# Patient Record
Sex: MALE | Race: OTHER | NOT HISPANIC OR LATINO | URBAN - METROPOLITAN AREA
[De-identification: names, ages, dates, MRNs, and addresses within clinical notes are randomized per-mention and may not be internally consistent; named-entity substitution may affect disease eponyms.]

---

## 2023-06-28 ENCOUNTER — INPATIENT (INPATIENT)
Facility: HOSPITAL | Age: 78
LOS: 15 days | Discharge: HOME HEALTH | DRG: 219 | End: 2023-07-14
Attending: THORACIC SURGERY (CARDIOTHORACIC VASCULAR SURGERY) | Admitting: THORACIC SURGERY (CARDIOTHORACIC VASCULAR SURGERY)
Payer: MEDICAID

## 2023-06-28 VITALS
WEIGHT: 146.83 LBS | HEART RATE: 68 BPM | SYSTOLIC BLOOD PRESSURE: 222 MMHG | DIASTOLIC BLOOD PRESSURE: 100 MMHG | HEIGHT: 69.29 IN | OXYGEN SATURATION: 98 % | RESPIRATION RATE: 18 BRPM

## 2023-06-28 DIAGNOSIS — Z90.79 ACQUIRED ABSENCE OF OTHER GENITAL ORGAN(S): Chronic | ICD-10-CM

## 2023-06-28 DIAGNOSIS — Z90.49 ACQUIRED ABSENCE OF OTHER SPECIFIED PARTS OF DIGESTIVE TRACT: Chronic | ICD-10-CM

## 2023-06-28 LAB
A1C WITH ESTIMATED AVERAGE GLUCOSE RESULT: 8.7 % — HIGH (ref 4–5.6)
ALBUMIN SERPL ELPH-MCNC: 3.7 G/DL — SIGNIFICANT CHANGE UP (ref 3.3–5)
ALP SERPL-CCNC: 130 U/L — HIGH (ref 40–120)
ALT FLD-CCNC: 22 U/L — SIGNIFICANT CHANGE UP (ref 10–45)
ANION GAP SERPL CALC-SCNC: 10 MMOL/L — SIGNIFICANT CHANGE UP (ref 5–17)
APTT BLD: 32.4 SEC — SIGNIFICANT CHANGE UP (ref 27.5–35.5)
AST SERPL-CCNC: 28 U/L — SIGNIFICANT CHANGE UP (ref 10–40)
BASOPHILS # BLD AUTO: 0.05 K/UL — SIGNIFICANT CHANGE UP (ref 0–0.2)
BASOPHILS NFR BLD AUTO: 0.7 % — SIGNIFICANT CHANGE UP (ref 0–2)
BILIRUB SERPL-MCNC: 1.1 MG/DL — SIGNIFICANT CHANGE UP (ref 0.2–1.2)
BLD GP AB SCN SERPL QL: NEGATIVE — SIGNIFICANT CHANGE UP
BUN SERPL-MCNC: 47 MG/DL — HIGH (ref 7–23)
CALCIUM SERPL-MCNC: 9.3 MG/DL — SIGNIFICANT CHANGE UP (ref 8.4–10.5)
CHLORIDE SERPL-SCNC: 103 MMOL/L — SIGNIFICANT CHANGE UP (ref 96–108)
CO2 SERPL-SCNC: 19 MMOL/L — LOW (ref 22–31)
CREAT SERPL-MCNC: 1.4 MG/DL — HIGH (ref 0.5–1.3)
EGFR: 51 ML/MIN/1.73M2 — LOW
EOSINOPHIL # BLD AUTO: 0.16 K/UL — SIGNIFICANT CHANGE UP (ref 0–0.5)
EOSINOPHIL NFR BLD AUTO: 2.1 % — SIGNIFICANT CHANGE UP (ref 0–6)
ESTIMATED AVERAGE GLUCOSE: 203 MG/DL — HIGH (ref 68–114)
GLUCOSE BLDC GLUCOMTR-MCNC: 158 MG/DL — HIGH (ref 70–99)
GLUCOSE SERPL-MCNC: 206 MG/DL — HIGH (ref 70–99)
HCT VFR BLD CALC: 43.1 % — SIGNIFICANT CHANGE UP (ref 39–50)
HGB BLD-MCNC: 14.6 G/DL — SIGNIFICANT CHANGE UP (ref 13–17)
IMM GRANULOCYTES NFR BLD AUTO: 0.3 % — SIGNIFICANT CHANGE UP (ref 0–0.9)
INR BLD: 1.08 — SIGNIFICANT CHANGE UP (ref 0.88–1.16)
LYMPHOCYTES # BLD AUTO: 2.06 K/UL — SIGNIFICANT CHANGE UP (ref 1–3.3)
LYMPHOCYTES # BLD AUTO: 27.5 % — SIGNIFICANT CHANGE UP (ref 13–44)
MCHC RBC-ENTMCNC: 32.7 PG — SIGNIFICANT CHANGE UP (ref 27–34)
MCHC RBC-ENTMCNC: 33.9 GM/DL — SIGNIFICANT CHANGE UP (ref 32–36)
MCV RBC AUTO: 96.4 FL — SIGNIFICANT CHANGE UP (ref 80–100)
MONOCYTES # BLD AUTO: 0.53 K/UL — SIGNIFICANT CHANGE UP (ref 0–0.9)
MONOCYTES NFR BLD AUTO: 7.1 % — SIGNIFICANT CHANGE UP (ref 2–14)
NEUTROPHILS # BLD AUTO: 4.68 K/UL — SIGNIFICANT CHANGE UP (ref 1.8–7.4)
NEUTROPHILS NFR BLD AUTO: 62.3 % — SIGNIFICANT CHANGE UP (ref 43–77)
NRBC # BLD: 0 /100 WBCS — SIGNIFICANT CHANGE UP (ref 0–0)
NT-PROBNP SERPL-SCNC: HIGH PG/ML (ref 0–300)
PLATELET # BLD AUTO: 251 K/UL — SIGNIFICANT CHANGE UP (ref 150–400)
POTASSIUM SERPL-MCNC: 4.9 MMOL/L — SIGNIFICANT CHANGE UP (ref 3.5–5.3)
POTASSIUM SERPL-SCNC: 4.9 MMOL/L — SIGNIFICANT CHANGE UP (ref 3.5–5.3)
PROT SERPL-MCNC: 7.8 G/DL — SIGNIFICANT CHANGE UP (ref 6–8.3)
PROTHROM AB SERPL-ACNC: 12.9 SEC — SIGNIFICANT CHANGE UP (ref 10.5–13.4)
RBC # BLD: 4.47 M/UL — SIGNIFICANT CHANGE UP (ref 4.2–5.8)
RBC # FLD: 12.9 % — SIGNIFICANT CHANGE UP (ref 10.3–14.5)
RH IG SCN BLD-IMP: POSITIVE — SIGNIFICANT CHANGE UP
SODIUM SERPL-SCNC: 132 MMOL/L — LOW (ref 135–145)
TSH SERPL-MCNC: 1.44 UIU/ML — SIGNIFICANT CHANGE UP (ref 0.27–4.2)
WBC # BLD: 7.5 K/UL — SIGNIFICANT CHANGE UP (ref 3.8–10.5)
WBC # FLD AUTO: 7.5 K/UL — SIGNIFICANT CHANGE UP (ref 3.8–10.5)

## 2023-06-28 PROCEDURE — 93010 ELECTROCARDIOGRAM REPORT: CPT

## 2023-06-28 RX ORDER — DEXTROSE 50 % IN WATER 50 %
25 SYRINGE (ML) INTRAVENOUS ONCE
Refills: 0 | Status: DISCONTINUED | OUTPATIENT
Start: 2023-06-28 | End: 2023-06-30

## 2023-06-28 RX ORDER — POTASSIUM CHLORIDE 20 MEQ
20 PACKET (EA) ORAL DAILY
Refills: 0 | Status: DISCONTINUED | OUTPATIENT
Start: 2023-06-29 | End: 2023-06-30

## 2023-06-28 RX ORDER — PANTOPRAZOLE SODIUM 20 MG/1
40 TABLET, DELAYED RELEASE ORAL
Refills: 0 | Status: DISCONTINUED | OUTPATIENT
Start: 2023-06-28 | End: 2023-06-30

## 2023-06-28 RX ORDER — HEPARIN SODIUM 5000 [USP'U]/ML
5000 INJECTION INTRAVENOUS; SUBCUTANEOUS EVERY 8 HOURS
Refills: 0 | Status: DISCONTINUED | OUTPATIENT
Start: 2023-06-28 | End: 2023-06-30

## 2023-06-28 RX ORDER — INSULIN LISPRO 100/ML
VIAL (ML) SUBCUTANEOUS
Refills: 0 | Status: DISCONTINUED | OUTPATIENT
Start: 2023-06-28 | End: 2023-06-30

## 2023-06-28 RX ORDER — DEXTROSE 50 % IN WATER 50 %
12.5 SYRINGE (ML) INTRAVENOUS ONCE
Refills: 0 | Status: DISCONTINUED | OUTPATIENT
Start: 2023-06-28 | End: 2023-06-30

## 2023-06-28 RX ORDER — FUROSEMIDE 40 MG
40 TABLET ORAL DAILY
Refills: 0 | Status: DISCONTINUED | OUTPATIENT
Start: 2023-06-29 | End: 2023-06-30

## 2023-06-28 RX ORDER — HYDRALAZINE HCL 50 MG
5 TABLET ORAL ONCE
Refills: 0 | Status: COMPLETED | OUTPATIENT
Start: 2023-06-28 | End: 2023-06-28

## 2023-06-28 RX ORDER — GLUCAGON INJECTION, SOLUTION 0.5 MG/.1ML
1 INJECTION, SOLUTION SUBCUTANEOUS ONCE
Refills: 0 | Status: DISCONTINUED | OUTPATIENT
Start: 2023-06-28 | End: 2023-06-30

## 2023-06-28 RX ORDER — SODIUM CHLORIDE 9 MG/ML
3 INJECTION INTRAMUSCULAR; INTRAVENOUS; SUBCUTANEOUS EVERY 8 HOURS
Refills: 0 | Status: DISCONTINUED | OUTPATIENT
Start: 2023-06-28 | End: 2023-06-30

## 2023-06-28 RX ORDER — SODIUM CHLORIDE 9 MG/ML
1000 INJECTION, SOLUTION INTRAVENOUS
Refills: 0 | Status: DISCONTINUED | OUTPATIENT
Start: 2023-06-28 | End: 2023-06-30

## 2023-06-28 RX ORDER — LOSARTAN POTASSIUM 100 MG/1
100 TABLET, FILM COATED ORAL DAILY
Refills: 0 | Status: DISCONTINUED | OUTPATIENT
Start: 2023-06-28 | End: 2023-06-29

## 2023-06-28 RX ORDER — SENNA PLUS 8.6 MG/1
2 TABLET ORAL AT BEDTIME
Refills: 0 | Status: DISCONTINUED | OUTPATIENT
Start: 2023-06-28 | End: 2023-06-30

## 2023-06-28 RX ORDER — DEXTROSE 50 % IN WATER 50 %
15 SYRINGE (ML) INTRAVENOUS ONCE
Refills: 0 | Status: DISCONTINUED | OUTPATIENT
Start: 2023-06-28 | End: 2023-06-30

## 2023-06-28 RX ADMIN — Medication 5 MILLIGRAM(S): at 22:31

## 2023-06-28 RX ADMIN — Medication 2: at 23:23

## 2023-06-28 NOTE — H&P ADULT - HISTORY OF PRESENT ILLNESS
76 YO Male w/ PMHx of HTN and DM who originally presented to [facility] c/o chest pain. He underwent a stress test which revealed small sized, reversible, myocardial perfusion defect of the anteroapical wall c/w ischemia. TTE revealed moderate AS and severe AR, nml LVEF. Patient referred for cardiac cath which revealed non-obstructive CAD. He was then transferred to [facility] under the care of Dr. Koehler for further work-up and intervention of severe AI and AS. At present ___.  78 YO Syrian-speaking Male w/ PMHx of HTN and DM who originally presented to [facility] on 6/26 c/o chest pain x 3 months. Chest pain associated with LE edema, cough and phlegm, which was unresolved with OTC medication. Patient lives in [facility] and states that he has not seen a doctor about his symptoms until coming to the [place]. At [facility] he underwent a stress test which revealed small sized, reversible, myocardial perfusion defect of the anteroapical wall c/w ischemia. TTE revealed moderate AS and severe AR, nml LVEF. Patient referred for cardiac cath which revealed non-obstructive CAD. He was then transferred to [facility] under the care of Dr. Koehler for further work-up and intervention of severe AI and AS. At present patient admits to feeling like his heart is racing, but denies fever, chills chest pain, SOB, N/V/D.   ID#[id number] utilized.

## 2023-06-28 NOTE — H&P ADULT - NSHPPHYSICALEXAM_GEN_ALL_CORE
PHYSICAL EXAM:  GENERAL: NAD, lying in bed comfortably  HEAD:  Atraumatic, Normocephalic  EYES: EOMI, PERRLA, conjunctiva and sclera clear  ENT: Moist mucous membranes  NECK: Supple, No JVD  CHEST/LUNG: Clear to auscultation bilaterally; No rales, rhonchi, wheezing, or rubs. Unlabored respirations  HEART: Regular rate and rhythm; No murmurs, rubs, or gallops  ABDOMEN: Bowel sounds present; Soft, Nontender, Nondistended. No hepatomegally  EXTREMITIES:  2+ Peripheral Pulses, brisk capillary refill. No clubbing, cyanosis, or edema  NERVOUS SYSTEM:  Alert & Oriented X3, speech clear. No deficits PHYSICAL EXAM:  GENERAL: NAD, sitting upright in bed  HEAD:  Atraumatic, Normocephalic  EYES: EOMI, PERRLA, conjunctiva and sclera clear  ENT: Moist mucous membranes  NECK: Supple, No JVD  CHEST/LUNG: CTAB  HEART: RRR  ABDOMEN: Soft, Nontender, Nondistended. No hepatomegally  EXTREMITIES:  2+ Peripheral Pulses, brisk capillary refill. No clubbing, cyanosis, or edema  NERVOUS SYSTEM:  Alert & Oriented X3, speech clear. No deficits

## 2023-06-28 NOTE — H&P ADULT - NSICDXPASTMEDICALHX_GEN_ALL_CORE_FT
PAST MEDICAL HISTORY:  DM (diabetes mellitus)     HTN (hypertension)     Severe aortic regurgitation

## 2023-06-28 NOTE — H&P ADULT - ASSESSMENT
78 YO Male w/ PMHx of HTN and DM who originally presented to [facility] c/o chest pain. He underwent a stress test which revealed small sized, reversible, myocardial perfusion defect of the anteroapical wall c/w ischemia. TTE revealed moderate AS and severe AR, nml LVEF. Patient referred for cardiac cath which revealed non-obstructive CAD. He was then transferred to [facility] under the care of Dr. Koehler for further work-up and intervention of severe AI and AS.     Plan:    Neurovascular:   -Pain well controlled with current regimen. PRN's: Tylenol    Cardiovascular:   -Severe AS and AI  -F/u PST  -Hx of HTN  -Hemodynamically stable.   -Monitor: BP, HR, tele    Respiratory:   -Oxygenating well on room air  -Encourage continued use of IS 10x/hr and frequent ambulation  -CXR: pending    GI:  -GI PPX: Protonix  -PO Diet  -Bowel Regimen: Senna    Renal / :  -Continue to monitor renal function: BUN/Cr  -Monitor I/O's daily     Endocrine:    -Hx of DM  -A1c:  -ISS  -No hx of thyroid dx  -TSH:    Hematologic:  -CBC: H/H-  -Coagulation Panel.    ID:  -Temperature: Afebrile  -CBC: WBC-  -Continue to observe for SIRS/Sepsis Syndrome.    Prophylaxis:  -DVT prophylaxis with 5000 SubQ Heparin q8h.  -Continue with SCD's b/l while patient is at rest     Disposition:  -Discharge home once patient is medically ready   76 YO Ghanaian-speaking Male w/ PMHx of HTN and DM who originally presented to [facility] on 6/26 c/o chest pain x 3 months. Chest pain associated with LE edema, cough and phlegm, which was unresolved with OTC medication. Patient lives in [facility] and states that he has not seen a doctor about his symptoms until coming to the [place]. At [facility] he underwent a stress test which revealed small sized, reversible, myocardial perfusion defect of the anteroapical wall c/w ischemia. TTE revealed moderate AS and severe AR, nml LVEF. Patient referred for cardiac cath which revealed non-obstructive CAD. He was then transferred to [facility] under the care of Dr. Koehler for further work-up and intervention of severe AI and AS.     Plan:    Neurovascular:   -Pain well controlled with current regimen. PRN's: Tylenol    Cardiovascular:   -Severe AS and AI  -F/u PST  -Surgical plan pending  -Hx of HTN  -Given IVP Hydralazine for BP 170s  -Home meds: Losartan 100mg, Bisoprolol 2.5mg QD  -Hemodynamically stable.   -Monitor: BP, HR, tele    Respiratory:   -Oxygenating well on room air  -Encourage continued use of IS 10x/hr and frequent ambulation  -CXR: pending    GI:  -GI PPX: Protonix  -PO Diet  -Bowel Regimen: Senna    Renal / :  -Continue to monitor renal function: BUN/Cr: pending  -Monitor I/O's daily     Endocrine:    -Hx of DM  -A1c: pending  -ISS  -No hx of  thyroid dx  -TSH: pending    Hematologic:  -CBC: H/H- pending  -Coagulation Panel.    ID:  -Temperature: Afebrile  -CBC: WBC- pending  -Continue to observe for SIRS/Sepsis Syndrome.    Prophylaxis:  -DVT prophylaxis with 5000 SubQ Heparin q8h.  -Continue with SCD's b/l while patient is at rest     Disposition:  -OR plan pending

## 2023-06-28 NOTE — H&P ADULT - NSHPREVIEWOFSYSTEMS_GEN_ALL_CORE
REVIEW OF SYSTEMS:    CONSTITUTIONAL: No weakness, fevers or chills  EYES/ENT: No visual changes;  No vertigo or throat pain   NECK: No pain or stiffness  RESPIRATORY: No cough, wheezing, hemoptysis; No shortness of breath  CARDIOVASCULAR: No chest pain or palpitations  GASTROINTESTINAL: No abdominal or epigastric pain. No nausea, vomiting, or hematemesis; No diarrhea or constipation. No melena or hematochezia.  GENITOURINARY: No dysuria, frequency or hematuria  NEUROLOGICAL: No numbness or weakness  SKIN: No itching, burning, rashes, or lesions   All other review of systems is negative unless indicated above. REVIEW OF SYSTEMS:    CONSTITUTIONAL: No weakness, fevers or chills  EYES/ENT: No visual changes;  No vertigo or throat pain   NECK: No pain or stiffness  RESPIRATORY: No cough, wheezing, hemoptysis; No shortness of breath  CARDIOVASCULAR: +heart racing. No chest pain or palpitations  GASTROINTESTINAL: No abdominal or epigastric pain. No nausea, vomiting, or hematemesis; No diarrhea or constipation. No melena or hematochezia.  GENITOURINARY: No dysuria, frequency or hematuria  NEUROLOGICAL: No numbness or weakness  SKIN: No itching, burning, rashes, or lesions   All other review of systems is negative unless indicated above.

## 2023-06-29 ENCOUNTER — TRANSCRIPTION ENCOUNTER (OUTPATIENT)
Age: 78
End: 2023-06-29

## 2023-06-29 PROBLEM — Z00.00 ENCOUNTER FOR PREVENTIVE HEALTH EXAMINATION: Status: ACTIVE | Noted: 2023-06-29

## 2023-06-29 LAB
A1C WITH ESTIMATED AVERAGE GLUCOSE RESULT: 8.6 % — HIGH (ref 4–5.6)
ALBUMIN SERPL ELPH-MCNC: 3.5 G/DL — SIGNIFICANT CHANGE UP (ref 3.3–5)
ALP SERPL-CCNC: 117 U/L — SIGNIFICANT CHANGE UP (ref 40–120)
ALT FLD-CCNC: 19 U/L — SIGNIFICANT CHANGE UP (ref 10–45)
ANION GAP SERPL CALC-SCNC: 10 MMOL/L — SIGNIFICANT CHANGE UP (ref 5–17)
APPEARANCE UR: CLEAR — SIGNIFICANT CHANGE UP
AST SERPL-CCNC: 25 U/L — SIGNIFICANT CHANGE UP (ref 10–40)
BACTERIA # UR AUTO: PRESENT /HPF
BASOPHILS # BLD AUTO: 0.03 K/UL — SIGNIFICANT CHANGE UP (ref 0–0.2)
BASOPHILS NFR BLD AUTO: 0.3 % — SIGNIFICANT CHANGE UP (ref 0–2)
BILIRUB SERPL-MCNC: 1.2 MG/DL — SIGNIFICANT CHANGE UP (ref 0.2–1.2)
BILIRUB UR-MCNC: NEGATIVE — SIGNIFICANT CHANGE UP
BLD GP AB SCN SERPL QL: NEGATIVE — SIGNIFICANT CHANGE UP
BUN SERPL-MCNC: 44 MG/DL — HIGH (ref 7–23)
CALCIUM SERPL-MCNC: 8.7 MG/DL — SIGNIFICANT CHANGE UP (ref 8.4–10.5)
CHLORIDE SERPL-SCNC: 102 MMOL/L — SIGNIFICANT CHANGE UP (ref 96–108)
CHOLEST SERPL-MCNC: 163 MG/DL — SIGNIFICANT CHANGE UP
CHOLEST SERPL-MCNC: 169 MG/DL — SIGNIFICANT CHANGE UP
CO2 SERPL-SCNC: 21 MMOL/L — LOW (ref 22–31)
COLOR SPEC: YELLOW — SIGNIFICANT CHANGE UP
COMMENT - URINE: SIGNIFICANT CHANGE UP
CREAT SERPL-MCNC: 1.41 MG/DL — HIGH (ref 0.5–1.3)
DIFF PNL FLD: NEGATIVE — SIGNIFICANT CHANGE UP
EGFR: 50 ML/MIN/1.73M2 — LOW
EOSINOPHIL # BLD AUTO: 0.2 K/UL — SIGNIFICANT CHANGE UP (ref 0–0.5)
EOSINOPHIL NFR BLD AUTO: 2.3 % — SIGNIFICANT CHANGE UP (ref 0–6)
EPI CELLS # UR: ABNORMAL /HPF (ref 0–5)
ESTIMATED AVERAGE GLUCOSE: 200 MG/DL — HIGH (ref 68–114)
GLUCOSE BLDC GLUCOMTR-MCNC: 110 MG/DL — HIGH (ref 70–99)
GLUCOSE BLDC GLUCOMTR-MCNC: 121 MG/DL — HIGH (ref 70–99)
GLUCOSE BLDC GLUCOMTR-MCNC: 142 MG/DL — HIGH (ref 70–99)
GLUCOSE BLDC GLUCOMTR-MCNC: 187 MG/DL — HIGH (ref 70–99)
GLUCOSE BLDC GLUCOMTR-MCNC: 202 MG/DL — HIGH (ref 70–99)
GLUCOSE BLDC GLUCOMTR-MCNC: 272 MG/DL — HIGH (ref 70–99)
GLUCOSE SERPL-MCNC: 147 MG/DL — HIGH (ref 70–99)
GLUCOSE UR QL: 250
HCT VFR BLD CALC: 41.7 % — SIGNIFICANT CHANGE UP (ref 39–50)
HDLC SERPL-MCNC: 39 MG/DL — LOW
HDLC SERPL-MCNC: 44 MG/DL — SIGNIFICANT CHANGE UP
HGB BLD-MCNC: 14 G/DL — SIGNIFICANT CHANGE UP (ref 13–17)
IMM GRANULOCYTES NFR BLD AUTO: 0.5 % — SIGNIFICANT CHANGE UP (ref 0–0.9)
KETONES UR-MCNC: NEGATIVE — SIGNIFICANT CHANGE UP
LEUKOCYTE ESTERASE UR-ACNC: NEGATIVE — SIGNIFICANT CHANGE UP
LIPID PNL WITH DIRECT LDL SERPL: 109 MG/DL — HIGH
LIPID PNL WITH DIRECT LDL SERPL: 99 MG/DL — SIGNIFICANT CHANGE UP
LYMPHOCYTES # BLD AUTO: 2.41 K/UL — SIGNIFICANT CHANGE UP (ref 1–3.3)
LYMPHOCYTES # BLD AUTO: 27.7 % — SIGNIFICANT CHANGE UP (ref 13–44)
MCHC RBC-ENTMCNC: 32.5 PG — SIGNIFICANT CHANGE UP (ref 27–34)
MCHC RBC-ENTMCNC: 33.6 GM/DL — SIGNIFICANT CHANGE UP (ref 32–36)
MCV RBC AUTO: 96.8 FL — SIGNIFICANT CHANGE UP (ref 80–100)
MONOCYTES # BLD AUTO: 0.65 K/UL — SIGNIFICANT CHANGE UP (ref 0–0.9)
MONOCYTES NFR BLD AUTO: 7.5 % — SIGNIFICANT CHANGE UP (ref 2–14)
NEUTROPHILS # BLD AUTO: 5.37 K/UL — SIGNIFICANT CHANGE UP (ref 1.8–7.4)
NEUTROPHILS NFR BLD AUTO: 61.7 % — SIGNIFICANT CHANGE UP (ref 43–77)
NITRITE UR-MCNC: NEGATIVE — SIGNIFICANT CHANGE UP
NON HDL CHOLESTEROL: 119 MG/DL — SIGNIFICANT CHANGE UP
NON HDL CHOLESTEROL: 130 MG/DL — HIGH
NRBC # BLD: 0 /100 WBCS — SIGNIFICANT CHANGE UP (ref 0–0)
PH UR: 5.5 — SIGNIFICANT CHANGE UP (ref 5–8)
PLATELET # BLD AUTO: 250 K/UL — SIGNIFICANT CHANGE UP (ref 150–400)
POTASSIUM SERPL-MCNC: 4.5 MMOL/L — SIGNIFICANT CHANGE UP (ref 3.5–5.3)
POTASSIUM SERPL-SCNC: 4.5 MMOL/L — SIGNIFICANT CHANGE UP (ref 3.5–5.3)
PROT SERPL-MCNC: 7.4 G/DL — SIGNIFICANT CHANGE UP (ref 6–8.3)
PROT UR-MCNC: 30 MG/DL
RBC # BLD: 4.31 M/UL — SIGNIFICANT CHANGE UP (ref 4.2–5.8)
RBC # FLD: 12.9 % — SIGNIFICANT CHANGE UP (ref 10.3–14.5)
RBC CASTS # UR COMP ASSIST: < 5 /HPF — SIGNIFICANT CHANGE UP
RH IG SCN BLD-IMP: POSITIVE — SIGNIFICANT CHANGE UP
SODIUM SERPL-SCNC: 133 MMOL/L — LOW (ref 135–145)
SP GR SPEC: 1.02 — SIGNIFICANT CHANGE UP (ref 1–1.03)
TRIGL SERPL-MCNC: 105 MG/DL — SIGNIFICANT CHANGE UP
TRIGL SERPL-MCNC: 98 MG/DL — SIGNIFICANT CHANGE UP
UROBILINOGEN FLD QL: 0.2 E.U./DL — SIGNIFICANT CHANGE UP
WBC # BLD: 8.7 K/UL — SIGNIFICANT CHANGE UP (ref 3.8–10.5)
WBC # FLD AUTO: 8.7 K/UL — SIGNIFICANT CHANGE UP (ref 3.8–10.5)
WBC UR QL: ABNORMAL /HPF

## 2023-06-29 PROCEDURE — 93010 ELECTROCARDIOGRAM REPORT: CPT

## 2023-06-29 PROCEDURE — 93880 EXTRACRANIAL BILAT STUDY: CPT | Mod: 26

## 2023-06-29 PROCEDURE — 94010 BREATHING CAPACITY TEST: CPT | Mod: 26

## 2023-06-29 PROCEDURE — 71046 X-RAY EXAM CHEST 2 VIEWS: CPT | Mod: 26

## 2023-06-29 RX ORDER — INSULIN LISPRO 100/ML
5 VIAL (ML) SUBCUTANEOUS
Refills: 0 | Status: DISCONTINUED | OUTPATIENT
Start: 2023-06-29 | End: 2023-06-30

## 2023-06-29 RX ORDER — CEFTRIAXONE 500 MG/1
1000 INJECTION, POWDER, FOR SOLUTION INTRAMUSCULAR; INTRAVENOUS EVERY 24 HOURS
Refills: 0 | Status: DISCONTINUED | OUTPATIENT
Start: 2023-06-29 | End: 2023-06-30

## 2023-06-29 RX ORDER — INSULIN GLARGINE 100 [IU]/ML
10 INJECTION, SOLUTION SUBCUTANEOUS AT BEDTIME
Refills: 0 | Status: DISCONTINUED | OUTPATIENT
Start: 2023-06-29 | End: 2023-06-30

## 2023-06-29 RX ORDER — BNT162B2 ORIGINAL AND OMICRON BA.4/BA.5 .1125; .1125 MG/2.25ML; MG/2.25ML
0.3 INJECTION, SUSPENSION INTRAMUSCULAR ONCE
Refills: 0 | Status: DISCONTINUED | OUTPATIENT
Start: 2023-06-29 | End: 2023-06-29

## 2023-06-29 RX ORDER — CHLORHEXIDINE GLUCONATE 213 G/1000ML
1 SOLUTION TOPICAL ONCE
Refills: 0 | Status: COMPLETED | OUTPATIENT
Start: 2023-06-29 | End: 2023-06-29

## 2023-06-29 RX ORDER — CHLORHEXIDINE GLUCONATE 213 G/1000ML
1 SOLUTION TOPICAL ONCE
Refills: 0 | Status: DISCONTINUED | OUTPATIENT
Start: 2023-06-29 | End: 2023-07-01

## 2023-06-29 RX ORDER — CHLORHEXIDINE GLUCONATE 213 G/1000ML
1 SOLUTION TOPICAL ONCE
Refills: 0 | Status: COMPLETED | OUTPATIENT
Start: 2023-06-30 | End: 2023-06-30

## 2023-06-29 RX ORDER — CHLORHEXIDINE GLUCONATE 213 G/1000ML
15 SOLUTION TOPICAL ONCE
Refills: 0 | Status: COMPLETED | OUTPATIENT
Start: 2023-06-30 | End: 2023-06-30

## 2023-06-29 RX ADMIN — HEPARIN SODIUM 5000 UNIT(S): 5000 INJECTION INTRAVENOUS; SUBCUTANEOUS at 06:42

## 2023-06-29 RX ADMIN — Medication 4: at 17:13

## 2023-06-29 RX ADMIN — Medication 40 MILLIGRAM(S): at 06:42

## 2023-06-29 RX ADMIN — PANTOPRAZOLE SODIUM 40 MILLIGRAM(S): 20 TABLET, DELAYED RELEASE ORAL at 06:42

## 2023-06-29 RX ADMIN — Medication 2: at 14:14

## 2023-06-29 RX ADMIN — INSULIN GLARGINE 10 UNIT(S): 100 INJECTION, SOLUTION SUBCUTANEOUS at 23:35

## 2023-06-29 RX ADMIN — SODIUM CHLORIDE 3 MILLILITER(S): 9 INJECTION INTRAMUSCULAR; INTRAVENOUS; SUBCUTANEOUS at 22:45

## 2023-06-29 RX ADMIN — CEFTRIAXONE 100 MILLIGRAM(S): 500 INJECTION, POWDER, FOR SOLUTION INTRAMUSCULAR; INTRAVENOUS at 14:14

## 2023-06-29 RX ADMIN — HEPARIN SODIUM 5000 UNIT(S): 5000 INJECTION INTRAVENOUS; SUBCUTANEOUS at 22:26

## 2023-06-29 RX ADMIN — HEPARIN SODIUM 5000 UNIT(S): 5000 INJECTION INTRAVENOUS; SUBCUTANEOUS at 14:13

## 2023-06-29 RX ADMIN — CHLORHEXIDINE GLUCONATE 1 APPLICATION(S): 213 SOLUTION TOPICAL at 22:27

## 2023-06-29 RX ADMIN — SODIUM CHLORIDE 3 MILLILITER(S): 9 INJECTION INTRAMUSCULAR; INTRAVENOUS; SUBCUTANEOUS at 14:14

## 2023-06-29 RX ADMIN — SENNA PLUS 2 TABLET(S): 8.6 TABLET ORAL at 22:26

## 2023-06-29 NOTE — PROGRESS NOTE ADULT - SUBJECTIVE AND OBJECTIVE BOX
HISTORY OF PRESENT ILLNESS  Akash Johnson is a 78-year-old male with a past medical history of type 2 diabetes mellitus, and hypertension who originally presented to [facility] (6/26/23) complaining of chest pain for the previous 3 months. He lives in [facility] and stated that he had not seen a doctor about his symptoms until [place] the [place]. The patient underwent a stress test which revealed a small sized, reversible, myocardial perfusion defect of the anteroapical wall consistent with ischemia. TTE revealed moderate aortic stenosis and severe aortic regurgitation with normal LVEF. The patient was referred for cardiac cath which revealed non-obstructive coronary artery disease. He was then transferred to [facility] under the care of Dr. Koehler for further work-up and intervention of severe aortic stenosis. Endocrinology was consulted for recommendations regarding diabetes management.     CAPILLARY BLOOD GLUCOSE & INSULIN RECEIVED  158 mg/dL (06-28 @ 22:51) ? 2 units of lispro sliding scale.   142 mg/dL (06-29 @ 06:52) ? Ø  272 mg/dL (06-29 @ 11:35) ?     DIABETES HISTORY  - Age at diagnosis:   - Symptoms at time of diagnosis:   - Current Therapy:  - History of other regimens:   - History of hypoglycemia:   - History of DKA/HHS:   - Complications:   - Home FSG:        > Fasting: *** mg/dL.        > Before meals: *** mg/dL.        > Bedtime: *** mg/dL.  - Diet:          > Breakfast:         > Lunch:        > Dinner:        > Snacks:  - Physical activity:    - Outpatient follow-up:     PAST MEDICAL & SURGICAL HISTORY  As per history of present illness.     FAMILY HISTORY  - Diabetes:  - Thyroid:  - Autoimmune:  - Other:    SOCIAL HISTORY  - Work:  - Alcohol:  - Smoking:  - Recreational Drugs:    ALLERGIES  Allergy Status Unknown    CURRENT MEDICATIONS  cefTRIAXone   IVPB 1000 milliGRAM(s) IV Intermittent every 24 hours  dextrose 5%. 1000 milliLiter(s) IV Continuous <Continuous>  dextrose 5%. 1000 milliLiter(s) IV Continuous <Continuous>  dextrose 50% Injectable 25 Gram(s) IV Push once  dextrose 50% Injectable 12.5 Gram(s) IV Push once  dextrose 50% Injectable 25 Gram(s) IV Push once  dextrose Oral Gel 15 Gram(s) Oral once PRN  furosemide    Tablet 40 milliGRAM(s) Oral daily  glucagon  Injectable 1 milliGRAM(s) IntraMuscular once  heparin   Injectable 5000 Unit(s) SubCutaneous every 8 hours  insulin lispro (ADMELOG) corrective regimen sliding scale   SubCutaneous three times a day before meals  pantoprazole    Tablet 40 milliGRAM(s) Oral before breakfast  potassium chloride    Tablet ER 20 milliEquivalent(s) Oral daily  senna 2 Tablet(s) Oral at bedtime  sodium chloride 0.9% lock flush 3 milliLiter(s) IV Push every 8 hours    REVIEW OF SYSTEMS  Constitutional:  Negative fever, chills or loss of appetite.  Eyes:  Negative blurry vision or double vision.  Cardiovascular:  Negative for chest pain or palpitations.  Respiratory:  Negative for cough, wheezing, or shortness of breath.   Gastrointestinal:  Negative for nausea, vomiting, diarrhea, constipation, or abdominal pain.  Genitourinary:  Negative frequency, urgency or dysuria.  Neurologic:  No headache, confusion, dizziness, lightheadedness.    PHYSICAL EXAM  Vital Signs Last 24 Hrs  T(C): 36.8 (29 Jun 2023 08:57), Max: 36.8 (29 Jun 2023 08:57)  T(F): 98.3 (29 Jun 2023 08:57), Max: 98.3 (29 Jun 2023 08:57)  HR: 74 (29 Jun 2023 08:30) (68 - 74)  BP: 137/65 (29 Jun 2023 08:30) (113/72 - 222/100)  BP(mean): 94 (29 Jun 2023 08:30) (87 - 101)  RR: 17 (29 Jun 2023 08:30) (17 - 18)  SpO2: 95% (29 Jun 2023 08:30) (95% - 98%)    Parameters below as of 29 Jun 2023 08:30  Patient On (Oxygen Delivery Method): room air    Constitutional: Awake, alert, in no acute distress.   HEENT: Normocephalic, atraumatic, SCARLETT, no proptosis or lid retraction.   Neck: supple, no acanthosis, no thyromegaly or palpable thyroid nodules.  Respiratory: Lungs clear to ausculation bilaterally.   Cardiovascular: regular rhythm, normal S1 and S2, no audible murmurs.   GI: soft, non-tender, non-distended, bowel sounds present, no masses appreciated.  Extremities: No lower extremity edema, peripheral pulses present.   Skin: no rashes.   Psychiatric: AAO x 3. Normal affect/mood.     LABS  CBC - WBC/HGB/HTC/PLT: 8.70/14.0/41.7/250 (06-29-23)  BMP: Na/K/Cl/Bicarb/BUN/Cr/Gluc: 133/4.5/102/21/44/1.41/147 (06-29-23)  Anion Gap: 10 (06-29-23)  eGFR: 50 (06-29-23)  Calcium: 8.7 (06-29-23)  Phosphorus: -- (06-29-23)  Magnesium: -- (06-29-23)  LFT - Alb/Tprot/Tbili/Dbili/AlkPhos/ALT/AST: 3.5/--/1.2/--/117/19/25 (06-29-23)  PT/aPTT/INR: 12.9/32.4/1.08 (06-28-23)  Thyroid Stimulating Hormone, Serum: 1.440 (06-28-23)    ASSESSMENT / RECOMMENDATIONS  Mr. Johnson is a 78-year-old male with a past medical history of type 2 diabetes mellitus, hypertension and severe aortic stenosis who was transferred to [facility] for further work-up and intervention of severe aortic stenosis. Endocrinology was consulted for recommendations regarding diabetes management.     A1C: 8.6 %  BUN: 44  Creatinine: 1.41  GFR: 50  Weight: 66.6  BMI: 21.5    # Type 2 diabetes mellitus with hyperglycemia  - Please continue lantus *** units at bedtime.   - Continue lispro *** units before each meal.  - Continue lispro moderate / low dose sliding scale before meals and at bedtime.  - Patient's fingerstick glucose goal is 100-180 mg/dL.    - Discharge recommendations to be discussed.   - Patient can follow up at discharge with [facility] by calling [phone] to make an appointment.      Case discussed with Dr. Berry. Primary team updated.       Milton Laura    Endocrinology Fellow    Service Pager: [phone]

## 2023-06-29 NOTE — PROGRESS NOTE ADULT - ASSESSMENT
78 YO Bahamian-speaking Male w/ PMHx of HTN and DM who originally presented to [facility] on 6/26 c/o chest pain x 3 months. Chest pain associated with LE edema, cough and phlegm, which was unresolved with OTC medication. Patient lives in [facility] and states that he has not seen a doctor about his symptoms until coming to the [place]. At [facility] he underwent a stress test which revealed small sized, reversible, myocardial perfusion defect of the anteroapical wall c/w ischemia. TTE revealed moderate AS and severe AR, nml LVEF. Patient referred for cardiac cath which revealed non-obstructive CAD. He was then transferred to [facility] under the care of Dr. Koehler for further work-up and intervention of severe AI and AS. Pt planned for OR tomorrow for aortic valve replacement.     Plan:    Neurovascular:   -Pain well controlled with current regimen. PRN's: NA    Cardiovascular:   -Hemodynamically stable.   -Monitor: BP, HR, tele  -AS-OR tomorrow for aortic valve replacement  -c/w lasix 40 PO BID  -HTN    -c/w metoprolol 12.5 BID      Respiratory:   -Oxygenating well on room air  -Encourage continued use of IS 10x/hr and frequent ambulation  -CXR: WNL    GI:  -GI PPX: protonix  -PO Diet  -Bowel Regimen: senna    Renal / :  -Continue to monitor renal function: BUN/Cr 44/1.4  -Monitor I/O's daily     Endocrine:    -No hx of DM or thyroid dx  -A1c: 8.6  -ISS, awaiting endo reccs  -TSH: 1.4    Hematologic:  -CBC: H/H- 14/41  -Coagulation Panel.    ID:  -Temperature: 36.5  -CBC: WBC- 8.7  -Continue to observe for SIRS/Sepsis Syndrome.    Prophylaxis:  -DVT prophylaxis with 5000 SubQ Heparin q8h.  -Continue with SCD's b/l while patient is at rest     Disposition:  -OR tomorrow for aortic valve replacement

## 2023-06-29 NOTE — PROGRESS NOTE ADULT - SUBJECTIVE AND OBJECTIVE BOX
Planned Date of Surgery: 6/30                                                                                                           Surgeon/Attending MD: Rodo    Procedure: aortic valve replacement    Subjective: Pt feeling well, no acute complaints at this time    HPI:  78y Male    PAST MEDICAL & SURGICAL HISTORY:  HTN (hypertension)      DM (diabetes mellitus)      Severe aortic regurgitation      History of cholecystectomy      H/O prostatectomy          Allergy Status Unknown      Vitals:  T(C): 36.1 (06-29-23 @ 14:42), Max: 36.8 (06-29-23 @ 08:57)  HR: 72 (06-29-23 @ 14:10) (68 - 74)  BP: 156/70 (06-29-23 @ 14:10) (113/72 - 222/100)  RR: 16 (06-29-23 @ 14:10) (16 - 18)  SpO2: 97% (06-29-23 @ 14:10) (95% - 98%)      PHYSICAL EXAM:  General: resting comfortably in chair in NAD  Neurological: AOx3. Motor skills grossly intact  Cardiovascular: Normal S1/S2. Regular rate/rhythm. No murmurs  Respiratory: Lungs CTA bilaterally. No wheezing or rales  Gastrointestinal: +BS in all 4 quadrants. Non-distended. Soft. Non-tender  Extremities: Strength 5/5 b/l upper/lower extremities. Sensation grossly intact upper/lower extremities. No edema. No calf tenderness.  Vascular: Radial 2+bilaterally, DP 2+ b/l  Incision Sites: none      MEDICATIONS  (STANDING):  cefTRIAXone   IVPB 1000 milliGRAM(s) IV Intermittent every 24 hours  chlorhexidine 4% Liquid 1 Application(s) Topical once  chlorhexidine 4% Liquid 1 Application(s) Topical once  dextrose 5%. 1000 milliLiter(s) (50 mL/Hr) IV Continuous <Continuous>  dextrose 5%. 1000 milliLiter(s) (100 mL/Hr) IV Continuous <Continuous>  dextrose 50% Injectable 25 Gram(s) IV Push once  dextrose 50% Injectable 12.5 Gram(s) IV Push once  dextrose 50% Injectable 25 Gram(s) IV Push once  furosemide    Tablet 40 milliGRAM(s) Oral daily  glucagon  Injectable 1 milliGRAM(s) IntraMuscular once  heparin   Injectable 5000 Unit(s) SubCutaneous every 8 hours  insulin lispro (ADMELOG) corrective regimen sliding scale   SubCutaneous three times a day before meals  pantoprazole    Tablet 40 milliGRAM(s) Oral before breakfast  potassium chloride    Tablet ER 20 milliEquivalent(s) Oral daily  senna 2 Tablet(s) Oral at bedtime  sodium chloride 0.9% lock flush 3 milliLiter(s) IV Push every 8 hours    MEDICATIONS  (PRN):  dextrose Oral Gel 15 Gram(s) Oral once PRN Blood Glucose LESS THAN 70 milliGRAM(s)/deciliter      On Beta Blocker? YES / NO / CONTRAINDICATED Reason_______________    Labs:                        14.0   8.70  )-----------( 250      ( 29 Jun 2023 05:30 )             41.7     06-29    133<L>  |  102  |  44<H>  ----------------------------<  147<H>  4.5   |  21<L>  |  1.41<H>    Ca    8.7      29 Jun 2023 05:30    TPro  7.4  /  Alb  3.5  /  TBili  1.2  /  DBili  x   /  AST  25  /  ALT  19  /  AlkPhos  117  06-29    PT/INR - ( 28 Jun 2023 22:13 )   PT: 12.9 sec;   INR: 1.08          PTT - ( 28 Jun 2023 22:13 )  PTT:32.4 sec  Urinalysis Basic - ( 29 Jun 2023 05:30 )    Color: x / Appearance: x / SG: x / pH: x  Gluc: 147 mg/dL / Ketone: x  / Bili: x / Urobili: x   Blood: x / Protein: x / Nitrite: x   Leuk Esterase: x / RBC: x / WBC x   Sq Epi: x / Non Sq Epi: x / Bacteria: x      ABO Interpretation: A (06-29-23 @ 12:01)            Preoperative Checklist: (x = completed, n/a = not applicable, p = pending)  [ x ] ECHO  [ x ] CXR  [ x ] Carotid Duplex  [ na ] CT imaging  [ x ] PFTs  [ x ] UA  [ x ] Baseline Labs (CMP, CBC w/ diff, PTT, PT/INR)  [ x ] A1c  [ x ] TSH  [ x ] Lipid panel  [ x ] Cardiac enzymes  [ x ] Pro BNP  [ x ] EKG   [ x ] T&S 1  [ x ] T&S 2 (within 72 hours)  [ na ] COVID PCR (within 72 hours)  [ na ] Room air ABG  [ na ] P2Y12  [ na ] bHCG  [ x ] Consents  [ x ] Pre-op Orders Placed  [x  ] Blood Products Ordered  [ x ] NPO at midnight  [ na ] Conduit tested    Abnormal/Noteworthy Preoperative Testing Results:     < from: US Duplex Carotid Arteries Complete, Bilateral (06.29.23 @ 13:55) >    IMPRESSION:  1. No significant hemodynamic stenosis of either carotid artery.    Measurement of carotid stenosis is based on velocity parameters that   correlate the residual internal carotid diameter with that of the more   distal vessel in accordance witha method such as the [place] American   Symptomatic Carotid Endarterectomy Trial (NASCET).    < end of copied text >

## 2023-06-29 NOTE — PATIENT PROFILE ADULT - FALL HARM RISK - RISK INTERVENTIONS
Assistance with ambulation/Communicate Fall Risk and Risk Factors to all staff, patient, and family/Monitor gait and stability/Reinforce activity limits and safety measures with patient and family/Sit up slowly, dangle for a short time, stand at bedside before walking/Visual Cue: Yellow wristband/Bed in lowest position, wheels locked, appropriate side rails in place/Call bell, personal items and telephone in reach/Instruct patient to call for assistance before getting out of bed or chair/Non-slip footwear when patient is out of bed/[place] to call system/Physically safe environment - no spills, clutter or unnecessary equipment/Purposeful Proactive Rounding/Room/bathroom lighting operational, light cord in reach

## 2023-06-29 NOTE — CONSULT NOTE ADULT - SUBJECTIVE AND OBJECTIVE BOX
HISTORY OF PRESENT ILLNESS  Akash Johnson is a 78-year-old male with a past medical history of type 2 diabetes mellitus, and hypertension who originally presented to [facility] (23) complaining of chest pain for the previous 3 months. He lives in [place] and stated that he had not seen a doctor about his symptoms until coming to the [place]. The patient underwent a stress test which revealed a small sized, reversible, myocardial perfusion defect of the anteroapical wall consistent with ischemia. TTE revealed moderate aortic stenosis and severe aortic regurgitation with normal LVEF. The patient was referred for cardiac cath which revealed non-obstructive coronary artery disease. He was then transferred to [facility] under the care of Dr. Koehler for further work-up and intervention of severe aortic stenosis. Endocrinology was consulted for recommendations regarding diabetes management.     CAPILLARY BLOOD GLUCOSE & INSULIN RECEIVED  158 mg/dL ( @ 22:51) ? 2 units of lispro sliding scale.   142 mg/dL ( @ 06:52) ? Ø  272 mg/dL ( @ 11:35) ? 2 units of lispro sliding scale.     DIABETES HISTORY  - Age at diagnosis: 12-13 years ago.   - Symptoms at time of diagnosis: Polyuria, polydipsia, weight loss (lost ~50 lbs).   - Current Therapy: Jardiance Duo 12.5-850 mg (Empagliflozin-Metformin) 1/2 tablet daily.   - History of other regimens: He was previously taking Glucovance 5-500 mg (Glyburide-Metformin) 1 tablet daily for ~11 years; however, the therapy was switched ~3 months ago after he followed with a doctor in [place] and reported experiencing persistent hypoglycemia to 60s mg/dL in the morning. He says that he was experiencing lows in the morning for a long time before the medication was changed, probably for years.   - History of hypoglycemia: None since his medication was changed to current regimen.   - History of DKA/HHS: Denied.   - Complications: He has never had a dilated eye exam. Denied having vision problems. Denied having neuropathy symptoms.   - Home FSG: He only checks 2-3 times per week, usually in the morning before breakfast, around 8-9 AM.        > Fastin-120 mg/dL.  - Diet:          > Breakfast: Coffee (with Splenda) and eggs +/- tomato juice (made with one tomato + water + 1 Splenda) or blueberry juice (8-10 blueberries + water + 1 Splenda).         > Lunch: Always eats in a restaurant close to his house as he has no one to cook for him. Usually has noodle soup + meat OR rice (1/2 plate) + beans + meat OR rice (1/2 plate) + potatoes.         > Dinner: Rice (1/2 plate) + meat OR omelette OR coffee with 1 bread.         > Snacks: He has a small piece of cake 2-3 times per month during family gatherings. Occasionally, he might have a glass of diluted coke during this gatherings (1/2 regular coke + 1/2 water).   - Outpatient follow-up: None.     PAST MEDICAL & SURGICAL HISTORY  As per history of present illness.     FAMILY HISTORY  - Diabetes: Mother (she was one pills for a long time, used insulin when older). Siblings (4 out of 9 have diabetes).   - Thyroid: Reports having family with thyroid issues.   - Autoimmune: Denied.     SOCIAL HISTORY  - Work: Retired, used to work as a .   - Alcohol: Denied.   - Smoking: Denied.   - Recreational Drugs: Denied.     ALLERGIES  Allergy Status Unknown    CURRENT MEDICATIONS  cefTRIAXone   IVPB 1000 milliGRAM(s) IV Intermittent every 24 hours  dextrose 5%. 1000 milliLiter(s) IV Continuous <Continuous>  dextrose 5%. 1000 milliLiter(s) IV Continuous <Continuous>  dextrose 50% Injectable 25 Gram(s) IV Push once  dextrose 50% Injectable 12.5 Gram(s) IV Push once  dextrose 50% Injectable 25 Gram(s) IV Push once  dextrose Oral Gel 15 Gram(s) Oral once PRN  furosemide    Tablet 40 milliGRAM(s) Oral daily  glucagon  Injectable 1 milliGRAM(s) IntraMuscular once  heparin   Injectable 5000 Unit(s) SubCutaneous every 8 hours  insulin lispro (ADMELOG) corrective regimen sliding scale   SubCutaneous three times a day before meals  pantoprazole    Tablet 40 milliGRAM(s) Oral before breakfast  potassium chloride    Tablet ER 20 milliEquivalent(s) Oral daily  senna 2 Tablet(s) Oral at bedtime  sodium chloride 0.9% lock flush 3 milliLiter(s) IV Push every 8 hours    REVIEW OF SYSTEMS  Constitutional:  Negative fever, chills or loss of appetite.  Cardiovascular:  Negative for chest pain or palpitations.  Respiratory:  Negative for cough, wheezing, or shortness of breath.   Gastrointestinal:  Negative for nausea, vomiting, diarrhea, constipation, or abdominal pain.  Neurologic:  No headache, confusion, dizziness, lightheadedness.    PHYSICAL EXAM  Vital Signs Last 24 Hrs  T(C): 36.8 (2023 08:57), Max: 36.8 (2023 08:57)  T(F): 98.3 (2023 08:57), Max: 98.3 (2023 08:57)  HR: 74 (2023 08:30) (68 - 74)  BP: 137/65 (2023 08:30) (113/72 - 222/100)  BP(mean): 94 (2023 08:30) (87 - 101)  RR: 17 (2023 08:30) (17 - 18)  SpO2: 95% (2023 08:30) (95% - 98%)    Parameters below as of 2023 08:30  Patient On (Oxygen Delivery Method): room air    Constitutional: Awake, alert, elderly male, in no acute distress.   HEENT: Normocephalic, atraumatic, SCARLETT.  Respiratory: Lungs clear to ausculation bilaterally.   Cardiovascular: regular rhythm, normal S1 and S2, no audible murmurs.   GI: soft, non-tender, non-distended, bowel sounds present.  Extremities: No lower extremity edema.   Psychiatric: AAO x 3.    LABS  CBC - WBC/HGB/HTC/PLT: 8.70/14.0/41.7/250 (23)  BMP: Na/K/Cl/Bicarb/BUN/Cr/Gluc: 133/4.5/102/21/44/1.41/147 (23)  Anion Gap: 10 (23)  eGFR: 50 (23)  Calcium: 8.7 (23)  Phosphorus: -- (23)  Magnesium: -- (23)  LFT - Alb/Tprot/Tbili/Dbili/AlkPhos/ALT/AST: 3.5/--/1.2/--/117/19/25 (23)  PT/aPTT/INR: 12.9/32.4/1.08 (23)  Thyroid Stimulating Hormone, Serum: 1.440 (23)    ASSESSMENT / RECOMMENDATIONS  Mr. Johnson is a 78-year-old male with a past medical history of type 2 diabetes mellitus, hypertension and severe aortic stenosis who was transferred to [facility] for further work-up and intervention of severe aortic stenosis. Endocrinology was consulted for recommendations regarding diabetes management.     A1C: 8.6 %  BUN: 44  Creatinine: 1.41  GFR: 50  Weight: 66.6  BMI: 21.5    # Type 2 diabetes mellitus with hyperglycemia  - Please continue lantus *** units at bedtime.   - Continue lispro *** units before each meal.  - Continue lispro moderate / low dose sliding scale before meals and at bedtime.  - Patient's fingerstick glucose goal is 100-180 mg/dL.    - Discharge recommendations to be discussed.   - Patient can follow up at discharge with [facility] by calling [phone] to make an appointment.      Case discussed with Dr. Berry. Primary team updated.       Milton Laura    Endocrinology Fellow    Service Pager: [phone]    HISTORY OF PRESENT ILLNESS  Akash Johnson is a 78-year-old male with a past medical history of type 2 diabetes mellitus, and hypertension who originally presented to [facility] (23) complaining of chest pain for the previous 3 months. He lives in [facility] and stated that he had not seen a doctor about his symptoms until [place] the [place]. The patient underwent a stress test which revealed a small sized, reversible, myocardial perfusion defect of the anteroapical wall consistent with ischemia. TTE revealed moderate aortic stenosis and severe aortic regurgitation with normal LVEF. The patient was referred for cardiac cath which revealed non-obstructive coronary artery disease. He was then transferred to [facility] under the care of Dr. Koehler for further work-up and intervention of severe aortic stenosis. Endocrinology was consulted for recommendations regarding diabetes management.     CAPILLARY BLOOD GLUCOSE & INSULIN RECEIVED  158 mg/dL ( @ 22:51) ? 2 units of lispro sliding scale.   142 mg/dL ( @ 06:52) ? Ø  272 mg/dL ( @ 11:35) ? 2 units of lispro sliding scale.     DIABETES HISTORY  - Age at diagnosis: 12-13 years ago.   - Symptoms at time of diagnosis: Polyuria, polydipsia, weight loss (lost ~50 lbs).   - Current Therapy: Jardiance Duo 12.5-850 mg (Empagliflozin-Metformin) 1/2 tablet daily.   - History of other regimens: He was previously taking Glucovance 5-500 mg (Glyburide-Metformin) 1 tablet daily for ~11 years; however, the therapy was switched ~3 months ago after he followed with a doctor in [facility] and reported experiencing persistent hypoglycemia to 60s mg/dL in the morning. He says that he was experiencing lows in the morning for a long time before the medication was changed, probably for years.   - History of hypoglycemia: None since his medication was changed to current regimen.   - History of DKA/HHS: Denied.   - Complications: He has never had a dilated eye exam. Denied having vision problems. Denied having neuropathy symptoms.   - Home FSG: He only checks 2-3 times per week, usually in the morning before breakfast, around 8-9 AM.        > Fastin-120 mg/dL.  - Diet:          > Breakfast: Coffee (with Splenda) and eggs +/- tomato juice (made with one tomato + water + 1 Splenda) or blueberry juice (8-10 blueberries + water + 1 Splenda).         > Lunch: Always eats in a restaurant close to his house as he has no one to cook for him. Usually has noodle soup + meat OR rice (1/2 plate) + beans + meat OR rice (1/2 plate) + potatoes.         > Dinner: Rice (1/2 plate) + meat OR omelette OR coffee with 1 bread.         > Snacks: He has a small piece of cake 2-3 times per month during family gatherings. Occasionally, he might have a glass of diluted coke during this gatherings (1/2 regular coke + 1/2 water).   - Outpatient follow-up: None.     PAST MEDICAL & SURGICAL HISTORY  As per history of present illness.     FAMILY HISTORY  - Diabetes: Mother (she was one pills for a long time, used insulin when older). Siblings (4 out of 9 have diabetes).   - Thyroid: Reports having family with thyroid issues.   - Autoimmune: Denied.     SOCIAL HISTORY  - Work: Retired, used to work as a .   - Alcohol: Denied.   - Smoking: Denied.   - Recreational Drugs: Denied.     ALLERGIES  Allergy Status Unknown    CURRENT MEDICATIONS  cefTRIAXone   IVPB 1000 milliGRAM(s) IV Intermittent every 24 hours  dextrose 5%. 1000 milliLiter(s) IV Continuous <Continuous>  dextrose 5%. 1000 milliLiter(s) IV Continuous <Continuous>  dextrose 50% Injectable 25 Gram(s) IV Push once  dextrose 50% Injectable 12.5 Gram(s) IV Push once  dextrose 50% Injectable 25 Gram(s) IV Push once  dextrose Oral Gel 15 Gram(s) Oral once PRN  furosemide    Tablet 40 milliGRAM(s) Oral daily  glucagon  Injectable 1 milliGRAM(s) IntraMuscular once  heparin   Injectable 5000 Unit(s) SubCutaneous every 8 hours  insulin lispro (ADMELOG) corrective regimen sliding scale   SubCutaneous three times a day before meals  pantoprazole    Tablet 40 milliGRAM(s) Oral before breakfast  potassium chloride    Tablet ER 20 milliEquivalent(s) Oral daily  senna 2 Tablet(s) Oral at bedtime  sodium chloride 0.9% lock flush 3 milliLiter(s) IV Push every 8 hours    REVIEW OF SYSTEMS  Constitutional:  Negative fever, chills or loss of appetite.  Cardiovascular:  Negative for chest pain or palpitations.  Respiratory:  Negative for cough, wheezing, or shortness of breath.   Gastrointestinal:  Negative for nausea, vomiting, diarrhea, constipation, or abdominal pain.  Neurologic:  No headache, confusion, dizziness, lightheadedness.    PHYSICAL EXAM  Vital Signs Last 24 Hrs  T(C): 36.8 (2023 08:57), Max: 36.8 (2023 08:57)  T(F): 98.3 (2023 08:57), Max: 98.3 (2023 08:57)  HR: 74 (2023 08:30) (68 - 74)  BP: 137/65 (2023 08:30) (113/72 - 222/100)  BP(mean): 94 (2023 08:30) (87 - 101)  RR: 17 (2023 08:30) (17 - 18)  SpO2: 95% (2023 08:30) (95% - 98%)    Parameters below as of 2023 08:30  Patient On (Oxygen Delivery Method): room air    Constitutional: Awake, alert, elderly male, in no acute distress.   HEENT: Normocephalic, atraumatic, SCARLETT.  Respiratory: Lungs clear to ausculation bilaterally.   Cardiovascular: regular rhythm, normal S1 and S2, no audible murmurs.   GI: soft, non-tender, non-distended, bowel sounds present.  Extremities: No lower extremity edema.   Psychiatric: AAO x 3.    LABS  CBC - WBC/HGB/HTC/PLT: 8.70/14.0/41.7/250 (23)  BMP: Na/K/Cl/Bicarb/BUN/Cr/Gluc: 133/4.5/102/21/44/1.41/147 (23)  Anion Gap: 10 (23)  eGFR: 50 (23)  Calcium: 8.7 (23)  Phosphorus: -- (23)  Magnesium: -- (23)  LFT - Alb/Tprot/Tbili/Dbili/AlkPhos/ALT/AST: 3.5/--/1.2/--/117/19/25 (23)  PT/aPTT/INR: 12.9/32.4/1.08 (23)  Thyroid Stimulating Hormone, Serum: 1.440 (23)    ASSESSMENT / RECOMMENDATIONS  Mr. Johnson is a 78-year-old male with a past medical history of type 2 diabetes mellitus, hypertension and severe aortic stenosis who was transferred to [facility] for further work-up and intervention of severe aortic stenosis. Endocrinology was consulted for recommendations regarding diabetes management.     A1C: 8.6 %  BUN: 44  Creatinine: 1.41  GFR: 50  Weight: 66.6  BMI: 21.5    # Type 2 diabetes mellitus with hyperglycemia  - Please START lantus 10 units at bedtime.   - START lispro 5 units before each meal.  - Continue lispro moderate dose sliding scale before meals and at bedtime.  - Patient's fingerstick glucose goal is 100-180 mg/dL.    - Discharge recommendations to be discussed.   - Patient can follow up at discharge with [facility] by calling [phone] to make an appointment.      Case discussed with Dr. Berry. Primary team updated.       Milton Laura    Endocrinology Fellow    Service Pager: [phone]

## 2023-06-30 ENCOUNTER — APPOINTMENT (OUTPATIENT)
Dept: CARDIOTHORACIC SURGERY | Facility: HOSPITAL | Age: 78
End: 2023-06-30

## 2023-06-30 ENCOUNTER — RESULT REVIEW (OUTPATIENT)
Age: 78
End: 2023-06-30

## 2023-06-30 LAB
ALBUMIN SERPL ELPH-MCNC: 2.8 G/DL — LOW (ref 3.3–5)
ALBUMIN SERPL ELPH-MCNC: 3.3 G/DL — SIGNIFICANT CHANGE UP (ref 3.3–5)
ALBUMIN SERPL ELPH-MCNC: 3.5 G/DL — SIGNIFICANT CHANGE UP (ref 3.3–5)
ALBUMIN SERPL ELPH-MCNC: 3.6 G/DL — SIGNIFICANT CHANGE UP (ref 3.3–5)
ALP SERPL-CCNC: 114 U/L — SIGNIFICANT CHANGE UP (ref 40–120)
ALP SERPL-CCNC: 85 U/L — SIGNIFICANT CHANGE UP (ref 40–120)
ALP SERPL-CCNC: 88 U/L — SIGNIFICANT CHANGE UP (ref 40–120)
ALP SERPL-CCNC: 94 U/L — SIGNIFICANT CHANGE UP (ref 40–120)
ALT FLD-CCNC: 16 U/L — SIGNIFICANT CHANGE UP (ref 10–45)
ALT FLD-CCNC: 17 U/L — SIGNIFICANT CHANGE UP (ref 10–45)
ALT FLD-CCNC: 17 U/L — SIGNIFICANT CHANGE UP (ref 10–45)
ALT FLD-CCNC: 19 U/L — SIGNIFICANT CHANGE UP (ref 10–45)
ANION GAP SERPL CALC-SCNC: 12 MMOL/L — SIGNIFICANT CHANGE UP (ref 5–17)
ANION GAP SERPL CALC-SCNC: 12 MMOL/L — SIGNIFICANT CHANGE UP (ref 5–17)
ANION GAP SERPL CALC-SCNC: 13 MMOL/L — SIGNIFICANT CHANGE UP (ref 5–17)
ANION GAP SERPL CALC-SCNC: 16 MMOL/L — SIGNIFICANT CHANGE UP (ref 5–17)
APTT BLD: 35.9 SEC — HIGH (ref 27.5–35.5)
APTT BLD: 46.4 SEC — HIGH (ref 27.5–35.5)
APTT BLD: 60.7 SEC — HIGH (ref 27.5–35.5)
APTT BLD: 63.2 SEC — HIGH (ref 27.5–35.5)
AST SERPL-CCNC: 25 U/L — SIGNIFICANT CHANGE UP (ref 10–40)
AST SERPL-CCNC: 41 U/L — HIGH (ref 10–40)
AST SERPL-CCNC: 41 U/L — HIGH (ref 10–40)
AST SERPL-CCNC: 45 U/L — HIGH (ref 10–40)
BASE EXCESS BLDA CALC-SCNC: -1.8 MMOL/L — SIGNIFICANT CHANGE UP (ref -2–3)
BASE EXCESS BLDA CALC-SCNC: -2.5 MMOL/L — LOW (ref -2–3)
BASE EXCESS BLDA CALC-SCNC: -2.6 MMOL/L — LOW (ref -2–3)
BASE EXCESS BLDA CALC-SCNC: -3.5 MMOL/L — LOW (ref -2–3)
BASE EXCESS BLDA CALC-SCNC: -3.7 MMOL/L — LOW (ref -2–3)
BASE EXCESS BLDA CALC-SCNC: -3.8 MMOL/L — LOW (ref -2–3)
BASE EXCESS BLDV CALC-SCNC: -1.5 MMOL/L — SIGNIFICANT CHANGE UP (ref -2–3)
BASE EXCESS BLDV CALC-SCNC: -1.5 MMOL/L — SIGNIFICANT CHANGE UP (ref -2–3)
BASE EXCESS BLDV CALC-SCNC: -2.7 MMOL/L — LOW (ref -2–3)
BASE EXCESS BLDV CALC-SCNC: -6.9 MMOL/L — LOW (ref -2–3)
BILIRUB SERPL-MCNC: 0.9 MG/DL — SIGNIFICANT CHANGE UP (ref 0.2–1.2)
BILIRUB SERPL-MCNC: 1 MG/DL — SIGNIFICANT CHANGE UP (ref 0.2–1.2)
BILIRUB SERPL-MCNC: 1.4 MG/DL — HIGH (ref 0.2–1.2)
BILIRUB SERPL-MCNC: 1.6 MG/DL — HIGH (ref 0.2–1.2)
BUN SERPL-MCNC: 44 MG/DL — HIGH (ref 7–23)
BUN SERPL-MCNC: 46 MG/DL — HIGH (ref 7–23)
BUN SERPL-MCNC: 46 MG/DL — HIGH (ref 7–23)
BUN SERPL-MCNC: 60 MG/DL — HIGH (ref 7–23)
CA-I BLDA-SCNC: 0.86 MMOL/L — LOW (ref 1.15–1.33)
CA-I BLDA-SCNC: 0.93 MMOL/L — LOW (ref 1.15–1.33)
CA-I BLDA-SCNC: 1.15 MMOL/L — SIGNIFICANT CHANGE UP (ref 1.15–1.33)
CA-I BLDA-SCNC: 1.22 MMOL/L — SIGNIFICANT CHANGE UP (ref 1.15–1.33)
CA-I BLDA-SCNC: 1.24 MMOL/L — SIGNIFICANT CHANGE UP (ref 1.15–1.33)
CA-I BLDA-SCNC: 1.29 MMOL/L — SIGNIFICANT CHANGE UP (ref 1.15–1.33)
CA-I SERPL-SCNC: 0.96 MMOL/L — LOW (ref 1.15–1.33)
CA-I SERPL-SCNC: 1.2 MMOL/L — SIGNIFICANT CHANGE UP (ref 1.15–1.33)
CA-I SERPL-SCNC: 1.24 MMOL/L — SIGNIFICANT CHANGE UP (ref 1.15–1.33)
CALCIUM SERPL-MCNC: 8.8 MG/DL — SIGNIFICANT CHANGE UP (ref 8.4–10.5)
CALCIUM SERPL-MCNC: 9.1 MG/DL — SIGNIFICANT CHANGE UP (ref 8.4–10.5)
CALCIUM SERPL-MCNC: 9.2 MG/DL — SIGNIFICANT CHANGE UP (ref 8.4–10.5)
CALCIUM SERPL-MCNC: 9.3 MG/DL — SIGNIFICANT CHANGE UP (ref 8.4–10.5)
CHLORIDE SERPL-SCNC: 103 MMOL/L — SIGNIFICANT CHANGE UP (ref 96–108)
CHLORIDE SERPL-SCNC: 106 MMOL/L — SIGNIFICANT CHANGE UP (ref 96–108)
CHLORIDE SERPL-SCNC: 107 MMOL/L — SIGNIFICANT CHANGE UP (ref 96–108)
CHLORIDE SERPL-SCNC: 108 MMOL/L — SIGNIFICANT CHANGE UP (ref 96–108)
CO2 BLDA-SCNC: 22 MMOL/L — SIGNIFICANT CHANGE UP (ref 19–24)
CO2 BLDA-SCNC: 24 MMOL/L — SIGNIFICANT CHANGE UP (ref 19–24)
CO2 BLDV-SCNC: 19.6 MMOL/L — LOW (ref 22–26)
CO2 BLDV-SCNC: 22.9 MMOL/L — SIGNIFICANT CHANGE UP (ref 22–26)
CO2 BLDV-SCNC: 25 MMOL/L — SIGNIFICANT CHANGE UP (ref 22–26)
CO2 BLDV-SCNC: 25.7 MMOL/L — SIGNIFICANT CHANGE UP (ref 22–26)
CO2 SERPL-SCNC: 18 MMOL/L — LOW (ref 22–31)
CO2 SERPL-SCNC: 19 MMOL/L — LOW (ref 22–31)
CO2 SERPL-SCNC: 20 MMOL/L — LOW (ref 22–31)
CO2 SERPL-SCNC: 20 MMOL/L — LOW (ref 22–31)
COHGB MFR BLDA: 1 % — SIGNIFICANT CHANGE UP
COHGB MFR BLDA: 1.1 % — SIGNIFICANT CHANGE UP
COHGB MFR BLDA: 1.1 % — SIGNIFICANT CHANGE UP
COHGB MFR BLDA: 1.3 % — SIGNIFICANT CHANGE UP
COHGB MFR BLDA: 1.4 % — SIGNIFICANT CHANGE UP
COHGB MFR BLDA: 1.5 % — SIGNIFICANT CHANGE UP
COHGB MFR BLDV: 1.6 % — SIGNIFICANT CHANGE UP
CREAT ?TM UR-MCNC: 46 MG/DL — SIGNIFICANT CHANGE UP
CREAT SERPL-MCNC: 1.61 MG/DL — HIGH (ref 0.5–1.3)
CREAT SERPL-MCNC: 1.68 MG/DL — HIGH (ref 0.5–1.3)
CREAT SERPL-MCNC: 1.76 MG/DL — HIGH (ref 0.5–1.3)
CREAT SERPL-MCNC: 2.15 MG/DL — HIGH (ref 0.5–1.3)
EGFR: 30 ML/MIN/1.73M2 — LOW
EGFR: 39 ML/MIN/1.73M2 — LOW
EGFR: 41 ML/MIN/1.73M2 — LOW
EGFR: 43 ML/MIN/1.73M2 — LOW
GAS PNL BLDA: SIGNIFICANT CHANGE UP
GAS PNL BLDV: 131 MMOL/L — LOW (ref 136–145)
GAS PNL BLDV: 135 MMOL/L — LOW (ref 136–145)
GAS PNL BLDV: 138 MMOL/L — SIGNIFICANT CHANGE UP (ref 136–145)
GAS PNL BLDV: SIGNIFICANT CHANGE UP
GLUCOSE BLDA-MCNC: 104 MG/DL — HIGH (ref 70–99)
GLUCOSE BLDA-MCNC: 113 MG/DL — HIGH (ref 70–99)
GLUCOSE BLDA-MCNC: 120 MG/DL — HIGH (ref 70–99)
GLUCOSE BLDA-MCNC: 121 MG/DL — HIGH (ref 70–99)
GLUCOSE BLDA-MCNC: 131 MG/DL — HIGH (ref 70–99)
GLUCOSE BLDA-MCNC: 94 MG/DL — SIGNIFICANT CHANGE UP (ref 70–99)
GLUCOSE BLDC GLUCOMTR-MCNC: 115 MG/DL — HIGH (ref 70–99)
GLUCOSE BLDC GLUCOMTR-MCNC: 117 MG/DL — HIGH (ref 70–99)
GLUCOSE BLDC GLUCOMTR-MCNC: 119 MG/DL — HIGH (ref 70–99)
GLUCOSE BLDC GLUCOMTR-MCNC: 122 MG/DL — HIGH (ref 70–99)
GLUCOSE BLDC GLUCOMTR-MCNC: 153 MG/DL — HIGH (ref 70–99)
GLUCOSE BLDC GLUCOMTR-MCNC: 87 MG/DL — SIGNIFICANT CHANGE UP (ref 70–99)
GLUCOSE BLDC GLUCOMTR-MCNC: 97 MG/DL — SIGNIFICANT CHANGE UP (ref 70–99)
GLUCOSE BLDV-MCNC: 114 MG/DL — HIGH (ref 70–99)
GLUCOSE SERPL-MCNC: 119 MG/DL — HIGH (ref 70–99)
GLUCOSE SERPL-MCNC: 131 MG/DL — HIGH (ref 70–99)
GLUCOSE SERPL-MCNC: 164 MG/DL — HIGH (ref 70–99)
GLUCOSE SERPL-MCNC: 99 MG/DL — SIGNIFICANT CHANGE UP (ref 70–99)
HCO3 BLDA-SCNC: 21 MMOL/L — SIGNIFICANT CHANGE UP (ref 21–28)
HCO3 BLDA-SCNC: 22 MMOL/L — SIGNIFICANT CHANGE UP (ref 21–28)
HCO3 BLDA-SCNC: 22 MMOL/L — SIGNIFICANT CHANGE UP (ref 21–28)
HCO3 BLDV-SCNC: 18 MMOL/L — LOW (ref 22–29)
HCO3 BLDV-SCNC: 22 MMOL/L — SIGNIFICANT CHANGE UP (ref 22–29)
HCO3 BLDV-SCNC: 24 MMOL/L — SIGNIFICANT CHANGE UP (ref 22–29)
HCO3 BLDV-SCNC: 24 MMOL/L — SIGNIFICANT CHANGE UP (ref 22–29)
HCT VFR BLD CALC: 27.3 % — LOW (ref 39–50)
HCT VFR BLD CALC: 28.2 % — LOW (ref 39–50)
HCT VFR BLD CALC: 32.2 % — LOW (ref 39–50)
HCT VFR BLD CALC: 40.7 % — SIGNIFICANT CHANGE UP (ref 39–50)
HCT VFR BLDA CALC: 34 % — SIGNIFICANT CHANGE UP
HGB BLD CALC-MCNC: 11.3 G/DL — LOW (ref 12.6–17.4)
HGB BLD-MCNC: 11 G/DL — LOW (ref 13–17)
HGB BLD-MCNC: 13.6 G/DL — SIGNIFICANT CHANGE UP (ref 13–17)
HGB BLD-MCNC: 9.5 G/DL — LOW (ref 13–17)
HGB BLD-MCNC: 9.8 G/DL — LOW (ref 13–17)
HGB BLDA-MCNC: 10 G/DL — LOW (ref 12.6–17.4)
HGB BLDA-MCNC: 11.1 G/DL — LOW (ref 12.6–17.4)
HGB BLDA-MCNC: 11.3 G/DL — LOW (ref 12.6–17.4)
HGB BLDA-MCNC: 11.9 G/DL — LOW (ref 12.6–17.4)
HGB BLDA-MCNC: 13.4 G/DL — SIGNIFICANT CHANGE UP (ref 12.6–17.4)
HGB BLDA-MCNC: 9.7 G/DL — LOW (ref 12.6–17.4)
INR BLD: 1.03 — SIGNIFICANT CHANGE UP (ref 0.88–1.16)
INR BLD: 1.2 — HIGH (ref 0.88–1.16)
INR BLD: 1.21 — HIGH (ref 0.88–1.16)
INR BLD: 1.23 — HIGH (ref 0.88–1.16)
ISTAT ARTERIAL BE: -3 MMOL/L — LOW (ref -2–3)
ISTAT ARTERIAL GLUCOSE: 121 MG/DL — HIGH (ref 70–99)
ISTAT ARTERIAL HCO3: 21 MMOL/L — LOW (ref 22–26)
ISTAT ARTERIAL HEMATOCRIT: 30 % — LOW (ref 39–50)
ISTAT ARTERIAL HEMOGLOBIN: 10.2 G/DL — LOW (ref 13–17)
ISTAT ARTERIAL IONIZED CALCIUM: 1.28 MMOL/L — SIGNIFICANT CHANGE UP (ref 1.12–1.3)
ISTAT ARTERIAL PCO2: 32 MMHG — LOW (ref 35–45)
ISTAT ARTERIAL PH: 7.42 — SIGNIFICANT CHANGE UP (ref 7.35–7.45)
ISTAT ARTERIAL PO2: 116 MMHG — HIGH (ref 80–105)
ISTAT ARTERIAL POTASSIUM: 4.9 MMOL/L — SIGNIFICANT CHANGE UP (ref 3.5–5.3)
ISTAT ARTERIAL SO2: 99 % — HIGH (ref 95–98)
ISTAT ARTERIAL SODIUM: 139 MMOL/L — SIGNIFICANT CHANGE UP (ref 135–145)
ISTAT ARTERIAL TCO2: 22 MMOL/L — SIGNIFICANT CHANGE UP (ref 22–31)
LACTATE SERPL-SCNC: 1.3 MMOL/L — SIGNIFICANT CHANGE UP (ref 0.5–2)
LACTATE SERPL-SCNC: 1.5 MMOL/L — SIGNIFICANT CHANGE UP (ref 0.5–2)
MAGNESIUM SERPL-MCNC: 2.2 MG/DL — SIGNIFICANT CHANGE UP (ref 1.6–2.6)
MAGNESIUM SERPL-MCNC: 2.4 MG/DL — SIGNIFICANT CHANGE UP (ref 1.6–2.6)
MAGNESIUM SERPL-MCNC: 2.4 MG/DL — SIGNIFICANT CHANGE UP (ref 1.6–2.6)
MAGNESIUM SERPL-MCNC: 2.7 MG/DL — HIGH (ref 1.6–2.6)
MCHC RBC-ENTMCNC: 32.3 PG — SIGNIFICANT CHANGE UP (ref 27–34)
MCHC RBC-ENTMCNC: 32.6 PG — SIGNIFICANT CHANGE UP (ref 27–34)
MCHC RBC-ENTMCNC: 33.3 PG — SIGNIFICANT CHANGE UP (ref 27–34)
MCHC RBC-ENTMCNC: 33.3 PG — SIGNIFICANT CHANGE UP (ref 27–34)
MCHC RBC-ENTMCNC: 33.4 GM/DL — SIGNIFICANT CHANGE UP (ref 32–36)
MCHC RBC-ENTMCNC: 34.2 GM/DL — SIGNIFICANT CHANGE UP (ref 32–36)
MCHC RBC-ENTMCNC: 34.8 GM/DL — SIGNIFICANT CHANGE UP (ref 32–36)
MCHC RBC-ENTMCNC: 34.8 GM/DL — SIGNIFICANT CHANGE UP (ref 32–36)
MCV RBC AUTO: 95.5 FL — SIGNIFICANT CHANGE UP (ref 80–100)
MCV RBC AUTO: 95.8 FL — SIGNIFICANT CHANGE UP (ref 80–100)
MCV RBC AUTO: 95.9 FL — SIGNIFICANT CHANGE UP (ref 80–100)
MCV RBC AUTO: 96.7 FL — SIGNIFICANT CHANGE UP (ref 80–100)
METHGB MFR BLDA: 1.1 % — SIGNIFICANT CHANGE UP
METHGB MFR BLDA: 1.2 % — SIGNIFICANT CHANGE UP
METHGB MFR BLDA: 1.2 % — SIGNIFICANT CHANGE UP
METHGB MFR BLDA: 1.6 % — HIGH
METHGB MFR BLDV: 1.2 % — SIGNIFICANT CHANGE UP
NRBC # BLD: 0 /100 WBCS — SIGNIFICANT CHANGE UP (ref 0–0)
OXYHGB MFR BLDA: 96.6 % — HIGH (ref 90–95)
OXYHGB MFR BLDA: 97.1 % — HIGH (ref 90–95)
OXYHGB MFR BLDA: 97.2 % — HIGH (ref 90–95)
OXYHGB MFR BLDA: 97.3 % — HIGH (ref 90–95)
OXYHGB MFR BLDA: 97.5 % — HIGH (ref 90–95)
OXYHGB MFR BLDA: 97.5 % — HIGH (ref 90–95)
PCO2 BLDA: 31 MMHG — LOW (ref 35–48)
PCO2 BLDA: 32 MMHG — LOW (ref 35–48)
PCO2 BLDA: 34 MMHG — LOW (ref 35–48)
PCO2 BLDA: 34 MMHG — LOW (ref 35–48)
PCO2 BLDA: 37 MMHG — SIGNIFICANT CHANGE UP (ref 35–48)
PCO2 BLDA: 39 MMHG — SIGNIFICANT CHANGE UP (ref 35–48)
PCO2 BLDV: 36 MMHG — LOW (ref 42–55)
PCO2 BLDV: 36 MMHG — LOW (ref 42–55)
PCO2 BLDV: 41 MMHG — LOW (ref 42–55)
PCO2 BLDV: 44 MMHG — SIGNIFICANT CHANGE UP (ref 42–55)
PH BLDA: 7.37 — SIGNIFICANT CHANGE UP (ref 7.35–7.45)
PH BLDA: 7.37 — SIGNIFICANT CHANGE UP (ref 7.35–7.45)
PH BLDA: 7.39 — SIGNIFICANT CHANGE UP (ref 7.35–7.45)
PH BLDA: 7.39 — SIGNIFICANT CHANGE UP (ref 7.35–7.45)
PH BLDA: 7.43 — SIGNIFICANT CHANGE UP (ref 7.35–7.45)
PH BLDA: 7.45 — SIGNIFICANT CHANGE UP (ref 7.35–7.45)
PH BLDV: 7.32 — SIGNIFICANT CHANGE UP (ref 7.32–7.43)
PH BLDV: 7.35 — SIGNIFICANT CHANGE UP (ref 7.32–7.43)
PH BLDV: 7.37 — SIGNIFICANT CHANGE UP (ref 7.32–7.43)
PH BLDV: 7.39 — SIGNIFICANT CHANGE UP (ref 7.32–7.43)
PHOSPHATE SERPL-MCNC: 2.8 MG/DL — SIGNIFICANT CHANGE UP (ref 2.5–4.5)
PHOSPHATE SERPL-MCNC: 3 MG/DL — SIGNIFICANT CHANGE UP (ref 2.5–4.5)
PHOSPHATE SERPL-MCNC: 3.1 MG/DL — SIGNIFICANT CHANGE UP (ref 2.5–4.5)
PHOSPHATE SERPL-MCNC: 4.1 MG/DL — SIGNIFICANT CHANGE UP (ref 2.5–4.5)
PLATELET # BLD AUTO: 171 K/UL — SIGNIFICANT CHANGE UP (ref 150–400)
PLATELET # BLD AUTO: 171 K/UL — SIGNIFICANT CHANGE UP (ref 150–400)
PLATELET # BLD AUTO: 196 K/UL — SIGNIFICANT CHANGE UP (ref 150–400)
PLATELET # BLD AUTO: 252 K/UL — SIGNIFICANT CHANGE UP (ref 150–400)
PO2 BLDA: 351 MMHG — HIGH (ref 83–108)
PO2 BLDA: 379 MMHG — HIGH (ref 83–108)
PO2 BLDA: 398 MMHG — HIGH (ref 83–108)
PO2 BLDA: 407 MMHG — HIGH (ref 83–108)
PO2 BLDA: 438 MMHG — HIGH (ref 83–108)
PO2 BLDA: 467 MMHG — HIGH (ref 83–108)
PO2 BLDV: 41 MMHG — SIGNIFICANT CHANGE UP (ref 25–45)
PO2 BLDV: 42 MMHG — SIGNIFICANT CHANGE UP (ref 25–45)
PO2 BLDV: 46 MMHG — HIGH (ref 25–45)
PO2 BLDV: 59 MMHG — HIGH (ref 25–45)
POTASSIUM BLDA-SCNC: 4.5 MMOL/L — SIGNIFICANT CHANGE UP (ref 3.5–5.1)
POTASSIUM BLDA-SCNC: 4.8 MMOL/L — SIGNIFICANT CHANGE UP (ref 3.5–5.1)
POTASSIUM BLDA-SCNC: 5.5 MMOL/L — HIGH (ref 3.5–5.1)
POTASSIUM BLDA-SCNC: 5.5 MMOL/L — HIGH (ref 3.5–5.1)
POTASSIUM BLDA-SCNC: 5.8 MMOL/L — HIGH (ref 3.5–5.1)
POTASSIUM BLDA-SCNC: 6.7 MMOL/L — CRITICAL HIGH (ref 3.5–5.1)
POTASSIUM BLDV-SCNC: 3.8 MMOL/L — SIGNIFICANT CHANGE UP (ref 3.5–5.1)
POTASSIUM BLDV-SCNC: 4.6 MMOL/L — SIGNIFICANT CHANGE UP (ref 3.5–5.1)
POTASSIUM BLDV-SCNC: 6.4 MMOL/L — CRITICAL HIGH (ref 3.5–5.1)
POTASSIUM SERPL-MCNC: 4.6 MMOL/L — SIGNIFICANT CHANGE UP (ref 3.5–5.3)
POTASSIUM SERPL-MCNC: 4.9 MMOL/L — SIGNIFICANT CHANGE UP (ref 3.5–5.3)
POTASSIUM SERPL-MCNC: 5.1 MMOL/L — SIGNIFICANT CHANGE UP (ref 3.5–5.3)
POTASSIUM SERPL-MCNC: 5.1 MMOL/L — SIGNIFICANT CHANGE UP (ref 3.5–5.3)
POTASSIUM SERPL-SCNC: 4.6 MMOL/L — SIGNIFICANT CHANGE UP (ref 3.5–5.3)
POTASSIUM SERPL-SCNC: 4.9 MMOL/L — SIGNIFICANT CHANGE UP (ref 3.5–5.3)
POTASSIUM SERPL-SCNC: 5.1 MMOL/L — SIGNIFICANT CHANGE UP (ref 3.5–5.3)
POTASSIUM SERPL-SCNC: 5.1 MMOL/L — SIGNIFICANT CHANGE UP (ref 3.5–5.3)
PROT SERPL-MCNC: 6 G/DL — SIGNIFICANT CHANGE UP (ref 6–8.3)
PROT SERPL-MCNC: 6 G/DL — SIGNIFICANT CHANGE UP (ref 6–8.3)
PROT SERPL-MCNC: 6.1 G/DL — SIGNIFICANT CHANGE UP (ref 6–8.3)
PROT SERPL-MCNC: 6.9 G/DL — SIGNIFICANT CHANGE UP (ref 6–8.3)
PROTHROM AB SERPL-ACNC: 12.3 SEC — SIGNIFICANT CHANGE UP (ref 10.5–13.4)
PROTHROM AB SERPL-ACNC: 14.3 SEC — HIGH (ref 10.5–13.4)
PROTHROM AB SERPL-ACNC: 14.4 SEC — HIGH (ref 10.5–13.4)
PROTHROM AB SERPL-ACNC: 14.7 SEC — HIGH (ref 10.5–13.4)
RBC # BLD: 2.85 M/UL — LOW (ref 4.2–5.8)
RBC # BLD: 2.94 M/UL — LOW (ref 4.2–5.8)
RBC # BLD: 3.37 M/UL — LOW (ref 4.2–5.8)
RBC # BLD: 4.21 M/UL — SIGNIFICANT CHANGE UP (ref 4.2–5.8)
RBC # FLD: 12.7 % — SIGNIFICANT CHANGE UP (ref 10.3–14.5)
RBC # FLD: 12.8 % — SIGNIFICANT CHANGE UP (ref 10.3–14.5)
RBC # FLD: 12.9 % — SIGNIFICANT CHANGE UP (ref 10.3–14.5)
RBC # FLD: 13 % — SIGNIFICANT CHANGE UP (ref 10.3–14.5)
SAO2 % BLDA: 100 % — HIGH (ref 94–98)
SAO2 % BLDA: 99.2 % — HIGH (ref 94–98)
SAO2 % BLDA: 99.2 % — HIGH (ref 94–98)
SAO2 % BLDA: 99.5 % — HIGH (ref 94–98)
SAO2 % BLDA: 99.8 % — HIGH (ref 94–98)
SAO2 % BLDA: 99.9 % — HIGH (ref 94–98)
SAO2 % BLDV: 70.2 % — SIGNIFICANT CHANGE UP (ref 67–88)
SAO2 % BLDV: 70.8 % — SIGNIFICANT CHANGE UP (ref 67–88)
SAO2 % BLDV: 73.7 % — SIGNIFICANT CHANGE UP (ref 67–88)
SAO2 % BLDV: 91 % — HIGH (ref 67–88)
SODIUM BLDA-SCNC: 130 MMOL/L — LOW (ref 136–145)
SODIUM BLDA-SCNC: 131 MMOL/L — LOW (ref 136–145)
SODIUM BLDA-SCNC: 131 MMOL/L — LOW (ref 136–145)
SODIUM BLDA-SCNC: 135 MMOL/L — LOW (ref 136–145)
SODIUM BLDA-SCNC: 135 MMOL/L — LOW (ref 136–145)
SODIUM BLDA-SCNC: 136 MMOL/L — SIGNIFICANT CHANGE UP (ref 136–145)
SODIUM SERPL-SCNC: 134 MMOL/L — LOW (ref 135–145)
SODIUM SERPL-SCNC: 139 MMOL/L — SIGNIFICANT CHANGE UP (ref 135–145)
SODIUM SERPL-SCNC: 140 MMOL/L — SIGNIFICANT CHANGE UP (ref 135–145)
SODIUM SERPL-SCNC: 141 MMOL/L — SIGNIFICANT CHANGE UP (ref 135–145)
SODIUM UR-SCNC: 70 MMOL/L — SIGNIFICANT CHANGE UP
UUN UR-MCNC: 382 MG/DL — SIGNIFICANT CHANGE UP
WBC # BLD: 10.09 K/UL — SIGNIFICANT CHANGE UP (ref 3.8–10.5)
WBC # BLD: 12.36 K/UL — HIGH (ref 3.8–10.5)
WBC # BLD: 13.39 K/UL — HIGH (ref 3.8–10.5)
WBC # BLD: 8.41 K/UL — SIGNIFICANT CHANGE UP (ref 3.8–10.5)
WBC # FLD AUTO: 10.09 K/UL — SIGNIFICANT CHANGE UP (ref 3.8–10.5)
WBC # FLD AUTO: 12.36 K/UL — HIGH (ref 3.8–10.5)
WBC # FLD AUTO: 13.39 K/UL — HIGH (ref 3.8–10.5)
WBC # FLD AUTO: 8.41 K/UL — SIGNIFICANT CHANGE UP (ref 3.8–10.5)

## 2023-06-30 PROCEDURE — 71045 X-RAY EXAM CHEST 1 VIEW: CPT | Mod: 26

## 2023-06-30 PROCEDURE — 88311 DECALCIFY TISSUE: CPT | Mod: 26

## 2023-06-30 PROCEDURE — 33405 REPLACEMENT AORTIC VALVE OPN: CPT

## 2023-06-30 PROCEDURE — 99292 CRITICAL CARE ADDL 30 MIN: CPT

## 2023-06-30 PROCEDURE — 88305 TISSUE EXAM BY PATHOLOGIST: CPT | Mod: 26

## 2023-06-30 PROCEDURE — 33405 REPLACEMENT AORTIC VALVE OPN: CPT | Mod: AS

## 2023-06-30 PROCEDURE — 71045 X-RAY EXAM CHEST 1 VIEW: CPT | Mod: 26,77

## 2023-06-30 PROCEDURE — 99291 CRITICAL CARE FIRST HOUR: CPT

## 2023-06-30 DEVICE — CATH VENT LEFT HEART SILICONE 16FR NON-VENTED: Type: IMPLANTABLE DEVICE | Status: FUNCTIONAL

## 2023-06-30 DEVICE — FELT PTFE 1 X 6": Type: IMPLANTABLE DEVICE | Status: FUNCTIONAL

## 2023-06-30 DEVICE — CANNULA VENOUS 2 STAGE MC2 32/40FR X 1/2" OVAL BODY NON-VENTED: Type: IMPLANTABLE DEVICE | Status: FUNCTIONAL

## 2023-06-30 DEVICE — INTRODUCER PERCUTANEOUS INSERTION KIT: Type: IMPLANTABLE DEVICE | Status: FUNCTIONAL

## 2023-06-30 DEVICE — CANNULA AORTIC ROOT 14G X 14CM FLANGED: Type: IMPLANTABLE DEVICE | Status: FUNCTIONAL

## 2023-06-30 DEVICE — IMPLANTABLE DEVICE: Type: IMPLANTABLE DEVICE | Status: FUNCTIONAL

## 2023-06-30 DEVICE — CANNULA ARTERIAL OPTISITE 18FR X 3/8" VENTED: Type: IMPLANTABLE DEVICE | Status: FUNCTIONAL

## 2023-06-30 DEVICE — SURGIFLO HEMOSTATIC MATRIX KIT: Type: IMPLANTABLE DEVICE | Status: FUNCTIONAL

## 2023-06-30 DEVICE — CANNULA CORONARY OSTIAL 12FR X 6" BASKET TIP: Type: IMPLANTABLE DEVICE | Status: FUNCTIONAL

## 2023-06-30 DEVICE — SURGICEL FIBRILLAR 4 X 4": Type: IMPLANTABLE DEVICE | Status: FUNCTIONAL

## 2023-06-30 DEVICE — COR-KNOT MINI DEVICE COMBO KIT: Type: IMPLANTABLE DEVICE | Status: FUNCTIONAL

## 2023-06-30 DEVICE — CANNULA RETROGRADE CARDIOPLEGIA SELF-INFLATING 14FR PRE-SHAPED STYLET/HANDLE: Type: IMPLANTABLE DEVICE | Status: FUNCTIONAL

## 2023-06-30 DEVICE — LIGATING CLIPS WECK HEMOCLIP TANTALUM LARGE (ORANGE) 10: Type: IMPLANTABLE DEVICE | Status: FUNCTIONAL

## 2023-06-30 DEVICE — COR-KNOT QUICK LOAD SINGLES: Type: IMPLANTABLE DEVICE | Status: FUNCTIONAL

## 2023-06-30 DEVICE — KIT CVC 2LUM MAC 9FR CHG: Type: IMPLANTABLE DEVICE | Status: FUNCTIONAL

## 2023-06-30 RX ORDER — FENTANYL CITRATE 50 UG/ML
25 INJECTION INTRAVENOUS
Refills: 0 | Status: DISCONTINUED | OUTPATIENT
Start: 2023-06-30 | End: 2023-07-01

## 2023-06-30 RX ORDER — FENTANYL CITRATE 50 UG/ML
25 INJECTION INTRAVENOUS ONCE
Refills: 0 | Status: DISCONTINUED | OUTPATIENT
Start: 2023-06-30 | End: 2023-06-30

## 2023-06-30 RX ORDER — DEXTROSE 50 % IN WATER 50 %
50 SYRINGE (ML) INTRAVENOUS
Refills: 0 | Status: DISCONTINUED | OUTPATIENT
Start: 2023-06-30 | End: 2023-07-07

## 2023-06-30 RX ORDER — DEXMEDETOMIDINE HYDROCHLORIDE IN 0.9% SODIUM CHLORIDE 4 UG/ML
0.2 INJECTION INTRAVENOUS
Qty: 400 | Refills: 0 | Status: DISCONTINUED | OUTPATIENT
Start: 2023-06-30 | End: 2023-07-01

## 2023-06-30 RX ORDER — ALBUMIN HUMAN 25 %
250 VIAL (ML) INTRAVENOUS ONCE
Refills: 0 | Status: COMPLETED | OUTPATIENT
Start: 2023-06-30 | End: 2023-06-30

## 2023-06-30 RX ORDER — INSULIN HUMAN 100 [IU]/ML
1 INJECTION, SOLUTION SUBCUTANEOUS
Qty: 50 | Refills: 0 | Status: DISCONTINUED | OUTPATIENT
Start: 2023-06-30 | End: 2023-07-01

## 2023-06-30 RX ORDER — CHLORHEXIDINE GLUCONATE 213 G/1000ML
1 SOLUTION TOPICAL DAILY
Refills: 0 | Status: DISCONTINUED | OUTPATIENT
Start: 2023-06-30 | End: 2023-07-10

## 2023-06-30 RX ORDER — SODIUM BICARBONATE 1 MEQ/ML
50 SYRINGE (ML) INTRAVENOUS ONCE
Refills: 0 | Status: COMPLETED | OUTPATIENT
Start: 2023-06-30 | End: 2023-06-30

## 2023-06-30 RX ORDER — ASPIRIN/CALCIUM CARB/MAGNESIUM 324 MG
81 TABLET ORAL DAILY
Refills: 0 | Status: DISCONTINUED | OUTPATIENT
Start: 2023-06-30 | End: 2023-07-03

## 2023-06-30 RX ORDER — HEPARIN SODIUM 5000 [USP'U]/ML
5000 INJECTION INTRAVENOUS; SUBCUTANEOUS EVERY 8 HOURS
Refills: 0 | Status: DISCONTINUED | OUTPATIENT
Start: 2023-06-30 | End: 2023-07-08

## 2023-06-30 RX ORDER — MEPERIDINE HYDROCHLORIDE 50 MG/ML
25 INJECTION INTRAMUSCULAR; INTRAVENOUS; SUBCUTANEOUS ONCE
Refills: 0 | Status: DISCONTINUED | OUTPATIENT
Start: 2023-06-30 | End: 2023-06-30

## 2023-06-30 RX ORDER — PANTOPRAZOLE SODIUM 20 MG/1
40 TABLET, DELAYED RELEASE ORAL DAILY
Refills: 0 | Status: DISCONTINUED | OUTPATIENT
Start: 2023-06-30 | End: 2023-07-01

## 2023-06-30 RX ORDER — FENTANYL CITRATE 50 UG/ML
50 INJECTION INTRAVENOUS ONCE
Refills: 0 | Status: DISCONTINUED | OUTPATIENT
Start: 2023-06-30 | End: 2023-06-30

## 2023-06-30 RX ORDER — CHLORHEXIDINE GLUCONATE 213 G/1000ML
5 SOLUTION TOPICAL
Refills: 0 | Status: DISCONTINUED | OUTPATIENT
Start: 2023-06-30 | End: 2023-07-01

## 2023-06-30 RX ORDER — ACETAMINOPHEN 500 MG
1000 TABLET ORAL ONCE
Refills: 0 | Status: COMPLETED | OUTPATIENT
Start: 2023-06-30 | End: 2023-06-30

## 2023-06-30 RX ORDER — PROPOFOL 10 MG/ML
10 INJECTION, EMULSION INTRAVENOUS
Qty: 1000 | Refills: 0 | Status: DISCONTINUED | OUTPATIENT
Start: 2023-06-30 | End: 2023-07-01

## 2023-06-30 RX ORDER — CEFAZOLIN SODIUM 1 G
2000 VIAL (EA) INJECTION EVERY 8 HOURS
Refills: 0 | Status: COMPLETED | OUTPATIENT
Start: 2023-06-30 | End: 2023-07-02

## 2023-06-30 RX ORDER — CHLORHEXIDINE GLUCONATE 213 G/1000ML
15 SOLUTION TOPICAL EVERY 12 HOURS
Refills: 0 | Status: DISCONTINUED | OUTPATIENT
Start: 2023-06-30 | End: 2023-07-01

## 2023-06-30 RX ORDER — SODIUM CHLORIDE 9 MG/ML
1000 INJECTION INTRAMUSCULAR; INTRAVENOUS; SUBCUTANEOUS
Refills: 0 | Status: DISCONTINUED | OUTPATIENT
Start: 2023-06-30 | End: 2023-07-14

## 2023-06-30 RX ORDER — NOREPINEPHRINE BITARTRATE/D5W 8 MG/250ML
0.04 PLASTIC BAG, INJECTION (ML) INTRAVENOUS
Qty: 8 | Refills: 0 | Status: DISCONTINUED | OUTPATIENT
Start: 2023-06-30 | End: 2023-07-02

## 2023-06-30 RX ORDER — ATORVASTATIN CALCIUM 80 MG/1
40 TABLET, FILM COATED ORAL AT BEDTIME
Refills: 0 | Status: DISCONTINUED | OUTPATIENT
Start: 2023-06-30 | End: 2023-07-03

## 2023-06-30 RX ADMIN — Medication 125 MILLILITER(S): at 14:18

## 2023-06-30 RX ADMIN — SODIUM CHLORIDE 3 MILLILITER(S): 9 INJECTION INTRAMUSCULAR; INTRAVENOUS; SUBCUTANEOUS at 06:10

## 2023-06-30 RX ADMIN — Medication 40 MILLIGRAM(S): at 05:06

## 2023-06-30 RX ADMIN — Medication 100 MILLIGRAM(S): at 22:05

## 2023-06-30 RX ADMIN — Medication 1000 MILLIGRAM(S): at 23:30

## 2023-06-30 RX ADMIN — HEPARIN SODIUM 5000 UNIT(S): 5000 INJECTION INTRAVENOUS; SUBCUTANEOUS at 05:05

## 2023-06-30 RX ADMIN — FENTANYL CITRATE 50 MICROGRAM(S): 50 INJECTION INTRAVENOUS at 17:39

## 2023-06-30 RX ADMIN — FENTANYL CITRATE 25 MICROGRAM(S): 50 INJECTION INTRAVENOUS at 18:46

## 2023-06-30 RX ADMIN — Medication 400 MILLIGRAM(S): at 22:30

## 2023-06-30 RX ADMIN — Medication 125 MILLILITER(S): at 15:00

## 2023-06-30 RX ADMIN — DEXMEDETOMIDINE HYDROCHLORIDE IN 0.9% SODIUM CHLORIDE 3.33 MICROGRAM(S)/KG/HR: 4 INJECTION INTRAVENOUS at 15:18

## 2023-06-30 RX ADMIN — Medication 125 MILLILITER(S): at 18:00

## 2023-06-30 RX ADMIN — Medication 5 MICROGRAM(S)/KG/MIN: at 15:18

## 2023-06-30 RX ADMIN — Medication 50 MILLIEQUIVALENT(S): at 21:26

## 2023-06-30 RX ADMIN — CHLORHEXIDINE GLUCONATE 15 MILLILITER(S): 213 SOLUTION TOPICAL at 19:31

## 2023-06-30 RX ADMIN — FENTANYL CITRATE 25 MICROGRAM(S): 50 INJECTION INTRAVENOUS at 17:58

## 2023-06-30 RX ADMIN — CHLORHEXIDINE GLUCONATE 1 APPLICATION(S): 213 SOLUTION TOPICAL at 05:12

## 2023-06-30 RX ADMIN — PANTOPRAZOLE SODIUM 40 MILLIGRAM(S): 20 TABLET, DELAYED RELEASE ORAL at 22:30

## 2023-06-30 RX ADMIN — CHLORHEXIDINE GLUCONATE 15 MILLILITER(S): 213 SOLUTION TOPICAL at 05:05

## 2023-06-30 RX ADMIN — HEPARIN SODIUM 5000 UNIT(S): 5000 INJECTION INTRAVENOUS; SUBCUTANEOUS at 22:06

## 2023-06-30 RX ADMIN — FENTANYL CITRATE 50 MICROGRAM(S): 50 INJECTION INTRAVENOUS at 17:50

## 2023-06-30 RX ADMIN — SODIUM CHLORIDE 10 MILLILITER(S): 9 INJECTION INTRAMUSCULAR; INTRAVENOUS; SUBCUTANEOUS at 15:29

## 2023-06-30 RX ADMIN — CHLORHEXIDINE GLUCONATE 5 MILLILITER(S): 213 SOLUTION TOPICAL at 19:31

## 2023-06-30 NOTE — PROGRESS NOTE ADULT - SUBJECTIVE AND OBJECTIVE BOX
CTICU  CRITICAL  CARE  attending     Hand off received 					   Pertinent clinical, laboratory, radiographic, hemodynamic, echocardiographic, respiratory data, microbiologic data and chart were reviewed and analyzed frequently throughout the course of the day and night  Patient seen and examined with CTS/ SH attending at bedside    Pt is a 78y , Male, operative day s/p AVR (t);    intra op:    no blood/products    post op:    intubated/sedated  volume resuscitated  low dose pressor support        , FAMILY HISTORY:  PAST MEDICAL & SURGICAL HISTORY:  HTN (hypertension)      DM (diabetes mellitus)      Severe aortic regurgitation      History of cholecystectomy      H/O prostatectomy        Patient is a 78y old  Male who presents with a chief complaint of severe AS and AI (29 Jun 2023 16:50)      14 system review unable to assess    Vital signs, hemodynamic and respiratory parameters were reviewed from the bedside nursing flowsheet.  ICU Vital Signs Last 24 Hrs  T(C): 36 (30 Jun 2023 14:24), Max: 36.7 (30 Jun 2023 03:59)  T(F): 96.8 (30 Jun 2023 14:24), Max: 98 (30 Jun 2023 03:59)  HR: 86 (30 Jun 2023 15:30) (72 - 95)  BP: 130/60 (30 Jun 2023 07:15) (127/60 - 135/66)  BP(mean): 86 (30 Jun 2023 07:15) (86 - 86)  ABP: 130/70 (30 Jun 2023 15:30) (95/50 - 142/63)  ABP(mean): 89 (30 Jun 2023 15:30) (64 - 89)  RR: 18 (30 Jun 2023 15:30) (17 - 20)  SpO2: 100% (30 Jun 2023 15:30) (91% - 100%)    O2 Parameters below as of 30 Jun 2023 15:45  Patient On (Oxygen Delivery Method): ventilator          Adult Advanced Hemodynamics Last 24 Hrs  CVP(mm Hg): 16 (30 Jun 2023 15:30) (1 - 16)  CVP(cm H2O): --  CO: 4.1 (30 Jun 2023 15:00) (3.6 - 4.1)  CI: 2.2 (30 Jun 2023 15:00) (1.9 - 2.2)  PA: 57/25 (30 Jun 2023 15:30) (31/6 - 57/25)  PA(mean): 36 (30 Jun 2023 15:30) (15 - 36)  PCWP: --  SVR: 1403 (30 Jun 2023 15:00) (1403 - 1753)  SVRI: 2614 (30 Jun 2023 15:00) (2614 - 3322)  PVR: --  PVRI: --, ABG - ( 30 Jun 2023 13:19 )  pH, Arterial: 7.42  pH, Blood: x     /  pCO2: 32    /  pO2: 131   / HCO3: 21    / Base Excess: -2.8  /  SaO2: 99.3              Mode: AC/ CMV (Assist Control/ Continuous Mandatory Ventilation)    Intake and output was reviewed and the fluid balance was calculated  Daily Height in cm: 176 (30 Jun 2023 07:15)    Daily   I&O's Summary    29 Jun 2023 07:01  -  30 Jun 2023 07:00  --------------------------------------------------------  IN: 0 mL / OUT: 200 mL / NET: -200 mL    30 Jun 2023 07:01  -  30 Jun 2023 15:48  --------------------------------------------------------  IN: 588 mL / OUT: 525 mL / NET: 63 mL        All lines and drain sites were assessed  [facility]      POCT Blood Glucose.: 87 mg/dL (30 Jun 2023 06:36)    No acute change in mental status  (+) orotracheally intubated  Auscultation of the chest reveals equal bs  Abdomen is soft  Extremities are warm and well perfused  Wounds appear clean and unremarkable  Antibiotics are periop    labs  CBC Full  -  ( 30 Jun 2023 13:19 )  WBC Count : 13.39 K/uL  RBC Count : 3.37 M/uL  Hemoglobin : 11.0 g/dL  Hematocrit : 32.2 %  Platelet Count - Automated : 196 K/uL  Mean Cell Volume : 95.5 fl  Mean Cell Hemoglobin : 32.6 pg  Mean Cell Hemoglobin Concentration : 34.2 gm/dL  Auto Neutrophil # : x  Auto Lymphocyte # : x  Auto Monocyte # : x  Auto Eosinophil # : x  Auto Basophil # : x  Auto Neutrophil % : x  Auto Lymphocyte % : x  Auto Monocyte % : x  Auto Eosinophil % : x  Auto Basophil % : x    06-30    139  |  107  |  44<H>  ----------------------------<  131<H>  5.1   |  20<L>  |  1.61<H>    Ca    8.8      30 Jun 2023 13:19  Phos  3.0     06-30  Mg     2.7     06-30    TPro  6.0  /  Alb  2.8<L>  /  TBili  0.9  /  DBili  x   /  AST  41<H>  /  ALT  17  /  AlkPhos  94  06-30    PT/INR - ( 30 Jun 2023 13:19 )   PT: 14.7 sec;   INR: 1.23          PTT - ( 30 Jun 2023 13:19 )  PTT:35.9 sec  The current medications were reviewed   MEDICATIONS  (STANDING):  aspirin enteric coated 81 milliGRAM(s) Oral daily  atorvastatin 40 milliGRAM(s) Oral at bedtime  ceFAZolin   IVPB 2000 milliGRAM(s) IV Intermittent every 8 hours  chlorhexidine 0.12% Liquid 15 milliLiter(s) Oral Mucosa every 12 hours  chlorhexidine 0.12% Liquid 5 milliLiter(s) Oral Mucosa two times a day  chlorhexidine 2% Cloths 1 Application(s) Topical daily  chlorhexidine 4% Liquid 1 Application(s) Topical once  dexMEDEtomidine Infusion 0.2 MICROgram(s)/kG/Hr (3.33 mL/Hr) IV Continuous <Continuous>  dextrose 50% Injectable 50 milliLiter(s) IV Push every 15 minutes  fentaNYL    Injectable 50 MICROGram(s) IV Push once  heparin   Injectable 5000 Unit(s) SubCutaneous every 8 hours  insulin regular Infusion 1 Unit(s)/Hr (1 mL/Hr) IV Continuous <Continuous>  norepinephrine Infusion 0.04 MICROgram(s)/kG/Min (5 mL/Hr) IV Continuous <Continuous>  pantoprazole  Injectable 40 milliGRAM(s) IV Push daily  propofol Infusion 10 MICROgram(s)/kG/Min (4 mL/Hr) IV Continuous <Continuous>  sodium chloride 0.9%. 1000 milliLiter(s) (10 mL/Hr) IV Continuous <Continuous>    MEDICATIONS  (PRN):  fentaNYL    Injectable 25 MICROGram(s) IV Push every 3 hours PRN Severe Pain (7 - 10)       PROBLEM LIST/ ASSESSMENT:  HEALTH ISSUES - PROBLEM Dx:      ,   Patient is a 78y old  Male who presents with a chief complaint of severe AS and AI (29 Jun 2023 16:50)     s/p AVR (t)      My plan includes :    Titrate pressor support to maintain MAP >70  Maintain CI> 2.2/MVO2 >60  Trend lactate levels  Full Vent support  Titrate Fio2 to maintain Sao2 >85%  Serial ABGs  Wean/extubate when ready  close hemodynamic, ventilatory and drain monitoring and management per post op routine    Monitor for arrhythmias and monitor parameters for organ perfusion  monitor neurologic status  Head of the bed should remain elevated to 45 deg .   chest PT and IS will be encouraged  monitor adequacy of oxygenation and ventilation and attempt to wean oxygen  Nutritional goals will be met using po eventually , ensure adequate caloric intake and montior the same  Stress ulcer and VTE prophylaxis will be achieved    Glycemic control is satisfactory  Electrolytes have been repleted as necessary and wound care has been carried out. Pain control has been achieved.   agressive physical therapy and early mobility and ambulation goals will be met   The family was updated about the course and plan  CRITICAL CARE TIME SPENT in evaluation and management, reassessments, review and interpretation of labs and x-rays, ventilator and hemodynamic management, formulating a plan and coordinating care: ___90____ MIN.  Time does not include procedural time.  CTICU ATTENDING     					    Radu Gabriel MD

## 2023-06-30 NOTE — BRIEF OPERATIVE NOTE - COMMENTS
I was the first assistant for this case including but not limited to sternotomy for aortic valve replacement (25 mm, vieyra).

## 2023-06-30 NOTE — BRIEF OPERATIVE NOTE - NSICDXBRIEFPREOP_GEN_ALL_CORE_FT
PRE-OP DIAGNOSIS:  Severe aortic insufficiency 30-Jun-2023 17:53:10  Gurinder Kong  Aortic stenosis 30-Jun-2023 17:53:19  Gurinder Kong

## 2023-06-30 NOTE — BRIEF OPERATIVE NOTE - NSICDXBRIEFPOSTOP_GEN_ALL_CORE_FT
POST-OP DIAGNOSIS:  Severe aortic insufficiency 30-Jun-2023 17:53:33  Gurinder Kong  Aortic stenosis 30-Jun-2023 17:53:50  Gurinder Kong

## 2023-06-30 NOTE — PROGRESS NOTE ADULT - ASSESSMENT
77 Male w/ PMHx of HTN and DM who originally presented to [facility] on 6/26 c/o chest pain x 3 months. TTE revealed moderate AS and severe AR, nml LVEF. Patient referred for cardiac cath which revealed non-obstructive CAD. He was then transferred to [facility] under the care of Dr. Koehler for further work-up and intervention of severe AI and AS.  S/p Bio AVR  EF 55%   6/30  received intubated on low dose Levo  A/p  In sinus, low dose levo for vasoplegia, volume resuscitated with colloids, CVP 7/ good UOP / warm and euvolemic on exam   Cardiac indices in normal range/ MvO2 73%  Post op HCT stable 27%/ low drains output--> will monitor  Admitted with Cr ~ 1.4-( unclear baseline also on RASSi/MRA/lasix and SGLT2i at home)  Cr has been is rising slowly ~ 1.7 now- good UOP lytes in good range, mild acidosis which is improving- likely hemodynamic related with a component of RISHI, urine is bland and unremarkable/ urine lytes pending will monitor UOP/ additional hydration for borderline urine   Woke up non focal/ cpaping well, Gas looks good--> for extubation   To dc willy in am     ATTENDING: I have personally and independently provided 30 min of critical care services.  77 Male w/ PMHx of HTN and DM who originally presented to [facility] on 6/26 c/o chest pain x 3 months. TTE revealed moderate AS and severe AR, nml LVEF. Patient referred for cardiac cath which revealed non-obstructive CAD. He was then transferred to [facility] under the care of Dr. Koehler for further work-up and intervention of severe AI and AS.  S/p Bio AVR  EF 55%   6/30  received intubated on low dose Levo  Right pneumo, pigtail placed with resolution of PTX  A/p  In sinus, low dose levo for vasoplegia, volume resuscitated with colloids, CVP 7/ good UOP / warm and euvolemic on exam   Cardiac indices in normal range/ MvO2 73%  Post op HCT stable 27%/ low drains output--> will monitor  Admitted with Cr ~ 1.4-( unclear baseline also on RASSi/MRA/lasix and SGLT2i at home)  Cr has been is rising slowly ~ 1.7 now- good UOP lytes in good range, mild acidosis which is improving- likely hemodynamic related with a component of RISHI, urine is bland and unremarkable/ urine lytes pending will monitor UOP/ additional hydration for borderline urine   Woke up non focal/ cpaping well, Gas looks good--> for extubation   To dc willy in am     ATTENDING: I have personally and independently provided 30 min of critical care services.

## 2023-06-30 NOTE — BRIEF OPERATIVE NOTE - NSICDXBRIEFPROCEDURE_GEN_ALL_CORE_FT
PROCEDURES:  Replacement of aortic valve with tissue valve 30-Jun-2023 17:50:17   25mm bio  Bypass time: 81 mins  Aortic clamp time: 62 mins Gurinder Kong

## 2023-06-30 NOTE — PROGRESS NOTE ADULT - SUBJECTIVE AND OBJECTIVE BOX
INTERVAL COURSE  POD#0  AVR   EF 55%  Received intubated on low dose Levo  Volume repleted/ woke up nonfocal  Cpaping/ In sinus CT output low     ICU Vital Signs Last 24 Hrs  T(C): 36.1 (30 Jun 2023 18:26), Max: 36.7 (30 Jun 2023 03:59)  T(F): 97 (30 Jun 2023 18:26), Max: 98 (30 Jun 2023 03:59)  HR: 89 (30 Jun 2023 21:00) (72 - 95)  BP: 130/60 (30 Jun 2023 07:15) (127/60 - 135/66)  BP(mean): 86 (30 Jun 2023 07:15) (86 - 86)  ABP: 131/57 (30 Jun 2023 21:00) (95/50 - 142/63)  ABP(mean): 78 (30 Jun 2023 21:00) (64 - 89)  RR: 18 (30 Jun 2023 21:00) (17 - 21)  SpO2: 100% (30 Jun 2023 21:00) (91% - 100%)    O2 Parameters below as of 30 Jun 2023 21:00  Patient On (Oxygen Delivery Method): ventilator    O2 Concentration (%): 40    Adult Advanced Hemodynamics Last 24 Hrs  CVP(mm Hg): 3 (30 Jun 2023 21:00) (1 - 16)  CO: 4.3 (30 Jun 2023 21:00) (3.6 - 6)  CI: 2.3 (30 Jun 2023 21:00) (1.9 - 3.3)  PA: 36/11 (30 Jun 2023 21:00) (31/6 - 57/25)  PA(mean): 20 (30 Jun 2023 21:00) (15 - 36)  SVR: 1393 (30 Jun 2023 21:00) (1039 - 1753)  SVRI: 2605 (30 Jun 2023 21:00) (2605 - 3322)  ABG - ( 30 Jun 2023 20:52 )  pH, Arterial: 7.47  pH, Blood: x     /  pCO2: 26    /  pO2: 170   / HCO3: 19    / Base Excess: -3.2  /  SaO2: 100.0     Mode: CPAP with PS  FiO2: 40  PEEP: 5  PS: 10  MAP: 7  PIP: 15    Daily   I&O's Summary  30 Jun 2023 07:01  -  30 Jun 2023 21:45  --------------------------------------------------------  IN: 1126.7 mL / OUT: 1930 mL / NET: -803.3 mL            	  PHYSICAL EXAM:  Constitutional: resting comfortably in bed; NAD  Respiratory: CTA B/L  Cardiac: NSR  Gastrointestinal: soft, NT/ND  Extremities: WWP, no peripheral edema  Neurologic: AAOx3; CNII-XII grossly intact; no focal deficits      LABS, RADIOLOGY, EKG & ADDITIONAL TESTS: Reviewed.

## 2023-07-01 LAB
ALBUMIN SERPL ELPH-MCNC: 3.1 G/DL — LOW (ref 3.3–5)
ALBUMIN SERPL ELPH-MCNC: 3.4 G/DL — SIGNIFICANT CHANGE UP (ref 3.3–5)
ALBUMIN SERPL ELPH-MCNC: 4 G/DL — SIGNIFICANT CHANGE UP (ref 3.3–5)
ALBUMIN SERPL ELPH-MCNC: 4.2 G/DL — SIGNIFICANT CHANGE UP (ref 3.3–5)
ALP SERPL-CCNC: 67 U/L — SIGNIFICANT CHANGE UP (ref 40–120)
ALP SERPL-CCNC: 70 U/L — SIGNIFICANT CHANGE UP (ref 40–120)
ALP SERPL-CCNC: 74 U/L — SIGNIFICANT CHANGE UP (ref 40–120)
ALP SERPL-CCNC: 81 U/L — SIGNIFICANT CHANGE UP (ref 40–120)
ALT FLD-CCNC: 12 U/L — SIGNIFICANT CHANGE UP (ref 10–45)
ALT FLD-CCNC: 6 U/L — LOW (ref 10–45)
ALT FLD-CCNC: 9 U/L — LOW (ref 10–45)
ALT FLD-CCNC: <5 U/L — LOW (ref 10–45)
ANION GAP SERPL CALC-SCNC: 13 MMOL/L — SIGNIFICANT CHANGE UP (ref 5–17)
ANION GAP SERPL CALC-SCNC: 14 MMOL/L — SIGNIFICANT CHANGE UP (ref 5–17)
ANION GAP SERPL CALC-SCNC: 15 MMOL/L — SIGNIFICANT CHANGE UP (ref 5–17)
ANION GAP SERPL CALC-SCNC: 3 MMOL/L — LOW (ref 5–17)
APPEARANCE UR: CLEAR — SIGNIFICANT CHANGE UP
APTT BLD: 33.8 SEC — SIGNIFICANT CHANGE UP (ref 27.5–35.5)
APTT BLD: 34.9 SEC — SIGNIFICANT CHANGE UP (ref 27.5–35.5)
APTT BLD: 35.5 SEC — SIGNIFICANT CHANGE UP (ref 27.5–35.5)
APTT BLD: 36.9 SEC — HIGH (ref 27.5–35.5)
APTT BLD: 38.5 SEC — HIGH (ref 27.5–35.5)
AST SERPL-CCNC: 32 U/L — SIGNIFICANT CHANGE UP (ref 10–40)
AST SERPL-CCNC: 32 U/L — SIGNIFICANT CHANGE UP (ref 10–40)
AST SERPL-CCNC: 36 U/L — SIGNIFICANT CHANGE UP (ref 10–40)
AST SERPL-CCNC: 37 U/L — SIGNIFICANT CHANGE UP (ref 10–40)
B-OH-BUTYR SERPL-SCNC: 0.1 MMOL/L — SIGNIFICANT CHANGE UP
BACTERIA # UR AUTO: PRESENT /HPF
BASE EXCESS BLDV CALC-SCNC: -0.7 MMOL/L — SIGNIFICANT CHANGE UP (ref -2–3)
BASE EXCESS BLDV CALC-SCNC: -2.2 MMOL/L — LOW (ref -2–3)
BASE EXCESS BLDV CALC-SCNC: -3.9 MMOL/L — LOW (ref -2–3)
BASE EXCESS BLDV CALC-SCNC: -4.8 MMOL/L — LOW (ref -2–3)
BASE EXCESS BLDV CALC-SCNC: -5 MMOL/L — LOW (ref -2–3)
BILIRUB SERPL-MCNC: 0.8 MG/DL — SIGNIFICANT CHANGE UP (ref 0.2–1.2)
BILIRUB SERPL-MCNC: 0.9 MG/DL — SIGNIFICANT CHANGE UP (ref 0.2–1.2)
BILIRUB SERPL-MCNC: 0.9 MG/DL — SIGNIFICANT CHANGE UP (ref 0.2–1.2)
BILIRUB SERPL-MCNC: 1 MG/DL — SIGNIFICANT CHANGE UP (ref 0.2–1.2)
BILIRUB UR-MCNC: NEGATIVE — SIGNIFICANT CHANGE UP
BUN SERPL-MCNC: 46 MG/DL — HIGH (ref 7–23)
BUN SERPL-MCNC: 48 MG/DL — HIGH (ref 7–23)
BUN SERPL-MCNC: 49 MG/DL — HIGH (ref 7–23)
BUN SERPL-MCNC: 51 MG/DL — HIGH (ref 7–23)
CA-I SERPL-SCNC: 1.19 MMOL/L — SIGNIFICANT CHANGE UP (ref 1.15–1.33)
CA-I SERPL-SCNC: 1.19 MMOL/L — SIGNIFICANT CHANGE UP (ref 1.15–1.33)
CA-I SERPL-SCNC: 1.2 MMOL/L — SIGNIFICANT CHANGE UP (ref 1.15–1.33)
CA-I SERPL-SCNC: 1.21 MMOL/L — SIGNIFICANT CHANGE UP (ref 1.15–1.33)
CALCIUM SERPL-MCNC: 8.4 MG/DL — SIGNIFICANT CHANGE UP (ref 8.4–10.5)
CALCIUM SERPL-MCNC: 8.7 MG/DL — SIGNIFICANT CHANGE UP (ref 8.4–10.5)
CALCIUM SERPL-MCNC: 8.8 MG/DL — SIGNIFICANT CHANGE UP (ref 8.4–10.5)
CALCIUM SERPL-MCNC: 8.8 MG/DL — SIGNIFICANT CHANGE UP (ref 8.4–10.5)
CHLORIDE SERPL-SCNC: 101 MMOL/L — SIGNIFICANT CHANGE UP (ref 96–108)
CHLORIDE SERPL-SCNC: 102 MMOL/L — SIGNIFICANT CHANGE UP (ref 96–108)
CHLORIDE SERPL-SCNC: 103 MMOL/L — SIGNIFICANT CHANGE UP (ref 96–108)
CHLORIDE SERPL-SCNC: 112 MMOL/L — HIGH (ref 96–108)
CO2 BLDV-SCNC: 22.4 MMOL/L — SIGNIFICANT CHANGE UP (ref 22–26)
CO2 BLDV-SCNC: 22.4 MMOL/L — SIGNIFICANT CHANGE UP (ref 22–26)
CO2 BLDV-SCNC: 23.5 MMOL/L — SIGNIFICANT CHANGE UP (ref 22–26)
CO2 BLDV-SCNC: 25.1 MMOL/L — SIGNIFICANT CHANGE UP (ref 22–26)
CO2 BLDV-SCNC: 27.3 MMOL/L — HIGH (ref 22–26)
CO2 SERPL-SCNC: 20 MMOL/L — LOW (ref 22–31)
CO2 SERPL-SCNC: 20 MMOL/L — LOW (ref 22–31)
CO2 SERPL-SCNC: 21 MMOL/L — LOW (ref 22–31)
CO2 SERPL-SCNC: 23 MMOL/L — SIGNIFICANT CHANGE UP (ref 22–31)
COLOR SPEC: YELLOW — SIGNIFICANT CHANGE UP
COMMENT - URINE: SIGNIFICANT CHANGE UP
CREAT SERPL-MCNC: 1.87 MG/DL — HIGH (ref 0.5–1.3)
CREAT SERPL-MCNC: 2.03 MG/DL — HIGH (ref 0.5–1.3)
CREAT SERPL-MCNC: 2.08 MG/DL — HIGH (ref 0.5–1.3)
CREAT SERPL-MCNC: 2.19 MG/DL — HIGH (ref 0.5–1.3)
DIFF PNL FLD: ABNORMAL
EGFR: 30 ML/MIN/1.73M2 — LOW
EGFR: 32 ML/MIN/1.73M2 — LOW
EGFR: 33 ML/MIN/1.73M2 — LOW
EGFR: 36 ML/MIN/1.73M2 — LOW
EPI CELLS # UR: SIGNIFICANT CHANGE UP /HPF (ref 0–5)
GAS PNL BLDA: SIGNIFICANT CHANGE UP
GAS PNL BLDV: 134 MMOL/L — LOW (ref 136–145)
GAS PNL BLDV: 135 MMOL/L — LOW (ref 136–145)
GAS PNL BLDV: SIGNIFICANT CHANGE UP
GLUCOSE BLDC GLUCOMTR-MCNC: 127 MG/DL — HIGH (ref 70–99)
GLUCOSE BLDC GLUCOMTR-MCNC: 154 MG/DL — HIGH (ref 70–99)
GLUCOSE BLDC GLUCOMTR-MCNC: 193 MG/DL — HIGH (ref 70–99)
GLUCOSE BLDC GLUCOMTR-MCNC: 238 MG/DL — HIGH (ref 70–99)
GLUCOSE BLDC GLUCOMTR-MCNC: 322 MG/DL — HIGH (ref 70–99)
GLUCOSE BLDC GLUCOMTR-MCNC: 67 MG/DL — LOW (ref 70–99)
GLUCOSE BLDC GLUCOMTR-MCNC: 67 MG/DL — LOW (ref 70–99)
GLUCOSE BLDC GLUCOMTR-MCNC: 84 MG/DL — SIGNIFICANT CHANGE UP (ref 70–99)
GLUCOSE BLDC GLUCOMTR-MCNC: 98 MG/DL — SIGNIFICANT CHANGE UP (ref 70–99)
GLUCOSE SERPL-MCNC: 136 MG/DL — HIGH (ref 70–99)
GLUCOSE SERPL-MCNC: 244 MG/DL — HIGH (ref 70–99)
GLUCOSE SERPL-MCNC: 252 MG/DL — HIGH (ref 70–99)
GLUCOSE SERPL-MCNC: 99 MG/DL — SIGNIFICANT CHANGE UP (ref 70–99)
GLUCOSE UR QL: NEGATIVE — SIGNIFICANT CHANGE UP
HCO3 BLDV-SCNC: 21 MMOL/L — LOW (ref 22–29)
HCO3 BLDV-SCNC: 21 MMOL/L — LOW (ref 22–29)
HCO3 BLDV-SCNC: 22 MMOL/L — SIGNIFICANT CHANGE UP (ref 22–29)
HCO3 BLDV-SCNC: 24 MMOL/L — SIGNIFICANT CHANGE UP (ref 22–29)
HCO3 BLDV-SCNC: 26 MMOL/L — SIGNIFICANT CHANGE UP (ref 22–29)
HCT VFR BLD CALC: 23.5 % — LOW (ref 39–50)
HCT VFR BLD CALC: 24 % — LOW (ref 39–50)
HCT VFR BLD CALC: 24 % — LOW (ref 39–50)
HCT VFR BLD CALC: 25.4 % — LOW (ref 39–50)
HCT VFR BLD CALC: 27.6 % — LOW (ref 39–50)
HGB BLD-MCNC: 7.9 G/DL — LOW (ref 13–17)
HGB BLD-MCNC: 8 G/DL — LOW (ref 13–17)
HGB BLD-MCNC: 8.2 G/DL — LOW (ref 13–17)
HGB BLD-MCNC: 8.5 G/DL — LOW (ref 13–17)
HGB BLD-MCNC: 9.2 G/DL — LOW (ref 13–17)
HYALINE CASTS # UR AUTO: SIGNIFICANT CHANGE UP /LPF (ref 0–2)
INR BLD: 1.19 — HIGH (ref 0.88–1.16)
INR BLD: 1.2 — HIGH (ref 0.88–1.16)
INR BLD: 1.22 — HIGH (ref 0.88–1.16)
INR BLD: 1.23 — HIGH (ref 0.88–1.16)
INR BLD: 1.25 — HIGH (ref 0.88–1.16)
KETONES UR-MCNC: NEGATIVE — SIGNIFICANT CHANGE UP
LACTATE SERPL-SCNC: 1.4 MMOL/L — SIGNIFICANT CHANGE UP (ref 0.5–2)
LACTATE SERPL-SCNC: 1.9 MMOL/L — SIGNIFICANT CHANGE UP (ref 0.5–2)
LACTATE SERPL-SCNC: 2.5 MMOL/L — HIGH (ref 0.5–2)
LACTATE SERPL-SCNC: 2.9 MMOL/L — HIGH (ref 0.5–2)
LEUKOCYTE ESTERASE UR-ACNC: NEGATIVE — SIGNIFICANT CHANGE UP
MAGNESIUM SERPL-MCNC: 2.2 MG/DL — SIGNIFICANT CHANGE UP (ref 1.6–2.6)
MAGNESIUM SERPL-MCNC: 2.3 MG/DL — SIGNIFICANT CHANGE UP (ref 1.6–2.6)
MCHC RBC-ENTMCNC: 31.5 PG — SIGNIFICANT CHANGE UP (ref 27–34)
MCHC RBC-ENTMCNC: 32.4 PG — SIGNIFICANT CHANGE UP (ref 27–34)
MCHC RBC-ENTMCNC: 32.7 PG — SIGNIFICANT CHANGE UP (ref 27–34)
MCHC RBC-ENTMCNC: 32.8 PG — SIGNIFICANT CHANGE UP (ref 27–34)
MCHC RBC-ENTMCNC: 32.9 PG — SIGNIFICANT CHANGE UP (ref 27–34)
MCHC RBC-ENTMCNC: 33.3 GM/DL — SIGNIFICANT CHANGE UP (ref 32–36)
MCHC RBC-ENTMCNC: 33.3 GM/DL — SIGNIFICANT CHANGE UP (ref 32–36)
MCHC RBC-ENTMCNC: 33.5 GM/DL — SIGNIFICANT CHANGE UP (ref 32–36)
MCHC RBC-ENTMCNC: 33.6 GM/DL — SIGNIFICANT CHANGE UP (ref 32–36)
MCHC RBC-ENTMCNC: 34.2 GM/DL — SIGNIFICANT CHANGE UP (ref 32–36)
MCV RBC AUTO: 94.1 FL — SIGNIFICANT CHANGE UP (ref 80–100)
MCV RBC AUTO: 96 FL — SIGNIFICANT CHANGE UP (ref 80–100)
MCV RBC AUTO: 97.2 FL — SIGNIFICANT CHANGE UP (ref 80–100)
MCV RBC AUTO: 97.9 FL — SIGNIFICANT CHANGE UP (ref 80–100)
MCV RBC AUTO: 98.2 FL — SIGNIFICANT CHANGE UP (ref 80–100)
NITRITE UR-MCNC: NEGATIVE — SIGNIFICANT CHANGE UP
NRBC # BLD: 0 /100 WBCS — SIGNIFICANT CHANGE UP (ref 0–0)
PCO2 BLDV: 41 MMHG — LOW (ref 42–55)
PCO2 BLDV: 42 MMHG — SIGNIFICANT CHANGE UP (ref 42–55)
PCO2 BLDV: 43 MMHG — SIGNIFICANT CHANGE UP (ref 42–55)
PCO2 BLDV: 44 MMHG — SIGNIFICANT CHANGE UP (ref 42–55)
PCO2 BLDV: 49 MMHG — SIGNIFICANT CHANGE UP (ref 42–55)
PH BLDV: 7.31 — LOW (ref 7.32–7.43)
PH BLDV: 7.32 — SIGNIFICANT CHANGE UP (ref 7.32–7.43)
PH BLDV: 7.32 — SIGNIFICANT CHANGE UP (ref 7.32–7.43)
PH BLDV: 7.33 — SIGNIFICANT CHANGE UP (ref 7.32–7.43)
PH BLDV: 7.34 — SIGNIFICANT CHANGE UP (ref 7.32–7.43)
PH UR: 5 — SIGNIFICANT CHANGE UP (ref 5–8)
PHOSPHATE SERPL-MCNC: 3.4 MG/DL — SIGNIFICANT CHANGE UP (ref 2.5–4.5)
PHOSPHATE SERPL-MCNC: 3.8 MG/DL — SIGNIFICANT CHANGE UP (ref 2.5–4.5)
PHOSPHATE SERPL-MCNC: 4 MG/DL — SIGNIFICANT CHANGE UP (ref 2.5–4.5)
PHOSPHATE SERPL-MCNC: 5 MG/DL — HIGH (ref 2.5–4.5)
PLATELET # BLD AUTO: 101 K/UL — LOW (ref 150–400)
PLATELET # BLD AUTO: 107 K/UL — LOW (ref 150–400)
PLATELET # BLD AUTO: 111 K/UL — LOW (ref 150–400)
PLATELET # BLD AUTO: 132 K/UL — LOW (ref 150–400)
PLATELET # BLD AUTO: 151 K/UL — SIGNIFICANT CHANGE UP (ref 150–400)
PO2 BLDV: 41 MMHG — SIGNIFICANT CHANGE UP (ref 25–45)
PO2 BLDV: 41 MMHG — SIGNIFICANT CHANGE UP (ref 25–45)
PO2 BLDV: 42 MMHG — SIGNIFICANT CHANGE UP (ref 25–45)
PO2 BLDV: 44 MMHG — SIGNIFICANT CHANGE UP (ref 25–45)
PO2 BLDV: 44 MMHG — SIGNIFICANT CHANGE UP (ref 25–45)
POTASSIUM BLDV-SCNC: 3.6 MMOL/L — SIGNIFICANT CHANGE UP (ref 3.5–5.1)
POTASSIUM BLDV-SCNC: 3.7 MMOL/L — SIGNIFICANT CHANGE UP (ref 3.5–5.1)
POTASSIUM BLDV-SCNC: 4.2 MMOL/L — SIGNIFICANT CHANGE UP (ref 3.5–5.1)
POTASSIUM BLDV-SCNC: 5.4 MMOL/L — HIGH (ref 3.5–5.1)
POTASSIUM SERPL-MCNC: 4 MMOL/L — SIGNIFICANT CHANGE UP (ref 3.5–5.3)
POTASSIUM SERPL-MCNC: 4.2 MMOL/L — SIGNIFICANT CHANGE UP (ref 3.5–5.3)
POTASSIUM SERPL-MCNC: 4.4 MMOL/L — SIGNIFICANT CHANGE UP (ref 3.5–5.3)
POTASSIUM SERPL-MCNC: 5.6 MMOL/L — HIGH (ref 3.5–5.3)
POTASSIUM SERPL-SCNC: 4 MMOL/L — SIGNIFICANT CHANGE UP (ref 3.5–5.3)
POTASSIUM SERPL-SCNC: 4.2 MMOL/L — SIGNIFICANT CHANGE UP (ref 3.5–5.3)
POTASSIUM SERPL-SCNC: 4.4 MMOL/L — SIGNIFICANT CHANGE UP (ref 3.5–5.3)
POTASSIUM SERPL-SCNC: 5.6 MMOL/L — HIGH (ref 3.5–5.3)
PROT SERPL-MCNC: 5.4 G/DL — LOW (ref 6–8.3)
PROT SERPL-MCNC: 6.2 G/DL — SIGNIFICANT CHANGE UP (ref 6–8.3)
PROT SERPL-MCNC: 6.7 G/DL — SIGNIFICANT CHANGE UP (ref 6–8.3)
PROT SERPL-MCNC: 6.7 G/DL — SIGNIFICANT CHANGE UP (ref 6–8.3)
PROT UR-MCNC: NEGATIVE MG/DL — SIGNIFICANT CHANGE UP
PROTHROM AB SERPL-ACNC: 14.2 SEC — HIGH (ref 10.5–13.4)
PROTHROM AB SERPL-ACNC: 14.3 SEC — HIGH (ref 10.5–13.4)
PROTHROM AB SERPL-ACNC: 14.5 SEC — HIGH (ref 10.5–13.4)
PROTHROM AB SERPL-ACNC: 14.7 SEC — HIGH (ref 10.5–13.4)
PROTHROM AB SERPL-ACNC: 14.9 SEC — HIGH (ref 10.5–13.4)
RBC # BLD: 2.4 M/UL — LOW (ref 4.2–5.8)
RBC # BLD: 2.47 M/UL — LOW (ref 4.2–5.8)
RBC # BLD: 2.5 M/UL — LOW (ref 4.2–5.8)
RBC # BLD: 2.7 M/UL — LOW (ref 4.2–5.8)
RBC # BLD: 2.81 M/UL — LOW (ref 4.2–5.8)
RBC # FLD: 13.1 % — SIGNIFICANT CHANGE UP (ref 10.3–14.5)
RBC # FLD: 13.8 % — SIGNIFICANT CHANGE UP (ref 10.3–14.5)
RBC # FLD: 14.7 % — HIGH (ref 10.3–14.5)
RBC # FLD: 14.8 % — HIGH (ref 10.3–14.5)
RBC # FLD: 15.1 % — HIGH (ref 10.3–14.5)
RBC CASTS # UR COMP ASSIST: > 10 /HPF
SAO2 % BLDV: 71.4 % — SIGNIFICANT CHANGE UP (ref 67–88)
SAO2 % BLDV: 71.7 % — SIGNIFICANT CHANGE UP (ref 67–88)
SAO2 % BLDV: 72.3 % — SIGNIFICANT CHANGE UP (ref 67–88)
SAO2 % BLDV: 76.9 % — SIGNIFICANT CHANGE UP (ref 67–88)
SAO2 % BLDV: 77.6 % — SIGNIFICANT CHANGE UP (ref 67–88)
SODIUM SERPL-SCNC: 136 MMOL/L — SIGNIFICANT CHANGE UP (ref 135–145)
SODIUM SERPL-SCNC: 136 MMOL/L — SIGNIFICANT CHANGE UP (ref 135–145)
SODIUM SERPL-SCNC: 137 MMOL/L — SIGNIFICANT CHANGE UP (ref 135–145)
SODIUM SERPL-SCNC: 138 MMOL/L — SIGNIFICANT CHANGE UP (ref 135–145)
SP GR SPEC: 1.01 — SIGNIFICANT CHANGE UP (ref 1–1.03)
UROBILINOGEN FLD QL: 0.2 E.U./DL — SIGNIFICANT CHANGE UP
WBC # BLD: 11.18 K/UL — HIGH (ref 3.8–10.5)
WBC # BLD: 8.39 K/UL — SIGNIFICANT CHANGE UP (ref 3.8–10.5)
WBC # BLD: 8.77 K/UL — SIGNIFICANT CHANGE UP (ref 3.8–10.5)
WBC # BLD: 9.86 K/UL — SIGNIFICANT CHANGE UP (ref 3.8–10.5)
WBC # BLD: 9.91 K/UL — SIGNIFICANT CHANGE UP (ref 3.8–10.5)
WBC # FLD AUTO: 11.18 K/UL — HIGH (ref 3.8–10.5)
WBC # FLD AUTO: 8.39 K/UL — SIGNIFICANT CHANGE UP (ref 3.8–10.5)
WBC # FLD AUTO: 8.77 K/UL — SIGNIFICANT CHANGE UP (ref 3.8–10.5)
WBC # FLD AUTO: 9.86 K/UL — SIGNIFICANT CHANGE UP (ref 3.8–10.5)
WBC # FLD AUTO: 9.91 K/UL — SIGNIFICANT CHANGE UP (ref 3.8–10.5)
WBC UR QL: ABNORMAL /HPF

## 2023-07-01 PROCEDURE — 32551 INSERTION OF CHEST TUBE: CPT

## 2023-07-01 PROCEDURE — 99292 CRITICAL CARE ADDL 30 MIN: CPT | Mod: 25

## 2023-07-01 PROCEDURE — 99291 CRITICAL CARE FIRST HOUR: CPT | Mod: 25

## 2023-07-01 PROCEDURE — 99292 CRITICAL CARE ADDL 30 MIN: CPT

## 2023-07-01 PROCEDURE — 71045 X-RAY EXAM CHEST 1 VIEW: CPT | Mod: 26,76

## 2023-07-01 PROCEDURE — 99232 SBSQ HOSP IP/OBS MODERATE 35: CPT | Mod: GC

## 2023-07-01 RX ORDER — SODIUM CHLORIDE 9 MG/ML
1000 INJECTION, SOLUTION INTRAVENOUS
Refills: 0 | Status: DISCONTINUED | OUTPATIENT
Start: 2023-07-01 | End: 2023-07-14

## 2023-07-01 RX ORDER — SODIUM ZIRCONIUM CYCLOSILICATE 10 G/10G
10 POWDER, FOR SUSPENSION ORAL ONCE
Refills: 0 | Status: COMPLETED | OUTPATIENT
Start: 2023-07-01 | End: 2023-07-01

## 2023-07-01 RX ORDER — SODIUM CHLORIDE 9 MG/ML
500 INJECTION, SOLUTION INTRAVENOUS
Refills: 0 | Status: DISCONTINUED | OUTPATIENT
Start: 2023-07-01 | End: 2023-07-02

## 2023-07-01 RX ORDER — DEXTROSE 50 % IN WATER 50 %
50 SYRINGE (ML) INTRAVENOUS ONCE
Refills: 0 | Status: COMPLETED | OUTPATIENT
Start: 2023-07-01 | End: 2023-07-01

## 2023-07-01 RX ORDER — FENTANYL CITRATE 50 UG/ML
25 INJECTION INTRAVENOUS
Refills: 0 | Status: DISCONTINUED | OUTPATIENT
Start: 2023-07-01 | End: 2023-07-02

## 2023-07-01 RX ORDER — OXYCODONE HYDROCHLORIDE 5 MG/1
5 TABLET ORAL EVERY 6 HOURS
Refills: 0 | Status: DISCONTINUED | OUTPATIENT
Start: 2023-07-01 | End: 2023-07-03

## 2023-07-01 RX ORDER — PANTOPRAZOLE SODIUM 20 MG/1
40 TABLET, DELAYED RELEASE ORAL
Refills: 0 | Status: DISCONTINUED | OUTPATIENT
Start: 2023-07-01 | End: 2023-07-03

## 2023-07-01 RX ORDER — BUMETANIDE 0.25 MG/ML
1 INJECTION INTRAMUSCULAR; INTRAVENOUS
Qty: 20 | Refills: 0 | Status: DISCONTINUED | OUTPATIENT
Start: 2023-07-01 | End: 2023-07-03

## 2023-07-01 RX ORDER — POLYETHYLENE GLYCOL 3350 17 G/17G
17 POWDER, FOR SOLUTION ORAL DAILY
Refills: 0 | Status: DISCONTINUED | OUTPATIENT
Start: 2023-07-01 | End: 2023-07-03

## 2023-07-01 RX ORDER — BUMETANIDE 0.25 MG/ML
1 INJECTION INTRAMUSCULAR; INTRAVENOUS ONCE
Refills: 0 | Status: COMPLETED | OUTPATIENT
Start: 2023-07-01 | End: 2023-07-01

## 2023-07-01 RX ORDER — DOBUTAMINE HCL 250MG/20ML
2.5 VIAL (ML) INTRAVENOUS
Qty: 500 | Refills: 0 | Status: DISCONTINUED | OUTPATIENT
Start: 2023-07-01 | End: 2023-07-02

## 2023-07-01 RX ORDER — INSULIN HUMAN 100 [IU]/ML
1 INJECTION, SOLUTION SUBCUTANEOUS
Qty: 100 | Refills: 0 | Status: DISCONTINUED | OUTPATIENT
Start: 2023-07-01 | End: 2023-07-02

## 2023-07-01 RX ORDER — OXYCODONE HYDROCHLORIDE 5 MG/1
10 TABLET ORAL EVERY 6 HOURS
Refills: 0 | Status: DISCONTINUED | OUTPATIENT
Start: 2023-07-01 | End: 2023-07-03

## 2023-07-01 RX ORDER — SODIUM BICARBONATE 1 MEQ/ML
50 SYRINGE (ML) INTRAVENOUS ONCE
Refills: 0 | Status: COMPLETED | OUTPATIENT
Start: 2023-07-01 | End: 2023-07-01

## 2023-07-01 RX ORDER — VASOPRESSIN 20 [USP'U]/ML
0.02 INJECTION INTRAVENOUS
Qty: 40 | Refills: 0 | Status: DISCONTINUED | OUTPATIENT
Start: 2023-07-01 | End: 2023-07-03

## 2023-07-01 RX ORDER — DEXTROSE 50 % IN WATER 50 %
15 SYRINGE (ML) INTRAVENOUS ONCE
Refills: 0 | Status: DISCONTINUED | OUTPATIENT
Start: 2023-07-01 | End: 2023-07-07

## 2023-07-01 RX ORDER — GLUCAGON INJECTION, SOLUTION 0.5 MG/.1ML
1 INJECTION, SOLUTION SUBCUTANEOUS ONCE
Refills: 0 | Status: DISCONTINUED | OUTPATIENT
Start: 2023-07-01 | End: 2023-07-14

## 2023-07-01 RX ORDER — SENNA PLUS 8.6 MG/1
2 TABLET ORAL AT BEDTIME
Refills: 0 | Status: DISCONTINUED | OUTPATIENT
Start: 2023-07-01 | End: 2023-07-03

## 2023-07-01 RX ORDER — SODIUM CHLORIDE 9 MG/ML
1000 INJECTION, SOLUTION INTRAVENOUS
Refills: 0 | Status: DISCONTINUED | OUTPATIENT
Start: 2023-07-01 | End: 2023-07-02

## 2023-07-01 RX ORDER — INSULIN HUMAN 100 [IU]/ML
10 INJECTION, SOLUTION SUBCUTANEOUS ONCE
Refills: 0 | Status: COMPLETED | OUTPATIENT
Start: 2023-07-01 | End: 2023-07-01

## 2023-07-01 RX ORDER — SODIUM CHLORIDE 9 MG/ML
100 INJECTION, SOLUTION INTRAVENOUS
Refills: 0 | Status: DISCONTINUED | OUTPATIENT
Start: 2023-07-01 | End: 2023-07-04

## 2023-07-01 RX ORDER — BUMETANIDE 0.25 MG/ML
2 INJECTION INTRAMUSCULAR; INTRAVENOUS ONCE
Refills: 0 | Status: COMPLETED | OUTPATIENT
Start: 2023-07-01 | End: 2023-07-01

## 2023-07-01 RX ORDER — METOCLOPRAMIDE HCL 10 MG
10 TABLET ORAL ONCE
Refills: 0 | Status: COMPLETED | OUTPATIENT
Start: 2023-07-01 | End: 2023-07-01

## 2023-07-01 RX ORDER — INSULIN LISPRO 100/ML
VIAL (ML) SUBCUTANEOUS
Refills: 0 | Status: DISCONTINUED | OUTPATIENT
Start: 2023-07-01 | End: 2023-07-03

## 2023-07-01 RX ORDER — ACETAMINOPHEN 500 MG
650 TABLET ORAL EVERY 6 HOURS
Refills: 0 | Status: DISCONTINUED | OUTPATIENT
Start: 2023-07-01 | End: 2023-07-03

## 2023-07-01 RX ORDER — MILRINONE LACTATE 1 MG/ML
0.12 INJECTION, SOLUTION INTRAVENOUS
Qty: 20 | Refills: 0 | Status: DISCONTINUED | OUTPATIENT
Start: 2023-07-01 | End: 2023-07-03

## 2023-07-01 RX ADMIN — MILRINONE LACTATE 5 MICROGRAM(S)/KG/MIN: 1 INJECTION, SOLUTION INTRAVENOUS at 19:49

## 2023-07-01 RX ADMIN — BUMETANIDE 1 MILLIGRAM(S): 0.25 INJECTION INTRAMUSCULAR; INTRAVENOUS at 17:00

## 2023-07-01 RX ADMIN — FENTANYL CITRATE 25 MICROGRAM(S): 50 INJECTION INTRAVENOUS at 19:53

## 2023-07-01 RX ADMIN — Medication 100 MILLIGRAM(S): at 22:25

## 2023-07-01 RX ADMIN — FENTANYL CITRATE 25 MICROGRAM(S): 50 INJECTION INTRAVENOUS at 23:00

## 2023-07-01 RX ADMIN — Medication 50 MILLILITER(S): at 12:42

## 2023-07-01 RX ADMIN — SENNA PLUS 2 TABLET(S): 8.6 TABLET ORAL at 22:26

## 2023-07-01 RX ADMIN — HEPARIN SODIUM 5000 UNIT(S): 5000 INJECTION INTRAVENOUS; SUBCUTANEOUS at 06:53

## 2023-07-01 RX ADMIN — HEPARIN SODIUM 5000 UNIT(S): 5000 INJECTION INTRAVENOUS; SUBCUTANEOUS at 15:38

## 2023-07-01 RX ADMIN — Medication 81 MILLIGRAM(S): at 12:43

## 2023-07-01 RX ADMIN — FENTANYL CITRATE 25 MICROGRAM(S): 50 INJECTION INTRAVENOUS at 22:27

## 2023-07-01 RX ADMIN — Medication 4: at 12:30

## 2023-07-01 RX ADMIN — BUMETANIDE 5 MG/HR: 0.25 INJECTION INTRAMUSCULAR; INTRAVENOUS at 19:50

## 2023-07-01 RX ADMIN — Medication 100 MILLIGRAM(S): at 06:00

## 2023-07-01 RX ADMIN — INSULIN HUMAN 10 UNIT(S): 100 INJECTION, SOLUTION SUBCUTANEOUS at 12:43

## 2023-07-01 RX ADMIN — VASOPRESSIN 3 UNIT(S)/MIN: 20 INJECTION INTRAVENOUS at 19:49

## 2023-07-01 RX ADMIN — FENTANYL CITRATE 25 MICROGRAM(S): 50 INJECTION INTRAVENOUS at 20:14

## 2023-07-01 RX ADMIN — POLYETHYLENE GLYCOL 3350 17 GRAM(S): 17 POWDER, FOR SOLUTION ORAL at 12:43

## 2023-07-01 RX ADMIN — Medication 100 MILLIGRAM(S): at 15:38

## 2023-07-01 RX ADMIN — VASOPRESSIN 3 UNIT(S)/MIN: 20 INJECTION INTRAVENOUS at 15:39

## 2023-07-01 RX ADMIN — SODIUM ZIRCONIUM CYCLOSILICATE 10 GRAM(S): 10 POWDER, FOR SUSPENSION ORAL at 12:43

## 2023-07-01 RX ADMIN — Medication 5 MICROGRAM(S)/KG/MIN: at 15:39

## 2023-07-01 RX ADMIN — ATORVASTATIN CALCIUM 40 MILLIGRAM(S): 80 TABLET, FILM COATED ORAL at 22:25

## 2023-07-01 RX ADMIN — SODIUM CHLORIDE 75 MILLILITER(S): 9 INJECTION, SOLUTION INTRAVENOUS at 20:16

## 2023-07-01 RX ADMIN — HEPARIN SODIUM 5000 UNIT(S): 5000 INJECTION INTRAVENOUS; SUBCUTANEOUS at 22:25

## 2023-07-01 RX ADMIN — BUMETANIDE 1 MILLIGRAM(S): 0.25 INJECTION INTRAMUSCULAR; INTRAVENOUS at 18:00

## 2023-07-01 RX ADMIN — Medication 50 MILLIEQUIVALENT(S): at 17:30

## 2023-07-01 RX ADMIN — INSULIN HUMAN 1 UNIT(S)/HR: 100 INJECTION, SOLUTION SUBCUTANEOUS at 19:50

## 2023-07-01 RX ADMIN — Medication 10 MILLIGRAM(S): at 22:25

## 2023-07-01 RX ADMIN — Medication 5 MICROGRAM(S)/KG/MIN: at 19:50

## 2023-07-01 RX ADMIN — SODIUM CHLORIDE 250 MILLILITER(S): 9 INJECTION, SOLUTION INTRAVENOUS at 01:37

## 2023-07-01 RX ADMIN — BUMETANIDE 2 MILLIGRAM(S): 0.25 INJECTION INTRAMUSCULAR; INTRAVENOUS at 22:25

## 2023-07-01 NOTE — DIETITIAN INITIAL EVALUATION ADULT - NSFNSGIIOFT_GEN_A_CORE
06-30-23 @ 07:01 - 07-01-23 @ 07:00  --------------------------------------------------------  OUT:    Chest Tube (mL): 970 mL    Nasogastric/Oral tube (mL): 0 mL  Total OUT: 970 mL    Total NET: -970 mL      07-01-23 @ 07:01 - 07-01-23 @ 14:14  --------------------------------------------------------  OUT:    Chest Tube (mL): 20 mL  Total OUT: 20 mL    Total NET: -20 mL

## 2023-07-01 NOTE — DIETITIAN NUTRITION RISK NOTIFICATION - ADDITIONAL COMMENTS/DIETITIAN RECOMMENDATIONS
1. Continue consistent carbohydrate, renal diet  2. Add Ensure Max BID (300kcal/60gpro)   3. Manage pain  4. Reinforce education prn

## 2023-07-01 NOTE — DIETITIAN INITIAL EVALUATION ADULT - OTHER INFO
78 YO Egyptian-speaking Male w/ PMHx of HTN and DM who originally presented to [facility] on 6/26 c/o chest pain x 3 months. Chest pain associated with LE edema, cough and phlegm, which was unresolved with OTC medication. Patient lives in [place] and states that he has not seen a doctor about his symptoms until coming to the [place]. At [facility] he underwent a stress test which revealed small sized, reversible, myocardial perfusion defect of the anteroapical wall c/w ischemia. TTE revealed moderate AS and severe AR, nml LVEF. Patient referred for cardiac cath which revealed non-obstructive CAD. He was then transferred to [facility] under the care of Dr. Koehler for further work-up and intervention of severe AI and AS.     Pt seen in room for nutrition assessment, sitting up in bed. Family at bedside. S/p aortic valve replacement 6/30. Pt reports decreased appetite PTA as well as 35lb/17% wt loss over the past 3 months. NKFA. No cultural, ethnic, Hindu food preferences noted. Currently on consistent carb/renal diet, not eating breakfast d/t not feeling hungry. Pt reports 7/10 pain during assessment. A1C 8.7%. No N/V/D/C noted at present. Per ASPEN guidelines, pt meets criteria for severe protein-calorie malnutrition 2/2 significant wt loss, likely meeting <75% EER, muscle/fat loss. RD provided education on importance of optimal PO intake to meet nutrition needs and prevent further wt loss. Encouraged adequate intake/hydration. Pt expressed understanding. Please see full nutrition recommendations below. Will continue to follow per RD protocol.

## 2023-07-01 NOTE — DIETITIAN INITIAL EVALUATION ADULT - PERTINENT MEDS FT
MEDICATIONS  (STANDING):  aspirin enteric coated 81 milliGRAM(s) Oral daily  atorvastatin 40 milliGRAM(s) Oral at bedtime  buMETAnide 1 milliGRAM(s) Oral once  ceFAZolin   IVPB 2000 milliGRAM(s) IV Intermittent every 8 hours  chlorhexidine 2% Cloths 1 Application(s) Topical daily  dextrose 5%. 1000 milliLiter(s) (50 mL/Hr) IV Continuous <Continuous>  dextrose 5%. 1000 milliLiter(s) (100 mL/Hr) IV Continuous <Continuous>  dextrose 50% Injectable 50 milliLiter(s) IV Push every 15 minutes  glucagon  Injectable 1 milliGRAM(s) IntraMuscular once  heparin   Injectable 5000 Unit(s) SubCutaneous every 8 hours  insulin lispro (ADMELOG) corrective regimen sliding scale   SubCutaneous three times a day before meals  lactated ringers. 500 milliLiter(s) (250 mL/Hr) IV Continuous <Continuous>  norepinephrine Infusion 0.04 MICROgram(s)/kG/Min (5 mL/Hr) IV Continuous <Continuous>  pantoprazole    Tablet 40 milliGRAM(s) Oral before breakfast  polyethylene glycol 3350 17 Gram(s) Oral daily  senna 2 Tablet(s) Oral at bedtime  sodium chloride 0.9%. 1000 milliLiter(s) (10 mL/Hr) IV Continuous <Continuous>    MEDICATIONS  (PRN):  acetaminophen     Tablet .. 650 milliGRAM(s) Oral every 6 hours PRN Mild Pain (1 - 3)  dextrose Oral Gel 15 Gram(s) Oral once PRN Blood Glucose LESS THAN 70 milliGRAM(s)/deciliter  fentaNYL    Injectable 25 MICROGram(s) IV Push every 3 hours PRN Breakthrough Pain  oxyCODONE    IR 5 milliGRAM(s) Oral every 6 hours PRN Moderate Pain (4 - 6)  oxyCODONE    IR 10 milliGRAM(s) Oral every 6 hours PRN Severe Pain (7 - 10)

## 2023-07-01 NOTE — PROGRESS NOTE ADULT - SUBJECTIVE AND OBJECTIVE BOX
SUBJECTIVE / INTERVAL HPI: Patient was seen and examined this morning. Her underwent aortic valve replacement yesterday (6/30/23). Overnight, he was placed briefly on an insulin drip, later discontinued due to borderline low glucose levels. He reports having low appetite. He didn't have breakfast this morning and was not planning to have lunch later.     CAPILLARY BLOOD GLUCOSE & INSULIN RECEIVED  158 mg/dL (06-28 @ 22:51) ? 2 units of lispro sliding scale.   142 mg/dL (06-29 @ 06:52) ? Ø  272 mg/dL (06-29 @ 11:35) ? Ø  187 mg/dL (06-29 @ 14:10) ? 2 units of lispro sliding scale.   202 mg/dL (06-29 @ 16:46) ? 4 units of lispro sliding scale.   110 mg/dL (06-29 @ 23:23) ? 10 units lantus.   87 mg/dL (06-30 @ 06:36) ? Ø  153 mg/dL (06-30 @ 16:57) ? Insulin infusion started at 2 units/hr.   122 mg/dL (06-30 @ 18:29) ? Insulin infusion at 2 units/hr.   119 mg/dL (06-30 @ 19:56) ? Insulin infusion at 2 units/hr.   115 mg/dL (06-30 @ 21:01) ? Insulin infusion at 2 units/hr.   117 mg/dL (06-30 @ 22:17) ? Insulin infusion at 1.5 units/hr.   97 mg/dL (06-30 @ 23:14) ? Insulin infusion stopped.   84 mg/dL (07-01 @ 00:13) ? Ø  98 mg/dL (07-01 @ 02:17) ? Ø  127 mg/dL (07-01 @ 07:43) ? Ø    REVIEW OF SYSTEMS  Constitutional:  (+) Loss of appetite. Negative fever, chills.   Cardiovascular:  (+) Chest soreness from procedure. Negative for palpitations.  Respiratory:  (+) Cough. Negative for cough, wheezing, or shortness of breath.    Gastrointestinal:  Negative for nausea, vomiting, diarrhea, constipation, or abdominal pain.  Neurologic:  No headache, confusion, dizziness, lightheadedness.    PHYSICAL EXAM  Vital Signs Last 24 Hrs  T(C): 36 (01 Jul 2023 08:59), Max: 36.5 (30 Jun 2023 16:00)  T(F): 96.8 (01 Jul 2023 08:59), Max: 97.7 (30 Jun 2023 16:00)  HR: 81 (01 Jul 2023 12:00) (72 - 95)  BP: 112/55 (01 Jul 2023 07:00) (112/55 - 112/55)  BP(mean): 78 (01 Jul 2023 07:00) (78 - 78)  RR: 17 (01 Jul 2023 12:00) (16 - 21)  SpO2: 98% (01 Jul 2023 12:00) (93% - 100%)    Parameters below as of 01 Jul 2023 13:00  Patient On (Oxygen Delivery Method): nasal cannula w/ humidification  O2 Flow (L/min): 6    Constitutional: Awake, alert, elderly male, in no acute distress.   HEENT: Normocephalic, atraumatic, SCARLETT.  Respiratory: Lungs clear to ausculation bilaterally.   Cardiovascular: regular rhythm, normal S1 and S2, (+) systolic murmur. (+) midline sternotomy scar covered with sterile dressings (+) wound vac.   GI: soft, non-tender, non-distended, bowel sounds present.  Extremities: No lower extremity edema.   Psychiatric: AAO x 3.    LABS  CBC - WBC/HGB/HTC/PLT: 9.91/9.2/27.6/132 (07-01-23)  BMP - Na/K/Cl/Bicarb/BUN/Cr/Gluc/AG/eGFR: 136/5.6/103/20/48/2.03/244/13/33 (07-01-23)  Ca - 8.7 (07-01-23)  Phos - 5.0 (07-01-23)  Mg - 2.3 (07-01-23)  LFT - Alb/Tprot/Tbili/Dbili/AlkPhos/ALT/AST: 3.4/--/1.0/--/81/9/37 (07-01-23)  PT/aPTT/INR: 14.2/33.8/1.19 (07-01-23)   Thyroid Stimulating Hormone, Serum: 1.440 (06-28-23)    MEDICATIONS  MEDICATIONS  (STANDING):  aspirin enteric coated 81 milliGRAM(s) Oral daily  atorvastatin 40 milliGRAM(s) Oral at bedtime  ceFAZolin   IVPB 2000 milliGRAM(s) IV Intermittent every 8 hours  chlorhexidine 2% Cloths 1 Application(s) Topical daily  dextrose 5%. 1000 milliLiter(s) (50 mL/Hr) IV Continuous <Continuous>  dextrose 5%. 1000 milliLiter(s) (100 mL/Hr) IV Continuous <Continuous>  dextrose 50% Injectable 50 milliLiter(s) IV Push once  dextrose 50% Injectable 50 milliLiter(s) IV Push every 15 minutes  glucagon  Injectable 1 milliGRAM(s) IntraMuscular once  heparin   Injectable 5000 Unit(s) SubCutaneous every 8 hours  insulin lispro (ADMELOG) corrective regimen sliding scale   SubCutaneous three times a day before meals  insulin regular  human recombinant 10 Unit(s) IV Push once  lactated ringers. 500 milliLiter(s) (250 mL/Hr) IV Continuous <Continuous>  norepinephrine Infusion 0.04 MICROgram(s)/kG/Min (5 mL/Hr) IV Continuous <Continuous>  pantoprazole    Tablet 40 milliGRAM(s) Oral before breakfast  polyethylene glycol 3350 17 Gram(s) Oral daily  senna 2 Tablet(s) Oral at bedtime  sodium chloride 0.9%. 1000 milliLiter(s) (10 mL/Hr) IV Continuous <Continuous>  sodium zirconium cyclosilicate 10 Gram(s) Oral once    MEDICATIONS  (PRN):  acetaminophen     Tablet .. 650 milliGRAM(s) Oral every 6 hours PRN Mild Pain (1 - 3)  dextrose Oral Gel 15 Gram(s) Oral once PRN Blood Glucose LESS THAN 70 milliGRAM(s)/deciliter  fentaNYL    Injectable 25 MICROGram(s) IV Push every 3 hours PRN Breakthrough Pain  oxyCODONE    IR 5 milliGRAM(s) Oral every 6 hours PRN Moderate Pain (4 - 6)  oxyCODONE    IR 10 milliGRAM(s) Oral every 6 hours PRN Severe Pain (7 - 10)    ASSESSMENT / RECOMMENDATIONS  Mr. Johnson is a 78-year-old male with a past medical history of type 2 diabetes mellitus, hypertension and severe aortic stenosis who was transferred to [facility] for further work-up and intervention of severe aortic stenosis, now s/p aortic valve replacement (6/30/23). Endocrinology was consulted for recommendations regarding diabetes management.     A1C: 8.6 %  BUN: 48  Creatinine: 2.03  GFR: 33  Weight: 66.6  BMI: 21.5    # Type 2 diabetes mellitus with hyperglycemia  - Please START lantus 6 units at bedtime.   - Would hold off starting premeal insulin due to poor appetite.   - Continue lispro moderate dose sliding scale before meals and at bedtime.  - Patient's fingerstick glucose goal is 100-180 mg/dL.    - Discharge recommendations to be discussed.   - Patient can follow up at discharge with [facility] by calling [phone] to make an appointment.      Case discussed with Dr. Cee. Primary team updated.       Milton Laura    Endocrinology Fellow    Service Pager: [phone]    SUBJECTIVE / INTERVAL HPI: Patient was seen and examined this morning. Her underwent aortic valve replacement yesterday (6/30/23). Overnight, he was placed briefly on an insulin drip, later discontinued due to borderline low glucose levels. He reports having low appetite. He didn't have breakfast this morning and was not planning to have lunch later.     CAPILLARY BLOOD GLUCOSE & INSULIN RECEIVED  158 mg/dL (06-28 @ 22:51) ? 2 units of lispro sliding scale.   142 mg/dL (06-29 @ 06:52) ? Ø  272 mg/dL (06-29 @ 11:35) ? Ø  187 mg/dL (06-29 @ 14:10) ? 2 units of lispro sliding scale.   202 mg/dL (06-29 @ 16:46) ? 4 units of lispro sliding scale.   110 mg/dL (06-29 @ 23:23) ? 10 units lantus.   87 mg/dL (06-30 @ 06:36) ? Ø  153 mg/dL (06-30 @ 16:57) ? Insulin infusion started at 2 units/hr.   122 mg/dL (06-30 @ 18:29) ? Insulin infusion at 2 units/hr.   119 mg/dL (06-30 @ 19:56) ? Insulin infusion at 2 units/hr.   115 mg/dL (06-30 @ 21:01) ? Insulin infusion at 2 units/hr.   117 mg/dL (06-30 @ 22:17) ? Insulin infusion at 1.5 units/hr.   97 mg/dL (06-30 @ 23:14) ? Insulin infusion stopped.   84 mg/dL (07-01 @ 00:13) ? Ø  98 mg/dL (07-01 @ 02:17) ? Ø  127 mg/dL (07-01 @ 07:43) ? Ø    REVIEW OF SYSTEMS  Constitutional:  (+) Loss of appetite. Negative fever, chills.   Cardiovascular:  (+) Chest soreness from procedure. Negative for palpitations.  Respiratory:  (+) Cough. Negative for cough, wheezing, or shortness of breath.    Gastrointestinal:  Negative for nausea, vomiting, diarrhea, constipation, or abdominal pain.  Neurologic:  No headache, confusion, dizziness, lightheadedness.    PHYSICAL EXAM  Vital Signs Last 24 Hrs  T(C): 36 (01 Jul 2023 08:59), Max: 36.5 (30 Jun 2023 16:00)  T(F): 96.8 (01 Jul 2023 08:59), Max: 97.7 (30 Jun 2023 16:00)  HR: 81 (01 Jul 2023 12:00) (72 - 95)  BP: 112/55 (01 Jul 2023 07:00) (112/55 - 112/55)  BP(mean): 78 (01 Jul 2023 07:00) (78 - 78)  RR: 17 (01 Jul 2023 12:00) (16 - 21)  SpO2: 98% (01 Jul 2023 12:00) (93% - 100%)    Parameters below as of 01 Jul 2023 13:00  Patient On (Oxygen Delivery Method): nasal cannula w/ humidification  O2 Flow (L/min): 6    Constitutional: Awake, alert, elderly male, in no acute distress.   HEENT: Normocephalic, atraumatic, SCARLETT.  Respiratory: Lungs clear to ausculation bilaterally.   Cardiovascular: regular rhythm, normal S1 and S2, (+) systolic murmur. (+) midline sternotomy scar covered with sterile dressings (+) wound vac.   GI: soft, non-tender, non-distended, bowel sounds present.  Extremities: No lower extremity edema.   Psychiatric: AAO x 3.    LABS  CBC - WBC/HGB/HTC/PLT: 9.91/9.2/27.6/132 (07-01-23)  BMP - Na/K/Cl/Bicarb/BUN/Cr/Gluc/AG/eGFR: 136/5.6/103/20/48/2.03/244/13/33 (07-01-23)  Ca - 8.7 (07-01-23)  Phos - 5.0 (07-01-23)  Mg - 2.3 (07-01-23)  LFT - Alb/Tprot/Tbili/Dbili/AlkPhos/ALT/AST: 3.4/--/1.0/--/81/9/37 (07-01-23)  PT/aPTT/INR: 14.2/33.8/1.19 (07-01-23)   Thyroid Stimulating Hormone, Serum: 1.440 (06-28-23)    MEDICATIONS  MEDICATIONS  (STANDING):  aspirin enteric coated 81 milliGRAM(s) Oral daily  atorvastatin 40 milliGRAM(s) Oral at bedtime  ceFAZolin   IVPB 2000 milliGRAM(s) IV Intermittent every 8 hours  chlorhexidine 2% Cloths 1 Application(s) Topical daily  dextrose 5%. 1000 milliLiter(s) (50 mL/Hr) IV Continuous <Continuous>  dextrose 5%. 1000 milliLiter(s) (100 mL/Hr) IV Continuous <Continuous>  dextrose 50% Injectable 50 milliLiter(s) IV Push once  dextrose 50% Injectable 50 milliLiter(s) IV Push every 15 minutes  glucagon  Injectable 1 milliGRAM(s) IntraMuscular once  heparin   Injectable 5000 Unit(s) SubCutaneous every 8 hours  insulin lispro (ADMELOG) corrective regimen sliding scale   SubCutaneous three times a day before meals  insulin regular  human recombinant 10 Unit(s) IV Push once  lactated ringers. 500 milliLiter(s) (250 mL/Hr) IV Continuous <Continuous>  norepinephrine Infusion 0.04 MICROgram(s)/kG/Min (5 mL/Hr) IV Continuous <Continuous>  pantoprazole    Tablet 40 milliGRAM(s) Oral before breakfast  polyethylene glycol 3350 17 Gram(s) Oral daily  senna 2 Tablet(s) Oral at bedtime  sodium chloride 0.9%. 1000 milliLiter(s) (10 mL/Hr) IV Continuous <Continuous>  sodium zirconium cyclosilicate 10 Gram(s) Oral once    MEDICATIONS  (PRN):  acetaminophen     Tablet .. 650 milliGRAM(s) Oral every 6 hours PRN Mild Pain (1 - 3)  dextrose Oral Gel 15 Gram(s) Oral once PRN Blood Glucose LESS THAN 70 milliGRAM(s)/deciliter  fentaNYL    Injectable 25 MICROGram(s) IV Push every 3 hours PRN Breakthrough Pain  oxyCODONE    IR 5 milliGRAM(s) Oral every 6 hours PRN Moderate Pain (4 - 6)  oxyCODONE    IR 10 milliGRAM(s) Oral every 6 hours PRN Severe Pain (7 - 10)    ASSESSMENT / RECOMMENDATIONS  Mr. Johnson is a 78-year-old male with type 2 diabetes mellitus, hypertension and severe aortic stenosis who was transferred to [facility] for further work-up and intervention of severe aortic stenosis, now s/p aortic valve replacement (6/30/23). Endocrinology was consulted for recommendations regarding diabetes management.     A1C: 8.6 %  BUN: 48  Creatinine: 2.03  GFR: 33  Weight: 66.6  BMI: 21.5    # Type 2 diabetes mellitus with hyperglycemia  - Please START lantus 6 units at bedtime.   - Would hold off starting premeal insulin due to poor appetite.   - Continue lispro moderate dose sliding scale before meals and at bedtime.  - Patient's fingerstick glucose goal is 100-180 mg/dL  -consistent carbohydrate diet  -hypoglycemia protocol  - Discharge recommendations to be discussed.   - Patient can follow up at discharge with [facility] by calling [phone] to make an appointment.      Case discussed with Dr. Cee. Primary team updated.       Milton Laura    Endocrinology Fellow    Service Pager: [phone]

## 2023-07-01 NOTE — PROGRESS NOTE ADULT - ATTENDING COMMENTS
Agree with Dr. Osuna assessment plan above. The patient was ambulating without problems, eating.  We will add small dose of basal insulin. Based on additional POCT glucose data, will add lispro with meals if needed. Consistent carbohydrate diet, hypoglycemia protocol

## 2023-07-01 NOTE — PROGRESS NOTE ADULT - SUBJECTIVE AND OBJECTIVE BOX
CTICU  CRITICAL  CARE  attending     Hand off received 					   Pertinent clinical, laboratory, radiographic, hemodynamic, echocardiographic, respiratory data, microbiologic data and chart were reviewed and analyzed frequently throughout the course of the day and night        77 year old Portuguese-speaking Male with DM, HTN.  He presented to [facility] with ANGINA.   He had chest pain x 3 months. Chest pain associated with LE edema, cough and clear sputum.  He underwent a stress test which revealed small sized, reversible, myocardial perfusion defect of the anteroapical wall consistent with subendocardial ischemia.   ECHO: Moderate Aortic Stenosis. Severe Aortic Regurgitation, Normal LVEF.   Cardiac cath revealed non-obstructive CAD.   He was then transferred to [facility] under the care of Dr. Koehler for AVR.    S/P AVR.        FAMILY HISTORY:  PAST MEDICAL & SURGICAL HISTORY:  HTN (hypertension)  DM (diabetes mellitus)  Severe aortic regurgitation  History of cholecystectomy  H/O prostatectomy          14 system review was unremarkable    Vital signs, hemodynamic and respiratory parameters were reviewed from the bedside nursing flow sheet.  ICU Vital Signs Last 24 Hrs  T(C): 36.1 (01 Jul 2023 22:32), Max: 36.5 (01 Jul 2023 14:42)  T(F): 96.9 (01 Jul 2023 22:32), Max: 97.7 (01 Jul 2023 14:42)  HR: 106 (01 Jul 2023 22:05) (72 - 106)  BP: 133/65 (01 Jul 2023 16:00) (112/55 - 133/65)  BP(mean): 93 (01 Jul 2023 16:00) (78 - 93)  ABP: 130/44 (01 Jul 2023 21:00) (108/47 - 157/52)  ABP(mean): 68 (01 Jul 2023 21:00) (63 - 86)  RR: 20 (01 Jul 2023 22:05) (16 - 20)  SpO2: 93% (01 Jul 2023 22:05) (91% - 100%)    O2 Parameters below as of 01 Jul 2023 22:05  Patient On (Oxygen Delivery Method): nasal cannula, high flow  O2 Flow (L/min): 30  O2 Concentration (%): 60      Adult Advanced Hemodynamics Last 24 Hrs  CVP(mm Hg): 8 (01 Jul 2023 19:00) (1 - 205)  CVP(cm H2O): --  CO: 6.3 (01 Jul 2023 18:00) (4.4 - 17.7)  CI: 3.4 (01 Jul 2023 18:00) (2.4 - 9.7)  PA: 64/12 (01 Jul 2023 21:00) (21/11 - 67/11)  PA(mean): 30 (01 Jul 2023 21:00) (15 - 36)  PCWP: --  SVR: 875 (01 Jul 2023 18:00) (338 - 1292)  SVRI: 1621 (01 Jul 2023 18:00) (617 - 2428)  PVR: --  PVRI: --, ABG - ( 01 Jul 2023 19:40 )  pH, Arterial: 7.37  pH, Blood: x     /  pCO2: 41    /  pO2: 85    / HCO3: 24    / Base Excess: -1.5  /  SaO2: 98.5                Intake and output was reviewed and the fluid balance was calculated  Daily     Daily   I&O's Summary    30 Jun 2023 07:01  -  01 Jul 2023 07:00  --------------------------------------------------------  IN: 2130.2 mL / OUT: 2595 mL / NET: -464.8 mL    01 Jul 2023 07:01  -  01 Jul 2023 22:40  --------------------------------------------------------  IN: 2676 mL / OUT: 945 mL / NET: 1731 mL        Neuro: No change in the Neuro status from the baseline.   Neck: No JVD.  CVS: S1, S2, No S3.  Lungs: Diminished air entry on the left side.  Abd: Soft. No tenderness. + Bowel sounds.  Vascular: + DP/PT.  Extremities: No edema.  Lymphatic: Normal.  Skin: No abnormalities.      labs  CBC Full  -  ( 01 Jul 2023 19:40 )  WBC Count : 9.86 K/uL  RBC Count : 2.40 M/uL  Hemoglobin : 7.9 g/dL  Hematocrit : 23.5 %  Platelet Count - Automated : 107 K/uL  Mean Cell Volume : 97.9 fl  Mean Cell Hemoglobin : 32.9 pg  Mean Cell Hemoglobin Concentration : 33.6 gm/dL  Auto Neutrophil # : x  Auto Lymphocyte # : x  Auto Monocyte # : x  Auto Eosinophil # : x  Auto Basophil # : x  Auto Neutrophil % : x  Auto Lymphocyte % : x  Auto Monocyte % : x  Auto Eosinophil % : x  Auto Basophil % : x    07-01    138  |  101  |  49<H>  ----------------------------<  136<H>  4.0   |  23  |  2.19<H>    Ca    8.4      01 Jul 2023 19:40  Phos  3.4     07-01  Mg     2.2     07-01    TPro  6.7  /  Alb  4.2  /  TBili  0.8  /  DBili  x   /  AST  32  /  ALT  <5<L>  /  AlkPhos  67  07-01    PT/INR - ( 01 Jul 2023 19:40 )   PT: 14.9 sec;   INR: 1.25          PTT - ( 01 Jul 2023 19:40 )  PTT:36.9 sec  The current medications were reviewed   MEDICATIONS  (STANDING):  aspirin enteric coated 81 milliGRAM(s) Oral daily  atorvastatin 40 milliGRAM(s) Oral at bedtime  buMETAnide Infusion 1 mG/Hr (5 mL/Hr) IV Continuous <Continuous>  ceFAZolin   IVPB 2000 milliGRAM(s) IV Intermittent every 8 hours  chlorhexidine 2% Cloths 1 Application(s) Topical daily  dextrose 5%. 1000 milliLiter(s) (50 mL/Hr) IV Continuous <Continuous>  dextrose 5%. 1000 milliLiter(s) (100 mL/Hr) IV Continuous <Continuous>  dextrose 50% Injectable 50 milliLiter(s) IV Push every 15 minutes  DOBUTamine Infusion 2.5 MICROgram(s)/kG/Min (5 mL/Hr) IV Continuous <Continuous>  glucagon  Injectable 1 milliGRAM(s) IntraMuscular once  heparin   Injectable 5000 Unit(s) SubCutaneous every 8 hours  insulin lispro (ADMELOG) corrective regimen sliding scale   SubCutaneous three times a day before meals  insulin regular Infusion 1 Unit(s)/Hr (1 mL/Hr) IV Continuous <Continuous>  lactated ringers. 500 milliLiter(s) (250 mL/Hr) IV Continuous <Continuous>  metolazone 5 milliGRAM(s) Oral once  milrinone Infusion 0.25 MICROgram(s)/kG/Min (5 mL/Hr) IV Continuous <Continuous>  norepinephrine Infusion 0.04 MICROgram(s)/kG/Min (5 mL/Hr) IV Continuous <Continuous>  pantoprazole    Tablet 40 milliGRAM(s) Oral before breakfast  polyethylene glycol 3350 17 Gram(s) Oral daily  senna 2 Tablet(s) Oral at bedtime  sodium chloride 0.45%. 1000 milliLiter(s) (75 mL/Hr) IV Continuous <Continuous>  sodium chloride 0.9%. 1000 milliLiter(s) (10 mL/Hr) IV Continuous <Continuous>  vasopressin Infusion 0.02 Unit(s)/Min (3 mL/Hr) IV Continuous <Continuous>    MEDICATIONS  (PRN):  acetaminophen     Tablet .. 650 milliGRAM(s) Oral every 6 hours PRN Mild Pain (1 - 3)  dextrose Oral Gel 15 Gram(s) Oral once PRN Blood Glucose LESS THAN 70 milliGRAM(s)/deciliter  fentaNYL    Injectable 25 MICROGram(s) IV Push every 3 hours PRN Breakthrough Pain  oxyCODONE    IR 5 milliGRAM(s) Oral every 6 hours PRN Moderate Pain (4 - 6)  oxyCODONE    IR 10 milliGRAM(s) Oral every 6 hours PRN Severe Pain (7 - 10)          78 year old  Male admitted with AORTIC STENOSIS  S/P AVR  CAMMIE  Right lung collapse and pneumothorax  Decreased  urine out put.  Hemodynamically stable.  Fair oxygenation.        My plan includes :  Emergent chest tube insertion.  D/C Norepinephrine.  WEAN milrinone and dobutamine as tolerated.  Gentle Diuresis.  Statin Rx.   Close hemodynamic monitoring   Monitor for arrhythmias and monitor parameters for organ perfusion  Monitor neurologic status  Monitor renal function.  Head of the bed should remain elevated to 45 deg .   Chest PT and IS will be encouraged  Monitor adequacy of oxygenation   Nutritional goals will be met using po eventually , ensure adequate caloric intake and monitor the same  Stress ulcer and VTE prophylaxis will be achieved    Glycemic control is satisfactory  Electrolytes have been repleted as necessary and wound care has been carried out. Pain control has been achieved.   Aggressive physical therapy and early mobility and ambulation goals will be met   The family was updated about the course and plan  CRITICAL CARE TIME SPENT in evaluation and management, review and interpretation of labs and x-rays, hemodynamic management, formulating a plan and coordinating care: ___30____ MIN.  Time does not include procedural time.  CTICU ATTENDING     					    Leodan Alcantar MD

## 2023-07-01 NOTE — PROGRESS NOTE ADULT - SUBJECTIVE AND OBJECTIVE BOX
CTICU  CRITICAL  CARE  attending     Hand off received 					   Pertinent clinical, laboratory, radiographic, hemodynamic, echocardiographic, respiratory data, microbiologic data and chart were reviewed and analyzed frequently throughout the course of the day and night  Patient seen and examined with CTS/ SH attending at bedside  Pt is a 78y , Male, HEALTH ISSUES - PROBLEM Dx:      , FAMILY HISTORY:  PAST MEDICAL & SURGICAL HISTORY:  HTN (hypertension)      DM (diabetes mellitus)      Severe aortic regurgitation      History of cholecystectomy      H/O prostatectomy        Patient is a 78y old  Male who presents with a chief complaint of AORTIC STENOSIS     (01 Jul 2023 14:14)      14 system review was unremarkable    Vital signs, hemodynamic and respiratory parameters were reviewed from the bedside nursing flowsheet.  ICU Vital Signs Last 24 Hrs  T(C): 36.1 (01 Jul 2023 22:32), Max: 36.5 (01 Jul 2023 14:42)  T(F): 96.9 (01 Jul 2023 22:32), Max: 97.7 (01 Jul 2023 14:42)  HR: 106 (01 Jul 2023 22:05) (72 - 106)  BP: 133/65 (01 Jul 2023 16:00) (112/55 - 133/65)  BP(mean): 93 (01 Jul 2023 16:00) (78 - 93)  ABP: 130/44 (01 Jul 2023 21:00) (108/47 - 157/52)  ABP(mean): 68 (01 Jul 2023 21:00) (63 - 86)  RR: 20 (01 Jul 2023 22:05) (16 - 20)  SpO2: 93% (01 Jul 2023 22:05) (91% - 100%)    O2 Parameters below as of 01 Jul 2023 22:05  Patient On (Oxygen Delivery Method): nasal cannula, high flow  O2 Flow (L/min): 30  O2 Concentration (%): 60      Adult Advanced Hemodynamics Last 24 Hrs  CVP(mm Hg): 8 (01 Jul 2023 19:00) (1 - 205)  CVP(cm H2O): --  CO: 6.3 (01 Jul 2023 18:00) (4.4 - 17.7)  CI: 3.4 (01 Jul 2023 18:00) (2.4 - 9.7)  PA: 64/12 (01 Jul 2023 21:00) (21/11 - 67/11)  PA(mean): 30 (01 Jul 2023 21:00) (15 - 36)  PCWP: --  SVR: 875 (01 Jul 2023 18:00) (338 - 1292)  SVRI: 1621 (01 Jul 2023 18:00) (617 - 2428)  PVR: --  PVRI: --, ABG - ( 01 Jul 2023 19:40 )  pH, Arterial: 7.37  pH, Blood: x     /  pCO2: 41    /  pO2: 85    / HCO3: 24    / Base Excess: -1.5  /  SaO2: 98.5                Intake and output was reviewed and the fluid balance was calculated  Daily     Daily   I&O's Summary    30 Jun 2023 07:01  -  01 Jul 2023 07:00  --------------------------------------------------------  IN: 2130.2 mL / OUT: 2595 mL / NET: -464.8 mL    01 Jul 2023 07:01  -  01 Jul 2023 22:48  --------------------------------------------------------  IN: 2676 mL / OUT: 945 mL / NET: 1731 mL        All lines and drain sites were assessed  Glycemic trend was reviewedCAPILLARY BLOOD GLUCOSE      POCT Blood Glucose.: 67 mg/dL (01 Jul 2023 21:50)    No acute change in mental status  Auscultation of the chest reveals equal bs  Abdomen is soft  Extremities are warm and well perfused  Wounds appear clean and unremarkable  Antibiotics are periop    labs  CBC Full  -  ( 01 Jul 2023 19:40 )  WBC Count : 9.86 K/uL  RBC Count : 2.40 M/uL  Hemoglobin : 7.9 g/dL  Hematocrit : 23.5 %  Platelet Count - Automated : 107 K/uL  Mean Cell Volume : 97.9 fl  Mean Cell Hemoglobin : 32.9 pg  Mean Cell Hemoglobin Concentration : 33.6 gm/dL  Auto Neutrophil # : x  Auto Lymphocyte # : x  Auto Monocyte # : x  Auto Eosinophil # : x  Auto Basophil # : x  Auto Neutrophil % : x  Auto Lymphocyte % : x  Auto Monocyte % : x  Auto Eosinophil % : x  Auto Basophil % : x    07-01    138  |  101  |  49<H>  ----------------------------<  136<H>  4.0   |  23  |  2.19<H>    Ca    8.4      01 Jul 2023 19:40  Phos  3.4     07-01  Mg     2.2     07-01    TPro  6.7  /  Alb  4.2  /  TBili  0.8  /  DBili  x   /  AST  32  /  ALT  <5<L>  /  AlkPhos  67  07-01    PT/INR - ( 01 Jul 2023 19:40 )   PT: 14.9 sec;   INR: 1.25          PTT - ( 01 Jul 2023 19:40 )  PTT:36.9 sec  The current medications were reviewed   MEDICATIONS  (STANDING):  aspirin enteric coated 81 milliGRAM(s) Oral daily  atorvastatin 40 milliGRAM(s) Oral at bedtime  buMETAnide Infusion 1 mG/Hr (5 mL/Hr) IV Continuous <Continuous>  ceFAZolin   IVPB 2000 milliGRAM(s) IV Intermittent every 8 hours  chlorhexidine 2% Cloths 1 Application(s) Topical daily  dextrose 5%. 1000 milliLiter(s) (50 mL/Hr) IV Continuous <Continuous>  dextrose 5%. 1000 milliLiter(s) (100 mL/Hr) IV Continuous <Continuous>  dextrose 50% Injectable 50 milliLiter(s) IV Push every 15 minutes  DOBUTamine Infusion 2.5 MICROgram(s)/kG/Min (5 mL/Hr) IV Continuous <Continuous>  glucagon  Injectable 1 milliGRAM(s) IntraMuscular once  heparin   Injectable 5000 Unit(s) SubCutaneous every 8 hours  insulin lispro (ADMELOG) corrective regimen sliding scale   SubCutaneous three times a day before meals  insulin regular Infusion 1 Unit(s)/Hr (1 mL/Hr) IV Continuous <Continuous>  lactated ringers. 500 milliLiter(s) (250 mL/Hr) IV Continuous <Continuous>  metolazone 5 milliGRAM(s) Oral once  milrinone Infusion 0.25 MICROgram(s)/kG/Min (5 mL/Hr) IV Continuous <Continuous>  norepinephrine Infusion 0.04 MICROgram(s)/kG/Min (5 mL/Hr) IV Continuous <Continuous>  pantoprazole    Tablet 40 milliGRAM(s) Oral before breakfast  polyethylene glycol 3350 17 Gram(s) Oral daily  senna 2 Tablet(s) Oral at bedtime  sodium chloride 0.45%. 1000 milliLiter(s) (75 mL/Hr) IV Continuous <Continuous>  sodium chloride 0.9%. 1000 milliLiter(s) (10 mL/Hr) IV Continuous <Continuous>  vasopressin Infusion 0.02 Unit(s)/Min (3 mL/Hr) IV Continuous <Continuous>    MEDICATIONS  (PRN):  acetaminophen     Tablet .. 650 milliGRAM(s) Oral every 6 hours PRN Mild Pain (1 - 3)  dextrose Oral Gel 15 Gram(s) Oral once PRN Blood Glucose LESS THAN 70 milliGRAM(s)/deciliter  fentaNYL    Injectable 25 MICROGram(s) IV Push every 3 hours PRN Breakthrough Pain  oxyCODONE    IR 5 milliGRAM(s) Oral every 6 hours PRN Moderate Pain (4 - 6)  oxyCODONE    IR 10 milliGRAM(s) Oral every 6 hours PRN Severe Pain (7 - 10)       PROBLEM LIST/ ASSESSMENT:  HEALTH ISSUES - PROBLEM Dx:      ,   Patient is a 78y old  Male who presents with a chief complaint of AORTIC STENOSIS     (01 Jul 2023 14:14)     s/p cardiac surgery    Acute systolic and diastolic heart failure evidenced by SOB and parenchymal infiltrates; will treat with diuresis    Cardiogenic shock on ionotropy    Vasogenic shock due to hypotension in the cticu , will keep on pressors    Hypovolemic shock - > 20% intravascular depletion will replete volume    Acute blood loss anemia with relative hypotension treated with > 1 unit PC    Acute post operative respiratory insufficiency ruled in due to hypoxemia, O2 sats < 91% on RA treated with HFNC    Acidosis evidenced by anion gap and negative base excess    Acute kidney injury - creatinine > 0.3 due to combined prerenal and intrarenal factors can presume ATN        My plan includes :    volume dobut primacor pc repleted  pigtail for ptx   close hemodynamic, ventilatory and drain monitoring and management per post op routine    Monitor for arrhythmias and monitor parameters for organ perfusion  beta blockade not administered due to hemodynamic instability and bradycardia  monitor neurologic status  Head of the bed should remain elevated to 45 deg .   chest PT and IS will be encouraged  monitor adequacy of oxygenation and ventilation and attempt to wean oxygen  antibiotic regimen will be tailored to the clinical, laboratory and microbiologic data  Nutritional goals will be met using po eventually , ensure adequate caloric intake and montior the same  Stress ulcer and VTE prophylaxis will be achieved    Glycemic control is satisfactory  Electrolytes have been repleted as necessary and wound care has been carried out. Pain control has been achieved.   agressive physical therapy and early mobility and ambulation goals will be met   The family was updated about the course and plan  CRITICAL CARE TIME Upon my evaluation, this patient had a high probability of imminent or life-threatening deterioration due to the above problems which required my direct attention, intervention, and personal management.  I have personally provided 110 minutes of critical care time exclusive of time spent on separately billable procedures. Time included review of laboratory data, radiology results, discussion with consultants, and monitoring for potential decompensation. Interventions were performed as documented abovepersonally provided by me  in evaluation and management, reassessments, review and interpretation of labs and x-rays, ventilator and hemodynamic management, formulating a plan and coordinating care: ___110___ MIN.  Time does not include procedural time.    CTICU ATTENDING     					    Alex Charlton MD

## 2023-07-01 NOTE — DIETITIAN INITIAL EVALUATION ADULT - PERTINENT LABORATORY DATA
07-01    136  |  103  |  48<H>  ----------------------------<  244<H>  5.6<H>   |  20<L>  |  2.03<H>    Ca    8.7      01 Jul 2023 10:01  Phos  5.0     07-01  Mg     2.3     07-01    TPro  6.2  /  Alb  3.4  /  TBili  1.0  /  DBili  x   /  AST  37  /  ALT  9<L>  /  AlkPhos  81  07-01  POCT Blood Glucose.: 322 mg/dL (07-01-23 @ 14:06)  A1C with Estimated Average Glucose Result: 8.6 % (06-29-23 @ 05:30)  A1C with Estimated Average Glucose Result: 8.7 % (06-28-23 @ 22:13)

## 2023-07-02 LAB
ALBUMIN SERPL ELPH-MCNC: 3.6 G/DL — SIGNIFICANT CHANGE UP (ref 3.3–5)
ALBUMIN SERPL ELPH-MCNC: 3.7 G/DL — SIGNIFICANT CHANGE UP (ref 3.3–5)
ALBUMIN SERPL ELPH-MCNC: 3.8 G/DL — SIGNIFICANT CHANGE UP (ref 3.3–5)
ALP SERPL-CCNC: 68 U/L — SIGNIFICANT CHANGE UP (ref 40–120)
ALP SERPL-CCNC: 69 U/L — SIGNIFICANT CHANGE UP (ref 40–120)
ALP SERPL-CCNC: 78 U/L — SIGNIFICANT CHANGE UP (ref 40–120)
ALT FLD-CCNC: <5 U/L — LOW (ref 10–45)
ANION GAP SERPL CALC-SCNC: 13 MMOL/L — SIGNIFICANT CHANGE UP (ref 5–17)
ANION GAP SERPL CALC-SCNC: 13 MMOL/L — SIGNIFICANT CHANGE UP (ref 5–17)
ANION GAP SERPL CALC-SCNC: 14 MMOL/L — SIGNIFICANT CHANGE UP (ref 5–17)
APTT BLD: 35.5 SEC — SIGNIFICANT CHANGE UP (ref 27.5–35.5)
APTT BLD: 38.8 SEC — HIGH (ref 27.5–35.5)
AST SERPL-CCNC: 29 U/L — SIGNIFICANT CHANGE UP (ref 10–40)
AST SERPL-CCNC: 30 U/L — SIGNIFICANT CHANGE UP (ref 10–40)
AST SERPL-CCNC: 32 U/L — SIGNIFICANT CHANGE UP (ref 10–40)
BASE EXCESS BLDV CALC-SCNC: -2.4 MMOL/L — LOW (ref -2–3)
BASE EXCESS BLDV CALC-SCNC: -2.5 MMOL/L — LOW (ref -2–3)
BILIRUB SERPL-MCNC: 0.9 MG/DL — SIGNIFICANT CHANGE UP (ref 0.2–1.2)
BILIRUB SERPL-MCNC: 1 MG/DL — SIGNIFICANT CHANGE UP (ref 0.2–1.2)
BILIRUB SERPL-MCNC: 1.2 MG/DL — SIGNIFICANT CHANGE UP (ref 0.2–1.2)
BUN SERPL-MCNC: 45 MG/DL — HIGH (ref 7–23)
BUN SERPL-MCNC: 52 MG/DL — HIGH (ref 7–23)
BUN SERPL-MCNC: 53 MG/DL — HIGH (ref 7–23)
CA-I SERPL-SCNC: 1.15 MMOL/L — SIGNIFICANT CHANGE UP (ref 1.15–1.33)
CALCIUM SERPL-MCNC: 8.6 MG/DL — SIGNIFICANT CHANGE UP (ref 8.4–10.5)
CALCIUM SERPL-MCNC: 8.8 MG/DL — SIGNIFICANT CHANGE UP (ref 8.4–10.5)
CALCIUM SERPL-MCNC: 8.8 MG/DL — SIGNIFICANT CHANGE UP (ref 8.4–10.5)
CHLORIDE SERPL-SCNC: 102 MMOL/L — SIGNIFICANT CHANGE UP (ref 96–108)
CHLORIDE SERPL-SCNC: 98 MMOL/L — SIGNIFICANT CHANGE UP (ref 96–108)
CHLORIDE SERPL-SCNC: 99 MMOL/L — SIGNIFICANT CHANGE UP (ref 96–108)
CO2 BLDV-SCNC: 25.1 MMOL/L — SIGNIFICANT CHANGE UP (ref 22–26)
CO2 BLDV-SCNC: 25.7 MMOL/L — SIGNIFICANT CHANGE UP (ref 22–26)
CO2 SERPL-SCNC: 23 MMOL/L — SIGNIFICANT CHANGE UP (ref 22–31)
CO2 SERPL-SCNC: 24 MMOL/L — SIGNIFICANT CHANGE UP (ref 22–31)
CO2 SERPL-SCNC: 24 MMOL/L — SIGNIFICANT CHANGE UP (ref 22–31)
CREAT SERPL-MCNC: 2.09 MG/DL — HIGH (ref 0.5–1.3)
CREAT SERPL-MCNC: 2.16 MG/DL — HIGH (ref 0.5–1.3)
CREAT SERPL-MCNC: 2.18 MG/DL — HIGH (ref 0.5–1.3)
EGFR: 30 ML/MIN/1.73M2 — LOW
EGFR: 30 ML/MIN/1.73M2 — LOW
EGFR: 31 ML/MIN/1.73M2 — LOW
GAS PNL BLDA: SIGNIFICANT CHANGE UP
GAS PNL BLDA: SIGNIFICANT CHANGE UP
GAS PNL BLDV: 132 MMOL/L — LOW (ref 136–145)
GAS PNL BLDV: SIGNIFICANT CHANGE UP
GAS PNL BLDV: SIGNIFICANT CHANGE UP
GLUCOSE BLDC GLUCOMTR-MCNC: 109 MG/DL — HIGH (ref 70–99)
GLUCOSE BLDC GLUCOMTR-MCNC: 202 MG/DL — HIGH (ref 70–99)
GLUCOSE BLDC GLUCOMTR-MCNC: 248 MG/DL — HIGH (ref 70–99)
GLUCOSE BLDC GLUCOMTR-MCNC: 249 MG/DL — HIGH (ref 70–99)
GLUCOSE BLDC GLUCOMTR-MCNC: 263 MG/DL — HIGH (ref 70–99)
GLUCOSE SERPL-MCNC: 155 MG/DL — HIGH (ref 70–99)
GLUCOSE SERPL-MCNC: 273 MG/DL — HIGH (ref 70–99)
GLUCOSE SERPL-MCNC: 66 MG/DL — LOW (ref 70–99)
HCO3 BLDV-SCNC: 24 MMOL/L — SIGNIFICANT CHANGE UP (ref 22–29)
HCO3 BLDV-SCNC: 24 MMOL/L — SIGNIFICANT CHANGE UP (ref 22–29)
HCT VFR BLD CALC: 24.6 % — LOW (ref 39–50)
HCT VFR BLD CALC: 28.8 % — LOW (ref 39–50)
HGB BLD-MCNC: 8.5 G/DL — LOW (ref 13–17)
HGB BLD-MCNC: 9.8 G/DL — LOW (ref 13–17)
INR BLD: 1.12 — SIGNIFICANT CHANGE UP (ref 0.88–1.16)
INR BLD: 1.18 — HIGH (ref 0.88–1.16)
LACTATE SERPL-SCNC: 1.4 MMOL/L — SIGNIFICANT CHANGE UP (ref 0.5–2)
MAGNESIUM SERPL-MCNC: 2 MG/DL — SIGNIFICANT CHANGE UP (ref 1.6–2.6)
MAGNESIUM SERPL-MCNC: 2.1 MG/DL — SIGNIFICANT CHANGE UP (ref 1.6–2.6)
MAGNESIUM SERPL-MCNC: 2.2 MG/DL — SIGNIFICANT CHANGE UP (ref 1.6–2.6)
MCHC RBC-ENTMCNC: 32.2 PG — SIGNIFICANT CHANGE UP (ref 27–34)
MCHC RBC-ENTMCNC: 32.9 PG — SIGNIFICANT CHANGE UP (ref 27–34)
MCHC RBC-ENTMCNC: 34 GM/DL — SIGNIFICANT CHANGE UP (ref 32–36)
MCHC RBC-ENTMCNC: 34.6 GM/DL — SIGNIFICANT CHANGE UP (ref 32–36)
MCV RBC AUTO: 94.7 FL — SIGNIFICANT CHANGE UP (ref 80–100)
MCV RBC AUTO: 95.3 FL — SIGNIFICANT CHANGE UP (ref 80–100)
NRBC # BLD: 0 /100 WBCS — SIGNIFICANT CHANGE UP (ref 0–0)
NRBC # BLD: 0 /100 WBCS — SIGNIFICANT CHANGE UP (ref 0–0)
PCO2 BLDV: 45 MMHG — SIGNIFICANT CHANGE UP (ref 42–55)
PCO2 BLDV: 48 MMHG — SIGNIFICANT CHANGE UP (ref 42–55)
PH BLDV: 7.31 — LOW (ref 7.32–7.43)
PH BLDV: 7.33 — SIGNIFICANT CHANGE UP (ref 7.32–7.43)
PHOSPHATE SERPL-MCNC: 3.5 MG/DL — SIGNIFICANT CHANGE UP (ref 2.5–4.5)
PHOSPHATE SERPL-MCNC: 4 MG/DL — SIGNIFICANT CHANGE UP (ref 2.5–4.5)
PHOSPHATE SERPL-MCNC: 4 MG/DL — SIGNIFICANT CHANGE UP (ref 2.5–4.5)
PLATELET # BLD AUTO: 105 K/UL — LOW (ref 150–400)
PLATELET # BLD AUTO: 113 K/UL — LOW (ref 150–400)
PO2 BLDV: 42 MMHG — SIGNIFICANT CHANGE UP (ref 25–45)
PO2 BLDV: 42 MMHG — SIGNIFICANT CHANGE UP (ref 25–45)
POTASSIUM BLDV-SCNC: 4.4 MMOL/L — SIGNIFICANT CHANGE UP (ref 3.5–5.1)
POTASSIUM SERPL-MCNC: 3.9 MMOL/L — SIGNIFICANT CHANGE UP (ref 3.5–5.3)
POTASSIUM SERPL-MCNC: 4.2 MMOL/L — SIGNIFICANT CHANGE UP (ref 3.5–5.3)
POTASSIUM SERPL-MCNC: 5.1 MMOL/L — SIGNIFICANT CHANGE UP (ref 3.5–5.3)
POTASSIUM SERPL-SCNC: 3.9 MMOL/L — SIGNIFICANT CHANGE UP (ref 3.5–5.3)
POTASSIUM SERPL-SCNC: 4.2 MMOL/L — SIGNIFICANT CHANGE UP (ref 3.5–5.3)
POTASSIUM SERPL-SCNC: 5.1 MMOL/L — SIGNIFICANT CHANGE UP (ref 3.5–5.3)
PROT SERPL-MCNC: 6.2 G/DL — SIGNIFICANT CHANGE UP (ref 6–8.3)
PROT SERPL-MCNC: 6.4 G/DL — SIGNIFICANT CHANGE UP (ref 6–8.3)
PROT SERPL-MCNC: 6.7 G/DL — SIGNIFICANT CHANGE UP (ref 6–8.3)
PROTHROM AB SERPL-ACNC: 13.4 SEC — SIGNIFICANT CHANGE UP (ref 10.5–13.4)
PROTHROM AB SERPL-ACNC: 14.1 SEC — HIGH (ref 10.5–13.4)
RBC # BLD: 2.58 M/UL — LOW (ref 4.2–5.8)
RBC # BLD: 3.04 M/UL — LOW (ref 4.2–5.8)
RBC # FLD: 15.2 % — HIGH (ref 10.3–14.5)
RBC # FLD: 15.6 % — HIGH (ref 10.3–14.5)
SAO2 % BLDV: 71.1 % — SIGNIFICANT CHANGE UP (ref 67–88)
SAO2 % BLDV: 72.3 % — SIGNIFICANT CHANGE UP (ref 67–88)
SODIUM SERPL-SCNC: 135 MMOL/L — SIGNIFICANT CHANGE UP (ref 135–145)
SODIUM SERPL-SCNC: 136 MMOL/L — SIGNIFICANT CHANGE UP (ref 135–145)
SODIUM SERPL-SCNC: 139 MMOL/L — SIGNIFICANT CHANGE UP (ref 135–145)
WBC # BLD: 12.45 K/UL — HIGH (ref 3.8–10.5)
WBC # BLD: 14.25 K/UL — HIGH (ref 3.8–10.5)
WBC # FLD AUTO: 12.45 K/UL — HIGH (ref 3.8–10.5)
WBC # FLD AUTO: 14.25 K/UL — HIGH (ref 3.8–10.5)

## 2023-07-02 PROCEDURE — 71045 X-RAY EXAM CHEST 1 VIEW: CPT | Mod: 26

## 2023-07-02 PROCEDURE — 99292 CRITICAL CARE ADDL 30 MIN: CPT

## 2023-07-02 PROCEDURE — 99291 CRITICAL CARE FIRST HOUR: CPT

## 2023-07-02 PROCEDURE — 99232 SBSQ HOSP IP/OBS MODERATE 35: CPT | Mod: GC

## 2023-07-02 RX ORDER — AMIODARONE HYDROCHLORIDE 400 MG/1
150 TABLET ORAL ONCE
Refills: 0 | Status: COMPLETED | OUTPATIENT
Start: 2023-07-02 | End: 2023-07-02

## 2023-07-02 RX ORDER — BENZOCAINE AND MENTHOL 5; 1 G/100ML; G/100ML
1 LIQUID ORAL EVERY 4 HOURS
Refills: 0 | Status: DISCONTINUED | OUTPATIENT
Start: 2023-07-02 | End: 2023-07-03

## 2023-07-02 RX ORDER — INSULIN GLARGINE 100 [IU]/ML
10 INJECTION, SOLUTION SUBCUTANEOUS AT BEDTIME
Refills: 0 | Status: DISCONTINUED | OUTPATIENT
Start: 2023-07-02 | End: 2023-07-03

## 2023-07-02 RX ORDER — MAGNESIUM SULFATE 500 MG/ML
2 VIAL (ML) INJECTION ONCE
Refills: 0 | Status: COMPLETED | OUTPATIENT
Start: 2023-07-02 | End: 2023-07-02

## 2023-07-02 RX ORDER — IPRATROPIUM BROMIDE 0.2 MG/ML
500 SOLUTION, NON-ORAL INHALATION ONCE
Refills: 0 | Status: COMPLETED | OUTPATIENT
Start: 2023-07-02 | End: 2023-07-02

## 2023-07-02 RX ORDER — AMIODARONE HYDROCHLORIDE 400 MG/1
400 TABLET ORAL EVERY 8 HOURS
Refills: 0 | Status: DISCONTINUED | OUTPATIENT
Start: 2023-07-02 | End: 2023-07-05

## 2023-07-02 RX ORDER — POTASSIUM CHLORIDE 20 MEQ
20 PACKET (EA) ORAL ONCE
Refills: 0 | Status: COMPLETED | OUTPATIENT
Start: 2023-07-02 | End: 2023-07-02

## 2023-07-02 RX ORDER — AMIODARONE HYDROCHLORIDE 400 MG/1
TABLET ORAL
Refills: 0 | Status: DISCONTINUED | OUTPATIENT
Start: 2023-07-02 | End: 2023-07-05

## 2023-07-02 RX ORDER — AMIODARONE HYDROCHLORIDE 400 MG/1
150 TABLET ORAL ONCE
Refills: 0 | Status: DISCONTINUED | OUTPATIENT
Start: 2023-07-02 | End: 2023-07-02

## 2023-07-02 RX ADMIN — FENTANYL CITRATE 25 MICROGRAM(S): 50 INJECTION INTRAVENOUS at 03:30

## 2023-07-02 RX ADMIN — INSULIN GLARGINE 10 UNIT(S): 100 INJECTION, SOLUTION SUBCUTANEOUS at 22:33

## 2023-07-02 RX ADMIN — AMIODARONE HYDROCHLORIDE 300 MILLIGRAM(S): 400 TABLET ORAL at 13:00

## 2023-07-02 RX ADMIN — OXYCODONE HYDROCHLORIDE 10 MILLIGRAM(S): 5 TABLET ORAL at 11:45

## 2023-07-02 RX ADMIN — POLYETHYLENE GLYCOL 3350 17 GRAM(S): 17 POWDER, FOR SOLUTION ORAL at 11:45

## 2023-07-02 RX ADMIN — CHLORHEXIDINE GLUCONATE 1 APPLICATION(S): 213 SOLUTION TOPICAL at 05:12

## 2023-07-02 RX ADMIN — FENTANYL CITRATE 25 MICROGRAM(S): 50 INJECTION INTRAVENOUS at 03:45

## 2023-07-02 RX ADMIN — OXYCODONE HYDROCHLORIDE 10 MILLIGRAM(S): 5 TABLET ORAL at 12:37

## 2023-07-02 RX ADMIN — AMIODARONE HYDROCHLORIDE 400 MILLIGRAM(S): 400 TABLET ORAL at 14:56

## 2023-07-02 RX ADMIN — HEPARIN SODIUM 5000 UNIT(S): 5000 INJECTION INTRAVENOUS; SUBCUTANEOUS at 05:15

## 2023-07-02 RX ADMIN — Medication 100 MILLIGRAM(S): at 05:15

## 2023-07-02 RX ADMIN — BUMETANIDE 5 MG/HR: 0.25 INJECTION INTRAMUSCULAR; INTRAVENOUS at 05:16

## 2023-07-02 RX ADMIN — ATORVASTATIN CALCIUM 40 MILLIGRAM(S): 80 TABLET, FILM COATED ORAL at 22:32

## 2023-07-02 RX ADMIN — Medication 81 MILLIGRAM(S): at 11:45

## 2023-07-02 RX ADMIN — AMIODARONE HYDROCHLORIDE 600 MILLIGRAM(S): 400 TABLET ORAL at 14:45

## 2023-07-02 RX ADMIN — PANTOPRAZOLE SODIUM 40 MILLIGRAM(S): 20 TABLET, DELAYED RELEASE ORAL at 07:28

## 2023-07-02 RX ADMIN — Medication 500 MICROGRAM(S): at 06:14

## 2023-07-02 RX ADMIN — Medication 4: at 16:46

## 2023-07-02 RX ADMIN — Medication 25 GRAM(S): at 13:30

## 2023-07-02 RX ADMIN — Medication 25 GRAM(S): at 13:20

## 2023-07-02 RX ADMIN — SENNA PLUS 2 TABLET(S): 8.6 TABLET ORAL at 22:32

## 2023-07-02 RX ADMIN — HEPARIN SODIUM 5000 UNIT(S): 5000 INJECTION INTRAVENOUS; SUBCUTANEOUS at 14:30

## 2023-07-02 RX ADMIN — OXYCODONE HYDROCHLORIDE 10 MILLIGRAM(S): 5 TABLET ORAL at 22:33

## 2023-07-02 RX ADMIN — AMIODARONE HYDROCHLORIDE 400 MILLIGRAM(S): 400 TABLET ORAL at 22:32

## 2023-07-02 RX ADMIN — Medication 6: at 11:36

## 2023-07-02 RX ADMIN — HEPARIN SODIUM 5000 UNIT(S): 5000 INJECTION INTRAVENOUS; SUBCUTANEOUS at 22:32

## 2023-07-02 RX ADMIN — AMIODARONE HYDROCHLORIDE 300 MILLIGRAM(S): 400 TABLET ORAL at 10:57

## 2023-07-02 RX ADMIN — OXYCODONE HYDROCHLORIDE 10 MILLIGRAM(S): 5 TABLET ORAL at 23:33

## 2023-07-02 RX ADMIN — Medication 100 MILLIEQUIVALENT(S): at 11:04

## 2023-07-02 RX ADMIN — Medication 4: at 07:40

## 2023-07-02 NOTE — PROGRESS NOTE ADULT - SUBJECTIVE AND OBJECTIVE BOX
SUBJECTIVE / INTERVAL HPI: Patient was seen and examined this morning. He reported having poor appetite due to persistent throat pain. He denied eating anything yesterday or today. He says that he only has had water. He didn't want to try Glucerna for now as it hurts when he swallows or speaks too much. There were no visible redness or thrush in oropharynx on physical examination. Per team, there was concern about his anion gap increasing (15 mmol/L on 07-01 @ 16:30). Therefore, he was briefly placed on an insulin drip. Bicarbonate was mildly decreased to 20 mmol/L (07-01 @ 16:30), with mildly elevated lactate (2.9 mmol/L on 07-01 @ 16:30). However, Beta-hydroxybutyrate level was within normal limits (0.1 mmol/L on 07-01 @ 14:45). Mild metabolic acidosis unlikely to be due to ketoacidosis given absence of BHB, possible due to mild lactic acidosis. He later developed an episode of hypoglycemia at bedtime (67 mg/dL on 07-01 @ 21:50). Therefore, insulin infusion was stopped. He didn't received long-actin insulin last night. Overnight, his glucose increased to 249 mg/dL (07-02 @ 07:34).     CAPILLARY BLOOD GLUCOSE & INSULIN RECEIVED  153 mg/dL (06-30 @ 16:57) ? Insulin infusion started at 2 units/hr.   122 mg/dL (06-30 @ 18:29) ? Insulin infusion at 2 units/hr.   119 mg/dL (06-30 @ 19:56) ? Insulin infusion at 2 units/hr.   115 mg/dL (06-30 @ 21:01) ? Insulin infusion at 2 units/hr.   117 mg/dL (06-30 @ 22:17) ? Insulin infusion at 1.5 units/hr.   97 mg/dL (06-30 @ 23:14) ? Insulin infusion stopped.   84 mg/dL (07-01 @ 00:13) ? Ø  98 mg/dL (07-01 @ 02:17) ? Ø  127 mg/dL (07-01 @ 07:43) ? Ø  238 mg/dL (07-01 @ 12:17) ? 4 units of lispro sliding scale (He also received treatment for hyperkalemia with D50% + 10 units of regular insulin IVP)  + Insulin infusion started at 3.5 units/hr.   322 mg/dL (07-01 @ 14:06) ? Insulin infusion at 5.5 units/hr.   193 mg/dL (07-01 @ 18:29) ? Insulin infusion at 7.5 units/hr.   154 mg/dL (07-01 @ 19:39) ? Insulin infusion at 10.5 units/hr.   67 mg/dL (07-01 @ 21:50) ? Insulin infusion stopped.   67 mg/dL (07-01 @ 23:25) ? Ø  109 mg/dL (07-02 @ 01:08) ? Ø  249 mg/dL (07-02 @ 07:34) ? 4 units of lispro sliding scale.   263 mg/dL (07-02 @ 11:19) ? 6 units of lispro sliding scale.     REVIEW OF SYSTEMS  Constitutional:  (+) Loss of appetite. Negative fever, chills.   Cardiovascular:  Negative for palpitations.  Respiratory:  (+) Sore throat. (+) Cough. Negative for cough, wheezing, or shortness of breath.    Gastrointestinal:  Negative for nausea, vomiting, diarrhea, constipation, or abdominal pain.  Neurologic:  No headache, confusion, dizziness, lightheadedness.    PHYSICAL EXAM  Vital Signs Last 24 Hrs  T(C): 36.9 (02 Jul 2023 10:14), Max: 37 (02 Jul 2023 01:24)  T(F): 98.4 (02 Jul 2023 10:14), Max: 98.6 (02 Jul 2023 01:24)  HR: 98 (02 Jul 2023 12:38) (90 - 117)  BP: 108/58 (02 Jul 2023 11:09) (108/58 - 133/65)  BP(mean): 79 (02 Jul 2023 11:09) (79 - 93)  RR: 18 (02 Jul 2023 12:38) (16 - 20)  SpO2: 98% (02 Jul 2023 12:38) (90% - 100%)    Parameters below as of 02 Jul 2023 12:38  Patient On (Oxygen Delivery Method): nasal cannula, high flow  O2 Flow (L/min): 60  O2 Concentration (%): 60    Constitutional: Awake, alert, elderly male, in no acute distress.   HEENT: Normocephalic, atraumatic, SCARLETT.  Respiratory: Lungs clear to ausculation bilaterally.   Cardiovascular: regular rhythm, normal S1 and S2, (+) systolic murmur. (+) midline sternotomy scar covered with sterile dressings (+) wound vac.   GI: soft, non-tender, non-distended, bowel sounds present.  Extremities: No lower extremity edema.   Psychiatric: AAO x 3.    LABS  CBC - WBC/HGB/HTC/PLT: 14.25/9.8/28.8/113 (07-02-23)  BMP - Na/K/Cl/Bicarb/BUN/Cr/Gluc/AG/eGFR: 136/4.2/99/23/53/2.18/155/14/30 (07-02-23)  Ca - 8.8 (07-02-23)  Phos - 4.0 (07-02-23)  Mg - 2.1 (07-02-23)  LFT - Alb/Tprot/Tbili/Dbili/AlkPhos/ALT/AST: 3.6/--/1.2/--/68/<5/30 (07-02-23)  PT/aPTT/INR: 13.4/35.5/1.12 (07-02-23)   Thyroid Stimulating Hormone, Serum: 1.440 (06-28-23)    MEDICATIONS  MEDICATIONS  (STANDING):  aMIOdarone    Tablet 400 milliGRAM(s) Oral every 8 hours  aMIOdarone    Tablet   Oral   aMIOdarone IVPB 150 milliGRAM(s) IV Intermittent once  aspirin enteric coated 81 milliGRAM(s) Oral daily  atorvastatin 40 milliGRAM(s) Oral at bedtime  buMETAnide Infusion 1 mG/Hr (5 mL/Hr) IV Continuous <Continuous>  chlorhexidine 2% Cloths 1 Application(s) Topical daily  dextrose 5%. 100 milliLiter(s) (50 mL/Hr) IV Continuous <Continuous>  dextrose 5%. 1000 milliLiter(s) (50 mL/Hr) IV Continuous <Continuous>  dextrose 5%. 1000 milliLiter(s) (100 mL/Hr) IV Continuous <Continuous>  dextrose 50% Injectable 50 milliLiter(s) IV Push every 15 minutes  glucagon  Injectable 1 milliGRAM(s) IntraMuscular once  heparin   Injectable 5000 Unit(s) SubCutaneous every 8 hours  insulin lispro (ADMELOG) corrective regimen sliding scale   SubCutaneous three times a day before meals  milrinone Infusion 0.25 MICROgram(s)/kG/Min (5 mL/Hr) IV Continuous <Continuous>  norepinephrine Infusion 0.04 MICROgram(s)/kG/Min (5 mL/Hr) IV Continuous <Continuous>  pantoprazole    Tablet 40 milliGRAM(s) Oral before breakfast  polyethylene glycol 3350 17 Gram(s) Oral daily  senna 2 Tablet(s) Oral at bedtime  sodium chloride 0.9%. 1000 milliLiter(s) (10 mL/Hr) IV Continuous <Continuous>  vasopressin Infusion 0.02 Unit(s)/Min (3 mL/Hr) IV Continuous <Continuous>    MEDICATIONS  (PRN):  acetaminophen     Tablet .. 650 milliGRAM(s) Oral every 6 hours PRN Mild Pain (1 - 3)  dextrose Oral Gel 15 Gram(s) Oral once PRN Blood Glucose LESS THAN 70 milliGRAM(s)/deciliter  oxyCODONE    IR 5 milliGRAM(s) Oral every 6 hours PRN Moderate Pain (4 - 6)  oxyCODONE    IR 10 milliGRAM(s) Oral every 6 hours PRN Severe Pain (7 - 10)    ASSESSMENT / RECOMMENDATIONS  Mr. Johnson is a 78-year-old male with type 2 diabetes mellitus, hypertension and severe aortic stenosis who was transferred to [facility] for further work-up and intervention of severe aortic stenosis, now s/p aortic valve replacement (6/30/23). Endocrinology was consulted for recommendations regarding diabetes management.     A1C: 8.6 %  BUN: 53  Creatinine: 2.18  GFR: 30  Weight: 66.6  BMI: 21.5    # Type 2 diabetes mellitus with hyperglycemia  ***INCOMPLETE NOTE***  - Please continue lantus *** units at bedtime.   - Continue lispro *** units before each meal.  - Continue lispro moderate / low dose sliding scale before meals and at bedtime.  - Patient's fingerstick glucose goal is 100-180 mg/dL.    - Discharge recommendations to be discussed.   - Patient can follow up at discharge with [facility] by calling [phone] to make an appointment.      Case discussed with Dr. Cee. Primary team updated.       Milton Laura    Endocrinology Fellow    Service Pager: [phone]    SUBJECTIVE / INTERVAL HPI: Patient was seen and examined this morning. He reported having poor appetite due to persistent throat pain. He denied eating anything yesterday or today. He says that he only has had water. He didn't want to try Glucerna for now as it hurts when he swallows or speaks too much. There were no visible redness or thrush in oropharynx on physical examination. Per team, there was concern about his anion gap increasing (15 mmol/L on 07-01 @ 16:30). Therefore, he was briefly placed on an insulin drip. Bicarbonate was mildly decreased to 20 mmol/L (07-01 @ 16:30), with mildly elevated lactate (2.9 mmol/L on 07-01 @ 16:30). However, Beta-hydroxybutyrate level was within normal limits (0.1 mmol/L on 07-01 @ 14:45). Mild metabolic acidosis unlikely to be due to ketoacidosis given absence of BHB, possible due to mild lactic acidosis. He later developed an episode of hypoglycemia at bedtime (67 mg/dL on 07-01 @ 21:50). Therefore, insulin infusion was stopped. He didn't received long-actin insulin last night. Overnight, his glucose increased to 249 mg/dL (07-02 @ 07:34).     CAPILLARY BLOOD GLUCOSE & INSULIN RECEIVED  153 mg/dL (06-30 @ 16:57) ? Insulin infusion started at 2 units/hr.   122 mg/dL (06-30 @ 18:29) ? Insulin infusion at 2 units/hr.   119 mg/dL (06-30 @ 19:56) ? Insulin infusion at 2 units/hr.   115 mg/dL (06-30 @ 21:01) ? Insulin infusion at 2 units/hr.   117 mg/dL (06-30 @ 22:17) ? Insulin infusion at 1.5 units/hr.   97 mg/dL (06-30 @ 23:14) ? Insulin infusion stopped.   84 mg/dL (07-01 @ 00:13) ? Ø  98 mg/dL (07-01 @ 02:17) ? Ø  127 mg/dL (07-01 @ 07:43) ? Ø  238 mg/dL (07-01 @ 12:17) ? 4 units of lispro sliding scale (He also received treatment for hyperkalemia with D50% + 10 units of regular insulin IVP)  + Insulin infusion started at 3.5 units/hr.   322 mg/dL (07-01 @ 14:06) ? Insulin infusion at 5.5 units/hr.   193 mg/dL (07-01 @ 18:29) ? Insulin infusion at 7.5 units/hr.   154 mg/dL (07-01 @ 19:39) ? Insulin infusion at 10.5 units/hr.   67 mg/dL (07-01 @ 21:50) ? Insulin infusion stopped.   67 mg/dL (07-01 @ 23:25) ? Ø  109 mg/dL (07-02 @ 01:08) ? Ø  249 mg/dL (07-02 @ 07:34) ? 4 units of lispro sliding scale.   263 mg/dL (07-02 @ 11:19) ? 6 units of lispro sliding scale.     REVIEW OF SYSTEMS  Constitutional:  (+) Loss of appetite. Negative fever, chills.   Cardiovascular:  Negative for palpitations.  Respiratory:  (+) Sore throat. (+) Cough. Negative for cough, wheezing, or shortness of breath.    Gastrointestinal:  Negative for nausea, vomiting, diarrhea, constipation, or abdominal pain.  Neurologic:  No headache, confusion, dizziness, lightheadedness.    PHYSICAL EXAM  Vital Signs Last 24 Hrs  T(C): 36.9 (02 Jul 2023 10:14), Max: 37 (02 Jul 2023 01:24)  T(F): 98.4 (02 Jul 2023 10:14), Max: 98.6 (02 Jul 2023 01:24)  HR: 98 (02 Jul 2023 12:38) (90 - 117)  BP: 108/58 (02 Jul 2023 11:09) (108/58 - 133/65)  BP(mean): 79 (02 Jul 2023 11:09) (79 - 93)  RR: 18 (02 Jul 2023 12:38) (16 - 20)  SpO2: 98% (02 Jul 2023 12:38) (90% - 100%)    Parameters below as of 02 Jul 2023 12:38  Patient On (Oxygen Delivery Method): nasal cannula, high flow  O2 Flow (L/min): 60  O2 Concentration (%): 60    Constitutional: Awake, alert, elderly male, in no acute distress.   HEENT: Normocephalic, atraumatic, SCARLETT.  Respiratory: Lungs clear to ausculation bilaterally.   Cardiovascular: regular rhythm, normal S1 and S2, (+) systolic murmur. (+) midline sternotomy scar covered with sterile dressings (+) wound vac.   GI: soft, non-tender, non-distended, bowel sounds present.  Extremities: No lower extremity edema.   Psychiatric: AAO x 3.    LABS  CBC - WBC/HGB/HTC/PLT: 14.25/9.8/28.8/113 (07-02-23)  BMP - Na/K/Cl/Bicarb/BUN/Cr/Gluc/AG/eGFR: 136/4.2/99/23/53/2.18/155/14/30 (07-02-23)  Ca - 8.8 (07-02-23)  Phos - 4.0 (07-02-23)  Mg - 2.1 (07-02-23)  LFT - Alb/Tprot/Tbili/Dbili/AlkPhos/ALT/AST: 3.6/--/1.2/--/68/<5/30 (07-02-23)  PT/aPTT/INR: 13.4/35.5/1.12 (07-02-23)   Thyroid Stimulating Hormone, Serum: 1.440 (06-28-23)    MEDICATIONS  MEDICATIONS  (STANDING):  aMIOdarone    Tablet 400 milliGRAM(s) Oral every 8 hours  aMIOdarone    Tablet   Oral   aMIOdarone IVPB 150 milliGRAM(s) IV Intermittent once  aspirin enteric coated 81 milliGRAM(s) Oral daily  atorvastatin 40 milliGRAM(s) Oral at bedtime  buMETAnide Infusion 1 mG/Hr (5 mL/Hr) IV Continuous <Continuous>  chlorhexidine 2% Cloths 1 Application(s) Topical daily  dextrose 5%. 100 milliLiter(s) (50 mL/Hr) IV Continuous <Continuous>  dextrose 5%. 1000 milliLiter(s) (50 mL/Hr) IV Continuous <Continuous>  dextrose 5%. 1000 milliLiter(s) (100 mL/Hr) IV Continuous <Continuous>  dextrose 50% Injectable 50 milliLiter(s) IV Push every 15 minutes  glucagon  Injectable 1 milliGRAM(s) IntraMuscular once  heparin   Injectable 5000 Unit(s) SubCutaneous every 8 hours  insulin lispro (ADMELOG) corrective regimen sliding scale   SubCutaneous three times a day before meals  milrinone Infusion 0.25 MICROgram(s)/kG/Min (5 mL/Hr) IV Continuous <Continuous>  norepinephrine Infusion 0.04 MICROgram(s)/kG/Min (5 mL/Hr) IV Continuous <Continuous>  pantoprazole    Tablet 40 milliGRAM(s) Oral before breakfast  polyethylene glycol 3350 17 Gram(s) Oral daily  senna 2 Tablet(s) Oral at bedtime  sodium chloride 0.9%. 1000 milliLiter(s) (10 mL/Hr) IV Continuous <Continuous>  vasopressin Infusion 0.02 Unit(s)/Min (3 mL/Hr) IV Continuous <Continuous>    MEDICATIONS  (PRN):  acetaminophen     Tablet .. 650 milliGRAM(s) Oral every 6 hours PRN Mild Pain (1 - 3)  dextrose Oral Gel 15 Gram(s) Oral once PRN Blood Glucose LESS THAN 70 milliGRAM(s)/deciliter  oxyCODONE    IR 5 milliGRAM(s) Oral every 6 hours PRN Moderate Pain (4 - 6)  oxyCODONE    IR 10 milliGRAM(s) Oral every 6 hours PRN Severe Pain (7 - 10)    ASSESSMENT / RECOMMENDATIONS  Mr. Johnson is a 78-year-old male with type 2 diabetes mellitus, hypertension and severe aortic stenosis who was transferred to [facility] for further work-up and intervention of severe aortic stenosis, now s/p aortic valve replacement (6/30/23). Endocrinology was consulted for recommendations regarding diabetes management.     A1C: 8.6 %  BUN: 53  Creatinine: 2.18  GFR: 30  Weight: 66.6  BMI: 21.5    # Type 2 diabetes mellitus with hyperglycemia  - Please give Lantus 10  units at  6PM  - Patient is NOT NPO but has decreased appetite. His PO intake based on symptoms are unpredictable. As such, we will continue him on moderate sliding scale and adjust based on whether he needs and finger stick data   - Continue lispro moderate / low dose sliding scale before meals and at bedtime.  - Patient's fingerstick glucose goal is 100-180 mg/dL.    - Discharge recommendations to be discussed.   - Patient can follow up at discharge with [facility] by calling [phone] to make an appointment.      Case discussed with Dr. Cee. Primary team updated.       Milton Laura    Endocrinology Fellow    Service Pager: [phone]    SUBJECTIVE / INTERVAL HPI: Patient was seen and examined this morning. He reported having poor appetite due to persistent throat pain. He denied eating anything yesterday or today. He says that he only has had water. He didn't want to try Glucerna for now as it hurts when he swallows or speaks too much. There were no visible redness or thrush in oropharynx on physical examination. Per team, there was concern about his anion gap increasing (15 mmol/L on 07-01 @ 16:30). Therefore, he was briefly placed on an insulin drip. Bicarbonate was mildly decreased to 20 mmol/L (07-01 @ 16:30), with mildly elevated lactate (2.9 mmol/L on 07-01 @ 16:30). However, Beta-hydroxybutyrate level was within normal limits (0.1 mmol/L on 07-01 @ 14:45). Mild metabolic acidosis unlikely to be due to ketoacidosis given absence of BHB, possible due to mild lactic acidosis. He later developed an episode of hypoglycemia at bedtime (67 mg/dL on 07-01 @ 21:50). Therefore, insulin infusion was stopped. He didn't received long-actin insulin last night. Overnight, his glucose increased to 249 mg/dL (07-02 @ 07:34).     CAPILLARY BLOOD GLUCOSE & INSULIN RECEIVED  153 mg/dL (06-30 @ 16:57) ? Insulin infusion started at 2 units/hr.   122 mg/dL (06-30 @ 18:29) ? Insulin infusion at 2 units/hr.   119 mg/dL (06-30 @ 19:56) ? Insulin infusion at 2 units/hr.   115 mg/dL (06-30 @ 21:01) ? Insulin infusion at 2 units/hr.   117 mg/dL (06-30 @ 22:17) ? Insulin infusion at 1.5 units/hr.   97 mg/dL (06-30 @ 23:14) ? Insulin infusion stopped.   84 mg/dL (07-01 @ 00:13) ? Ø  98 mg/dL (07-01 @ 02:17) ? Ø  127 mg/dL (07-01 @ 07:43) ? Ø  238 mg/dL (07-01 @ 12:17) ? 4 units of lispro sliding scale (He also received treatment for hyperkalemia with D50% + 10 units of regular insulin IVP)  + Insulin infusion started at 3.5 units/hr.   322 mg/dL (07-01 @ 14:06) ? Insulin infusion at 5.5 units/hr.   193 mg/dL (07-01 @ 18:29) ? Insulin infusion at 7.5 units/hr.   154 mg/dL (07-01 @ 19:39) ? Insulin infusion at 10.5 units/hr.   67 mg/dL (07-01 @ 21:50) ? Insulin infusion stopped.   67 mg/dL (07-01 @ 23:25) ? Ø  109 mg/dL (07-02 @ 01:08) ? Ø  249 mg/dL (07-02 @ 07:34) ? 4 units of lispro sliding scale.   263 mg/dL (07-02 @ 11:19) ? 6 units of lispro sliding scale.     REVIEW OF SYSTEMS  Constitutional:  (+) Loss of appetite. Negative fever, chills.   Cardiovascular:  Negative for palpitations.  Respiratory:  (+) Sore throat. (+) Cough. Negative for cough, wheezing, or shortness of breath.    Gastrointestinal:  Negative for nausea, vomiting, diarrhea, constipation, or abdominal pain.  Neurologic:  No headache, confusion, dizziness, lightheadedness.    PHYSICAL EXAM  Vital Signs Last 24 Hrs  T(C): 36.9 (02 Jul 2023 10:14), Max: 37 (02 Jul 2023 01:24)  T(F): 98.4 (02 Jul 2023 10:14), Max: 98.6 (02 Jul 2023 01:24)  HR: 98 (02 Jul 2023 12:38) (90 - 117)  BP: 108/58 (02 Jul 2023 11:09) (108/58 - 133/65)  BP(mean): 79 (02 Jul 2023 11:09) (79 - 93)  RR: 18 (02 Jul 2023 12:38) (16 - 20)  SpO2: 98% (02 Jul 2023 12:38) (90% - 100%)    Parameters below as of 02 Jul 2023 12:38  Patient On (Oxygen Delivery Method): nasal cannula, high flow  O2 Flow (L/min): 60  O2 Concentration (%): 60    Constitutional: Awake, alert, elderly male, in no acute distress.   HEENT: Normocephalic, atraumatic, SCARLETT.  Respiratory: Lungs clear to ausculation bilaterally.   Cardiovascular: regular rhythm, normal S1 and S2, (+) systolic murmur. (+) midline sternotomy scar covered with sterile dressings (+) wound vac.   GI: soft, non-tender, non-distended, bowel sounds present.  Extremities: No lower extremity edema.   Psychiatric: AAO x 3.    LABS  CBC - WBC/HGB/HTC/PLT: 14.25/9.8/28.8/113 (07-02-23)  BMP - Na/K/Cl/Bicarb/BUN/Cr/Gluc/AG/eGFR: 136/4.2/99/23/53/2.18/155/14/30 (07-02-23)  Ca - 8.8 (07-02-23)  Phos - 4.0 (07-02-23)  Mg - 2.1 (07-02-23)  LFT - Alb/Tprot/Tbili/Dbili/AlkPhos/ALT/AST: 3.6/--/1.2/--/68/<5/30 (07-02-23)  PT/aPTT/INR: 13.4/35.5/1.12 (07-02-23)   Thyroid Stimulating Hormone, Serum: 1.440 (06-28-23)    MEDICATIONS  MEDICATIONS  (STANDING):  aMIOdarone    Tablet 400 milliGRAM(s) Oral every 8 hours  aMIOdarone    Tablet   Oral   aMIOdarone IVPB 150 milliGRAM(s) IV Intermittent once  aspirin enteric coated 81 milliGRAM(s) Oral daily  atorvastatin 40 milliGRAM(s) Oral at bedtime  buMETAnide Infusion 1 mG/Hr (5 mL/Hr) IV Continuous <Continuous>  chlorhexidine 2% Cloths 1 Application(s) Topical daily  dextrose 5%. 100 milliLiter(s) (50 mL/Hr) IV Continuous <Continuous>  dextrose 5%. 1000 milliLiter(s) (50 mL/Hr) IV Continuous <Continuous>  dextrose 5%. 1000 milliLiter(s) (100 mL/Hr) IV Continuous <Continuous>  dextrose 50% Injectable 50 milliLiter(s) IV Push every 15 minutes  glucagon  Injectable 1 milliGRAM(s) IntraMuscular once  heparin   Injectable 5000 Unit(s) SubCutaneous every 8 hours  insulin lispro (ADMELOG) corrective regimen sliding scale   SubCutaneous three times a day before meals  milrinone Infusion 0.25 MICROgram(s)/kG/Min (5 mL/Hr) IV Continuous <Continuous>  norepinephrine Infusion 0.04 MICROgram(s)/kG/Min (5 mL/Hr) IV Continuous <Continuous>  pantoprazole    Tablet 40 milliGRAM(s) Oral before breakfast  polyethylene glycol 3350 17 Gram(s) Oral daily  senna 2 Tablet(s) Oral at bedtime  sodium chloride 0.9%. 1000 milliLiter(s) (10 mL/Hr) IV Continuous <Continuous>  vasopressin Infusion 0.02 Unit(s)/Min (3 mL/Hr) IV Continuous <Continuous>    MEDICATIONS  (PRN):  acetaminophen     Tablet .. 650 milliGRAM(s) Oral every 6 hours PRN Mild Pain (1 - 3)  dextrose Oral Gel 15 Gram(s) Oral once PRN Blood Glucose LESS THAN 70 milliGRAM(s)/deciliter  oxyCODONE    IR 5 milliGRAM(s) Oral every 6 hours PRN Moderate Pain (4 - 6)  oxyCODONE    IR 10 milliGRAM(s) Oral every 6 hours PRN Severe Pain (7 - 10)    ASSESSMENT / RECOMMENDATIONS  Mr. Johnson is a 78-year-old male with type 2 diabetes mellitus, hypertension and severe aortic stenosis who was transferred to [facility] for further work-up and intervention of severe aortic stenosis, now s/p aortic valve replacement (6/30/23). Endocrinology was consulted for recommendations regarding diabetes management.     A1C: 8.6 %  BUN: 53  Creatinine: 2.18  GFR: 30  Weight: 66.6  BMI: 21.5    # Type 2 diabetes mellitus with hyperglycemia  - Please give Lantus 10  units at  6PM  - Patient is NOT NPO but has decreased appetite. His PO intake based on symptoms are unpredictable. As such, we will continue him on moderate sliding scale and adjust based on whether he needs and finger stick data   - Continue lispro moderate / low dose sliding scale before meals and at bedtime.  - Patient's fingerstick glucose goal is 100-180 mg/dL.    - Discharge recommendations to be discussed.   - Patient can follow up at discharge with [facility] by calling [phone] to make an appointment.      # Amiodarone  - The patient was chemically euthyroid initially  - He is on amiodarone. At some point either inpatient or outpatient, repeat TFTs should be completed.    Case discussed with Dr. Cee. Primary team updated.       Milton Laura    Endocrinology Fellow    Service Pager: [phone]

## 2023-07-02 NOTE — PROGRESS NOTE ADULT - ATTENDING COMMENTS
Agree with Dr. Osuna assessment and plan above. The patient has poor PO intake. HE developed hyperglycemia and was briefly on an insulin gtt.  He was titrated off and was not bridged with basal and he had resultant AM hyperglycemia.  For now, we will dose basal insulin.  He is not eating so continue with moderate SS.  Once he eats, and we have better SMBG data, we will consider adding prandial insulin. Agree with Dr. Osuna assessment and plan above. The patient has poor PO intake. HE developed hyperglycemia and was briefly on an insulin gtt.  He was titrated off and was not bridged with basal and he had resultant AM hyperglycemia.  For now, we will dose basal insulin.  He is not eating so continue with moderate SS.  Once he eats, and we have better SMBG data, we will consider adding prandial insulin.    Recommend repeating TFT's inpatient if he stays longer for 1 week.  Based on receiving amiodarone dose, thyroid function requires close surveillance and clinical correlation.

## 2023-07-02 NOTE — PROGRESS NOTE ADULT - SUBJECTIVE AND OBJECTIVE BOX
CTICU  CRITICAL  CARE  attending     Hand off received 					   Pertinent clinical, laboratory, radiographic, hemodynamic, echocardiographic, respiratory data, microbiologic data and chart were reviewed and analyzed frequently throughout the course of the day and night  Patient seen and examined with CTS/ SH attending at bedside    Pt is a 78y , Male, post op day # 2 s/p AVR (t);     post op:    inotrope/pressor support  acute post hemorrhagic anemia    today:    Afib/RVR; s/p multiple amiodarone boluses; and initiating enteral load  hypotension in the setting of acute post hemorrhagic anemia  s/p 1 unit PRBC today  supplemental O2 via HFNC  Acute on CKD ( Cr 2.0; baseline 1.3)          , FAMILY HISTORY:  PAST MEDICAL & SURGICAL HISTORY:  HTN (hypertension)      DM (diabetes mellitus)      Severe aortic regurgitation      History of cholecystectomy      H/O prostatectomy        Patient is a 78y old  Male who presents with a chief complaint of severe AS and AI (02 Jul 2023 12:52)      14 system review limited 2/2 post op morbidity    Vital signs, hemodynamic and respiratory parameters were reviewed from the bedside nursing flowsheet.  ICU Vital Signs Last 24 Hrs  T(C): 37.1 (02 Jul 2023 13:23), Max: 37.1 (02 Jul 2023 13:23)  T(F): 98.7 (02 Jul 2023 13:23), Max: 98.7 (02 Jul 2023 13:23)  HR: 112 (02 Jul 2023 14:00) (90 - 117)  BP: 108/58 (02 Jul 2023 11:09) (108/58 - 133/65)  BP(mean): 79 (02 Jul 2023 11:09) (79 - 93)  ABP: 108/51 (02 Jul 2023 14:00) (108/51 - 157/52)  ABP(mean): 65 (02 Jul 2023 14:00) (63 - 88)  RR: 20 (02 Jul 2023 14:00) (16 - 20)  SpO2: 97% (02 Jul 2023 14:00) (90% - 100%)    O2 Parameters below as of 02 Jul 2023 14:00  Patient On (Oxygen Delivery Method): nasal cannula, high flow  O2 Flow (L/min): 60  O2 Concentration (%): 60      Adult Advanced Hemodynamics Last 24 Hrs  CVP(mm Hg): 10 (02 Jul 2023 14:00) (1 - 15)  CVP(cm H2O): --  CO: 5.3 (02 Jul 2023 11:09) (5.3 - 17.7)  CI: 2.9 (02 Jul 2023 11:09) (2.9 - 9.7)  PA: 48/18 (02 Jul 2023 14:00) (21/11 - 84/23)  PA(mean): 27 (02 Jul 2023 14:00) (15 - 40)  PCWP: --  SVR: 994 (02 Jul 2023 11:09) (338 - 994)  SVRI: 1818 (02 Jul 2023 11:09) (617 - 1818)  PVR: --  PVRI: --, ABG - ( 02 Jul 2023 11:58 )  pH, Arterial: 7.34  pH, Blood: x     /  pCO2: 43    /  pO2: 125   / HCO3: 23    / Base Excess: -2.6  /  SaO2: 99.7                Intake and output was reviewed and the fluid balance was calculated  Daily     Daily   I&O's Summary    01 Jul 2023 07:01  -  02 Jul 2023 07:00  --------------------------------------------------------  IN: 3570 mL / OUT: 3165 mL / NET: 405 mL    02 Jul 2023 07:01  -  02 Jul 2023 14:25  --------------------------------------------------------  IN: 926 mL / OUT: 1040 mL / NET: -114 mL        All lines and drain sites were assessed  Glycemic trend was reviewedCAPILLARY BLOOD GLUCOSE      POCT Blood Glucose.: 263 mg/dL (02 Jul 2023 11:19)    No acute change in mental status  Auscultation of the chest reveals equal bs  Abdomen is soft  Extremities are warm and well perfused  Wounds appear clean and unremarkable  Antibiotics are periop    labs  CBC Full  -  ( 02 Jul 2023 11:58 )  WBC Count : 14.25 K/uL  RBC Count : 3.04 M/uL  Hemoglobin : 9.8 g/dL  Hematocrit : 28.8 %  Platelet Count - Automated : 113 K/uL  Mean Cell Volume : 94.7 fl  Mean Cell Hemoglobin : 32.2 pg  Mean Cell Hemoglobin Concentration : 34.0 gm/dL  Auto Neutrophil # : x  Auto Lymphocyte # : x  Auto Monocyte # : x  Auto Eosinophil # : x  Auto Basophil # : x  Auto Neutrophil % : x  Auto Lymphocyte % : x  Auto Monocyte % : x  Auto Eosinophil % : x  Auto Basophil % : x    07-02    135  |  98  |  52<H>  ----------------------------<  273<H>  5.1   |  24  |  2.09<H>    Ca    8.6      02 Jul 2023 11:58  Phos  4.0     07-02  Mg     2.0     07-02    TPro  6.7  /  Alb  3.7  /  TBili  0.9  /  DBili  x   /  AST  29  /  ALT  <5<L>  /  AlkPhos  78  07-02    PT/INR - ( 02 Jul 2023 11:58 )   PT: 13.4 sec;   INR: 1.12          PTT - ( 02 Jul 2023 11:58 )  PTT:35.5 sec  The current medications were reviewed   MEDICATIONS  (STANDING):  aMIOdarone    Tablet 400 milliGRAM(s) Oral every 8 hours  aMIOdarone    Tablet   Oral   aMIOdarone IVPB 150 milliGRAM(s) IV Intermittent once  aMIOdarone IVPB 150 milliGRAM(s) IV Intermittent once  aspirin enteric coated 81 milliGRAM(s) Oral daily  atorvastatin 40 milliGRAM(s) Oral at bedtime  buMETAnide Infusion 1 mG/Hr (5 mL/Hr) IV Continuous <Continuous>  chlorhexidine 2% Cloths 1 Application(s) Topical daily  dextrose 5%. 1000 milliLiter(s) (50 mL/Hr) IV Continuous <Continuous>  dextrose 5%. 1000 milliLiter(s) (100 mL/Hr) IV Continuous <Continuous>  dextrose 5%. 100 milliLiter(s) (50 mL/Hr) IV Continuous <Continuous>  dextrose 50% Injectable 50 milliLiter(s) IV Push every 15 minutes  glucagon  Injectable 1 milliGRAM(s) IntraMuscular once  heparin   Injectable 5000 Unit(s) SubCutaneous every 8 hours  insulin lispro (ADMELOG) corrective regimen sliding scale   SubCutaneous three times a day before meals  magnesium sulfate  IVPB 2 Gram(s) IV Intermittent once  milrinone Infusion 0.25 MICROgram(s)/kG/Min (5 mL/Hr) IV Continuous <Continuous>  norepinephrine Infusion 0.04 MICROgram(s)/kG/Min (5 mL/Hr) IV Continuous <Continuous>  pantoprazole    Tablet 40 milliGRAM(s) Oral before breakfast  polyethylene glycol 3350 17 Gram(s) Oral daily  senna 2 Tablet(s) Oral at bedtime  sodium chloride 0.9%. 1000 milliLiter(s) (10 mL/Hr) IV Continuous <Continuous>  vasopressin Infusion 0.02 Unit(s)/Min (3 mL/Hr) IV Continuous <Continuous>    MEDICATIONS  (PRN):  acetaminophen     Tablet .. 650 milliGRAM(s) Oral every 6 hours PRN Mild Pain (1 - 3)  benzocaine/menthol Lozenge 1 Lozenge Oral every 4 hours PRN Sore Throat  dextrose Oral Gel 15 Gram(s) Oral once PRN Blood Glucose LESS THAN 70 milliGRAM(s)/deciliter  oxyCODONE    IR 5 milliGRAM(s) Oral every 6 hours PRN Moderate Pain (4 - 6)  oxyCODONE    IR 10 milliGRAM(s) Oral every 6 hours PRN Severe Pain (7 - 10)       PROBLEM LIST/ ASSESSMENT:  HEALTH ISSUES - PROBLEM Dx:      ,   Patient is a 78y old  Male who presents with a chief complaint of severe AS and AI (02 Jul 2023 12:52)     s/p AVR(t)      My plan includes :    Titrate pressor support to maintain MAP >75-80 ( higher MAP needed to expedite renal fn recovery)  Continue inotrope support with Milrinone  Trend lactate levels  Supplemental O2 as needed  Titrate Fio2 to maintain Sao2 >95%  Serial ABGs  Trend H/H after PRBC transfusion  No evidence of acute blood loss    close hemodynamic, ventilatory and drain monitoring and management per post op routine    Monitor for arrhythmias and monitor parameters for organ perfusion  monitor neurologic status  Head of the bed should remain elevated to 45 deg .   chest PT and IS will be encouraged  monitor adequacy of oxygenation and ventilation and attempt to wean oxygen  Nutritional goals will be met using po eventually , ensure adequate caloric intake and montior the same  Stress ulcer and VTE prophylaxis will be achieved    Glycemic control is satisfactory  Electrolytes have been repleted as necessary and wound care has been carried out. Pain control has been achieved.   agressive physical therapy and early mobility and ambulation goals will be met   The family was updated about the course and plan  CRITICAL CARE TIME SPENT in evaluation and management, reassessments, review and interpretation of labs and x-rays, ventilator and hemodynamic management, formulating a plan and coordinating care: ___90____ MIN.  Time does not include procedural time.  CTICU ATTENDING     					    Radu Gabriel MD

## 2023-07-02 NOTE — PROGRESS NOTE ADULT - SUBJECTIVE AND OBJECTIVE BOX
CTICU  CRITICAL  CARE  attending     Hand off received 					   Pertinent clinical, laboratory, radiographic, hemodynamic, echocardiographic, respiratory data, microbiologic data and chart were reviewed and analyzed frequently throughout the course of the day and night    77 year old [place]ian-speaking Male with DM, HTN.  He presented to [facility] with ANGINA.   He had chest pain x 3 months. Chest pain associated with LE edema, cough and clear sputum.  He underwent a stress test which revealed small sized, reversible, myocardial perfusion defect of the anteroapical wall consistent with subendocardial ischemia.   ECHO: Moderate Aortic Stenosis. Severe Aortic Regurgitation, Normal LVEF.   Cardiac cath revealed non-obstructive CAD.   He was then transferred to [facility] under the care of Dr. Koehler for AVR.    S/P AVR.        FAMILY HISTORY:  PAST MEDICAL & SURGICAL HISTORY:  HTN (hypertension  DM (diabetes mellitus)  Severe aortic regurgitation  History of cholecystectomy  H/O prostatectomy          14 system review was unremarkable    Vital signs, hemodynamic and respiratory parameters were reviewed from the bedside nursing flow sheet.  ICU Vital Signs Last 24 Hrs  T(C): 36.7 (02 Jul 2023 21:44), Max: 37.1 (02 Jul 2023 13:23)  T(F): 98.1 (02 Jul 2023 21:44), Max: 98.7 (02 Jul 2023 13:23)  HR: 90 (02 Jul 2023 21:00) (78 - 125)  BP: 108/58 (02 Jul 2023 11:09) (108/58 - 108/58)  BP(mean): 79 (02 Jul 2023 11:09) (79 - 79)  ABP: 131/61 (02 Jul 2023 21:00) (108/51 - 160/69)  ABP(mean): 82 (02 Jul 2023 21:00) (65 - 95)  RR: 18 (02 Jul 2023 20:26) (16 - 20)  SpO2: 100% (02 Jul 2023 20:26) (90% - 100%)    O2 Parameters below as of 02 Jul 2023 22:00  Patient On (Oxygen Delivery Method): nasal cannula, high flow  O2 Flow (L/min): 60  O2 Concentration (%): 60      Adult Advanced Hemodynamics Last 24 Hrs  CVP(mm Hg): 9 (02 Jul 2023 21:00) (1 - 15)  CVP(cm H2O): --  CO: 3.9 (02 Jul 2023 16:00) (3.9 - 8.7)  CI: 2.1 (02 Jul 2023 16:00) (2.1 - 4.8)  PA: 43/20 (02 Jul 2023 21:00) (43/20 - 84/23)  PA(mean): 29 (02 Jul 2023 21:00) (23 - 40)  PCWP: --  SVR: 1413 (02 Jul 2023 16:00) (606 - 1413)  SVRI: 2625 (02 Jul 2023 16:00) (1104 - 2625)  PVR: --  PVRI: --, ABG - ( 02 Jul 2023 11:58 )  pH, Arterial: 7.34  pH, Blood: x     /  pCO2: 43    /  pO2: 125   / HCO3: 23    / Base Excess: -2.6  /  SaO2: 99.7                Intake and output was reviewed and the fluid balance was calculated  Daily     Daily   I&O's Summary    01 Jul 2023 07:01  -  02 Jul 2023 07:00  --------------------------------------------------------  IN: 3570 mL / OUT: 3165 mL / NET: 405 mL    02 Jul 2023 07:01  -  02 Jul 2023 21:54  --------------------------------------------------------  IN: 1420 mL / OUT: 2005 mL / NET: -585 mL          Neuro: No change in the Neuro status from the baseline.   Neck: No JVD.  CVS: S1, S2, No S3.  Lungs: Good air entry bilaterally.  Abd: Soft. No tenderness. + Bowel sounds.  Vascular: + DP/PT.  Extremities: No edema.  Lymphatic: Normal.  Skin: No abnormalities.      labs  CBC Full  -  ( 02 Jul 2023 11:58 )  WBC Count : 14.25 K/uL  RBC Count : 3.04 M/uL  Hemoglobin : 9.8 g/dL  Hematocrit : 28.8 %  Platelet Count - Automated : 113 K/uL  Mean Cell Volume : 94.7 fl  Mean Cell Hemoglobin : 32.2 pg  Mean Cell Hemoglobin Concentration : 34.0 gm/dL  Auto Neutrophil # : x  Auto Lymphocyte # : x  Auto Monocyte # : x  Auto Eosinophil # : x  Auto Basophil # : x  Auto Neutrophil % : x  Auto Lymphocyte % : x  Auto Monocyte % : x  Auto Eosinophil % : x  Auto Basophil % : x    07-02    135  |  98  |  52<H>  ----------------------------<  273<H>  5.1   |  24  |  2.09<H>    Ca    8.6      02 Jul 2023 11:58  Phos  4.0     07-02  Mg     2.0     07-02    TPro  6.7  /  Alb  3.7  /  TBili  0.9  /  DBili  x   /  AST  29  /  ALT  <5<L>  /  AlkPhos  78  07-02    PT/INR - ( 02 Jul 2023 11:58 )   PT: 13.4 sec;   INR: 1.12          PTT - ( 02 Jul 2023 11:58 )  PTT:35.5 sec  The current medications were reviewed   MEDICATIONS  (STANDING):  aMIOdarone    Tablet   Oral   aMIOdarone    Tablet 400 milliGRAM(s) Oral every 8 hours  aspirin enteric coated 81 milliGRAM(s) Oral daily  atorvastatin 40 milliGRAM(s) Oral at bedtime  buMETAnide Infusion 1 mG/Hr (5 mL/Hr) IV Continuous <Continuous>  chlorhexidine 2% Cloths 1 Application(s) Topical daily  dextrose 5%. 1000 milliLiter(s) (50 mL/Hr) IV Continuous <Continuous>  dextrose 5%. 1000 milliLiter(s) (100 mL/Hr) IV Continuous <Continuous>  dextrose 5%. 100 milliLiter(s) (50 mL/Hr) IV Continuous <Continuous>  dextrose 50% Injectable 50 milliLiter(s) IV Push every 15 minutes  glucagon  Injectable 1 milliGRAM(s) IntraMuscular once  heparin   Injectable 5000 Unit(s) SubCutaneous every 8 hours  insulin glargine Injectable (LANTUS) 10 Unit(s) SubCutaneous at bedtime  insulin lispro (ADMELOG) corrective regimen sliding scale   SubCutaneous three times a day before meals  milrinone Infusion 0.25 MICROgram(s)/kG/Min (5 mL/Hr) IV Continuous <Continuous>  pantoprazole    Tablet 40 milliGRAM(s) Oral before breakfast  polyethylene glycol 3350 17 Gram(s) Oral daily  senna 2 Tablet(s) Oral at bedtime  sodium chloride 0.9%. 1000 milliLiter(s) (10 mL/Hr) IV Continuous <Continuous>  vasopressin Infusion 0.02 Unit(s)/Min (3 mL/Hr) IV Continuous <Continuous>    MEDICATIONS  (PRN):  acetaminophen     Tablet .. 650 milliGRAM(s) Oral every 6 hours PRN Mild Pain (1 - 3)  benzocaine/menthol Lozenge 1 Lozenge Oral every 4 hours PRN Sore Throat  dextrose Oral Gel 15 Gram(s) Oral once PRN Blood Glucose LESS THAN 70 milliGRAM(s)/deciliter  oxyCODONE    IR 5 milliGRAM(s) Oral every 6 hours PRN Moderate Pain (4 - 6)  oxyCODONE    IR 10 milliGRAM(s) Oral every 6 hours PRN Severe Pain (7 - 10)            78 year old  Male admitted with AORTIC STENOSIS  S/P AVR  Postoperative course complicated by Uncontrolled DM, CAMMIE, Right pneumothorax.  Increased A-a Gradient.  Hemodynamically stable.  Fair urine out put.        My plan includes :  OPTIMIZE Glycemic control (Adjust Insulin).  Statin Rx.  Gentle Diuresis.  Low dose IV milrinone.   PO Amiodarone  Betablocker Rx when off vasopressin..  Close hemodynamic monitoring   Monitor for arrhythmias and monitor parameters for organ perfusion  Monitor neurologic status  Monitor renal function.  Head of the bed should remain elevated to 45 deg .   Chest PT and IS will be encouraged  Monitor adequacy of oxygenation   Nutritional goals will be met using po eventually , ensure adequate caloric intake and monitor the same  Stress ulcer and VTE prophylaxis will be achieved    Electrolytes have been repleted as necessary and wound care has been carried out. Pain control has been achieved.   Aggressive physical therapy and early mobility and ambulation goals will be met   The family was updated about the course and plan  CRITICAL CARE TIME SPENT in evaluation and management, review and interpretation of labs and x-rays, hemodynamic management, formulating a plan and coordinating care: ___30____ MIN.  Time does not include procedural time.  CTICU ATTENDING     					    Leodan Alcantar MD

## 2023-07-03 LAB
ALBUMIN SERPL ELPH-MCNC: 3.7 G/DL — SIGNIFICANT CHANGE UP (ref 3.3–5)
ALBUMIN SERPL ELPH-MCNC: 3.8 G/DL — SIGNIFICANT CHANGE UP (ref 3.3–5)
ALBUMIN SERPL ELPH-MCNC: 3.9 G/DL — SIGNIFICANT CHANGE UP (ref 3.3–5)
ALBUMIN SERPL ELPH-MCNC: 4.5 G/DL — SIGNIFICANT CHANGE UP (ref 3.3–5)
ALP SERPL-CCNC: 100 U/L — SIGNIFICANT CHANGE UP (ref 40–120)
ALP SERPL-CCNC: 76 U/L — SIGNIFICANT CHANGE UP (ref 40–120)
ALP SERPL-CCNC: 81 U/L — SIGNIFICANT CHANGE UP (ref 40–120)
ALP SERPL-CCNC: 84 U/L — SIGNIFICANT CHANGE UP (ref 40–120)
ALT FLD-CCNC: <5 U/L — LOW (ref 10–45)
ANION GAP SERPL CALC-SCNC: 12 MMOL/L — SIGNIFICANT CHANGE UP (ref 5–17)
ANION GAP SERPL CALC-SCNC: 14 MMOL/L — SIGNIFICANT CHANGE UP (ref 5–17)
ANION GAP SERPL CALC-SCNC: 14 MMOL/L — SIGNIFICANT CHANGE UP (ref 5–17)
ANION GAP SERPL CALC-SCNC: 18 MMOL/L — HIGH (ref 5–17)
APPEARANCE UR: CLEAR — SIGNIFICANT CHANGE UP
APTT BLD: 32.8 SEC — SIGNIFICANT CHANGE UP (ref 27.5–35.5)
APTT BLD: 33 SEC — SIGNIFICANT CHANGE UP (ref 27.5–35.5)
APTT BLD: 34.2 SEC — SIGNIFICANT CHANGE UP (ref 27.5–35.5)
APTT BLD: 34.9 SEC — SIGNIFICANT CHANGE UP (ref 27.5–35.5)
AST SERPL-CCNC: 23 U/L — SIGNIFICANT CHANGE UP (ref 10–40)
AST SERPL-CCNC: 24 U/L — SIGNIFICANT CHANGE UP (ref 10–40)
AST SERPL-CCNC: 26 U/L — SIGNIFICANT CHANGE UP (ref 10–40)
AST SERPL-CCNC: 27 U/L — SIGNIFICANT CHANGE UP (ref 10–40)
BACTERIA # UR AUTO: ABNORMAL /HPF
BASE EXCESS BLDV CALC-SCNC: -0.3 MMOL/L — SIGNIFICANT CHANGE UP (ref -2–3)
BASE EXCESS BLDV CALC-SCNC: 0.5 MMOL/L — SIGNIFICANT CHANGE UP (ref -2–3)
BASE EXCESS BLDV CALC-SCNC: 2.4 MMOL/L — SIGNIFICANT CHANGE UP (ref -2–3)
BILIRUB SERPL-MCNC: 0.7 MG/DL — SIGNIFICANT CHANGE UP (ref 0.2–1.2)
BILIRUB SERPL-MCNC: 0.9 MG/DL — SIGNIFICANT CHANGE UP (ref 0.2–1.2)
BILIRUB UR-MCNC: NEGATIVE — SIGNIFICANT CHANGE UP
BUN SERPL-MCNC: 60 MG/DL — HIGH (ref 7–23)
BUN SERPL-MCNC: 63 MG/DL — HIGH (ref 7–23)
BUN SERPL-MCNC: 65 MG/DL — HIGH (ref 7–23)
BUN SERPL-MCNC: 66 MG/DL — HIGH (ref 7–23)
CA-I SERPL-SCNC: 1.13 MMOL/L — LOW (ref 1.15–1.33)
CA-I SERPL-SCNC: 1.24 MMOL/L — SIGNIFICANT CHANGE UP (ref 1.15–1.33)
CA-I SERPL-SCNC: 1.25 MMOL/L — SIGNIFICANT CHANGE UP (ref 1.15–1.33)
CALCIUM SERPL-MCNC: 9.2 MG/DL — SIGNIFICANT CHANGE UP (ref 8.4–10.5)
CALCIUM SERPL-MCNC: 9.2 MG/DL — SIGNIFICANT CHANGE UP (ref 8.4–10.5)
CALCIUM SERPL-MCNC: 9.3 MG/DL — SIGNIFICANT CHANGE UP (ref 8.4–10.5)
CALCIUM SERPL-MCNC: 9.5 MG/DL — SIGNIFICANT CHANGE UP (ref 8.4–10.5)
CHLORIDE SERPL-SCNC: 92 MMOL/L — LOW (ref 96–108)
CHLORIDE SERPL-SCNC: 93 MMOL/L — LOW (ref 96–108)
CHLORIDE SERPL-SCNC: 93 MMOL/L — LOW (ref 96–108)
CHLORIDE SERPL-SCNC: 95 MMOL/L — LOW (ref 96–108)
CO2 BLDV-SCNC: 25.1 MMOL/L — SIGNIFICANT CHANGE UP (ref 22–26)
CO2 BLDV-SCNC: 29.5 MMOL/L — HIGH (ref 22–26)
CO2 BLDV-SCNC: 30.6 MMOL/L — HIGH (ref 22–26)
CO2 SERPL-SCNC: 23 MMOL/L — SIGNIFICANT CHANGE UP (ref 22–31)
CO2 SERPL-SCNC: 24 MMOL/L — SIGNIFICANT CHANGE UP (ref 22–31)
CO2 SERPL-SCNC: 25 MMOL/L — SIGNIFICANT CHANGE UP (ref 22–31)
CO2 SERPL-SCNC: 26 MMOL/L — SIGNIFICANT CHANGE UP (ref 22–31)
COD CRY URNS QL: ABNORMAL /HPF
COLOR SPEC: YELLOW — SIGNIFICANT CHANGE UP
COMMENT - URINE: SIGNIFICANT CHANGE UP
CORTIS F PM SERPL-MCNC: 28.97 UG/DL — HIGH (ref 2.68–10.5)
CREAT ?TM UR-MCNC: 46 MG/DL — SIGNIFICANT CHANGE UP
CREAT SERPL-MCNC: 2.21 MG/DL — HIGH (ref 0.5–1.3)
CREAT SERPL-MCNC: 2.22 MG/DL — HIGH (ref 0.5–1.3)
CREAT SERPL-MCNC: 2.26 MG/DL — HIGH (ref 0.5–1.3)
CREAT SERPL-MCNC: 2.29 MG/DL — HIGH (ref 0.5–1.3)
DIFF PNL FLD: ABNORMAL
EGFR: 28 ML/MIN/1.73M2 — LOW
EGFR: 29 ML/MIN/1.73M2 — LOW
EPI CELLS # UR: SIGNIFICANT CHANGE UP /HPF (ref 0–5)
GAS PNL BLDA: SIGNIFICANT CHANGE UP
GAS PNL BLDV: 129 MMOL/L — LOW (ref 136–145)
GAS PNL BLDV: SIGNIFICANT CHANGE UP
GLUCOSE BLDC GLUCOMTR-MCNC: 146 MG/DL — HIGH (ref 70–99)
GLUCOSE BLDC GLUCOMTR-MCNC: 151 MG/DL — HIGH (ref 70–99)
GLUCOSE BLDC GLUCOMTR-MCNC: 205 MG/DL — HIGH (ref 70–99)
GLUCOSE BLDC GLUCOMTR-MCNC: 221 MG/DL — HIGH (ref 70–99)
GLUCOSE BLDC GLUCOMTR-MCNC: 252 MG/DL — HIGH (ref 70–99)
GLUCOSE SERPL-MCNC: 180 MG/DL — HIGH (ref 70–99)
GLUCOSE SERPL-MCNC: 193 MG/DL — HIGH (ref 70–99)
GLUCOSE SERPL-MCNC: 209 MG/DL — HIGH (ref 70–99)
GLUCOSE SERPL-MCNC: 236 MG/DL — HIGH (ref 70–99)
GLUCOSE UR QL: NEGATIVE — SIGNIFICANT CHANGE UP
HCO3 BLDV-SCNC: 24 MMOL/L — SIGNIFICANT CHANGE UP (ref 22–29)
HCO3 BLDV-SCNC: 28 MMOL/L — SIGNIFICANT CHANGE UP (ref 22–29)
HCO3 BLDV-SCNC: 29 MMOL/L — SIGNIFICANT CHANGE UP (ref 22–29)
HCT VFR BLD CALC: 21.7 % — LOW (ref 39–50)
HCT VFR BLD CALC: 23 % — LOW (ref 39–50)
HCT VFR BLD CALC: 25.7 % — LOW (ref 39–50)
HCT VFR BLD CALC: 27.8 % — LOW (ref 39–50)
HCT VFR BLD CALC: 29 % — LOW (ref 39–50)
HGB BLD-MCNC: 7.8 G/DL — LOW (ref 13–17)
HGB BLD-MCNC: 7.9 G/DL — LOW (ref 13–17)
HGB BLD-MCNC: 8.8 G/DL — LOW (ref 13–17)
HGB BLD-MCNC: 9.3 G/DL — LOW (ref 13–17)
HGB BLD-MCNC: 9.9 G/DL — LOW (ref 13–17)
HYALINE CASTS # UR AUTO: ABNORMAL /LPF (ref 0–2)
INR BLD: 1.03 — SIGNIFICANT CHANGE UP (ref 0.88–1.16)
INR BLD: 1.04 — SIGNIFICANT CHANGE UP (ref 0.88–1.16)
INR BLD: 1.09 — SIGNIFICANT CHANGE UP (ref 0.88–1.16)
INR BLD: 1.16 — SIGNIFICANT CHANGE UP (ref 0.88–1.16)
KETONES UR-MCNC: NEGATIVE — SIGNIFICANT CHANGE UP
LACTATE SERPL-SCNC: 1 MMOL/L — SIGNIFICANT CHANGE UP (ref 0.5–2)
LACTATE SERPL-SCNC: 1 MMOL/L — SIGNIFICANT CHANGE UP (ref 0.5–2)
LACTATE SERPL-SCNC: 1.6 MMOL/L — SIGNIFICANT CHANGE UP (ref 0.5–2)
LEUKOCYTE ESTERASE UR-ACNC: ABNORMAL
MAGNESIUM SERPL-MCNC: 2.3 MG/DL — SIGNIFICANT CHANGE UP (ref 1.6–2.6)
MAGNESIUM SERPL-MCNC: 2.4 MG/DL — SIGNIFICANT CHANGE UP (ref 1.6–2.6)
MAGNESIUM SERPL-MCNC: 2.5 MG/DL — SIGNIFICANT CHANGE UP (ref 1.6–2.6)
MAGNESIUM SERPL-MCNC: 2.5 MG/DL — SIGNIFICANT CHANGE UP (ref 1.6–2.6)
MCHC RBC-ENTMCNC: 31.5 PG — SIGNIFICANT CHANGE UP (ref 27–34)
MCHC RBC-ENTMCNC: 31.5 PG — SIGNIFICANT CHANGE UP (ref 27–34)
MCHC RBC-ENTMCNC: 32.2 PG — SIGNIFICANT CHANGE UP (ref 27–34)
MCHC RBC-ENTMCNC: 32.2 PG — SIGNIFICANT CHANGE UP (ref 27–34)
MCHC RBC-ENTMCNC: 32.8 PG — SIGNIFICANT CHANGE UP (ref 27–34)
MCHC RBC-ENTMCNC: 33.5 GM/DL — SIGNIFICANT CHANGE UP (ref 32–36)
MCHC RBC-ENTMCNC: 34.1 GM/DL — SIGNIFICANT CHANGE UP (ref 32–36)
MCHC RBC-ENTMCNC: 34.2 GM/DL — SIGNIFICANT CHANGE UP (ref 32–36)
MCHC RBC-ENTMCNC: 34.3 GM/DL — SIGNIFICANT CHANGE UP (ref 32–36)
MCHC RBC-ENTMCNC: 35.9 GM/DL — SIGNIFICANT CHANGE UP (ref 32–36)
MCV RBC AUTO: 91.2 FL — SIGNIFICANT CHANGE UP (ref 80–100)
MCV RBC AUTO: 91.6 FL — SIGNIFICANT CHANGE UP (ref 80–100)
MCV RBC AUTO: 94.1 FL — SIGNIFICANT CHANGE UP (ref 80–100)
MCV RBC AUTO: 94.2 FL — SIGNIFICANT CHANGE UP (ref 80–100)
MCV RBC AUTO: 94.5 FL — SIGNIFICANT CHANGE UP (ref 80–100)
NITRITE UR-MCNC: NEGATIVE — SIGNIFICANT CHANGE UP
NRBC # BLD: 0 /100 WBCS — SIGNIFICANT CHANGE UP (ref 0–0)
OSMOLALITY UR: 310 MOSM/KG — SIGNIFICANT CHANGE UP (ref 300–900)
PCO2 BLDV: 29 MMHG — LOW (ref 42–55)
PCO2 BLDV: 59 MMHG — HIGH (ref 42–55)
PCO2 BLDV: 61 MMHG — HIGH (ref 42–55)
PH BLDV: 7.28 — LOW (ref 7.32–7.43)
PH BLDV: 7.28 — LOW (ref 7.32–7.43)
PH BLDV: 7.53 — HIGH (ref 7.32–7.43)
PH UR: 5 — SIGNIFICANT CHANGE UP (ref 5–8)
PHOSPHATE SERPL-MCNC: 3.4 MG/DL — SIGNIFICANT CHANGE UP (ref 2.5–4.5)
PHOSPHATE SERPL-MCNC: 4.5 MG/DL — SIGNIFICANT CHANGE UP (ref 2.5–4.5)
PHOSPHATE SERPL-MCNC: 4.7 MG/DL — HIGH (ref 2.5–4.5)
PHOSPHATE SERPL-MCNC: 4.9 MG/DL — HIGH (ref 2.5–4.5)
PLATELET # BLD AUTO: 100 K/UL — LOW (ref 150–400)
PLATELET # BLD AUTO: 102 K/UL — LOW (ref 150–400)
PLATELET # BLD AUTO: 107 K/UL — LOW (ref 150–400)
PLATELET # BLD AUTO: 84 K/UL — LOW (ref 150–400)
PLATELET # BLD AUTO: 94 K/UL — LOW (ref 150–400)
PO2 BLDV: 40 MMHG — SIGNIFICANT CHANGE UP (ref 25–45)
PO2 BLDV: 40 MMHG — SIGNIFICANT CHANGE UP (ref 25–45)
PO2 BLDV: 43 MMHG — SIGNIFICANT CHANGE UP (ref 25–45)
POTASSIUM BLDV-SCNC: 3.5 MMOL/L — SIGNIFICANT CHANGE UP (ref 3.5–5.1)
POTASSIUM BLDV-SCNC: 4.1 MMOL/L — SIGNIFICANT CHANGE UP (ref 3.5–5.1)
POTASSIUM BLDV-SCNC: 4.3 MMOL/L — SIGNIFICANT CHANGE UP (ref 3.5–5.1)
POTASSIUM SERPL-MCNC: 3.6 MMOL/L — SIGNIFICANT CHANGE UP (ref 3.5–5.3)
POTASSIUM SERPL-MCNC: 4.1 MMOL/L — SIGNIFICANT CHANGE UP (ref 3.5–5.3)
POTASSIUM SERPL-MCNC: 4.2 MMOL/L — SIGNIFICANT CHANGE UP (ref 3.5–5.3)
POTASSIUM SERPL-MCNC: 4.3 MMOL/L — SIGNIFICANT CHANGE UP (ref 3.5–5.3)
POTASSIUM SERPL-SCNC: 3.6 MMOL/L — SIGNIFICANT CHANGE UP (ref 3.5–5.3)
POTASSIUM SERPL-SCNC: 4.1 MMOL/L — SIGNIFICANT CHANGE UP (ref 3.5–5.3)
POTASSIUM SERPL-SCNC: 4.2 MMOL/L — SIGNIFICANT CHANGE UP (ref 3.5–5.3)
POTASSIUM SERPL-SCNC: 4.3 MMOL/L — SIGNIFICANT CHANGE UP (ref 3.5–5.3)
POTASSIUM UR-SCNC: 47 MMOL/L — SIGNIFICANT CHANGE UP
PROT ?TM UR-MCNC: 26 MG/DL — HIGH (ref 0–12)
PROT SERPL-MCNC: 6.4 G/DL — SIGNIFICANT CHANGE UP (ref 6–8.3)
PROT SERPL-MCNC: 6.9 G/DL — SIGNIFICANT CHANGE UP (ref 6–8.3)
PROT SERPL-MCNC: 7.1 G/DL — SIGNIFICANT CHANGE UP (ref 6–8.3)
PROT SERPL-MCNC: 7.1 G/DL — SIGNIFICANT CHANGE UP (ref 6–8.3)
PROT UR-MCNC: NEGATIVE MG/DL — SIGNIFICANT CHANGE UP
PROT/CREAT UR-RTO: 0.6 RATIO — HIGH (ref 0–0.2)
PROTHROM AB SERPL-ACNC: 12.3 SEC — SIGNIFICANT CHANGE UP (ref 10.5–13.4)
PROTHROM AB SERPL-ACNC: 12.4 SEC — SIGNIFICANT CHANGE UP (ref 10.5–13.4)
PROTHROM AB SERPL-ACNC: 13 SEC — SIGNIFICANT CHANGE UP (ref 10.5–13.4)
PROTHROM AB SERPL-ACNC: 13.8 SEC — HIGH (ref 10.5–13.4)
RBC # BLD: 2.38 M/UL — LOW (ref 4.2–5.8)
RBC # BLD: 2.51 M/UL — LOW (ref 4.2–5.8)
RBC # BLD: 2.73 M/UL — LOW (ref 4.2–5.8)
RBC # BLD: 2.95 M/UL — LOW (ref 4.2–5.8)
RBC # BLD: 3.07 M/UL — LOW (ref 4.2–5.8)
RBC # FLD: 15 % — HIGH (ref 10.3–14.5)
RBC # FLD: 15.1 % — HIGH (ref 10.3–14.5)
RBC # FLD: 15.5 % — HIGH (ref 10.3–14.5)
RBC # FLD: 15.6 % — HIGH (ref 10.3–14.5)
RBC # FLD: 15.6 % — HIGH (ref 10.3–14.5)
RBC CASTS # UR COMP ASSIST: ABNORMAL /HPF
SAO2 % BLDV: 66.2 % — LOW (ref 67–88)
SAO2 % BLDV: 71 % — SIGNIFICANT CHANGE UP (ref 67–88)
SAO2 % BLDV: 73.8 % — SIGNIFICANT CHANGE UP (ref 67–88)
SODIUM SERPL-SCNC: 130 MMOL/L — LOW (ref 135–145)
SODIUM SERPL-SCNC: 132 MMOL/L — LOW (ref 135–145)
SODIUM SERPL-SCNC: 133 MMOL/L — LOW (ref 135–145)
SODIUM SERPL-SCNC: 134 MMOL/L — LOW (ref 135–145)
SODIUM UR-SCNC: 69 MMOL/L — SIGNIFICANT CHANGE UP
SP GR SPEC: 1.02 — SIGNIFICANT CHANGE UP (ref 1–1.03)
UROBILINOGEN FLD QL: 0.2 E.U./DL — SIGNIFICANT CHANGE UP
UUN UR-MCNC: 150 MG/DL — SIGNIFICANT CHANGE UP
WBC # BLD: 11.94 K/UL — HIGH (ref 3.8–10.5)
WBC # BLD: 13.17 K/UL — HIGH (ref 3.8–10.5)
WBC # BLD: 14.35 K/UL — HIGH (ref 3.8–10.5)
WBC # BLD: 8.75 K/UL — SIGNIFICANT CHANGE UP (ref 3.8–10.5)
WBC # BLD: 9.83 K/UL — SIGNIFICANT CHANGE UP (ref 3.8–10.5)
WBC # FLD AUTO: 11.94 K/UL — HIGH (ref 3.8–10.5)
WBC # FLD AUTO: 13.17 K/UL — HIGH (ref 3.8–10.5)
WBC # FLD AUTO: 14.35 K/UL — HIGH (ref 3.8–10.5)
WBC # FLD AUTO: 8.75 K/UL — SIGNIFICANT CHANGE UP (ref 3.8–10.5)
WBC # FLD AUTO: 9.83 K/UL — SIGNIFICANT CHANGE UP (ref 3.8–10.5)
WBC UR QL: ABNORMAL /HPF

## 2023-07-03 PROCEDURE — 71045 X-RAY EXAM CHEST 1 VIEW: CPT | Mod: 26,77

## 2023-07-03 PROCEDURE — 99152 MOD SED SAME PHYS/QHP 5/>YRS: CPT

## 2023-07-03 PROCEDURE — 93503 INSERT/PLACE HEART CATHETER: CPT

## 2023-07-03 PROCEDURE — 76937 US GUIDE VASCULAR ACCESS: CPT | Mod: 26,RT

## 2023-07-03 PROCEDURE — 36556 INSERT NON-TUNNEL CV CATH: CPT | Mod: RT

## 2023-07-03 PROCEDURE — 31500 INSERT EMERGENCY AIRWAY: CPT

## 2023-07-03 PROCEDURE — 99232 SBSQ HOSP IP/OBS MODERATE 35: CPT | Mod: GC

## 2023-07-03 PROCEDURE — 71045 X-RAY EXAM CHEST 1 VIEW: CPT | Mod: 26

## 2023-07-03 PROCEDURE — 99292 CRITICAL CARE ADDL 30 MIN: CPT | Mod: 25

## 2023-07-03 PROCEDURE — 31645 BRNCHSC W/THER ASPIR 1ST: CPT

## 2023-07-03 PROCEDURE — 99292 CRITICAL CARE ADDL 30 MIN: CPT

## 2023-07-03 PROCEDURE — 99223 1ST HOSP IP/OBS HIGH 75: CPT

## 2023-07-03 PROCEDURE — 93306 TTE W/DOPPLER COMPLETE: CPT | Mod: 26

## 2023-07-03 PROCEDURE — 99291 CRITICAL CARE FIRST HOUR: CPT | Mod: 25

## 2023-07-03 RX ORDER — SENNA PLUS 8.6 MG/1
2 TABLET ORAL AT BEDTIME
Refills: 0 | Status: DISCONTINUED | OUTPATIENT
Start: 2023-07-03 | End: 2023-07-14

## 2023-07-03 RX ORDER — INSULIN HUMAN 100 [IU]/ML
1 INJECTION, SOLUTION SUBCUTANEOUS
Qty: 100 | Refills: 0 | Status: DISCONTINUED | OUTPATIENT
Start: 2023-07-03 | End: 2023-07-06

## 2023-07-03 RX ORDER — ATORVASTATIN CALCIUM 80 MG/1
40 TABLET, FILM COATED ORAL AT BEDTIME
Refills: 0 | Status: DISCONTINUED | OUTPATIENT
Start: 2023-07-03 | End: 2023-07-14

## 2023-07-03 RX ORDER — CALCIUM GLUCONATE 100 MG/ML
2 VIAL (ML) INTRAVENOUS ONCE
Refills: 0 | Status: COMPLETED | OUTPATIENT
Start: 2023-07-03 | End: 2023-07-04

## 2023-07-03 RX ORDER — DIGOXIN 250 MCG
500 TABLET ORAL ONCE
Refills: 0 | Status: COMPLETED | OUTPATIENT
Start: 2023-07-03 | End: 2023-07-03

## 2023-07-03 RX ORDER — AMIODARONE HYDROCHLORIDE 400 MG/1
150 TABLET ORAL ONCE
Refills: 0 | Status: COMPLETED | OUTPATIENT
Start: 2023-07-03 | End: 2023-07-03

## 2023-07-03 RX ORDER — ALBUMIN HUMAN 25 %
50 VIAL (ML) INTRAVENOUS ONCE
Refills: 0 | Status: COMPLETED | OUTPATIENT
Start: 2023-07-03 | End: 2023-07-03

## 2023-07-03 RX ORDER — DOBUTAMINE HCL 250MG/20ML
1 VIAL (ML) INTRAVENOUS
Qty: 500 | Refills: 0 | Status: DISCONTINUED | OUTPATIENT
Start: 2023-07-03 | End: 2023-07-07

## 2023-07-03 RX ORDER — ACETAMINOPHEN 500 MG
650 TABLET ORAL EVERY 6 HOURS
Refills: 0 | Status: DISCONTINUED | OUTPATIENT
Start: 2023-07-03 | End: 2023-07-04

## 2023-07-03 RX ORDER — CISATRACURIUM BESYLATE 2 MG/ML
10 INJECTION INTRAVENOUS ONCE
Refills: 0 | Status: COMPLETED | OUTPATIENT
Start: 2023-07-03 | End: 2023-07-03

## 2023-07-03 RX ORDER — BUMETANIDE 0.25 MG/ML
1 INJECTION INTRAMUSCULAR; INTRAVENOUS ONCE
Refills: 0 | Status: COMPLETED | OUTPATIENT
Start: 2023-07-03 | End: 2023-07-03

## 2023-07-03 RX ORDER — POTASSIUM CHLORIDE 20 MEQ
20 PACKET (EA) ORAL ONCE
Refills: 0 | Status: COMPLETED | OUTPATIENT
Start: 2023-07-03 | End: 2023-07-03

## 2023-07-03 RX ORDER — POLYETHYLENE GLYCOL 3350 17 G/17G
17 POWDER, FOR SOLUTION ORAL DAILY
Refills: 0 | Status: DISCONTINUED | OUTPATIENT
Start: 2023-07-03 | End: 2023-07-08

## 2023-07-03 RX ORDER — AMIODARONE HYDROCHLORIDE 400 MG/1
1 TABLET ORAL
Qty: 450 | Refills: 0 | Status: DISCONTINUED | OUTPATIENT
Start: 2023-07-03 | End: 2023-07-03

## 2023-07-03 RX ORDER — PROPOFOL 10 MG/ML
20 INJECTION, EMULSION INTRAVENOUS
Qty: 1000 | Refills: 0 | Status: DISCONTINUED | OUTPATIENT
Start: 2023-07-03 | End: 2023-07-05

## 2023-07-03 RX ORDER — BUMETANIDE 0.25 MG/ML
0.25 INJECTION INTRAMUSCULAR; INTRAVENOUS
Qty: 20 | Refills: 0 | Status: DISCONTINUED | OUTPATIENT
Start: 2023-07-03 | End: 2023-07-03

## 2023-07-03 RX ORDER — MILRINONE LACTATE 1 MG/ML
0.12 INJECTION, SOLUTION INTRAVENOUS
Qty: 20 | Refills: 0 | Status: DISCONTINUED | OUTPATIENT
Start: 2023-07-03 | End: 2023-07-05

## 2023-07-03 RX ORDER — BUMETANIDE 0.25 MG/ML
1 INJECTION INTRAMUSCULAR; INTRAVENOUS EVERY 8 HOURS
Refills: 0 | Status: DISCONTINUED | OUTPATIENT
Start: 2023-07-03 | End: 2023-07-03

## 2023-07-03 RX ORDER — PANTOPRAZOLE SODIUM 20 MG/1
40 TABLET, DELAYED RELEASE ORAL DAILY
Refills: 0 | Status: ACTIVE | OUTPATIENT
Start: 2023-07-04 | End: 2024-06-01

## 2023-07-03 RX ORDER — ALBUMIN HUMAN 25 %
250 VIAL (ML) INTRAVENOUS
Refills: 0 | Status: COMPLETED | OUTPATIENT
Start: 2023-07-03 | End: 2023-07-03

## 2023-07-03 RX ORDER — BUMETANIDE 0.25 MG/ML
0.5 INJECTION INTRAMUSCULAR; INTRAVENOUS
Qty: 20 | Refills: 0 | Status: DISCONTINUED | OUTPATIENT
Start: 2023-07-03 | End: 2023-07-05

## 2023-07-03 RX ORDER — FENTANYL CITRATE 50 UG/ML
25 INJECTION INTRAVENOUS ONCE
Refills: 0 | Status: DISCONTINUED | OUTPATIENT
Start: 2023-07-03 | End: 2023-07-03

## 2023-07-03 RX ORDER — MIDODRINE HYDROCHLORIDE 2.5 MG/1
10 TABLET ORAL EVERY 8 HOURS
Refills: 0 | Status: DISCONTINUED | OUTPATIENT
Start: 2023-07-03 | End: 2023-07-04

## 2023-07-03 RX ORDER — ALBUMIN HUMAN 25 %
250 VIAL (ML) INTRAVENOUS ONCE
Refills: 0 | Status: COMPLETED | OUTPATIENT
Start: 2023-07-03 | End: 2023-07-03

## 2023-07-03 RX ORDER — INSULIN GLARGINE 100 [IU]/ML
20 INJECTION, SOLUTION SUBCUTANEOUS AT BEDTIME
Refills: 0 | Status: DISCONTINUED | OUTPATIENT
Start: 2023-07-03 | End: 2023-07-03

## 2023-07-03 RX ORDER — ASPIRIN/CALCIUM CARB/MAGNESIUM 324 MG
81 TABLET ORAL DAILY
Refills: 0 | Status: DISCONTINUED | OUTPATIENT
Start: 2023-07-04 | End: 2023-07-14

## 2023-07-03 RX ORDER — ACETAMINOPHEN 500 MG
1000 TABLET ORAL ONCE
Refills: 0 | Status: COMPLETED | OUTPATIENT
Start: 2023-07-03 | End: 2023-07-04

## 2023-07-03 RX ORDER — FENTANYL CITRATE 50 UG/ML
50 INJECTION INTRAVENOUS ONCE
Refills: 0 | Status: DISCONTINUED | OUTPATIENT
Start: 2023-07-03 | End: 2023-07-03

## 2023-07-03 RX ORDER — DEXMEDETOMIDINE HYDROCHLORIDE IN 0.9% SODIUM CHLORIDE 4 UG/ML
0.5 INJECTION INTRAVENOUS
Qty: 400 | Refills: 0 | Status: DISCONTINUED | OUTPATIENT
Start: 2023-07-03 | End: 2023-07-06

## 2023-07-03 RX ORDER — MIDODRINE HYDROCHLORIDE 2.5 MG/1
10 TABLET ORAL EVERY 8 HOURS
Refills: 0 | Status: DISCONTINUED | OUTPATIENT
Start: 2023-07-03 | End: 2023-07-03

## 2023-07-03 RX ORDER — AMIODARONE HYDROCHLORIDE 400 MG/1
150 TABLET ORAL ONCE
Refills: 0 | Status: DISCONTINUED | OUTPATIENT
Start: 2023-07-03 | End: 2023-07-04

## 2023-07-03 RX ORDER — PROPOFOL 10 MG/ML
5 INJECTION, EMULSION INTRAVENOUS ONCE
Refills: 0 | Status: COMPLETED | OUTPATIENT
Start: 2023-07-03 | End: 2023-07-03

## 2023-07-03 RX ORDER — BUMETANIDE 0.25 MG/ML
2 INJECTION INTRAMUSCULAR; INTRAVENOUS ONCE
Refills: 0 | Status: COMPLETED | OUTPATIENT
Start: 2023-07-03 | End: 2023-07-03

## 2023-07-03 RX ORDER — CHLORHEXIDINE GLUCONATE 213 G/1000ML
15 SOLUTION TOPICAL EVERY 12 HOURS
Refills: 0 | Status: DISCONTINUED | OUTPATIENT
Start: 2023-07-03 | End: 2023-07-05

## 2023-07-03 RX ADMIN — SENNA PLUS 2 TABLET(S): 8.6 TABLET ORAL at 22:47

## 2023-07-03 RX ADMIN — Medication 4 MICROGRAM(S)/KG/MIN: at 17:02

## 2023-07-03 RX ADMIN — BUMETANIDE 1 MILLIGRAM(S): 0.25 INJECTION INTRAMUSCULAR; INTRAVENOUS at 12:30

## 2023-07-03 RX ADMIN — Medication 125 MILLILITER(S): at 08:47

## 2023-07-03 RX ADMIN — BUMETANIDE 1 MILLIGRAM(S): 0.25 INJECTION INTRAMUSCULAR; INTRAVENOUS at 16:36

## 2023-07-03 RX ADMIN — Medication 125 MILLILITER(S): at 13:49

## 2023-07-03 RX ADMIN — Medication 50 MILLILITER(S): at 13:33

## 2023-07-03 RX ADMIN — CISATRACURIUM BESYLATE 10 MILLIGRAM(S): 2 INJECTION INTRAVENOUS at 15:40

## 2023-07-03 RX ADMIN — CHLORHEXIDINE GLUCONATE 1 APPLICATION(S): 213 SOLUTION TOPICAL at 05:49

## 2023-07-03 RX ADMIN — AMIODARONE HYDROCHLORIDE 400 MILLIGRAM(S): 400 TABLET ORAL at 05:39

## 2023-07-03 RX ADMIN — AMIODARONE HYDROCHLORIDE 400 MILLIGRAM(S): 400 TABLET ORAL at 22:47

## 2023-07-03 RX ADMIN — Medication 100 MILLIEQUIVALENT(S): at 22:47

## 2023-07-03 RX ADMIN — Medication 4: at 09:01

## 2023-07-03 RX ADMIN — PANTOPRAZOLE SODIUM 40 MILLIGRAM(S): 20 TABLET, DELAYED RELEASE ORAL at 08:42

## 2023-07-03 RX ADMIN — AMIODARONE HYDROCHLORIDE 400 MILLIGRAM(S): 400 TABLET ORAL at 13:34

## 2023-07-03 RX ADMIN — Medication 4: at 17:43

## 2023-07-03 RX ADMIN — HEPARIN SODIUM 5000 UNIT(S): 5000 INJECTION INTRAVENOUS; SUBCUTANEOUS at 13:34

## 2023-07-03 RX ADMIN — Medication 6: at 12:20

## 2023-07-03 RX ADMIN — AMIODARONE HYDROCHLORIDE 600 MILLIGRAM(S): 400 TABLET ORAL at 16:36

## 2023-07-03 RX ADMIN — HEPARIN SODIUM 5000 UNIT(S): 5000 INJECTION INTRAVENOUS; SUBCUTANEOUS at 22:47

## 2023-07-03 RX ADMIN — ATORVASTATIN CALCIUM 40 MILLIGRAM(S): 80 TABLET, FILM COATED ORAL at 23:09

## 2023-07-03 RX ADMIN — BUMETANIDE 2 MILLIGRAM(S): 0.25 INJECTION INTRAMUSCULAR; INTRAVENOUS at 20:00

## 2023-07-03 RX ADMIN — Medication 125 MILLILITER(S): at 14:30

## 2023-07-03 RX ADMIN — Medication 125 MILLILITER(S): at 15:00

## 2023-07-03 RX ADMIN — MIDODRINE HYDROCHLORIDE 10 MILLIGRAM(S): 2.5 TABLET ORAL at 08:35

## 2023-07-03 RX ADMIN — CISATRACURIUM BESYLATE 10 MILLIGRAM(S): 2 INJECTION INTRAVENOUS at 19:50

## 2023-07-03 RX ADMIN — Medication 500 MICROGRAM(S): at 16:36

## 2023-07-03 RX ADMIN — MIDODRINE HYDROCHLORIDE 10 MILLIGRAM(S): 2.5 TABLET ORAL at 22:47

## 2023-07-03 RX ADMIN — Medication 81 MILLIGRAM(S): at 12:30

## 2023-07-03 RX ADMIN — HEPARIN SODIUM 5000 UNIT(S): 5000 INJECTION INTRAVENOUS; SUBCUTANEOUS at 05:39

## 2023-07-03 RX ADMIN — PROPOFOL 5 MILLIGRAM(S): 10 INJECTION, EMULSION INTRAVENOUS at 18:00

## 2023-07-03 RX ADMIN — FENTANYL CITRATE 25 MICROGRAM(S): 50 INJECTION INTRAVENOUS at 21:30

## 2023-07-03 RX ADMIN — POLYETHYLENE GLYCOL 3350 17 GRAM(S): 17 POWDER, FOR SOLUTION ORAL at 12:31

## 2023-07-03 RX ADMIN — AMIODARONE HYDROCHLORIDE 100 MILLIGRAM(S): 400 TABLET ORAL at 05:39

## 2023-07-03 RX ADMIN — FENTANYL CITRATE 50 MICROGRAM(S): 50 INJECTION INTRAVENOUS at 18:00

## 2023-07-03 RX ADMIN — FENTANYL CITRATE 50 MICROGRAM(S): 50 INJECTION INTRAVENOUS at 18:15

## 2023-07-03 RX ADMIN — BUMETANIDE 1 MILLIGRAM(S): 0.25 INJECTION INTRAMUSCULAR; INTRAVENOUS at 13:34

## 2023-07-03 RX ADMIN — MIDODRINE HYDROCHLORIDE 10 MILLIGRAM(S): 2.5 TABLET ORAL at 13:34

## 2023-07-03 RX ADMIN — BUMETANIDE 1 MILLIGRAM(S): 0.25 INJECTION INTRAMUSCULAR; INTRAVENOUS at 17:02

## 2023-07-03 RX ADMIN — FENTANYL CITRATE 25 MICROGRAM(S): 50 INJECTION INTRAVENOUS at 22:00

## 2023-07-03 NOTE — PHYSICAL THERAPY INITIAL EVALUATION ADULT - PERTINENT HX OF CURRENT PROBLEM, REHAB EVAL
76 YO [place]adorean-speaking Male w/ PMHx of HTN and DM who originally presented to [facility] on 6/26 c/o chest pain x 3 months. Chest pain associated with LE edema, cough and phlegm, which was unresolved with OTC medication. Patient lives in [facility] and states that he has not seen a doctor about his symptoms until coming to the [place]. At [facility] he underwent a stress test which revealed small sized, reversible, myocardial perfusion defect of the anteroapical wall c/w ischemia. TTE revealed moderate AS and severe AR, nml LVEF. Patient referred for cardiac cath which revealed non-obstructive CAD. He was then transferred to [facility] under the care of Dr. Koehler for further work-up and intervention of severe AI and AS

## 2023-07-03 NOTE — PROGRESS NOTE ADULT - SUBJECTIVE AND OBJECTIVE BOX
CTICU  CRITICAL  CARE  attending     Hand off received 					   Pertinent clinical, laboratory, radiographic, hemodynamic, echocardiographic, respiratory data, microbiologic data and chart were reviewed and analyzed frequently throughout the course of the day and night  Patient seen and examined with CTS/ SH attending at bedside  Pt is a 78y , Male, HEALTH ISSUES - PROBLEM Dx:  Acute on chronic renal failure        , FAMILY HISTORY:  PAST MEDICAL & SURGICAL HISTORY:  HTN (hypertension)      DM (diabetes mellitus)      Severe aortic regurgitation      History of cholecystectomy      H/O prostatectomy        Patient is a 78y old  Male who presents with a chief complaint of severe AS and AI (04 Jul 2023 13:42)      14 system review was unremarkable    Vital signs, hemodynamic and respiratory parameters were reviewed from the bedside nursing flowsheet.  ICU Vital Signs Last 24 Hrs  T(C): 36.1 (04 Jul 2023 14:59), Max: 36.6 (03 Jul 2023 17:10)  T(F): 97 (04 Jul 2023 14:59), Max: 97.8 (03 Jul 2023 17:10)  HR: 90 (04 Jul 2023 14:00) (16 - 123)  BP: --  BP(mean): --  ABP: 111/53 (04 Jul 2023 14:00) (100/47 - 188/64)  ABP(mean): 69 (04 Jul 2023 14:00) (61 - 101)  RR: 12 (04 Jul 2023 14:00) (12 - 23)  SpO2: 100% (04 Jul 2023 14:00) (91% - 100%)    O2 Parameters below as of 04 Jul 2023 14:00  Patient On (Oxygen Delivery Method): ventilator    O2 Concentration (%): 60      Adult Advanced Hemodynamics Last 24 Hrs  CVP(mm Hg): 13 (04 Jul 2023 14:00) (5 - 302)  CVP(cm H2O): --  CO: 4.4 (04 Jul 2023 13:00) (3.8 - 7.7)  CI: 2.4 (04 Jul 2023 13:00) (2 - 4.2)  PA: 56/21 (04 Jul 2023 14:00) (-37/-38 - 65/23)  PA(mean): 32 (04 Jul 2023 14:00) (-37 - 42)  PCWP: --  SVR: 1380 (04 Jul 2023 13:00) (612 - 1380)  SVRI: 2530 (04 Jul 2023 13:00) (1122  2530)  PVR: --  PVRI: --, ABG - ( 04 Jul 2023 09:26 )  pH, Arterial: 7.45  pH, Blood: x     /  pCO2: 35    /  pO2: 133   / HCO3: 24    / Base Excess: 0.7   /  SaO2: 100.0             Mode: AC/ CMV (Assist Control/ Continuous Mandatory Ventilation)  RR (machine): 14  TV (machine): 500  FiO2: 60  PEEP: 5  ITime: 1  MAP: 10  PIP: 20    Intake and output was reviewed and the fluid balance was calculated  Daily     Daily   I&O's Summary    03 Jul 2023 07:01  -  04 Jul 2023 07:00  --------------------------------------------------------  IN: 3098.7 mL / OUT: 3665 mL / NET: -566.3 mL    04 Jul 2023 07:01  -  04 Jul 2023 15:11  --------------------------------------------------------  IN: 548.3 mL / OUT: 990 mL / NET: -441.7 mL        All lines and drain sites were assessed  [facility]      POCT Blood Glucose.: 74 mg/dL (04 Jul 2023 15:07)    No acute change in mental status  Auscultation of the chest reveals equal bs  Abdomen is soft  Extremities are warm and well perfused  Wounds appear clean and unremarkable  Antibiotics are periop    labs  CBC Full  -  ( 04 Jul 2023 08:59 )  WBC Count : 8.08 K/uL  RBC Count : 2.67 M/uL  Hemoglobin : 8.6 g/dL  Hematocrit : 24.8 %  Platelet Count - Automated : 83 K/uL  Mean Cell Volume : 92.9 fl  Mean Cell Hemoglobin : 32.2 pg  Mean Cell Hemoglobin Concentration : 34.7 gm/dL  Auto Neutrophil # : x  Auto Lymphocyte # : x  Auto Monocyte # : x  Auto Eosinophil # : x  Auto Basophil # : x  Auto Neutrophil % : x  Auto Lymphocyte % : x  Auto Monocyte % : x  Auto Eosinophil % : x  Auto Basophil % : x    07-04    131<L>  |  96  |  65<H>  ----------------------------<  164<H>  4.0   |  22  |  2.21<H>    Ca    8.6      04 Jul 2023 08:59  Phos  3.7     07-04  Mg     2.0     07-04    TPro  5.6<L>  /  Alb  3.5  /  TBili  1.5<H>  /  DBili  x   /  AST  22  /  ALT  <5<L>  /  AlkPhos  69  07-04    PT/INR - ( 04 Jul 2023 08:59 )   PT: 13.9 sec;   INR: 1.17          PTT - ( 04 Jul 2023 08:59 )  PTT:33.8 sec  The current medications were reviewed   MEDICATIONS  (STANDING):  aMIOdarone    Tablet   Oral   aMIOdarone    Tablet 400 milliGRAM(s) Oral every 8 hours  aMIOdarone IVPB 150 milliGRAM(s) IV Intermittent once  aspirin  chewable 81 milliGRAM(s) Enteral Tube daily  atorvastatin 40 milliGRAM(s) Oral at bedtime  buMETAnide Infusion 1 mG/Hr (5 mL/Hr) IV Continuous <Continuous>  chlorhexidine 0.12% Liquid 15 milliLiter(s) Oral Mucosa every 12 hours  chlorhexidine 2% Cloths 1 Application(s) Topical daily  dexMEDEtomidine Infusion 0.5 MICROgram(s)/kG/Hr (8.33 mL/Hr) IV Continuous <Continuous>  dextrose 5%. 1000 milliLiter(s) (50 mL/Hr) IV Continuous <Continuous>  dextrose 5%. 1000 milliLiter(s) (100 mL/Hr) IV Continuous <Continuous>  dextrose 50% Injectable 50 milliLiter(s) IV Push every 15 minutes  DOBUTamine Infusion 2 MICROgram(s)/kG/Min (4 mL/Hr) IV Continuous <Continuous>  glucagon  Injectable 1 milliGRAM(s) IntraMuscular once  heparin   Injectable 5000 Unit(s) SubCutaneous every 8 hours  insulin regular Infusion 1 Unit(s)/Hr (1 mL/Hr) IV Continuous <Continuous>  milrinone Infusion 0.375 MICROgram(s)/kG/Min (7.49 mL/Hr) IV Continuous <Continuous>  pantoprazole  Injectable 40 milliGRAM(s) IV Push daily  polyethylene glycol 3350 17 Gram(s) Oral daily  propofol Infusion 20 MICROgram(s)/kG/Min (7.99 mL/Hr) IV Continuous <Continuous>  senna 2 Tablet(s) Oral at bedtime  sodium chloride 0.9%. 1000 milliLiter(s) (10 mL/Hr) IV Continuous <Continuous>  vasopressin Infusion 0.01 Unit(s)/Min (1.5 mL/Hr) IV Continuous <Continuous>    MEDICATIONS  (PRN):  dextrose Oral Gel 15 Gram(s) Oral once PRN Blood Glucose LESS THAN 70 milliGRAM(s)/deciliter  fentaNYL    Injectable 25 MICROGram(s) IV Push every 2 hours PRN Severe Pain (7 - 10)       PROBLEM LIST/ ASSESSMENT:  HEALTH ISSUES - PROBLEM Dx:  Acute on chronic renal failure        ,   Patient is a 78y old  Male who presents with a chief complaint of severe AS and AI (04 Jul 2023 13:42)     s/p cardiac surgery    Acute systolic and diastolic heart failure evidenced by SOB and parenchymal infiltrates; will treat with diuresis    Cardiogenic shock on ionotropy    Vasogenic shock due to hypotension in the cticu , will keep on pressors        Acute blood loss anemia with relative hypotension treated with > 1 unit PC      Acute respiratory insufficiency  ruled in due to prolonged mechanical ventilation > 24 hrs on the vent due to failure to wean due to weak respiratory mechanics      Acidosis evidenced by anion gap and negative base excess    Acute kidney injury - creatinine > 0.3 due to combined prerenal and intrarenal factors can presume ATN          My plan includes :    will intubate for double acidosis   cardiogenic shock    afterload reduction ionotropy  close hemodynamic, ventilatory and drain monitoring and management per post op routine    Monitor for arrhythmias and monitor parameters for organ perfusion  beta blockade not administered due to hemodynamic instability and bradycardia  monitor neurologic status  Head of the bed should remain elevated to 45 deg .   chest PT and IS will be encouraged  monitor adequacy of oxygenation and ventilation and attempt to wean oxygen  antibiotic regimen will be tailored to the clinical, laboratory and microbiologic data  Nutritional goals will be met using po eventually , ensure adequate caloric intake and montior the same  Stress ulcer and VTE prophylaxis will be achieved    Glycemic control is satisfactory  Electrolytes have been repleted as necessary and wound care has been carried out. Pain control has been achieved.   agressive physical therapy and early mobility and ambulation goals will be met   The family was updated about the course and plan  CRITICAL CARE TIME Upon my evaluation, this patient had a high probability of imminent or life-threatening deterioration due to the above problems which required my direct attention, intervention, and personal management.  I have personally provided 110 minutes of critical care time exclusive of time spent on separately billable procedures. Time included review of laboratory data, radiology results, discussion with consultants, and monitoring for potential decompensation. Interventions were performed as documented abovepersonally provided by me  in evaluation and management, reassessments, review and interpretation of labs and x-rays, ventilator and hemodynamic management, formulating a plan and coordinating care: ___110___ MIN.  Time does not include procedural time.    CTICU ATTENDING     					    Alex Charlton MD

## 2023-07-03 NOTE — CONSULT NOTE ADULT - SUBJECTIVE AND OBJECTIVE BOX
HPI:  78 YO Japanese-speaking Male w/ HTN, DM who originally presented to [facility] on  c/o chest pain x 3 months. Chest pain associated with LE edema, cough. At [facility] he underwent a stress test which revealed small sized, reversible, myocardial perfusion defect of the anteroapical wall c/w ischemia. Patient referred for cardiac cath which revealed non-obstructive CAD. He was then transferred to [facility] under the care of Dr. Koehler for further work-up and intervention of severe AI and AS s/p AVR , post op course c/b cardiogenic shock, pneumothorax and CAMMIE. Pt started diuresis post op, Cr w/ gradual rise. Nephrology consulted 7/3 for CAMMIE. No previous records in [facility]. Pt at this time tachypneic, requiring HF O2. UOP responding to bumex drip.        PAST MEDICAL & SURGICAL HISTORY:  HTN (hypertension)      DM (diabetes mellitus)      Severe aortic regurgitation      History of cholecystectomy      H/O prostatectomy            Allergies:  Allergy Status Unknown      Home Medications:   Aldactone 25 mg oral tablet: 1 tab(s) orally once a day  bisoprolol 5 mg oral tablet: 0.5 tab(s) orally once a day  furosemide 40 mg oral tablet: 1 tab(s) orally once a day  Jardiance 25 mg oral tablet: 1 tab(s) orally once a day  losartan 100 mg oral tablet: 1 tab(s) orally once a day  spironolactone 25 mg oral tablet: 1 tab(s) orally once a day      Hospital Medications:   MEDICATIONS  (STANDING):  aMIOdarone    Tablet   Oral   aMIOdarone    Tablet 400 milliGRAM(s) Oral every 8 hours  aMIOdarone Infusion 1 mG/Min (33.3 mL/Hr) IV Continuous <Continuous>  aMIOdarone IVPB 150 milliGRAM(s) IV Intermittent once  atorvastatin 40 milliGRAM(s) Oral at bedtime  buMETAnide Infusion 1 mG/Hr (5 mL/Hr) IV Continuous <Continuous>  chlorhexidine 0.12% Liquid 15 milliLiter(s) Oral Mucosa every 12 hours  chlorhexidine 2% Cloths 1 Application(s) Topical daily  cisatracurium Injectable 10 milliGRAM(s) IV Push once  cisatracurium Injectable 10 milliGRAM(s) IV Push once  dextrose 5%. 1000 milliLiter(s) (50 mL/Hr) IV Continuous <Continuous>  dextrose 5%. 1000 milliLiter(s) (100 mL/Hr) IV Continuous <Continuous>  dextrose 5%. 100 milliLiter(s) (50 mL/Hr) IV Continuous <Continuous>  dextrose 50% Injectable 50 milliLiter(s) IV Push every 15 minutes  DOBUTamine Infusion 2 MICROgram(s)/kG/Min (4 mL/Hr) IV Continuous <Continuous>  glucagon  Injectable 1 milliGRAM(s) IntraMuscular once  heparin   Injectable 5000 Unit(s) SubCutaneous every 8 hours  insulin lispro (ADMELOG) corrective regimen sliding scale   SubCutaneous Before meals and at bedtime  insulin regular Infusion 1 Unit(s)/Hr (1 mL/Hr) IV Continuous <Continuous>  midodrine 10 milliGRAM(s) Oral every 8 hours  polyethylene glycol 3350 17 Gram(s) Oral daily  propofol Infusion 20 MICROgram(s)/kG/Min (7.99 mL/Hr) IV Continuous <Continuous>  propofol Injectable 5 milliGRAM(s) IV Push once  senna 2 Tablet(s) Oral at bedtime  sodium chloride 0.9%. 1000 milliLiter(s) (10 mL/Hr) IV Continuous <Continuous>  vasopressin Infusion 0.02 Unit(s)/Min (3 mL/Hr) IV Continuous <Continuous>      SOCIAL HISTORY:  Denies ETOh, Smoking    Family History:  FAMILY HISTORY:        VITALS:  T(F): 97.8 (23 @ 17:10), Max: 98.2 (23 @ 01:30)  HR: 16 (23 @ 18:40)  BP: --  RR: 22 (23 @ 18:00)  SpO2: 100% (23 @ 18:40)  Wt(kg): --     @ :  -   @ 07:00  --------------------------------------------------------  IN: 1650 mL / OUT: 3340 mL / NET: -1690 mL     @ :  -   @ 19:55  --------------------------------------------------------  IN: 1383.9 mL / OUT: 1080 mL / NET: 303.9 mL        CAPILLARY BLOOD GLUCOSE      POCT Blood Glucose.: 221 mg/dL (2023 16:48)  POCT Blood Glucose.: 252 mg/dL (2023 11:49)  POCT Blood Glucose.: 205 mg/dL (2023 08:50)  POCT Blood Glucose.: 202 mg/dL (2023 22:56)      Review of Systems:  Negative except as mentioned in HPI    PHYSICAL EXAM:  GENERAL: Alert, awake, mod distress due to SOB  CHEST/LUNG: Bilateral clear breath sounds  HEART: S1, S2  ABDOMEN: Soft, nontender, non distended  EXTREMITIES: trace dependent edema  Neurology: Awake, oriented        LABS:      130<L>  |  92<L>  |  66<H>  ----------------------------<  193<H>  4.1   |  24  |  2.29<H>    Ca    9.3      2023 16:10  Phos  4.9     07-  Mg     2.5     07-03    TPro  7.1  /  Alb  4.5  /  TBili  0.7  /  DBili      /  AST  23  /  ALT  <5<L>  /  AlkPhos  81  07-03    Creatinine Trend: 2.29 <--, 2.22 <--, 2.21 <--, 2.09 <--, 2.18 <--, 2.16 <--, 2.19 <--, 2.08 <--, 2.03 <--, 1.87 <--, 1.76 <--, 1.68 <--, 1.61 <--, 2.15 <--, 1.41 <--, 1.40 <--                        8.8    11.94 )-----------( 100      ( 2023 16:10 )             25.7     Urine Studies:  Urinalysis Basic - ( 2023 18:07 )    Color: Yellow / Appearance: Clear / S.025 / pH:   Gluc:  / Ketone: NEGATIVE  / Bili: Negative / Urobili: 0.2 E.U./dL   Blood:  / Protein: NEGATIVE mg/dL / Nitrite: NEGATIVE   Leuk Esterase: Small / RBC: Many /HPF / WBC 5-10 /HPF   Sq Epi:  / Non Sq Epi:  / Bacteria: Many /HPF      Sodium, Random Urine: 69 mmol/L ( @ 18:07)  Creatinine, Random Urine: 46 mg/dL ( @ 18:07)  Protein/Creatinine Ratio Calculation: 0.6 Ratio ( @ 18:07)  Osmolality, Random Urine: 310 mosm/kg ( @ 18:07)  Potassium, Random Urine: 47 mmol/L ( @ 18:07)  Sodium, Random Urine: 70 mmol/L ( @ 22:47)  Creatinine, Random Urine: 46 mg/dL ( @ 22:47)

## 2023-07-03 NOTE — OCCUPATIONAL THERAPY INITIAL EVALUATION ADULT - PERSONAL SAFETY AND JUDGMENT, REHAB EVAL
Requires edu/cues to increased safety awareness w/ functional activities; edu provided for sternal precs.

## 2023-07-03 NOTE — PHYSICAL THERAPY INITIAL EVALUATION ADULT - MANUAL MUSCLE TESTING RESULTS, REHAB EVAL
Bot UE WFL. Bilateral hip flexion  3+/5. Left knee ext 5/5. right knee ext 4-/5. Bilateral ankle DF/PF 5/5

## 2023-07-03 NOTE — OCCUPATIONAL THERAPY INITIAL EVALUATION ADULT - ADDITIONAL COMMENTS
Pt states that he lives w/ his son in basement apt w/ 12 stairs to enter. Pt states that he was independent prior to admission w/ no prior use of AEs/DMEs.

## 2023-07-03 NOTE — OCCUPATIONAL THERAPY INITIAL EVALUATION ADULT - GENERAL OBSERVATIONS, REHAB EVAL
Pt's RN Claudia aware of intent to eval/tx; cleared Pt. Pt received in chair - +telemetry, heplock, IVs<>RIJ, a-line, chest tube x1 & blakes x3 (off suction for activities per RN), prevena drain, 02 via NC 6/L, sternal dressing intact, tete, PT Dianna CHATMAN present.

## 2023-07-03 NOTE — PROGRESS NOTE ADULT - SUBJECTIVE AND OBJECTIVE BOX
SUBJECTIVE / INTERVAL HPI: Patient was seen and examined this morning. He reports slight improvement in soreness of throat. He continues to have decreased appetite. Glucose levels have remained above target despite minimal intake, likely due to post-surgical stress.     DIET:  - Dinner: Ø  - Breakfast: Sugar-free Jello + pudding.   - Lunch: 1 cup of tea.     CAPILLARY BLOOD GLUCOSE & INSULIN RECEIVED  109 mg/dL (07-02 @ 01:08) ? Ø  249 mg/dL (07-02 @ 07:34) ? 4 units of lispro sliding scale.   263 mg/dL (07-02 @ 11:19) ? 6 units of lispro sliding scale.   248 mg/dL (07-02 @ 16:24) ? 4 units of lispro sliding scale.   202 mg/dL (07-02 @ 22:56) ? 10 units of lantus.   205 mg/dL (07-03 @ 08:50) ? 4 units of lispro sliding scale.   252 mg/dL (07-03 @ 11:49) ? 6 units of lispro sliding scale.     REVIEW OF SYSTEMS  Constitutional:  (+) Loss of appetite. Negative fever, chills.   Cardiovascular:  Negative for palpitations.  Respiratory:  (+) Sore throat. (+) Cough. Negative for cough, wheezing, or shortness of breath.    Gastrointestinal:  Negative for nausea, vomiting, diarrhea, constipation, or abdominal pain.  Neurologic:  No headache, confusion, dizziness, lightheadedness.    PHYSICAL EXAM  Vital Signs Last 24 Hrs  T(C): 36.1 (03 Jul 2023 09:50), Max: 36.8 (03 Jul 2023 01:30)  T(F): 97 (03 Jul 2023 09:50), Max: 98.2 (03 Jul 2023 01:30)  HR: 125 (03 Jul 2023 13:00) (76 - 130)  BP: --  BP(mean): --  RR: 20 (03 Jul 2023 13:00) (14 - 21)  SpO2: 100% (03 Jul 2023 13:00) (93% - 100%)    Parameters below as of 03 Jul 2023 13:00  Patient On (Oxygen Delivery Method): nasal cannula w/ humidification  O2 Flow (L/min): 6    Constitutional: Awake, alert, elderly male, in no acute distress.   HEENT: Normocephalic, atraumatic, SCARLETT.  Respiratory: Lungs clear to ausculation bilaterally.   Cardiovascular: regular rhythm, normal S1 and S2, (+) systolic murmur. (+) midline sternotomy scar covered with sterile dressings (+) wound vac.   GI: soft, non-tender, non-distended, bowel sounds present.  Extremities: No lower extremity edema.   Psychiatric: AAO x 3.    LABS  CBC - WBC/HGB/HTC/PLT: 13.17/9.3/27.8/107 (07-03-23)  BMP - Na/K/Cl/Bicarb/BUN/Cr/Gluc/AG/eGFR: 132/4.2/93/25/63/2.22/236/14/29 (07-03-23)  Ca - 9.2 (07-03-23)  Phos - 4.7 (07-03-23)  Mg - 2.4 (07-03-23)  LFT - Alb/Tprot/Tbili/Dbili/AlkPhos/ALT/AST: 3.8/--/0.7/--/84/<5/24 (07-03-23)  PT/aPTT/INR: 12.3/33.0/1.03 (07-03-23)   Thyroid Stimulating Hormone, Serum: 1.440 (06-28-23)    MEDICATIONS  MEDICATIONS  (STANDING):  albumin human  5% IVPB 250 milliLiter(s) IV Intermittent every 1 hour  albumin human 25% IVPB 50 milliLiter(s) IV Intermittent once  aMIOdarone    Tablet   Oral   aMIOdarone    Tablet 400 milliGRAM(s) Oral every 8 hours  aspirin enteric coated 81 milliGRAM(s) Oral daily  atorvastatin 40 milliGRAM(s) Oral at bedtime  buMETAnide Injectable 1 milliGRAM(s) IV Push every 8 hours  buMETAnide Injectable 1 milliGRAM(s) IV Push once  chlorhexidine 2% Cloths 1 Application(s) Topical daily  dextrose 5%. 1000 milliLiter(s) (50 mL/Hr) IV Continuous <Continuous>  dextrose 5%. 1000 milliLiter(s) (100 mL/Hr) IV Continuous <Continuous>  dextrose 5%. 100 milliLiter(s) (50 mL/Hr) IV Continuous <Continuous>  dextrose 50% Injectable 50 milliLiter(s) IV Push every 15 minutes  glucagon  Injectable 1 milliGRAM(s) IntraMuscular once  heparin   Injectable 5000 Unit(s) SubCutaneous every 8 hours  insulin glargine Injectable (LANTUS) 10 Unit(s) SubCutaneous at bedtime  insulin lispro (ADMELOG) corrective regimen sliding scale   SubCutaneous Before meals and at bedtime  midodrine 10 milliGRAM(s) Oral every 8 hours  milrinone Infusion 0.125 MICROgram(s)/kG/Min (2.5 mL/Hr) IV Continuous <Continuous>  pantoprazole    Tablet 40 milliGRAM(s) Oral before breakfast  polyethylene glycol 3350 17 Gram(s) Oral daily  senna 2 Tablet(s) Oral at bedtime  sodium chloride 0.9%. 1000 milliLiter(s) (10 mL/Hr) IV Continuous <Continuous>  vasopressin Infusion 0.02 Unit(s)/Min (3 mL/Hr) IV Continuous <Continuous>    MEDICATIONS  (PRN):  acetaminophen     Tablet .. 650 milliGRAM(s) Oral every 6 hours PRN Mild Pain (1 - 3)  benzocaine/menthol Lozenge 1 Lozenge Oral every 4 hours PRN Sore Throat  dextrose Oral Gel 15 Gram(s) Oral once PRN Blood Glucose LESS THAN 70 milliGRAM(s)/deciliter  oxyCODONE    IR 5 milliGRAM(s) Oral every 6 hours PRN Moderate Pain (4 - 6)  oxyCODONE    IR 10 milliGRAM(s) Oral every 6 hours PRN Severe Pain (7 - 10)    ASSESSMENT / RECOMMENDATIONS  Mr. Johnson is a 78-year-old male with type 2 diabetes mellitus, hypertension and severe aortic stenosis who was transferred to [facility] for further work-up and intervention of severe aortic stenosis, now s/p aortic valve replacement (6/30/23). Endocrinology was consulted for recommendations regarding diabetes management.     A1C: 8.6 %  BUN: 63  Creatinine: 2.22  GFR: 29  Weight: 66.6  BMI: 21.5    # Type 2 diabetes mellitus with hyperglycemia  - Please continue lantus *** units at bedtime.   - Continue lispro *** units before each meal.  - Continue lispro moderate / low dose sliding scale before meals and at bedtime.  - Patient's fingerstick glucose goal is 100-180 mg/dL.    - Discharge recommendations to be discussed.   - Patient can follow up at discharge with [facility] by calling [phone] to make an appointment.      Case discussed with Dr. Caicedo. Primary team updated.       Milton Laura    Endocrinology Fellow    Service Pager: [phone]  SUBJECTIVE / INTERVAL HPI: Patient was seen and examined this morning. He reports slight improvement in soreness of throat. He continues to have decreased appetite. Glucose levels have remained above target despite minimal intake, likely due to post-surgical stress.     DIET:  - Dinner: Ø  - Breakfast: Sugar-free Jello + pudding.   - Lunch: 1 cup of tea.     CAPILLARY BLOOD GLUCOSE & INSULIN RECEIVED  109 mg/dL (07-02 @ 01:08) ? Ø  249 mg/dL (07-02 @ 07:34) ? 4 units of lispro sliding scale.   263 mg/dL (07-02 @ 11:19) ? 6 units of lispro sliding scale.   248 mg/dL (07-02 @ 16:24) ? 4 units of lispro sliding scale.   202 mg/dL (07-02 @ 22:56) ? 10 units of lantus.   205 mg/dL (07-03 @ 08:50) ? 4 units of lispro sliding scale.   252 mg/dL (07-03 @ 11:49) ? 6 units of lispro sliding scale.     REVIEW OF SYSTEMS  Constitutional:  (+) Loss of appetite. Negative fever, chills.   Cardiovascular:  Negative for palpitations.  Respiratory:  (+) Sore throat. (+) Cough. Negative for cough, wheezing, or shortness of breath.    Gastrointestinal:  Negative for nausea, vomiting, diarrhea, constipation, or abdominal pain.  Neurologic:  No headache, confusion, dizziness, lightheadedness.    PHYSICAL EXAM  Vital Signs Last 24 Hrs  T(C): 36.1 (03 Jul 2023 09:50), Max: 36.8 (03 Jul 2023 01:30)  T(F): 97 (03 Jul 2023 09:50), Max: 98.2 (03 Jul 2023 01:30)  HR: 125 (03 Jul 2023 13:00) (76 - 130)  BP: --  BP(mean): --  RR: 20 (03 Jul 2023 13:00) (14 - 21)  SpO2: 100% (03 Jul 2023 13:00) (93% - 100%)    Parameters below as of 03 Jul 2023 13:00  Patient On (Oxygen Delivery Method): nasal cannula w/ humidification  O2 Flow (L/min): 6    Constitutional: Awake, alert, elderly male, in no acute distress.   HEENT: Normocephalic, atraumatic, SCARLETT.  Respiratory: Lungs clear to ausculation bilaterally.   Cardiovascular: regular rhythm, normal S1 and S2, (+) systolic murmur. (+) midline sternotomy scar covered with sterile dressings (+) wound vac.   GI: soft, non-tender, non-distended, bowel sounds present.  Extremities: No lower extremity edema.   Psychiatric: AAO x 3.    LABS  CBC - WBC/HGB/HTC/PLT: 13.17/9.3/27.8/107 (07-03-23)  BMP - Na/K/Cl/Bicarb/BUN/Cr/Gluc/AG/eGFR: 132/4.2/93/25/63/2.22/236/14/29 (07-03-23)  Ca - 9.2 (07-03-23)  Phos - 4.7 (07-03-23)  Mg - 2.4 (07-03-23)  LFT - Alb/Tprot/Tbili/Dbili/AlkPhos/ALT/AST: 3.8/--/0.7/--/84/<5/24 (07-03-23)  PT/aPTT/INR: 12.3/33.0/1.03 (07-03-23)   Thyroid Stimulating Hormone, Serum: 1.440 (06-28-23)    MEDICATIONS  MEDICATIONS  (STANDING):  albumin human  5% IVPB 250 milliLiter(s) IV Intermittent every 1 hour  albumin human 25% IVPB 50 milliLiter(s) IV Intermittent once  aMIOdarone    Tablet   Oral   aMIOdarone    Tablet 400 milliGRAM(s) Oral every 8 hours  aspirin enteric coated 81 milliGRAM(s) Oral daily  atorvastatin 40 milliGRAM(s) Oral at bedtime  buMETAnide Injectable 1 milliGRAM(s) IV Push every 8 hours  buMETAnide Injectable 1 milliGRAM(s) IV Push once  chlorhexidine 2% Cloths 1 Application(s) Topical daily  dextrose 5%. 1000 milliLiter(s) (50 mL/Hr) IV Continuous <Continuous>  dextrose 5%. 1000 milliLiter(s) (100 mL/Hr) IV Continuous <Continuous>  dextrose 5%. 100 milliLiter(s) (50 mL/Hr) IV Continuous <Continuous>  dextrose 50% Injectable 50 milliLiter(s) IV Push every 15 minutes  glucagon  Injectable 1 milliGRAM(s) IntraMuscular once  heparin   Injectable 5000 Unit(s) SubCutaneous every 8 hours  insulin glargine Injectable (LANTUS) 10 Unit(s) SubCutaneous at bedtime  insulin lispro (ADMELOG) corrective regimen sliding scale   SubCutaneous Before meals and at bedtime  midodrine 10 milliGRAM(s) Oral every 8 hours  milrinone Infusion 0.125 MICROgram(s)/kG/Min (2.5 mL/Hr) IV Continuous <Continuous>  pantoprazole    Tablet 40 milliGRAM(s) Oral before breakfast  polyethylene glycol 3350 17 Gram(s) Oral daily  senna 2 Tablet(s) Oral at bedtime  sodium chloride 0.9%. 1000 milliLiter(s) (10 mL/Hr) IV Continuous <Continuous>  vasopressin Infusion 0.02 Unit(s)/Min (3 mL/Hr) IV Continuous <Continuous>    MEDICATIONS  (PRN):  acetaminophen     Tablet .. 650 milliGRAM(s) Oral every 6 hours PRN Mild Pain (1 - 3)  benzocaine/menthol Lozenge 1 Lozenge Oral every 4 hours PRN Sore Throat  dextrose Oral Gel 15 Gram(s) Oral once PRN Blood Glucose LESS THAN 70 milliGRAM(s)/deciliter  oxyCODONE    IR 5 milliGRAM(s) Oral every 6 hours PRN Moderate Pain (4 - 6)  oxyCODONE    IR 10 milliGRAM(s) Oral every 6 hours PRN Severe Pain (7 - 10)    ASSESSMENT / RECOMMENDATIONS  Mr. Johnson is a 78-year-old male with type 2 diabetes mellitus, hypertension and severe aortic stenosis who was transferred to [facility] for further work-up and intervention of severe aortic stenosis, now s/p aortic valve replacement (6/30/23). Endocrinology was consulted for recommendations regarding diabetes management.     A1C: 8.6 %  BUN: 63  Creatinine: 2.22  GFR: 29  Weight: 66.6  BMI: 21.5    # Type 2 diabetes mellitus with hyperglycemia  - Glucose levels remain elevated despite minimal oral intake, likely due to increase in basal requirements from recent surgery.   - Please INCREASE lantus to 20 units at bedtime.   - Continue hold off premeal insulin due to poor oral intake.   - Continue lispro moderate dose sliding scale before meals and at bedtime.  - Patient's fingerstick glucose goal is 100-180 mg/dL.    - Discharge recommendations to be discussed.   - Patient can follow up at discharge with [facility] by calling [phone] to make an appointment.      Case discussed with Dr. Caicedo. Primary team updated.       Milton Laura    Endocrinology Fellow    Service Pager: [phone]

## 2023-07-03 NOTE — OCCUPATIONAL THERAPY INITIAL EVALUATION ADULT - MODIFIED CLINICAL TEST OF SENSORY INTEGRATION IN BALANCE TEST
Pt took ~10 steps in room (+A-fib per RN) w/ RW and Min A x2 Colchicine Counseling:  Patient counseled regarding adverse effects including but not limited to stomach upset (nausea, vomiting, stomach pain, or diarrhea).  Patient instructed to limit alcohol consumption while taking this medication.  Colchicine may reduce blood counts especially with prolonged use.  The patient understands that monitoring of kidney function and blood counts may be required, especially at baseline. The patient verbalized understanding of the proper use and possible adverse effects of colchicine.  All of the patient's questions and concerns were addressed.

## 2023-07-03 NOTE — OCCUPATIONAL THERAPY INITIAL EVALUATION ADULT - PERTINENT HX OF CURRENT PROBLEM, REHAB EVAL
78 YO [place]-speaking Male w/ PMHx of HTN and DM who originally presented to [facility] on 6/26 c/o chest pain x 3 months. Chest pain associated with LE edema, cough and phlegm, which was unresolved with OTC medication. Patient lives in [place] and states that he has not seen a doctor about his symptoms until coming to the [place]. At [facility] he underwent a stress test which revealed small sized, reversible, myocardial perfusion defect of the anteroapical wall c/w ischemia. TTE revealed moderate AS and severe AR, nml LVEF. Patient referred for cardiac cath which revealed non-obstructive CAD. He was then transferred to [facility] under the care of Dr. Koehler for further work-up and intervention of severe AI and AS.

## 2023-07-03 NOTE — PHYSICAL THERAPY INITIAL EVALUATION ADULT - ADDITIONAL COMMENTS
Patient staying wit his son in  a basement apartment with 12 steps down to apt. Denies use of AD PTA

## 2023-07-03 NOTE — PROGRESS NOTE ADULT - SUBJECTIVE AND OBJECTIVE BOX
CTICU  CRITICAL  CARE  PROCEDURE  NOTE      				     Name of procedure – Flexible fiberoptic bronchoscopy      Flexible fiberoptic bronchoscopy was performed under propofol and fentanyl sedation while the patient was intubated for controlled mechanical ventilation  Pre-procedural assessment reveals no risk of Tb  Ventilator settings were adjusted to reduce airway pressures to reduce the risk of ptx  The scope was advanced past the ett which was noted to be 2 cm the jasen   The jasen was sharp  Right LL and RML air way were patent ,  Lavaged and sent for culture  Left LL air way  , lavaged, and sent for culture  CXR confirmed no pneumothorax post bronch  There were no complications  The patient tolerated the procedure well

## 2023-07-03 NOTE — PROGRESS NOTE ADULT - ATTENDING COMMENTS
Pt seen on rounds this afternoon and events of the weekend reviewed>  78[facility] severe [facility] who underwent open AVR on 6/30/23.  Has a 12-yr history of type 2 DM, treated for most of this interval with Glucovance, but apparently with frequent episodes of hypoglycemia (anitha in the AM) on this regimen.  Was switched 3 months ago to a Jardiance/metformin combination pill, but has been taking only 1/2 pill once a day.    A1c level on admission was moderately elevated at 8.6 mg%  Is now on basal insulin only--10 units Lantus qhs--and glucoses since yesterday have been persistently in the 200 range despite a minimal PO intake.  Pt reports both lack of appetite and pain on swallowing (the latter likely related to intubation).  He says "the food won't go down."  He is moderately hoarse on exam today.  At this point he clearly needs more basal insulin and will raise the Lantus to 20 units.  Will hold off on pre-meal lispro given his negligible PO Intake.  He had previously refused enteral supplements but now says that he will try.

## 2023-07-03 NOTE — PROCEDURE NOTE - NSINFORMCONSENT_GEN_A_CORE
Benefits, risks, and possible complications of procedure explained to patient/caregiver who verbalized understanding and gave verbal consent.
Benefits, risks, and possible complications of procedure explained to patient/caregiver who verbalized understanding and gave verbal consent.
This was an emergent procedure.
Benefits, risks, and possible complications of procedure explained to patient/caregiver who verbalized understanding and gave verbal consent.
This was an emergent procedure.

## 2023-07-03 NOTE — PROGRESS NOTE ADULT - SUBJECTIVE AND OBJECTIVE BOX
CTICU  CRITICAL  CARE  attending     Hand off received 					   Pertinent clinical, laboratory, radiographic, hemodynamic, echocardiographic, respiratory data, microbiologic data and chart were reviewed and analyzed frequently throughout the course of the day and night        77 year old Welsh-speaking Male with DM, HTN.  He presented to [facility] with ANGINA.   He had chest pain x 3 months. Chest pain associated with LE edema, cough and clear sputum.  He underwent a stress test which revealed small sized, reversible, myocardial perfusion defect of the anteroapical wall consistent with subendocardial ischemia.   ECHO: Moderate Aortic Stenosis. Severe Aortic Regurgitation, Normal LVEF.   Cardiac cath revealed non-obstructive CAD.   He was then transferred to [facility] under the care of Dr. Koehler for AVR.    S/P AVR.      FAMILY HISTORY:  PAST MEDICAL & SURGICAL HISTORY:  HTN (hypertension)  DM (diabetes mellitus)  Severe aortic regurgitation  History of cholecystectomy  H/O prostatectomy        14 system review was unremarkable    Vital signs, hemodynamic and respiratory parameters were reviewed from the bedside nursing flow sheet.  ICU Vital Signs Last 24 Hrs  T(C): 36.4 (03 Jul 2023 21:15), Max: 36.8 (03 Jul 2023 01:30)  T(F): 97.5 (03 Jul 2023 21:15), Max: 98.2 (03 Jul 2023 01:30)  HR: 97 (03 Jul 2023 22:00) (16 - 130)  BP: --  BP(mean): --  ABP: 138/61 (03 Jul 2023 22:00) (99/57 - 188/64)  ABP(mean): 79 (03 Jul 2023 22:00) (63 - 101)  RR: 17 (03 Jul 2023 22:00) (14 - 23)  SpO2: 100% (03 Jul 2023 22:00) (91% - 100%)    O2 Parameters below as of 03 Jul 2023 22:00  Patient On (Oxygen Delivery Method): ventilator    O2 Concentration (%): 100      Adult Advanced Hemodynamics Last 24 Hrs  CVP(mm Hg): 22 (03 Jul 2023 22:00) (3 - 117)  CVP(cm H2O): --  CO: 7.7 (03 Jul 2023 21:00) (4.1 - 7.7)  CI: 4.2 (03 Jul 2023 21:00) (2.2 - 4.2)  PA: 65/23 (03 Jul 2023 21:00) (-37/-38 - 66/36)  PA(mean): 36 (03 Jul 2023 21:00) (-37 - 46)  PCWP: --  SVR: 612 (03 Jul 2023 21:00) (612 - 1403)  SVRI: 1122 (03 Jul 2023 21:00) (1122 - 2614)  PVR: --  PVRI: --, ABG - ( 03 Jul 2023 20:30 )  pH, Arterial: 7.54  pH, Blood: x     /  pCO2: 24    /  pO2: 131   / HCO3: 20    / Base Excess: -0.4  /  SaO2: 99.7              Mode: AC/ CMV (Assist Control/ Continuous Mandatory Ventilation)  RR (machine): 16  TV (machine): 650  FiO2: 100  PEEP: 5  ITime: 1  MAP: 14  PIP: 29    Intake and output was reviewed and the fluid balance was calculated  Daily     Daily   I&O's Summary    02 Jul 2023 07:01  -  03 Jul 2023 07:00  --------------------------------------------------------  IN: 1650 mL / OUT: 3340 mL / NET: -1690 mL    03 Jul 2023 07:01  -  03 Jul 2023 23:14  --------------------------------------------------------  IN: 1540.6 mL / OUT: 2000 mL / NET: -459.4 mL        Neuro: No change in the Neuro status from the baseline.   Neck: No JVD.  CVS: S1, S2, No S3.  Lungs: Good air entry bilaterally.  Abd: Soft. No tenderness. + Bowel sounds.  Vascular: + DP/PT.  Extremities: No edema.  Lymphatic: Normal.  Skin: No abnormalities.      labs  CBC Full  -  ( 03 Jul 2023 20:46 )  WBC Count : 9.83 K/uL  RBC Count : 2.51 M/uL  Hemoglobin : 7.9 g/dL  Hematocrit : 23.0 %  Platelet Count - Automated : 84 K/uL  Mean Cell Volume : 91.6 fl  Mean Cell Hemoglobin : 31.5 pg  Mean Cell Hemoglobin Concentration : 34.3 gm/dL  Auto Neutrophil # : x  Auto Lymphocyte # : x  Auto Monocyte # : x  Auto Eosinophil # : x  Auto Basophil # : x  Auto Neutrophil % : x  Auto Lymphocyte % : x  Auto Monocyte % : x  Auto Eosinophil % : x  Auto Basophil % : x    07-03    134<L>  |  93<L>  |  65<H>  ----------------------------<  180<H>  3.6   |  23  |  2.26<H>    Ca    9.2      03 Jul 2023 20:46  Phos  3.4     07-03  Mg     2.3     07-03    TPro  6.4  /  Alb  3.9  /  TBili  0.9  /  DBili  x   /  AST  26  /  ALT  <5<L>  /  AlkPhos  76  07-03    PT/INR - ( 03 Jul 2023 20:46 )   PT: 13.8 sec;   INR: 1.16          PTT - ( 03 Jul 2023 20:46 )  PTT:34.9 sec  The current medications were reviewed   MEDICATIONS  (STANDING):  acetaminophen   IVPB .. 1000 milliGRAM(s) IV Intermittent once  aMIOdarone    Tablet   Oral   aMIOdarone    Tablet 400 milliGRAM(s) Oral every 8 hours  aMIOdarone IVPB 150 milliGRAM(s) IV Intermittent once  atorvastatin 40 milliGRAM(s) Oral at bedtime  buMETAnide Infusion 1 mG/Hr (5 mL/Hr) IV Continuous <Continuous>  buMETAnide Injectable 2 milliGRAM(s) IV Push once  calcium gluconate IVPB 2 Gram(s) IV Intermittent once  chlorhexidine 0.12% Liquid 15 milliLiter(s) Oral Mucosa every 12 hours  chlorhexidine 2% Cloths 1 Application(s) Topical daily  dexMEDEtomidine Infusion 0.5 MICROgram(s)/kG/Hr (8.33 mL/Hr) IV Continuous <Continuous>  dextrose 5%. 1000 milliLiter(s) (50 mL/Hr) IV Continuous <Continuous>  dextrose 5%. 1000 milliLiter(s) (100 mL/Hr) IV Continuous <Continuous>  dextrose 5%. 100 milliLiter(s) (50 mL/Hr) IV Continuous <Continuous>  dextrose 50% Injectable 50 milliLiter(s) IV Push every 15 minutes  DOBUTamine Infusion 2 MICROgram(s)/kG/Min (4 mL/Hr) IV Continuous <Continuous>  glucagon  Injectable 1 milliGRAM(s) IntraMuscular once  heparin   Injectable 5000 Unit(s) SubCutaneous every 8 hours  insulin regular Infusion 1 Unit(s)/Hr (1 mL/Hr) IV Continuous <Continuous>  midodrine 10 milliGRAM(s) Oral every 8 hours  milrinone Infusion 0.375 MICROgram(s)/kG/Min (7.49 mL/Hr) IV Continuous <Continuous>  polyethylene glycol 3350 17 Gram(s) Oral daily  propofol Infusion 20 MICROgram(s)/kG/Min (7.99 mL/Hr) IV Continuous <Continuous>  senna 2 Tablet(s) Oral at bedtime  sodium chloride 0.9%. 1000 milliLiter(s) (10 mL/Hr) IV Continuous <Continuous>    MEDICATIONS  (PRN):  acetaminophen     Tablet .. 650 milliGRAM(s) Oral every 6 hours PRN Mild Pain (1 - 3)  dextrose Oral Gel 15 Gram(s) Oral once PRN Blood Glucose LESS THAN 70 milliGRAM(s)/deciliter               78 year old  Male admitted with AORTIC STENOSIS  S/P AVR  Postoperative course complicated by Uncontrolled DM, CAMMIE, Right pneumothorax.  Reintubated for cardiogenic shock and respiratory failure.  Hyponatremia  Hypochloremia  Azotemia  Hemodynamically stable.  Good oxygenation.  Low urine out put.        My plan includes :  Continue vent support overnight  IV dobutamine and milrinone infusion.  Statin Rx.  Gentle diuresis.  Optimize Glycemic control.  Amiodarone and beta blocker Rx.  Enteral Nutrition.  Close hemodynamic, ventilatory and drain monitoring and management  Monitor for arrhythmias and monitor parameters for organ perfusion  Monitor neurologic status  Monitor renal function.  Head of the bed should remain elevated to 45 deg .   Chest PT and IS will be encouraged  Monitor adequacy of oxygenation and ventilation and attempt to wean oxygen  Nutritional goals will be met using po eventually , ensure adequate caloric intake and monitor the same  Stress ulcer and VTE prophylaxis will be achieved    Glycemic control is satisfactory  Electrolytes have been repleted as necessary and wound care has been carried out. Pain control has been achieved.   Aggressive physical therapy and early mobility and ambulation goals will be met   The family was updated about the course and plan  CRITICAL CARE TIME SPENT in evaluation and management, reassessments, review and interpretation of labs and x-rays, ventilator and hemodynamic management, formulating a plan and coordinating care: ___30____ MIN.  Time does not include procedural time.  CTICU ATTENDING     					    Leodan Alcantar MD

## 2023-07-03 NOTE — PROGRESS NOTE ADULT - SUBJECTIVE AND OBJECTIVE BOX
Moderate sedation was performed by me using propofol midazolam and fentanyl  for the procedure of intubation  continuous hemodynamic monitoring was performed   continuous monitoring of adequacy of oxygenation and ventilation was performed  patient remained stable thorughout the procedure  post - sedation monitoring of hemodynamics and oxygenation and ventilation was performed  Time required for moderate sedation was 30 minutes  time does not include proceural time or critical care time

## 2023-07-03 NOTE — PHYSICAL THERAPY INITIAL EVALUATION ADULT - GENERAL OBSERVATIONS, REHAB EVAL
As per Ct surgery ROOSEVELT Matthews patient cleared for PT. Received sitting in a chair + tele, central line,  A line, blakes x 3, CT x 1 to suction, oxygen 6LNC, temporary pacemaker, epstein, prevena,  in NAD

## 2023-07-03 NOTE — PROCEDURE NOTE - NSPROCDETAILS_GEN_ALL_CORE
Seldinger technique
Seldinger technique/dressing applied/secured in place/percutaneous
ultrasound guidance with use of sterile gel and probe cove

## 2023-07-03 NOTE — OCCUPATIONAL THERAPY INITIAL EVALUATION ADULT - MARITAL STATUS
protocol.      Donald Charles, PT [facility] [id number]    Treatment Charges: Mins Units   Initial Evaluation     Re-Evaluation     Ther Exercise         TE 15 1   Manual Therapy     MT     Ther Activities        TA     Gait Training          GT     Neuro Re-education NR 25 2   Modalities     Non-Billable Service Time     Other     Total Time/Units 40 3 Single

## 2023-07-04 DIAGNOSIS — N17.9 ACUTE KIDNEY FAILURE, UNSPECIFIED: ICD-10-CM

## 2023-07-04 LAB
ALBUMIN SERPL ELPH-MCNC: 3.5 G/DL — SIGNIFICANT CHANGE UP (ref 3.3–5)
ALBUMIN SERPL ELPH-MCNC: 3.7 G/DL — SIGNIFICANT CHANGE UP (ref 3.3–5)
ALBUMIN SERPL ELPH-MCNC: 3.8 G/DL — SIGNIFICANT CHANGE UP (ref 3.3–5)
ALP SERPL-CCNC: 69 U/L — SIGNIFICANT CHANGE UP (ref 40–120)
ALP SERPL-CCNC: 71 U/L — SIGNIFICANT CHANGE UP (ref 40–120)
ALP SERPL-CCNC: 71 U/L — SIGNIFICANT CHANGE UP (ref 40–120)
ALP SERPL-CCNC: 76 U/L — SIGNIFICANT CHANGE UP (ref 40–120)
ALP SERPL-CCNC: 78 U/L — SIGNIFICANT CHANGE UP (ref 40–120)
ALT FLD-CCNC: <5 U/L — LOW (ref 10–45)
ANION GAP SERPL CALC-SCNC: 11 MMOL/L — SIGNIFICANT CHANGE UP (ref 5–17)
ANION GAP SERPL CALC-SCNC: 12 MMOL/L — SIGNIFICANT CHANGE UP (ref 5–17)
ANION GAP SERPL CALC-SCNC: 13 MMOL/L — SIGNIFICANT CHANGE UP (ref 5–17)
ANION GAP SERPL CALC-SCNC: 13 MMOL/L — SIGNIFICANT CHANGE UP (ref 5–17)
ANION GAP SERPL CALC-SCNC: 16 MMOL/L — SIGNIFICANT CHANGE UP (ref 5–17)
APTT BLD: 32.6 SEC — SIGNIFICANT CHANGE UP (ref 27.5–35.5)
APTT BLD: 33.8 SEC — SIGNIFICANT CHANGE UP (ref 27.5–35.5)
APTT BLD: 34.2 SEC — SIGNIFICANT CHANGE UP (ref 27.5–35.5)
APTT BLD: 38 SEC — HIGH (ref 27.5–35.5)
AST SERPL-CCNC: 21 U/L — SIGNIFICANT CHANGE UP (ref 10–40)
AST SERPL-CCNC: 21 U/L — SIGNIFICANT CHANGE UP (ref 10–40)
AST SERPL-CCNC: 22 U/L — SIGNIFICANT CHANGE UP (ref 10–40)
AST SERPL-CCNC: 25 U/L — SIGNIFICANT CHANGE UP (ref 10–40)
AST SERPL-CCNC: 26 U/L — SIGNIFICANT CHANGE UP (ref 10–40)
BASE EXCESS BLDV CALC-SCNC: 0.3 MMOL/L — SIGNIFICANT CHANGE UP (ref -2–3)
BASE EXCESS BLDV CALC-SCNC: 0.5 MMOL/L — SIGNIFICANT CHANGE UP (ref -2–3)
BASE EXCESS BLDV CALC-SCNC: 1.3 MMOL/L — SIGNIFICANT CHANGE UP (ref -2–3)
BASE EXCESS BLDV CALC-SCNC: 2 MMOL/L — SIGNIFICANT CHANGE UP (ref -2–3)
BILIRUB SERPL-MCNC: 1.4 MG/DL — HIGH (ref 0.2–1.2)
BILIRUB SERPL-MCNC: 1.4 MG/DL — HIGH (ref 0.2–1.2)
BILIRUB SERPL-MCNC: 1.5 MG/DL — HIGH (ref 0.2–1.2)
BILIRUB SERPL-MCNC: 1.5 MG/DL — HIGH (ref 0.2–1.2)
BILIRUB SERPL-MCNC: 1.6 MG/DL — HIGH (ref 0.2–1.2)
BLD GP AB SCN SERPL QL: NEGATIVE — SIGNIFICANT CHANGE UP
BUN SERPL-MCNC: 63 MG/DL — HIGH (ref 7–23)
BUN SERPL-MCNC: 64 MG/DL — HIGH (ref 7–23)
BUN SERPL-MCNC: 65 MG/DL — HIGH (ref 7–23)
BUN SERPL-MCNC: 65 MG/DL — HIGH (ref 7–23)
BUN SERPL-MCNC: 68 MG/DL — HIGH (ref 7–23)
CALCIUM SERPL-MCNC: 8.6 MG/DL — SIGNIFICANT CHANGE UP (ref 8.4–10.5)
CALCIUM SERPL-MCNC: 8.6 MG/DL — SIGNIFICANT CHANGE UP (ref 8.4–10.5)
CALCIUM SERPL-MCNC: 8.7 MG/DL — SIGNIFICANT CHANGE UP (ref 8.4–10.5)
CALCIUM SERPL-MCNC: 8.8 MG/DL — SIGNIFICANT CHANGE UP (ref 8.4–10.5)
CALCIUM SERPL-MCNC: 9.1 MG/DL — SIGNIFICANT CHANGE UP (ref 8.4–10.5)
CHLORIDE SERPL-SCNC: 91 MMOL/L — LOW (ref 96–108)
CHLORIDE SERPL-SCNC: 95 MMOL/L — LOW (ref 96–108)
CHLORIDE SERPL-SCNC: 95 MMOL/L — LOW (ref 96–108)
CHLORIDE SERPL-SCNC: 96 MMOL/L — SIGNIFICANT CHANGE UP (ref 96–108)
CHLORIDE SERPL-SCNC: 96 MMOL/L — SIGNIFICANT CHANGE UP (ref 96–108)
CO2 BLDV-SCNC: 25.8 MMOL/L — SIGNIFICANT CHANGE UP (ref 22–26)
CO2 BLDV-SCNC: 26.7 MMOL/L — HIGH (ref 22–26)
CO2 BLDV-SCNC: 27.1 MMOL/L — HIGH (ref 22–26)
CO2 BLDV-SCNC: 28.7 MMOL/L — HIGH (ref 22–26)
CO2 SERPL-SCNC: 22 MMOL/L — SIGNIFICANT CHANGE UP (ref 22–31)
CO2 SERPL-SCNC: 24 MMOL/L — SIGNIFICANT CHANGE UP (ref 22–31)
CO2 SERPL-SCNC: 24 MMOL/L — SIGNIFICANT CHANGE UP (ref 22–31)
CO2 SERPL-SCNC: 25 MMOL/L — SIGNIFICANT CHANGE UP (ref 22–31)
CO2 SERPL-SCNC: 25 MMOL/L — SIGNIFICANT CHANGE UP (ref 22–31)
CREAT SERPL-MCNC: 2.21 MG/DL — HIGH (ref 0.5–1.3)
CREAT SERPL-MCNC: 2.24 MG/DL — HIGH (ref 0.5–1.3)
CREAT SERPL-MCNC: 2.27 MG/DL — HIGH (ref 0.5–1.3)
CREAT SERPL-MCNC: 2.3 MG/DL — HIGH (ref 0.5–1.3)
CREAT SERPL-MCNC: 2.33 MG/DL — HIGH (ref 0.5–1.3)
EGFR: 28 ML/MIN/1.73M2 — LOW
EGFR: 29 ML/MIN/1.73M2 — LOW
EGFR: 29 ML/MIN/1.73M2 — LOW
GAS PNL BLDA: SIGNIFICANT CHANGE UP
GAS PNL BLDV: SIGNIFICANT CHANGE UP
GAS PNL BLDV: SIGNIFICANT CHANGE UP
GLUCOSE BLDC GLUCOMTR-MCNC: 105 MG/DL — HIGH (ref 70–99)
GLUCOSE BLDC GLUCOMTR-MCNC: 109 MG/DL — HIGH (ref 70–99)
GLUCOSE BLDC GLUCOMTR-MCNC: 112 MG/DL — HIGH (ref 70–99)
GLUCOSE BLDC GLUCOMTR-MCNC: 115 MG/DL — HIGH (ref 70–99)
GLUCOSE BLDC GLUCOMTR-MCNC: 126 MG/DL — HIGH (ref 70–99)
GLUCOSE BLDC GLUCOMTR-MCNC: 131 MG/DL — HIGH (ref 70–99)
GLUCOSE BLDC GLUCOMTR-MCNC: 157 MG/DL — HIGH (ref 70–99)
GLUCOSE BLDC GLUCOMTR-MCNC: 162 MG/DL — HIGH (ref 70–99)
GLUCOSE BLDC GLUCOMTR-MCNC: 165 MG/DL — HIGH (ref 70–99)
GLUCOSE BLDC GLUCOMTR-MCNC: 169 MG/DL — HIGH (ref 70–99)
GLUCOSE BLDC GLUCOMTR-MCNC: 184 MG/DL — HIGH (ref 70–99)
GLUCOSE BLDC GLUCOMTR-MCNC: 185 MG/DL — HIGH (ref 70–99)
GLUCOSE BLDC GLUCOMTR-MCNC: 190 MG/DL — HIGH (ref 70–99)
GLUCOSE BLDC GLUCOMTR-MCNC: 74 MG/DL — SIGNIFICANT CHANGE UP (ref 70–99)
GLUCOSE BLDC GLUCOMTR-MCNC: 96 MG/DL — SIGNIFICANT CHANGE UP (ref 70–99)
GLUCOSE BLDC GLUCOMTR-MCNC: 96 MG/DL — SIGNIFICANT CHANGE UP (ref 70–99)
GLUCOSE SERPL-MCNC: 108 MG/DL — HIGH (ref 70–99)
GLUCOSE SERPL-MCNC: 113 MG/DL — HIGH (ref 70–99)
GLUCOSE SERPL-MCNC: 126 MG/DL — HIGH (ref 70–99)
GLUCOSE SERPL-MCNC: 134 MG/DL — HIGH (ref 70–99)
GLUCOSE SERPL-MCNC: 164 MG/DL — HIGH (ref 70–99)
GRAM STN FLD: SIGNIFICANT CHANGE UP
HCO3 BLDV-SCNC: 25 MMOL/L — SIGNIFICANT CHANGE UP (ref 22–29)
HCO3 BLDV-SCNC: 25 MMOL/L — SIGNIFICANT CHANGE UP (ref 22–29)
HCO3 BLDV-SCNC: 26 MMOL/L — SIGNIFICANT CHANGE UP (ref 22–29)
HCO3 BLDV-SCNC: 27 MMOL/L — SIGNIFICANT CHANGE UP (ref 22–29)
HCT VFR BLD CALC: 24.1 % — LOW (ref 39–50)
HCT VFR BLD CALC: 24.8 % — LOW (ref 39–50)
HCT VFR BLD CALC: 25 % — LOW (ref 39–50)
HCT VFR BLD CALC: 25.5 % — LOW (ref 39–50)
HEPARIN-PF4 AB RESULT: <0.6 U/ML — SIGNIFICANT CHANGE UP (ref 0–0.9)
HGB BLD-MCNC: 8.4 G/DL — LOW (ref 13–17)
HGB BLD-MCNC: 8.6 G/DL — LOW (ref 13–17)
HGB BLD-MCNC: 8.7 G/DL — LOW (ref 13–17)
HGB BLD-MCNC: 9 G/DL — LOW (ref 13–17)
INR BLD: 1.13 — SIGNIFICANT CHANGE UP (ref 0.88–1.16)
INR BLD: 1.14 — SIGNIFICANT CHANGE UP (ref 0.88–1.16)
INR BLD: 1.15 — SIGNIFICANT CHANGE UP (ref 0.88–1.16)
INR BLD: 1.17 — HIGH (ref 0.88–1.16)
LACTATE SERPL-SCNC: 0.9 MMOL/L — SIGNIFICANT CHANGE UP (ref 0.5–2)
LACTATE SERPL-SCNC: 1 MMOL/L — SIGNIFICANT CHANGE UP (ref 0.5–2)
LACTATE SERPL-SCNC: 1.2 MMOL/L — SIGNIFICANT CHANGE UP (ref 0.5–2)
LACTATE SERPL-SCNC: 1.2 MMOL/L — SIGNIFICANT CHANGE UP (ref 0.5–2)
MAGNESIUM SERPL-MCNC: 2 MG/DL — SIGNIFICANT CHANGE UP (ref 1.6–2.6)
MAGNESIUM SERPL-MCNC: 2.1 MG/DL — SIGNIFICANT CHANGE UP (ref 1.6–2.6)
MAGNESIUM SERPL-MCNC: 2.2 MG/DL — SIGNIFICANT CHANGE UP (ref 1.6–2.6)
MCHC RBC-ENTMCNC: 31.8 PG — SIGNIFICANT CHANGE UP (ref 27–34)
MCHC RBC-ENTMCNC: 32.1 PG — SIGNIFICANT CHANGE UP (ref 27–34)
MCHC RBC-ENTMCNC: 32.2 PG — SIGNIFICANT CHANGE UP (ref 27–34)
MCHC RBC-ENTMCNC: 32.4 PG — SIGNIFICANT CHANGE UP (ref 27–34)
MCHC RBC-ENTMCNC: 34.7 GM/DL — SIGNIFICANT CHANGE UP (ref 32–36)
MCHC RBC-ENTMCNC: 34.8 GM/DL — SIGNIFICANT CHANGE UP (ref 32–36)
MCHC RBC-ENTMCNC: 34.9 GM/DL — SIGNIFICANT CHANGE UP (ref 32–36)
MCHC RBC-ENTMCNC: 35.3 GM/DL — SIGNIFICANT CHANGE UP (ref 32–36)
MCV RBC AUTO: 91.3 FL — SIGNIFICANT CHANGE UP (ref 80–100)
MCV RBC AUTO: 91.7 FL — SIGNIFICANT CHANGE UP (ref 80–100)
MCV RBC AUTO: 92.3 FL — SIGNIFICANT CHANGE UP (ref 80–100)
MCV RBC AUTO: 92.9 FL — SIGNIFICANT CHANGE UP (ref 80–100)
NRBC # BLD: 0 /100 WBCS — SIGNIFICANT CHANGE UP (ref 0–0)
PCO2 BLDV: 38 MMHG — LOW (ref 42–55)
PCO2 BLDV: 40 MMHG — LOW (ref 42–55)
PCO2 BLDV: 41 MMHG — LOW (ref 42–55)
PCO2 BLDV: 44 MMHG — SIGNIFICANT CHANGE UP (ref 42–55)
PF4 HEPARIN CMPLX AB SER-ACNC: NEGATIVE — SIGNIFICANT CHANGE UP
PH BLDV: 7.4 — SIGNIFICANT CHANGE UP (ref 7.32–7.43)
PH BLDV: 7.4 — SIGNIFICANT CHANGE UP (ref 7.32–7.43)
PH BLDV: 7.42 — SIGNIFICANT CHANGE UP (ref 7.32–7.43)
PH BLDV: 7.42 — SIGNIFICANT CHANGE UP (ref 7.32–7.43)
PHOSPHATE SERPL-MCNC: 2.2 MG/DL — LOW (ref 2.5–4.5)
PHOSPHATE SERPL-MCNC: 2.8 MG/DL — SIGNIFICANT CHANGE UP (ref 2.5–4.5)
PHOSPHATE SERPL-MCNC: 3.7 MG/DL — SIGNIFICANT CHANGE UP (ref 2.5–4.5)
PHOSPHATE SERPL-MCNC: 3.7 MG/DL — SIGNIFICANT CHANGE UP (ref 2.5–4.5)
PHOSPHATE SERPL-MCNC: 4.1 MG/DL — SIGNIFICANT CHANGE UP (ref 2.5–4.5)
PLATELET # BLD AUTO: 83 K/UL — LOW (ref 150–400)
PLATELET # BLD AUTO: 83 K/UL — LOW (ref 150–400)
PLATELET # BLD AUTO: 90 K/UL — LOW (ref 150–400)
PLATELET # BLD AUTO: 98 K/UL — LOW (ref 150–400)
PO2 BLDV: 40 MMHG — SIGNIFICANT CHANGE UP (ref 25–45)
PO2 BLDV: 43 MMHG — SIGNIFICANT CHANGE UP (ref 25–45)
PO2 BLDV: 44 MMHG — SIGNIFICANT CHANGE UP (ref 25–45)
PO2 BLDV: 47 MMHG — HIGH (ref 25–45)
POTASSIUM SERPL-MCNC: 3 MMOL/L — LOW (ref 3.5–5.3)
POTASSIUM SERPL-MCNC: 3.4 MMOL/L — LOW (ref 3.5–5.3)
POTASSIUM SERPL-MCNC: 3.9 MMOL/L — SIGNIFICANT CHANGE UP (ref 3.5–5.3)
POTASSIUM SERPL-MCNC: 4 MMOL/L — SIGNIFICANT CHANGE UP (ref 3.5–5.3)
POTASSIUM SERPL-MCNC: 4 MMOL/L — SIGNIFICANT CHANGE UP (ref 3.5–5.3)
POTASSIUM SERPL-SCNC: 3 MMOL/L — LOW (ref 3.5–5.3)
POTASSIUM SERPL-SCNC: 3.4 MMOL/L — LOW (ref 3.5–5.3)
POTASSIUM SERPL-SCNC: 3.9 MMOL/L — SIGNIFICANT CHANGE UP (ref 3.5–5.3)
POTASSIUM SERPL-SCNC: 4 MMOL/L — SIGNIFICANT CHANGE UP (ref 3.5–5.3)
POTASSIUM SERPL-SCNC: 4 MMOL/L — SIGNIFICANT CHANGE UP (ref 3.5–5.3)
PROT SERPL-MCNC: 5.4 G/DL — LOW (ref 6–8.3)
PROT SERPL-MCNC: 5.6 G/DL — LOW (ref 6–8.3)
PROT SERPL-MCNC: 5.7 G/DL — LOW (ref 6–8.3)
PROT SERPL-MCNC: 5.7 G/DL — LOW (ref 6–8.3)
PROT SERPL-MCNC: 6.1 G/DL — SIGNIFICANT CHANGE UP (ref 6–8.3)
PROTHROM AB SERPL-ACNC: 13.5 SEC — HIGH (ref 10.5–13.4)
PROTHROM AB SERPL-ACNC: 13.6 SEC — HIGH (ref 10.5–13.4)
PROTHROM AB SERPL-ACNC: 13.7 SEC — HIGH (ref 10.5–13.4)
PROTHROM AB SERPL-ACNC: 13.9 SEC — HIGH (ref 10.5–13.4)
RBC # BLD: 2.64 M/UL — LOW (ref 4.2–5.8)
RBC # BLD: 2.67 M/UL — LOW (ref 4.2–5.8)
RBC # BLD: 2.71 M/UL — LOW (ref 4.2–5.8)
RBC # BLD: 2.78 M/UL — LOW (ref 4.2–5.8)
RBC # FLD: 14.4 % — SIGNIFICANT CHANGE UP (ref 10.3–14.5)
RBC # FLD: 14.5 % — SIGNIFICANT CHANGE UP (ref 10.3–14.5)
RH IG SCN BLD-IMP: POSITIVE — SIGNIFICANT CHANGE UP
SAO2 % BLDV: 64.5 % — LOW (ref 67–88)
SAO2 % BLDV: 73.5 % — SIGNIFICANT CHANGE UP (ref 67–88)
SAO2 % BLDV: 74.8 % — SIGNIFICANT CHANGE UP (ref 67–88)
SAO2 % BLDV: 77.2 % — SIGNIFICANT CHANGE UP (ref 67–88)
SODIUM SERPL-SCNC: 131 MMOL/L — LOW (ref 135–145)
SODIUM SERPL-SCNC: 132 MMOL/L — LOW (ref 135–145)
SODIUM SERPL-SCNC: 133 MMOL/L — LOW (ref 135–145)
SPECIMEN SOURCE: SIGNIFICANT CHANGE UP
WBC # BLD: 8.08 K/UL — SIGNIFICANT CHANGE UP (ref 3.8–10.5)
WBC # BLD: 8.25 K/UL — SIGNIFICANT CHANGE UP (ref 3.8–10.5)
WBC # BLD: 8.82 K/UL — SIGNIFICANT CHANGE UP (ref 3.8–10.5)
WBC # BLD: 8.98 K/UL — SIGNIFICANT CHANGE UP (ref 3.8–10.5)
WBC # FLD AUTO: 8.08 K/UL — SIGNIFICANT CHANGE UP (ref 3.8–10.5)
WBC # FLD AUTO: 8.25 K/UL — SIGNIFICANT CHANGE UP (ref 3.8–10.5)
WBC # FLD AUTO: 8.82 K/UL — SIGNIFICANT CHANGE UP (ref 3.8–10.5)
WBC # FLD AUTO: 8.98 K/UL — SIGNIFICANT CHANGE UP (ref 3.8–10.5)

## 2023-07-04 PROCEDURE — 99292 CRITICAL CARE ADDL 30 MIN: CPT

## 2023-07-04 PROCEDURE — 71045 X-RAY EXAM CHEST 1 VIEW: CPT | Mod: 26

## 2023-07-04 PROCEDURE — 99291 CRITICAL CARE FIRST HOUR: CPT

## 2023-07-04 RX ORDER — POTASSIUM CHLORIDE 20 MEQ
20 PACKET (EA) ORAL ONCE
Refills: 0 | Status: COMPLETED | OUTPATIENT
Start: 2023-07-04 | End: 2023-07-04

## 2023-07-04 RX ORDER — VASOPRESSIN 20 [USP'U]/ML
0.01 INJECTION INTRAVENOUS
Qty: 40 | Refills: 0 | Status: DISCONTINUED | OUTPATIENT
Start: 2023-07-04 | End: 2023-07-07

## 2023-07-04 RX ORDER — POTASSIUM PHOSPHATE, MONOBASIC POTASSIUM PHOSPHATE, DIBASIC 236; 224 MG/ML; MG/ML
15 INJECTION, SOLUTION INTRAVENOUS ONCE
Refills: 0 | Status: COMPLETED | OUTPATIENT
Start: 2023-07-04 | End: 2023-07-04

## 2023-07-04 RX ORDER — DEXTROSE 50 % IN WATER 50 %
50 SYRINGE (ML) INTRAVENOUS
Refills: 0 | Status: COMPLETED | OUTPATIENT
Start: 2023-07-04 | End: 2023-07-04

## 2023-07-04 RX ORDER — POTASSIUM CHLORIDE 20 MEQ
20 PACKET (EA) ORAL
Refills: 0 | Status: DISCONTINUED | OUTPATIENT
Start: 2023-07-04 | End: 2023-07-04

## 2023-07-04 RX ORDER — FENTANYL CITRATE 50 UG/ML
25 INJECTION INTRAVENOUS
Refills: 0 | Status: DISCONTINUED | OUTPATIENT
Start: 2023-07-04 | End: 2023-07-07

## 2023-07-04 RX ORDER — ALBUMIN HUMAN 25 %
250 VIAL (ML) INTRAVENOUS
Refills: 0 | Status: COMPLETED | OUTPATIENT
Start: 2023-07-04 | End: 2023-07-04

## 2023-07-04 RX ORDER — NICARDIPINE HYDROCHLORIDE 30 MG/1
1 CAPSULE, EXTENDED RELEASE ORAL
Qty: 40 | Refills: 0 | Status: DISCONTINUED | OUTPATIENT
Start: 2023-07-04 | End: 2023-07-04

## 2023-07-04 RX ADMIN — CHLORHEXIDINE GLUCONATE 1 APPLICATION(S): 213 SOLUTION TOPICAL at 07:19

## 2023-07-04 RX ADMIN — FENTANYL CITRATE 25 MICROGRAM(S): 50 INJECTION INTRAVENOUS at 09:36

## 2023-07-04 RX ADMIN — ATORVASTATIN CALCIUM 40 MILLIGRAM(S): 80 TABLET, FILM COATED ORAL at 22:28

## 2023-07-04 RX ADMIN — SENNA PLUS 2 TABLET(S): 8.6 TABLET ORAL at 22:28

## 2023-07-04 RX ADMIN — DEXMEDETOMIDINE HYDROCHLORIDE IN 0.9% SODIUM CHLORIDE 8.33 MICROGRAM(S)/KG/HR: 4 INJECTION INTRAVENOUS at 09:04

## 2023-07-04 RX ADMIN — AMIODARONE HYDROCHLORIDE 400 MILLIGRAM(S): 400 TABLET ORAL at 05:23

## 2023-07-04 RX ADMIN — AMIODARONE HYDROCHLORIDE 400 MILLIGRAM(S): 400 TABLET ORAL at 22:28

## 2023-07-04 RX ADMIN — AMIODARONE HYDROCHLORIDE 400 MILLIGRAM(S): 400 TABLET ORAL at 13:01

## 2023-07-04 RX ADMIN — HEPARIN SODIUM 5000 UNIT(S): 5000 INJECTION INTRAVENOUS; SUBCUTANEOUS at 13:01

## 2023-07-04 RX ADMIN — HEPARIN SODIUM 5000 UNIT(S): 5000 INJECTION INTRAVENOUS; SUBCUTANEOUS at 22:29

## 2023-07-04 RX ADMIN — Medication 125 MILLILITER(S): at 09:37

## 2023-07-04 RX ADMIN — CHLORHEXIDINE GLUCONATE 15 MILLILITER(S): 213 SOLUTION TOPICAL at 05:23

## 2023-07-04 RX ADMIN — MILRINONE LACTATE 7.49 MICROGRAM(S)/KG/MIN: 1 INJECTION, SOLUTION INTRAVENOUS at 02:15

## 2023-07-04 RX ADMIN — Medication 50 MILLIEQUIVALENT(S): at 05:23

## 2023-07-04 RX ADMIN — SODIUM CHLORIDE 10 MILLILITER(S): 9 INJECTION INTRAMUSCULAR; INTRAVENOUS; SUBCUTANEOUS at 17:31

## 2023-07-04 RX ADMIN — PANTOPRAZOLE SODIUM 40 MILLIGRAM(S): 20 TABLET, DELAYED RELEASE ORAL at 11:11

## 2023-07-04 RX ADMIN — MIDODRINE HYDROCHLORIDE 10 MILLIGRAM(S): 2.5 TABLET ORAL at 05:23

## 2023-07-04 RX ADMIN — HEPARIN SODIUM 5000 UNIT(S): 5000 INJECTION INTRAVENOUS; SUBCUTANEOUS at 05:23

## 2023-07-04 RX ADMIN — BUMETANIDE 5 MG/HR: 0.25 INJECTION INTRAMUSCULAR; INTRAVENOUS at 02:15

## 2023-07-04 RX ADMIN — Medication 125 MILLILITER(S): at 07:18

## 2023-07-04 RX ADMIN — FENTANYL CITRATE 25 MICROGRAM(S): 50 INJECTION INTRAVENOUS at 10:34

## 2023-07-04 RX ADMIN — PROPOFOL 7.99 MICROGRAM(S)/KG/MIN: 10 INJECTION, EMULSION INTRAVENOUS at 22:29

## 2023-07-04 RX ADMIN — POLYETHYLENE GLYCOL 3350 17 GRAM(S): 17 POWDER, FOR SOLUTION ORAL at 12:57

## 2023-07-04 RX ADMIN — Medication 200 GRAM(S): at 01:18

## 2023-07-04 RX ADMIN — PROPOFOL 7.99 MICROGRAM(S)/KG/MIN: 10 INJECTION, EMULSION INTRAVENOUS at 02:15

## 2023-07-04 RX ADMIN — Medication 1000 MILLIGRAM(S): at 12:30

## 2023-07-04 RX ADMIN — FENTANYL CITRATE 25 MICROGRAM(S): 50 INJECTION INTRAVENOUS at 19:34

## 2023-07-04 RX ADMIN — Medication 9.99 MICROGRAM(S)/KG/MIN: at 17:31

## 2023-07-04 RX ADMIN — NICARDIPINE HYDROCHLORIDE 5 MG/HR: 30 CAPSULE, EXTENDED RELEASE ORAL at 02:15

## 2023-07-04 RX ADMIN — MILRINONE LACTATE 2.5 MICROGRAM(S)/KG/MIN: 1 INJECTION, SOLUTION INTRAVENOUS at 17:31

## 2023-07-04 RX ADMIN — Medication 81 MILLIGRAM(S): at 11:11

## 2023-07-04 RX ADMIN — FENTANYL CITRATE 25 MICROGRAM(S): 50 INJECTION INTRAVENOUS at 19:16

## 2023-07-04 RX ADMIN — Medication 50 MILLILITER(S): at 15:22

## 2023-07-04 RX ADMIN — Medication 400 MILLIGRAM(S): at 11:11

## 2023-07-04 RX ADMIN — Medication 100 MILLIEQUIVALENT(S): at 17:17

## 2023-07-04 RX ADMIN — POTASSIUM PHOSPHATE, MONOBASIC POTASSIUM PHOSPHATE, DIBASIC 62.5 MILLIMOLE(S): 236; 224 INJECTION, SOLUTION INTRAVENOUS at 07:18

## 2023-07-04 RX ADMIN — CHLORHEXIDINE GLUCONATE 15 MILLILITER(S): 213 SOLUTION TOPICAL at 17:17

## 2023-07-04 NOTE — PROGRESS NOTE ADULT - SUBJECTIVE AND OBJECTIVE BOX
CC: AORTIC STENOSIS        INTERVAL HISTORY: intubated/sedated      ROS: No chest pain, no sob, no abd pain. No n/v/d    PAST MEDICAL & SURGICAL HISTORY:  HTN (hypertension)    DM (diabetes mellitus)    Severe aortic stenosis    Severe aortic regurgitation    History of cholecystectomy    H/O prostatectomy        PHYSICAL EXAM:  T(C): 36.4 (07-03-23 @ 21:15), Max: 36.6 (07-03-23 @ 17:10)  HR: 85 (07-04-23 @ 08:00)  BP: --  RR: 14 (07-04-23 @ 08:00)  SpO2: 99% (07-04-23 @ 08:00)  Wt(kg): --  I&O's Summary    03 Jul 2023 07:01  -  04 Jul 2023 07:00  --------------------------------------------------------  IN: 3098.7 mL / OUT: 3665 mL / NET: -566.3 mL    04 Jul 2023 07:01  -  04 Jul 2023 08:40  --------------------------------------------------------  IN: 99.6 mL / OUT: 100 mL / NET: -0.4 mL      Weight 66.6 (06-30 @ 07:15)  General: NAD.  HEENT: moist mucous membranes, no pallor/cyanosis.  Neck: no JVD visible.  Cardiac: S1, S2. RRR. No murmurs   Respratory:rhonchi  Abdomen: soft. nontender. nondistended  Skin: no rashes.  Extremities: no LE edema b/l  Access: R IJ      DATA:                        8.7<L>  8.98  )-----------( 83<L>    ( 04 Jul 2023 04:55 )             25.0<L>        133<L>  |  95<L>  |  63<H>  ----------------------------<  113<H>  Ca:8.6   (04 Jul 2023 04:55)  3.4<L>   |  25     |  2.24<H>        TPro  5.4<L>  /  Alb  3.5    /  TBili  1.6<H>  /  DBili  x      /  AST  25     /  ALT  <5<L>  /  AlkPhos  71     04 Jul 2023 04:55        Urinalysis Basic - ( 04 Jul 2023 04:55 )  Color: x / Appearance: x / SG: x / pH: x  Gluc: 113 mg/dL<H> / Ketone: x  / Bili: x / Urobili: x   Blood: x / Protein: x / Nitrite: x   Leuk Esterase: x / RBC: x / WBC x   Sq Epi: x / Non Sq Epi: x / Bacteria: x      Sodium, Random Urine: 69 mmol/L (07-03 @ 18:07)  Creatinine, Random Urine: 46 mg/dL (07-03 @ 18:07)  Osmolality, Random Urine: 310 mosm/kg (07-03 @ 18:07)  Potassium, Random Urine: 47 mmol/L (07-03 @ 18:07)    Protein/Creatinine Ratio Calculation: 0.6 Ratio *H* (07-03 @ 18:07)          MEDICATIONS  (STANDING):  acetaminophen   IVPB .. 1000 milliGRAM(s) IV Intermittent once  albumin human  5% IVPB 250 milliLiter(s) IV Intermittent every 1 hour  aMIOdarone    Tablet   Oral   aMIOdarone    Tablet 400 milliGRAM(s) Oral every 8 hours  aMIOdarone IVPB 150 milliGRAM(s) IV Intermittent once  aspirin  chewable 81 milliGRAM(s) Enteral Tube daily  atorvastatin 40 milliGRAM(s) Oral at bedtime  buMETAnide Infusion 1 mG/Hr (5 mL/Hr) IV Continuous <Continuous>  chlorhexidine 0.12% Liquid 15 milliLiter(s) Oral Mucosa every 12 hours  chlorhexidine 2% Cloths 1 Application(s) Topical daily  dexMEDEtomidine Infusion 0.5 MICROgram(s)/kG/Hr (8.33 mL/Hr) IV Continuous <Continuous>  dextrose 5%. 1000 milliLiter(s) (50 mL/Hr) IV Continuous <Continuous>  dextrose 5%. 1000 milliLiter(s) (100 mL/Hr) IV Continuous <Continuous>  dextrose 5%. 100 milliLiter(s) (50 mL/Hr) IV Continuous <Continuous>  dextrose 50% Injectable 50 milliLiter(s) IV Push every 15 minutes  DOBUTamine Infusion 2 MICROgram(s)/kG/Min (4 mL/Hr) IV Continuous <Continuous>  glucagon  Injectable 1 milliGRAM(s) IntraMuscular once  heparin   Injectable 5000 Unit(s) SubCutaneous every 8 hours  insulin regular Infusion 1 Unit(s)/Hr (1 mL/Hr) IV Continuous <Continuous>  midodrine 10 milliGRAM(s) Oral every 8 hours  milrinone Infusion 0.375 MICROgram(s)/kG/Min (7.49 mL/Hr) IV Continuous <Continuous>  niCARdipine Infusion 1 mG/Hr (5 mL/Hr) IV Continuous <Continuous>  pantoprazole  Injectable 40 milliGRAM(s) IV Push daily  polyethylene glycol 3350 17 Gram(s) Oral daily  propofol Infusion 20 MICROgram(s)/kG/Min (7.99 mL/Hr) IV Continuous <Continuous>  senna 2 Tablet(s) Oral at bedtime  sodium chloride 0.9%. 1000 milliLiter(s) (10 mL/Hr) IV Continuous <Continuous>    MEDICATIONS  (PRN):  dextrose Oral Gel 15 Gram(s) Oral once PRN Blood Glucose LESS THAN 70 milliGRAM(s)/deciliter  fentaNYL    Injectable 25 MICROGram(s) IV Push every 2 hours PRN Severe Pain (7 - 10)

## 2023-07-04 NOTE — PROGRESS NOTE ADULT - SUBJECTIVE AND OBJECTIVE BOX
INTERVAL COURSE  POD#4   AVR  7/3: hemodynamic instability worsening renal function with oliguria   Stat echo with severely reduced LV fx, reduced RV fx -- no tamponade   [place] replaced remained on Mil and      ICU Vital Signs Last 24 Hrs  T(C): 36.4 (2023 10:00), Max: 36.6 (2023 17:10)  T(F): 97.5 (2023 10:00), Max: 97.8 (2023 17:10)  HR: 88 (2023 10:00) (16 - 126)  ABP: 100/47 (2023 10:00) (99/57 - 188/64)  ABP(mean): 61 (2023 10:00) (61 - 101)  RR: 14 (2023 10:00) (12 - 23)  SpO2: 100% (2023 10:00) (91% - 100%)  O2 Parameters below as of 2023 10:00  Patient On (Oxygen Delivery Method): ventilator  O2 Concentration (%): 60    Adult Advanced Hemodynamics Last 24 Hrs  CVP(mm Hg): 276 (2023 10:00) (5 - 302)  CO: 3.8 (2023 09:00) (3.8 - 7.7)  CI: 2 (2023 09:00) (2 - 4.2)  PA: 44/17 (2023 10:00) (-37/-38 - 65/23)  PA(mean): 26 (2023 10:00) (-37 - 42)  SVR: 1304 (2023 09:00) (612 - 1304)  SVRI: 2375 (2023 09:00) (1122 - 2375)  ABG - ( 2023 09:26 )  pH, Arterial: 7.45  pH, Blood: x     /  pCO2: 35    /  pO2: 133   / HCO3: 24    / Base Excess: 0.7   /  SaO2: 100.0     Mode: AC/ CMV (Assist Control/ Continuous Mandatory Ventilation)  RR (machine): 14  TV (machine): 500  FiO2: 60  PEEP: 5  ITime: 1  MAP: 9  PIP: 21    I&O's Summary    2023 07:01   07:00  --------------------------------------------------------  IN: 3098.7 mL / OUT: 3665 mL / NET: -566.3 mL    2023 07:01  -  2023 10:45  --------------------------------------------------------  IN: 204.2 mL / OUT: 350 mL / NET: -145.8 mL    PHYSICAL EXAM  General: lightly sedated, interactive, following commands   Respiratory: CTA B/L; no wheezes  Cardiovascular: Regular rhythm/rate  Gastrointestinal: Soft; NTND   Extremities: Warm, mild pedal edema   Neurological: no obvious focal deficits    LABS/IMAGING/EKG/ETC  Reviewed.

## 2023-07-04 NOTE — PROGRESS NOTE ADULT - ASSESSMENT
77 Male w/ PMHx of HTN and DM who originally presented to [facility] on  c/o chest pain x 3 months. TTE revealed moderate AS and severe AR, nml LVEF. Patient referred for cardiac cath which revealed non-obstructive CAD. He was then transferred to [facility] under the care of Dr. Koehler for further work-up and intervention of severe AI and AS.  S/p Bio AVR  EF 55%     Post op course complicated by cardiogenic shock on Day 3  requiring reintubation and increasing inotrop support   Echo 7/3: severely reduced LVEF 15%/ reduced RV fx / no tamponade   A/p  Normal Lactate, MVo2 65%/ good UOP in response to bumex gtt   Borderline cardiac indices on Mil and / Pressors titrated off --> continue  and Mil current dose  Monitor perfusion parameters closely   Continue PO amio load for postop Afib/ Currently sinus with 1st degree AVB  CAMMIE : most likely perfusional, post valve surgery with cardiogenic shock-- Cr plateauing and slowly trending down  lytes and bicarb in good range/clinically euvolemic but--continue diuresis with bumex gtt at current rate given high PA pressures/ CVP in mid 10s for cardiac unloading and recovery- Bps marginal/ Coming off pressors, no room to add afterload reducing agents as of yet    Discontinue Midodrine- MAP goal 65-70 for adequate renal perfusion   [place] for decompression- will check residue and start trophic feeds   Continue ASA and ICU ppx   Plan:   Will obtain HF consult  CPAP as tolerated     ATTENDING: I have personally and independently provided 90 Min of critical care services.

## 2023-07-04 NOTE — CONSULT NOTE ADULT - ASSESSMENT
Assessment/Plan:   77 yo M w/ CKD3 admitted 6/28 for severe AS s/p AVR 6/30, post-op course c/b cardiogenic shock, pneumothorax and CAMMIE. Nephrology consulted 7/3 for CAMMIE Cr 2.3    #Non-oliguric CAMMIE on CKD3  BCr not known, admitted w/ Cr 1.4, since then gradual rise, now at 2.3  UA 7/3 w/ proteinuria (UPCR 0.5), hematuria (not present on admission)  Dede 69 7/3 high due to diuretics  Fluid status: s/p bumex started 7/1, net neg 1.6L/24 hours, net neg 1.2L/48 hours, net neg 2.1L/admission,   Etiology likely Cardiorenal syndrome i/s/o cardiogenic shock. Hematuria may be due to epstein    Recommend:   C/w bumex drip. Aim for net neg 1.5-2L in the next 24 hours. Will reassess vol status in AM and adjust fluid goal if needed  Lytes noted. Ensure drips composition in NS instead of D5  No indication of HD at this time. WIll monitor closely. If worsening BUN or pt became oliguric/diuretic resistance, then may need RRT  Maintain MAP>70 for adequate renal perfusion  renal sono   strict I/Os           Efrain Ferirs M.D.  PGY 4 - Nephrology Fellow  [phone]
77M PMH HTN, DM, CAD (nonobstructive on [facility] 6/27/23), AS/AI s/p AVR 6/29/23 with course c/b hypotension and concern for cardiogenic shock.     TTE 7/3/23: LVIDd 3.9cm. LVEF 10-15% with global hypokinesis. ?Reverse takotsubo pattern. RV normal in size with reduced function. LA mildly dilated. BioAVR, MG 7, no AI. Trace MR. Mild to moderate TR. PASP 58. Small pericardial effusion.  [facility] 6/27/23 ([facility]): Nonobstructive CAD    Hemodynamics  7/4 AM: MvO2 64.6; Erika CO 4.4 CI 2.7    # ?Cardiogenic shock  TTE with newly reduced EF compared to [facility] TTE, EF now severely reduced with ?reverse Takotsubo pattern  Unclear if decompensation after AVR represents CS. Currently warm on exam with adequate perfusion as reflected on CMP/lactate/PA catheter hemodynamics  Currently requiring vasopressin to maintain MAPs. Hypotension is expected effect of milrinone especially in setting of renal failure  -Slight reduction in MvO2 since yesterday. Would repeat MvO2 this afternoon and if stable/improving, would reduce milrinone to 0.125 and obtain follow-up labs 6 hours later. If labs continue to remain stable, wean off as tolerated  -Wean vasopressin as tolerated  -Continue dobutamine as ordered  -HF management as below    #CHF  -Etiology: NICM given nonobstructive CAD on recent [facility]. Etiology unclear ?stress induced CM. Anticipate repeat TTE for LV function assessment once stable.   -GDMT: On hold while on inotropes  -Volume: CVPs currently ~8, would continue diuresis at current rate. Nephro following.   -Devices: Not appropriate at this time

## 2023-07-04 NOTE — CONSULT NOTE ADULT - SUBJECTIVE AND OBJECTIVE BOX
HPI:  76 YO Kazakh-speaking Male w/ PMHx of HTN and DM who originally presented to [facility] on 6/26 c/o chest pain x 3 months. Chest pain associated with LE edema, cough and phlegm, which was unresolved with OTC medication. Patient lives in [facility] and states that he has not seen a doctor about his symptoms until coming to the [place]. At [facility] he underwent a stress test which revealed small sized, reversible, myocardial perfusion defect of the anteroapical wall c/w ischemia. TTE revealed moderate AS and severe AR, nml LVEF. Patient referred for cardiac cath which revealed non-obstructive CAD. He was then transferred to [facility] under the care of Dr. Koehler for further work-up and intervention of severe AI and AS. At present patient admits to feeling like his heart is racing, but denies fever, chills chest pain, SOB, N/V/D.   ID#[id number] utilized.  (28 Jun 2023 14:00)      ROS: A 10-point review of systems was otherwise negative.    PAST MEDICAL & SURGICAL HISTORY:  HTN (hypertension)      DM (diabetes mellitus)      Severe aortic regurgitation      History of cholecystectomy      H/O prostatectomy          SOCIAL HISTORY:  FAMILY HISTORY:      ALLERGIES: 	  Allergy Status Unknown            MEDICATIONS:  aMIOdarone    Tablet   Oral   aMIOdarone    Tablet 400 milliGRAM(s) Oral every 8 hours  aMIOdarone IVPB 150 milliGRAM(s) IV Intermittent once  aspirin  chewable 81 milliGRAM(s) Enteral Tube daily  atorvastatin 40 milliGRAM(s) Oral at bedtime  buMETAnide Infusion 1 mG/Hr IV Continuous <Continuous>  chlorhexidine 0.12% Liquid 15 milliLiter(s) Oral Mucosa every 12 hours  chlorhexidine 2% Cloths 1 Application(s) Topical daily  dexMEDEtomidine Infusion 0.5 MICROgram(s)/kG/Hr IV Continuous <Continuous>  dextrose 5%. 1000 milliLiter(s) IV Continuous <Continuous>  dextrose 5%. 1000 milliLiter(s) IV Continuous <Continuous>  dextrose 50% Injectable 50 milliLiter(s) IV Push every 15 minutes  dextrose Oral Gel 15 Gram(s) Oral once PRN  DOBUTamine Infusion 2 MICROgram(s)/kG/Min IV Continuous <Continuous>  fentaNYL    Injectable 25 MICROGram(s) IV Push every 2 hours PRN  glucagon  Injectable 1 milliGRAM(s) IntraMuscular once  heparin   Injectable 5000 Unit(s) SubCutaneous every 8 hours  insulin regular Infusion 1 Unit(s)/Hr IV Continuous <Continuous>  milrinone Infusion 0.375 MICROgram(s)/kG/Min IV Continuous <Continuous>  pantoprazole  Injectable 40 milliGRAM(s) IV Push daily  polyethylene glycol 3350 17 Gram(s) Oral daily  propofol Infusion 20 MICROgram(s)/kG/Min IV Continuous <Continuous>  senna 2 Tablet(s) Oral at bedtime  sodium chloride 0.9%. 1000 milliLiter(s) IV Continuous <Continuous>  vasopressin Infusion 0.01 Unit(s)/Min IV Continuous <Continuous>      HOME MEDICATIONS:  Aldactone 25 mg oral tablet: 1 tab(s) orally once a day  bisoprolol 5 mg oral tablet: 0.5 tab(s) orally once a day  furosemide 40 mg oral tablet: 1 tab(s) orally once a day  Jardiance 25 mg oral tablet: 1 tab(s) orally once a day  losartan 100 mg oral tablet: 1 tab(s) orally once a day  spironolactone 25 mg oral tablet: 1 tab(s) orally once a day      PHYSICAL EXAM:      I/O Summary 24H    IN: 3098.7 mL / OUT: 3665 mL / NET: -566.3 mL        T(F): 97 (07-04-23 @ 14:59), Max: 97.8 (07-03-23 @ 17:10)  HR: 90 (07-04-23 @ 14:00) (16 - 123)  BP: --  BP(mean): --  ABP: 111/53 (07-04-23 @ 14:00) (100/47 - 188/64)  ABP(mean): 69 (07-04-23 @ 14:00) (61 - 101)  RR: 12 (07-04-23 @ 14:00) (12 - 23)  SpO2: 100% (07-04-23 @ 14:00) (91% - 100%)    GEN: Awake, comfortable. NAD.   HEENT: NCAT, PERRL, EOMI. Mucosa moist. No JVD.   RESP: CTA b/l  CV: RRR, normal s1/s2. No m/r/g.  ABD: Soft, NTND. BS+  EXT: Warm. No edema, clubbing, or cyanosis.   NEURO: AAOx3. No focal deficits.      	  LABS:	 	    Cardiac Markers             CBC 07-04-23 @ 08:59                        8.6    8.08  )-----------( 83                    24.8       Hgb trend: 8.6 <-- , 8.7 <-- , 7.8 <-- , 7.9 <-- , 8.8 <-- , 9.3 <-- , 9.9 <-- , 9.8 <-- , 8.5 <-- , 8.5 <-- , 7.9 <-- , 8.0 <--   WBC trend: 8.08 <-- , 8.98 <-- , 8.75 <-- , 9.83 <-- , 11.94 <-- , 13.17 <-- , 14.35 <-- , 14.25 <-- , 12.45 <-- , 11.18 <-- , 9.86 <-- , 8.39 <--     CMP 07-04-23 @ 08:59    131<L>  |  96  |  65<H>  ----------------------------<  164<H>  4.0   |  22  |  2.21<H>    Ca    8.6      07-04-23 @ 08:59  Phos  3.7     07-04  Mg     2.0     07-04    TPro  5.6<L>  /  Alb  3.5  /  TBili  1.5<H>  /  DBili  x   /  AST  22  /  ALT  <5<L>  /  AlkPhos  69  07-04      Serum Cr trend: 2.21 <-- , 2.24 <-- , 2.30 <-- , 2.26 <-- , 2.29 <-- , 2.22 <-- , 2.21 <-- , 2.09 <-- , 2.18 <-- , 2.16 <-- , 2.19 <-- , 2.08 <--   proBNP:   Lipid Profile:   HgA1c:   TSH:     TELEMETRY: 	    ECG:  	  RADIOLOGY:   ECHO:  STRESS:  CATH:   HPI:  76 YO Belarusian-speaking Male w/ PMHx of HTN and DM who originally presented to [facility] on 6/26 c/o chest pain x 3 months. Chest pain associated with LE edema, cough and phlegm, which was unresolved with OTC medication. Patient lives in [facility] and states that he has not seen a doctor about his symptoms until coming to the [place]. At [facility] he underwent a stress test which revealed small sized, reversible, myocardial perfusion defect of the anteroapical wall c/w ischemia. TTE revealed moderate AS and severe AR, nml LVEF. Patient referred for cardiac cath which revealed non-obstructive CAD. He was then transferred to [facility] under the care of Dr. Koehler for further work-up and intervention of severe AI and AS. At present patient admits to feeling like his heart is racing, but denies fever, chills chest pain, SOB, N/V/D.   ID#[id number] utilized.  (28 Jun 2023 14:00)      Patient underwent AVR on 6/29/23. On 7/1, noted to have oliguric CAMMIE and was started on milrinone 0.25 due to concern for cardiogenic shock. MAPs at this time were 60s-80s. Course further c/b respiratory failure on 7/3 requiring intubationTTE showed severely reduced EF. Milrinone was started on 7/1. PA catheter placed.        ROS: A 10-point review of systems was otherwise negative.    PAST MEDICAL & SURGICAL HISTORY:  HTN (hypertension)      DM (diabetes mellitus)      Severe aortic regurgitation      History of cholecystectomy      H/O prostatectomy          SOCIAL HISTORY:  FAMILY HISTORY:      ALLERGIES: 	  Allergy Status Unknown            MEDICATIONS:  aMIOdarone    Tablet   Oral   aMIOdarone    Tablet 400 milliGRAM(s) Oral every 8 hours  aMIOdarone IVPB 150 milliGRAM(s) IV Intermittent once  aspirin  chewable 81 milliGRAM(s) Enteral Tube daily  atorvastatin 40 milliGRAM(s) Oral at bedtime  buMETAnide Infusion 1 mG/Hr IV Continuous <Continuous>  chlorhexidine 0.12% Liquid 15 milliLiter(s) Oral Mucosa every 12 hours  chlorhexidine 2% Cloths 1 Application(s) Topical daily  dexMEDEtomidine Infusion 0.5 MICROgram(s)/kG/Hr IV Continuous <Continuous>  dextrose 5%. 1000 milliLiter(s) IV Continuous <Continuous>  dextrose 5%. 1000 milliLiter(s) IV Continuous <Continuous>  dextrose 50% Injectable 50 milliLiter(s) IV Push every 15 minutes  dextrose Oral Gel 15 Gram(s) Oral once PRN  DOBUTamine Infusion 2 MICROgram(s)/kG/Min IV Continuous <Continuous>  fentaNYL    Injectable 25 MICROGram(s) IV Push every 2 hours PRN  glucagon  Injectable 1 milliGRAM(s) IntraMuscular once  heparin   Injectable 5000 Unit(s) SubCutaneous every 8 hours  insulin regular Infusion 1 Unit(s)/Hr IV Continuous <Continuous>  milrinone Infusion 0.375 MICROgram(s)/kG/Min IV Continuous <Continuous>  pantoprazole  Injectable 40 milliGRAM(s) IV Push daily  polyethylene glycol 3350 17 Gram(s) Oral daily  propofol Infusion 20 MICROgram(s)/kG/Min IV Continuous <Continuous>  senna 2 Tablet(s) Oral at bedtime  sodium chloride 0.9%. 1000 milliLiter(s) IV Continuous <Continuous>  vasopressin Infusion 0.01 Unit(s)/Min IV Continuous <Continuous>      HOME MEDICATIONS:  Aldactone 25 mg oral tablet: 1 tab(s) orally once a day  bisoprolol 5 mg oral tablet: 0.5 tab(s) orally once a day  furosemide 40 mg oral tablet: 1 tab(s) orally once a day  Jardiance 25 mg oral tablet: 1 tab(s) orally once a day  losartan 100 mg oral tablet: 1 tab(s) orally once a day  spironolactone 25 mg oral tablet: 1 tab(s) orally once a day      PHYSICAL EXAM:      I/O Summary 24H    IN: 3098.7 mL / OUT: 3665 mL / NET: -566.3 mL        T(F): 97 (07-04-23 @ 14:59), Max: 97.8 (07-03-23 @ 17:10)  HR: 90 (07-04-23 @ 14:00) (16 - 123)  BP: --  BP(mean): --  ABP: 111/53 (07-04-23 @ 14:00) (100/47 - 188/64)  ABP(mean): 69 (07-04-23 @ 14:00) (61 - 101)  RR: 12 (07-04-23 @ 14:00) (12 - 23)  SpO2: 100% (07-04-23 @ 14:00) (91% - 100%)    GEN: Awake, comfortable. NAD.   HEENT: NCAT, PERRL, EOMI. Mucosa moist. No JVD.   RESP: CTA b/l  CV: RRR, normal s1/s2. No m/r/g.  ABD: Soft, NTND. BS+  EXT: Warm. No edema, clubbing, or cyanosis.   NEURO: AAOx3. No focal deficits.      	  LABS:	 	    Cardiac Markers             CBC 07-04-23 @ 08:59                        8.6    8.08  )-----------( 83                    24.8       Hgb trend: 8.6 <-- , 8.7 <-- , 7.8 <-- , 7.9 <-- , 8.8 <-- , 9.3 <-- , 9.9 <-- , 9.8 <-- , 8.5 <-- , 8.5 <-- , 7.9 <-- , 8.0 <--   WBC trend: 8.08 <-- , 8.98 <-- , 8.75 <-- , 9.83 <-- , 11.94 <-- , 13.17 <-- , 14.35 <-- , 14.25 <-- , 12.45 <-- , 11.18 <-- , 9.86 <-- , 8.39 <--     CMP 07-04-23 @ 08:59    131<L>  |  96  |  65<H>  ----------------------------<  164<H>  4.0   |  22  |  2.21<H>    Ca    8.6      07-04-23 @ 08:59  Phos  3.7     07-04  Mg     2.0     07-04    TPro  5.6<L>  /  Alb  3.5  /  TBili  1.5<H>  /  DBili  x   /  AST  22  /  ALT  <5<L>  /  AlkPhos  69  07-04      Serum Cr trend: 2.21 <-- , 2.24 <-- , 2.30 <-- , 2.26 <-- , 2.29 <-- , 2.22 <-- , 2.21 <-- , 2.09 <-- , 2.18 <-- , 2.16 <-- , 2.19 <-- , 2.08 <--   proBNP:   Lipid Profile:   HgA1c:   TSH:     TELEMETRY: 	    ECG:  	  RADIOLOGY:   ECHO:  STRESS:  CATH:

## 2023-07-04 NOTE — PROGRESS NOTE ADULT - ASSESSMENT
78 YO Iranian-speaking Male w/ PMHx of HTN and DM who originally presented to [facility] on 6/26 c/o chest pain x 3 months  now post-op day #4 s/p Replacement of aortic valve with tissue valve  hospital course complicated by cardiogenic shock, pneumothorax and CAMMIE  called to follow for non-oliguric acute on chronic renal failure

## 2023-07-04 NOTE — PROGRESS NOTE ADULT - SUBJECTIVE AND OBJECTIVE BOX
CTICU  CRITICAL  CARE  attending     Hand off received 					   Pertinent clinical, laboratory, radiographic, hemodynamic, echocardiographic, respiratory data, microbiologic data and chart were reviewed and analyzed frequently throughout the course of the day and night  Patient seen and examined with CTS/ SH attending at bedside    Pt is a 78y , Male, post op day # 4 s/p AVR (t); for AS/AI;    post op:    inotrope/pressor support  extubated initially   re-intubated for low flow state yesterday (POD # 3)    currently:    on full Vent support  inotrope support with dobutamine/primacor  pressor support with vasopressin  Acute post hemorrhagic anemia  CAMMIE on CKD      Acute on chronic renal failure        , FAMILY HISTORY:  PAST MEDICAL & SURGICAL HISTORY:  HTN (hypertension)      DM (diabetes mellitus)      Severe aortic regurgitation      History of cholecystectomy      H/O prostatectomy        Patient is a 78y old  Male who presents with a chief complaint of severe AS and AI (04 Jul 2023 13:42)      14 system review unable to assess  acute changes include acute respiratory failure  Vital signs, hemodynamic and respiratory parameters were reviewed from the bedside nursing flowsheet.  ICU Vital Signs Last 24 Hrs  T(C): 36.3 (04 Jul 2023 22:31), Max: 36.5 (04 Jul 2023 16:30)  T(F): 97.3 (04 Jul 2023 22:31), Max: 97.7 (04 Jul 2023 16:30)  HR: 85 (05 Jul 2023 00:00) (84 - 98)  BP: --  BP(mean): --  ABP: 123/61 (05 Jul 2023 00:00) (100/47 - 152/68)  ABP(mean): 78 (05 Jul 2023 00:00) (61 - 90)  RR: 14 (05 Jul 2023 00:00) (12 - 20)  SpO2: 100% (05 Jul 2023 00:00) (96% - 100%)    O2 Parameters below as of 05 Jul 2023 00:00  Patient On (Oxygen Delivery Method): ventilator    O2 Concentration (%): 60      Adult Advanced Hemodynamics Last 24 Hrs  CVP(mm Hg): 12 (05 Jul 2023 00:00) (5 - 302)  CVP(cm H2O): --  CO: 4.2 (04 Jul 2023 21:00) (3.8 - 6.6)  CI: 2.3 (04 Jul 2023 21:00) (2 - 3.6)  PA: 48/20 (05 Jul 2023 00:00) (43/21 - 61/26)  PA(mean): 30 (05 Jul 2023 00:00) (26 - 42)  PCWP: --  SVR: 1141 (04 Jul 2023 21:00) (690 - 1380)  SVRI: 2084 (04 Jul 2023 21:00) (1265 - 2530)  PVR: --  PVRI: --, ABG - ( 04 Jul 2023 22:13 )  pH, Arterial: 7.45  pH, Blood: x     /  pCO2: 35    /  pO2: 187   / HCO3: 24    / Base Excess: 0.7   /  SaO2: 99.5              Mode: AC/ CMV (Assist Control/ Continuous Mandatory Ventilation)  RR (machine): 14  TV (machine): 500  FiO2: 60  PEEP: 5  MAP: 9  PIP: 21    Intake and output was reviewed and the fluid balance was calculated  Daily     Daily   I&O's Summary    03 Jul 2023 07:01  -  04 Jul 2023 07:00  --------------------------------------------------------  IN: 3098.7 mL / OUT: 3665 mL / NET: -566.3 mL    04 Jul 2023 07:01  -  05 Jul 2023 01:01  --------------------------------------------------------  IN: 1254.4 mL / OUT: 2493 mL / NET: -1238.6 mL        All lines and drain sites were assessed  Glycemic trend was reviewedCAPILLARY BLOOD GLUCOSE      POCT Blood Glucose.: 77 mg/dL (05 Jul 2023 00:56)    No acute change in mental status  (+) Orotracheally intubated  Auscultation of the chest reveals equal bs  Abdomen is soft  Extremities are warm and well perfused  Wounds appear clean and unremarkable  Antibiotics are periop    labs  CBC Full  -  ( 04 Jul 2023 22:20 )  WBC Count : 8.82 K/uL  RBC Count : 2.78 M/uL  Hemoglobin : 9.0 g/dL  Hematocrit : 25.5 %  Platelet Count - Automated : 98 K/uL  Mean Cell Volume : 91.7 fl  Mean Cell Hemoglobin : 32.4 pg  Mean Cell Hemoglobin Concentration : 35.3 gm/dL  Auto Neutrophil # : x  Auto Lymphocyte # : x  Auto Monocyte # : x  Auto Eosinophil # : x  Auto Basophil # : x  Auto Neutrophil % : x  Auto Lymphocyte % : x  Auto Monocyte % : x  Auto Eosinophil % : x  Auto Basophil % : x    07-04    132<L>  |  96  |  68<H>  ----------------------------<  126<H>  4.0   |  24  |  2.27<H>    Ca    8.8      04 Jul 2023 22:20  Phos  3.7     07-04  Mg     2.0     07-04    TPro  6.1  /  Alb  3.7  /  TBili  1.5<H>  /  DBili  x   /  AST  21  /  ALT  <5<L>  /  AlkPhos  78  07-04    PT/INR - ( 04 Jul 2023 22:20 )   PT: 13.7 sec;   INR: 1.15          PTT - ( 04 Jul 2023 22:20 )  PTT:32.6 sec  The current medications were reviewed   MEDICATIONS  (STANDING):  aMIOdarone    Tablet   Oral   aMIOdarone    Tablet 400 milliGRAM(s) Oral every 8 hours  aspirin  chewable 81 milliGRAM(s) Enteral Tube daily  atorvastatin 40 milliGRAM(s) Oral at bedtime  buMETAnide Infusion 0.5 mG/Hr (2.5 mL/Hr) IV Continuous <Continuous>  chlorhexidine 0.12% Liquid 15 milliLiter(s) Oral Mucosa every 12 hours  chlorhexidine 2% Cloths 1 Application(s) Topical daily  dexMEDEtomidine Infusion 0.5 MICROgram(s)/kG/Hr (8.33 mL/Hr) IV Continuous <Continuous>  dextrose 5%. 1000 milliLiter(s) (50 mL/Hr) IV Continuous <Continuous>  dextrose 5%. 1000 milliLiter(s) (100 mL/Hr) IV Continuous <Continuous>  dextrose 50% Injectable 50 milliLiter(s) IV Push every 15 minutes  DOBUTamine Infusion 5 MICROgram(s)/kG/Min (9.99 mL/Hr) IV Continuous <Continuous>  glucagon  Injectable 1 milliGRAM(s) IntraMuscular once  heparin   Injectable 5000 Unit(s) SubCutaneous every 8 hours  insulin regular Infusion 1 Unit(s)/Hr (1 mL/Hr) IV Continuous <Continuous>  milrinone Infusion 0.125 MICROgram(s)/kG/Min (2.5 mL/Hr) IV Continuous <Continuous>  pantoprazole  Injectable 40 milliGRAM(s) IV Push daily  polyethylene glycol 3350 17 Gram(s) Oral daily  propofol Infusion 20 MICROgram(s)/kG/Min (7.99 mL/Hr) IV Continuous <Continuous>  senna 2 Tablet(s) Oral at bedtime  sodium chloride 0.9%. 1000 milliLiter(s) (10 mL/Hr) IV Continuous <Continuous>  vasopressin Infusion 0.01 Unit(s)/Min (1.5 mL/Hr) IV Continuous <Continuous>    MEDICATIONS  (PRN):  dextrose Oral Gel 15 Gram(s) Oral once PRN Blood Glucose LESS THAN 70 milliGRAM(s)/deciliter  fentaNYL    Injectable 25 MICROGram(s) IV Push every 2 hours PRN Severe Pain (7 - 10)       PROBLEM LIST/ ASSESSMENT:  HEALTH ISSUES - PROBLEM Dx:  Acute on chronic renal failure        ,   Patient is a 78y old  Male who presents with a chief complaint of severe AS and AI (04 Jul 2023 13:42)     s/p AVR (t)  acute changes include acute respiratory failure    My plan includes :    Titrate pressor support to maintain MAP >75-80  Titrate inotrope support to maintain CI >2.2/MVO2 >60  Trend lactate levels  Full Vent support  Titrate Fio2 to maintain Sao2 >95%  Serial ABGs  Trend H/H; transfuse if needed    close hemodynamic, ventilatory and drain monitoring and management per post op routine    Monitor for arrhythmias and monitor parameters for organ perfusion  monitor neurologic status  Head of the bed should remain elevated to 45 deg .   chest PT and IS will be encouraged  monitor adequacy of oxygenation and ventilation and attempt to wean oxygen  Nutritional goals will be met using po eventually , ensure adequate caloric intake and montior the same  Stress ulcer and VTE prophylaxis will be achieved    Glycemic control is satisfactory  Electrolytes have been repleted as necessary and wound care has been carried out. Pain control has been achieved.   agressive physical therapy and early mobility and ambulation goals will be met   The family was updated about the course and plan  CRITICAL CARE TIME SPENT in evaluation and management, reassessments, review and interpretation of labs and x-rays, ventilator and hemodynamic management, formulating a plan and coordinating care: ___30___ MIN.  Time does not include procedural time.  CTICU ATTENDING     					    Radu Gabriel MD

## 2023-07-04 NOTE — PROVIDER CONTACT NOTE (HYPOGLYCEMIA EVENT) - NS PROVIDER CONTACT BACKGROUND-HYPO
Age: 78y    Gender: Male    POCT Blood Glucose:  74 mg/dL (07-04-23 @ 15:07)  162 mg/dL (07-04-23 @ 13:11)  126 mg/dL (07-04-23 @ 11:18)  165 mg/dL (07-04-23 @ 09:12)  185 mg/dL (07-04-23 @ 07:42)  105 mg/dL (07-04-23 @ 05:02)  109 mg/dL (07-04-23 @ 03:05)  112 mg/dL (07-04-23 @ 01:53)      eMAR:atorvastatin   40 milliGRAM(s) Oral (07-03-23 @ 23:09)    insulin lispro (ADMELOG) corrective regimen sliding scale   4 Unit(s) SubCutaneous (07-03-23 @ 17:43)

## 2023-07-04 NOTE — PROGRESS NOTE ADULT - PROBLEM SELECTOR PLAN 1
diuresing well with IV Bumex with ~2.3L UO in past 24 hours  continue diuresis while volume overloaded  unclear what baseline Scret is but currently stable at 2.2

## 2023-07-04 NOTE — CONSULT NOTE ADULT - ATTENDING COMMENTS
79 yo man with seen now for CAMMIE; underlying CKD3; admitted 6/28 for severe AS s/p AVR 6/30, post-op course c/b cardiogenic shock, pneumothorax Cr 2.3    non oliguric, but UO not robust- needs volume off- per CTS team for intubation and inotrop's- was on bumex drip earlier, changed to bolus  dose  earlier today with drop in UO- prefer restart drip  If BP and UO don't improve with intubation/oxygenation may need to add aquapheresis in short term. May also need acute dialysis (CVVHD v. intermittent HD) if B/creat rise, or develops hyperkalemia or acidosis    reviewed with CTS team at bedside
-CHF - Acute systolic CHF post AVR c/b cardiogenic shock - Previously on Milrinone 0.25mcg and  5mcg, subsequently initiated on Vasopressin 0.08u to maintain MAP, now weaned to 0.04u; Pt. is clinically warm on exam, volume up; Need for Vasopressin likely 2/2 excessive vasodilation on  and Milrinone -> Plan would wean Milrinone to 0.125mcg which should allow weaning of Vasopressin, and c/w  5mcg - if repeat MV02, Lactate and end-organ function stable - would consider weaning off both Vasopressin and Milrinone, while c/w  5mcg; c/w Bumex gtt 0.5mg/hr  -Advanced HF will continue to follow    Fatmata Johnson MD
The patient was admitted for surgery on aortic valve with workup showing moderate aortic stenosis and severe aortic insufficiency. He has been Diabetic with current Hgb A1C 8.6%. He had chest pain for three months.  but he has no CAD by catheterization.. He had recently been on Jardiance-duo and in the past Glucovance. four of his 9 siblings have Diabetes mellitus. as well as his mother. Glucose levels were 158 last night and today 142-272. I agree with treating him with 10 units Lantus Insulin and 5 units pre-meal Lispro Insulin.

## 2023-07-05 LAB
ALBUMIN SERPL ELPH-MCNC: 3.5 G/DL — SIGNIFICANT CHANGE UP (ref 3.3–5)
ALBUMIN SERPL ELPH-MCNC: 3.5 G/DL — SIGNIFICANT CHANGE UP (ref 3.3–5)
ALBUMIN SERPL ELPH-MCNC: 3.6 G/DL — SIGNIFICANT CHANGE UP (ref 3.3–5)
ALBUMIN SERPL ELPH-MCNC: 3.6 G/DL — SIGNIFICANT CHANGE UP (ref 3.3–5)
ALP SERPL-CCNC: 84 U/L — SIGNIFICANT CHANGE UP (ref 40–120)
ALP SERPL-CCNC: 85 U/L — SIGNIFICANT CHANGE UP (ref 40–120)
ALP SERPL-CCNC: 90 U/L — SIGNIFICANT CHANGE UP (ref 40–120)
ALP SERPL-CCNC: 92 U/L — SIGNIFICANT CHANGE UP (ref 40–120)
ALT FLD-CCNC: <5 U/L — LOW (ref 10–45)
ANION GAP SERPL CALC-SCNC: 11 MMOL/L — SIGNIFICANT CHANGE UP (ref 5–17)
ANION GAP SERPL CALC-SCNC: 13 MMOL/L — SIGNIFICANT CHANGE UP (ref 5–17)
ANION GAP SERPL CALC-SCNC: 13 MMOL/L — SIGNIFICANT CHANGE UP (ref 5–17)
ANION GAP SERPL CALC-SCNC: 15 MMOL/L — SIGNIFICANT CHANGE UP (ref 5–17)
APTT BLD: 32.4 SEC — SIGNIFICANT CHANGE UP (ref 27.5–35.5)
APTT BLD: 32.5 SEC — SIGNIFICANT CHANGE UP (ref 27.5–35.5)
APTT BLD: 33.3 SEC — SIGNIFICANT CHANGE UP (ref 27.5–35.5)
APTT BLD: 33.7 SEC — SIGNIFICANT CHANGE UP (ref 27.5–35.5)
AST SERPL-CCNC: 18 U/L — SIGNIFICANT CHANGE UP (ref 10–40)
AST SERPL-CCNC: 18 U/L — SIGNIFICANT CHANGE UP (ref 10–40)
AST SERPL-CCNC: 19 U/L — SIGNIFICANT CHANGE UP (ref 10–40)
AST SERPL-CCNC: 20 U/L — SIGNIFICANT CHANGE UP (ref 10–40)
BASE EXCESS BLDV CALC-SCNC: 1.5 MMOL/L — SIGNIFICANT CHANGE UP (ref -2–3)
BASE EXCESS BLDV CALC-SCNC: 1.7 MMOL/L — SIGNIFICANT CHANGE UP (ref -2–3)
BASE EXCESS BLDV CALC-SCNC: 2 MMOL/L — SIGNIFICANT CHANGE UP (ref -2–3)
BASE EXCESS BLDV CALC-SCNC: 3.9 MMOL/L — HIGH (ref -2–3)
BASE EXCESS BLDV CALC-SCNC: 3.9 MMOL/L — HIGH (ref -2–3)
BASE EXCESS BLDV CALC-SCNC: 5 MMOL/L — HIGH (ref -2–3)
BILIRUB SERPL-MCNC: 1.8 MG/DL — HIGH (ref 0.2–1.2)
BILIRUB SERPL-MCNC: 1.9 MG/DL — HIGH (ref 0.2–1.2)
BUN SERPL-MCNC: 68 MG/DL — HIGH (ref 7–23)
BUN SERPL-MCNC: 71 MG/DL — HIGH (ref 7–23)
BUN SERPL-MCNC: 72 MG/DL — HIGH (ref 7–23)
BUN SERPL-MCNC: 72 MG/DL — HIGH (ref 7–23)
CALCIUM SERPL-MCNC: 8.7 MG/DL — SIGNIFICANT CHANGE UP (ref 8.4–10.5)
CALCIUM SERPL-MCNC: 8.8 MG/DL — SIGNIFICANT CHANGE UP (ref 8.4–10.5)
CALCIUM SERPL-MCNC: 9.1 MG/DL — SIGNIFICANT CHANGE UP (ref 8.4–10.5)
CALCIUM SERPL-MCNC: 9.1 MG/DL — SIGNIFICANT CHANGE UP (ref 8.4–10.5)
CHLORIDE SERPL-SCNC: 92 MMOL/L — LOW (ref 96–108)
CHLORIDE SERPL-SCNC: 93 MMOL/L — LOW (ref 96–108)
CHLORIDE SERPL-SCNC: 94 MMOL/L — LOW (ref 96–108)
CHLORIDE SERPL-SCNC: 94 MMOL/L — LOW (ref 96–108)
CK SERPL-CCNC: 38 U/L — SIGNIFICANT CHANGE UP (ref 30–200)
CO2 BLDV-SCNC: 27.9 MMOL/L — HIGH (ref 22–26)
CO2 BLDV-SCNC: 27.9 MMOL/L — HIGH (ref 22–26)
CO2 BLDV-SCNC: 28.7 MMOL/L — HIGH (ref 22–26)
CO2 BLDV-SCNC: 29.8 MMOL/L — HIGH (ref 22–26)
CO2 BLDV-SCNC: 31.3 MMOL/L — HIGH (ref 22–26)
CO2 BLDV-SCNC: 31.8 MMOL/L — HIGH (ref 22–26)
CO2 SERPL-SCNC: 24 MMOL/L — SIGNIFICANT CHANGE UP (ref 22–31)
CO2 SERPL-SCNC: 25 MMOL/L — SIGNIFICANT CHANGE UP (ref 22–31)
CO2 SERPL-SCNC: 26 MMOL/L — SIGNIFICANT CHANGE UP (ref 22–31)
CO2 SERPL-SCNC: 29 MMOL/L — SIGNIFICANT CHANGE UP (ref 22–31)
CREAT SERPL-MCNC: 2.21 MG/DL — HIGH (ref 0.5–1.3)
CREAT SERPL-MCNC: 2.28 MG/DL — HIGH (ref 0.5–1.3)
CREAT SERPL-MCNC: 2.33 MG/DL — HIGH (ref 0.5–1.3)
CREAT SERPL-MCNC: 2.39 MG/DL — HIGH (ref 0.5–1.3)
EGFR: 27 ML/MIN/1.73M2 — LOW
EGFR: 28 ML/MIN/1.73M2 — LOW
EGFR: 28 ML/MIN/1.73M2 — LOW
EGFR: 29 ML/MIN/1.73M2 — LOW
GAS PNL BLDA: SIGNIFICANT CHANGE UP
GAS PNL BLDV: SIGNIFICANT CHANGE UP
GLUCOSE BLDC GLUCOMTR-MCNC: 105 MG/DL — HIGH (ref 70–99)
GLUCOSE BLDC GLUCOMTR-MCNC: 107 MG/DL — HIGH (ref 70–99)
GLUCOSE BLDC GLUCOMTR-MCNC: 109 MG/DL — HIGH (ref 70–99)
GLUCOSE BLDC GLUCOMTR-MCNC: 110 MG/DL — HIGH (ref 70–99)
GLUCOSE BLDC GLUCOMTR-MCNC: 116 MG/DL — HIGH (ref 70–99)
GLUCOSE BLDC GLUCOMTR-MCNC: 116 MG/DL — HIGH (ref 70–99)
GLUCOSE BLDC GLUCOMTR-MCNC: 118 MG/DL — HIGH (ref 70–99)
GLUCOSE BLDC GLUCOMTR-MCNC: 123 MG/DL — HIGH (ref 70–99)
GLUCOSE BLDC GLUCOMTR-MCNC: 131 MG/DL — HIGH (ref 70–99)
GLUCOSE BLDC GLUCOMTR-MCNC: 131 MG/DL — HIGH (ref 70–99)
GLUCOSE BLDC GLUCOMTR-MCNC: 132 MG/DL — HIGH (ref 70–99)
GLUCOSE BLDC GLUCOMTR-MCNC: 136 MG/DL — HIGH (ref 70–99)
GLUCOSE BLDC GLUCOMTR-MCNC: 143 MG/DL — HIGH (ref 70–99)
GLUCOSE BLDC GLUCOMTR-MCNC: 163 MG/DL — HIGH (ref 70–99)
GLUCOSE BLDC GLUCOMTR-MCNC: 183 MG/DL — HIGH (ref 70–99)
GLUCOSE BLDC GLUCOMTR-MCNC: 196 MG/DL — HIGH (ref 70–99)
GLUCOSE BLDC GLUCOMTR-MCNC: 73 MG/DL — SIGNIFICANT CHANGE UP (ref 70–99)
GLUCOSE BLDC GLUCOMTR-MCNC: 77 MG/DL — SIGNIFICANT CHANGE UP (ref 70–99)
GLUCOSE BLDC GLUCOMTR-MCNC: 81 MG/DL — SIGNIFICANT CHANGE UP (ref 70–99)
GLUCOSE BLDC GLUCOMTR-MCNC: 85 MG/DL — SIGNIFICANT CHANGE UP (ref 70–99)
GLUCOSE BLDC GLUCOMTR-MCNC: 89 MG/DL — SIGNIFICANT CHANGE UP (ref 70–99)
GLUCOSE BLDC GLUCOMTR-MCNC: 91 MG/DL — SIGNIFICANT CHANGE UP (ref 70–99)
GLUCOSE SERPL-MCNC: 113 MG/DL — HIGH (ref 70–99)
GLUCOSE SERPL-MCNC: 117 MG/DL — HIGH (ref 70–99)
GLUCOSE SERPL-MCNC: 137 MG/DL — HIGH (ref 70–99)
GLUCOSE SERPL-MCNC: 188 MG/DL — HIGH (ref 70–99)
HCO3 BLDV-SCNC: 27 MMOL/L — SIGNIFICANT CHANGE UP (ref 22–29)
HCO3 BLDV-SCNC: 28 MMOL/L — SIGNIFICANT CHANGE UP (ref 22–29)
HCO3 BLDV-SCNC: 30 MMOL/L — HIGH (ref 22–29)
HCO3 BLDV-SCNC: 30 MMOL/L — HIGH (ref 22–29)
HCT VFR BLD CALC: 25.9 % — LOW (ref 39–50)
HCT VFR BLD CALC: 26.5 % — LOW (ref 39–50)
HCT VFR BLD CALC: 26.8 % — LOW (ref 39–50)
HCT VFR BLD CALC: 27.8 % — LOW (ref 39–50)
HGB BLD-MCNC: 8.8 G/DL — LOW (ref 13–17)
HGB BLD-MCNC: 9.5 G/DL — LOW (ref 13–17)
INR BLD: 1.13 — SIGNIFICANT CHANGE UP (ref 0.88–1.16)
INR BLD: 1.14 — SIGNIFICANT CHANGE UP (ref 0.88–1.16)
INR BLD: 1.15 — SIGNIFICANT CHANGE UP (ref 0.88–1.16)
INR BLD: 1.17 — HIGH (ref 0.88–1.16)
LACTATE SERPL-SCNC: 0.6 MMOL/L — SIGNIFICANT CHANGE UP (ref 0.5–2)
LACTATE SERPL-SCNC: 0.7 MMOL/L — SIGNIFICANT CHANGE UP (ref 0.5–2)
LACTATE SERPL-SCNC: 0.8 MMOL/L — SIGNIFICANT CHANGE UP (ref 0.5–2)
LACTATE SERPL-SCNC: 0.8 MMOL/L — SIGNIFICANT CHANGE UP (ref 0.5–2)
MAGNESIUM SERPL-MCNC: 2 MG/DL — SIGNIFICANT CHANGE UP (ref 1.6–2.6)
MCHC RBC-ENTMCNC: 31.8 PG — SIGNIFICANT CHANGE UP (ref 27–34)
MCHC RBC-ENTMCNC: 31.8 PG — SIGNIFICANT CHANGE UP (ref 27–34)
MCHC RBC-ENTMCNC: 32.8 PG — SIGNIFICANT CHANGE UP (ref 27–34)
MCHC RBC-ENTMCNC: 33 PG — SIGNIFICANT CHANGE UP (ref 27–34)
MCHC RBC-ENTMCNC: 34 GM/DL — SIGNIFICANT CHANGE UP (ref 32–36)
MCHC RBC-ENTMCNC: 34.2 GM/DL — SIGNIFICANT CHANGE UP (ref 32–36)
MCHC RBC-ENTMCNC: 35.4 GM/DL — SIGNIFICANT CHANGE UP (ref 32–36)
MCHC RBC-ENTMCNC: 35.8 GM/DL — SIGNIFICANT CHANGE UP (ref 32–36)
MCV RBC AUTO: 92 FL — SIGNIFICANT CHANGE UP (ref 80–100)
MCV RBC AUTO: 92.4 FL — SIGNIFICANT CHANGE UP (ref 80–100)
MCV RBC AUTO: 93 FL — SIGNIFICANT CHANGE UP (ref 80–100)
MCV RBC AUTO: 93.5 FL — SIGNIFICANT CHANGE UP (ref 80–100)
NRBC # BLD: 0 /100 WBCS — SIGNIFICANT CHANGE UP (ref 0–0)
PCO2 BLDV: 42 MMHG — SIGNIFICANT CHANGE UP (ref 42–55)
PCO2 BLDV: 42 MMHG — SIGNIFICANT CHANGE UP (ref 42–55)
PCO2 BLDV: 43 MMHG — SIGNIFICANT CHANGE UP (ref 42–55)
PCO2 BLDV: 44 MMHG — SIGNIFICANT CHANGE UP (ref 42–55)
PCO2 BLDV: 44 MMHG — SIGNIFICANT CHANGE UP (ref 42–55)
PCO2 BLDV: 51 MMHG — SIGNIFICANT CHANGE UP (ref 42–55)
PH BLDV: 7.38 — SIGNIFICANT CHANGE UP (ref 7.32–7.43)
PH BLDV: 7.4 — SIGNIFICANT CHANGE UP (ref 7.32–7.43)
PH BLDV: 7.4 — SIGNIFICANT CHANGE UP (ref 7.32–7.43)
PH BLDV: 7.41 — SIGNIFICANT CHANGE UP (ref 7.32–7.43)
PH BLDV: 7.44 — HIGH (ref 7.32–7.43)
PH BLDV: 7.44 — HIGH (ref 7.32–7.43)
PHOSPHATE SERPL-MCNC: 4.3 MG/DL — SIGNIFICANT CHANGE UP (ref 2.5–4.5)
PHOSPHATE SERPL-MCNC: 4.5 MG/DL — SIGNIFICANT CHANGE UP (ref 2.5–4.5)
PHOSPHATE SERPL-MCNC: 4.8 MG/DL — HIGH (ref 2.5–4.5)
PHOSPHATE SERPL-MCNC: 4.8 MG/DL — HIGH (ref 2.5–4.5)
PLATELET # BLD AUTO: 102 K/UL — LOW (ref 150–400)
PLATELET # BLD AUTO: 104 K/UL — LOW (ref 150–400)
PO2 BLDV: 34 MMHG — SIGNIFICANT CHANGE UP (ref 25–45)
PO2 BLDV: 34 MMHG — SIGNIFICANT CHANGE UP (ref 25–45)
PO2 BLDV: 42 MMHG — SIGNIFICANT CHANGE UP (ref 25–45)
PO2 BLDV: 42 MMHG — SIGNIFICANT CHANGE UP (ref 25–45)
PO2 BLDV: 43 MMHG — SIGNIFICANT CHANGE UP (ref 25–45)
PO2 BLDV: 44 MMHG — SIGNIFICANT CHANGE UP (ref 25–45)
POTASSIUM SERPL-MCNC: 3.6 MMOL/L — SIGNIFICANT CHANGE UP (ref 3.5–5.3)
POTASSIUM SERPL-MCNC: 3.7 MMOL/L — SIGNIFICANT CHANGE UP (ref 3.5–5.3)
POTASSIUM SERPL-MCNC: 3.8 MMOL/L — SIGNIFICANT CHANGE UP (ref 3.5–5.3)
POTASSIUM SERPL-MCNC: 3.9 MMOL/L — SIGNIFICANT CHANGE UP (ref 3.5–5.3)
POTASSIUM SERPL-SCNC: 3.6 MMOL/L — SIGNIFICANT CHANGE UP (ref 3.5–5.3)
POTASSIUM SERPL-SCNC: 3.7 MMOL/L — SIGNIFICANT CHANGE UP (ref 3.5–5.3)
POTASSIUM SERPL-SCNC: 3.8 MMOL/L — SIGNIFICANT CHANGE UP (ref 3.5–5.3)
POTASSIUM SERPL-SCNC: 3.9 MMOL/L — SIGNIFICANT CHANGE UP (ref 3.5–5.3)
PROT SERPL-MCNC: 6.1 G/DL — SIGNIFICANT CHANGE UP (ref 6–8.3)
PROT SERPL-MCNC: 6.2 G/DL — SIGNIFICANT CHANGE UP (ref 6–8.3)
PROTHROM AB SERPL-ACNC: 13.5 SEC — HIGH (ref 10.5–13.4)
PROTHROM AB SERPL-ACNC: 13.6 SEC — HIGH (ref 10.5–13.4)
PROTHROM AB SERPL-ACNC: 13.7 SEC — HIGH (ref 10.5–13.4)
PROTHROM AB SERPL-ACNC: 14 SEC — HIGH (ref 10.5–13.4)
RBC # BLD: 2.77 M/UL — LOW (ref 4.2–5.8)
RBC # BLD: 2.88 M/UL — LOW (ref 4.2–5.8)
RBC # BLD: 2.9 M/UL — LOW (ref 4.2–5.8)
RBC # BLD: 2.99 M/UL — LOW (ref 4.2–5.8)
RBC # FLD: 14.4 % — SIGNIFICANT CHANGE UP (ref 10.3–14.5)
RBC # FLD: 14.5 % — SIGNIFICANT CHANGE UP (ref 10.3–14.5)
RBC # FLD: 14.5 % — SIGNIFICANT CHANGE UP (ref 10.3–14.5)
RBC # FLD: 14.6 % — HIGH (ref 10.3–14.5)
SAO2 % BLDV: 52.2 % — LOW (ref 67–88)
SAO2 % BLDV: 53.3 % — LOW (ref 67–88)
SAO2 % BLDV: 65.9 % — LOW (ref 67–88)
SAO2 % BLDV: 68.2 % — SIGNIFICANT CHANGE UP (ref 67–88)
SAO2 % BLDV: 70 % — SIGNIFICANT CHANGE UP (ref 67–88)
SAO2 % BLDV: 72.8 % — SIGNIFICANT CHANGE UP (ref 67–88)
SODIUM SERPL-SCNC: 131 MMOL/L — LOW (ref 135–145)
SODIUM SERPL-SCNC: 131 MMOL/L — LOW (ref 135–145)
SODIUM SERPL-SCNC: 133 MMOL/L — LOW (ref 135–145)
SODIUM SERPL-SCNC: 134 MMOL/L — LOW (ref 135–145)
WBC # BLD: 10.03 K/UL — SIGNIFICANT CHANGE UP (ref 3.8–10.5)
WBC # BLD: 11.29 K/UL — HIGH (ref 3.8–10.5)
WBC # BLD: 11.44 K/UL — HIGH (ref 3.8–10.5)
WBC # BLD: 9.34 K/UL — SIGNIFICANT CHANGE UP (ref 3.8–10.5)
WBC # FLD AUTO: 10.03 K/UL — SIGNIFICANT CHANGE UP (ref 3.8–10.5)
WBC # FLD AUTO: 11.29 K/UL — HIGH (ref 3.8–10.5)
WBC # FLD AUTO: 11.44 K/UL — HIGH (ref 3.8–10.5)
WBC # FLD AUTO: 9.34 K/UL — SIGNIFICANT CHANGE UP (ref 3.8–10.5)

## 2023-07-05 PROCEDURE — 99232 SBSQ HOSP IP/OBS MODERATE 35: CPT | Mod: GC

## 2023-07-05 PROCEDURE — 93306 TTE W/DOPPLER COMPLETE: CPT | Mod: 26

## 2023-07-05 PROCEDURE — 71045 X-RAY EXAM CHEST 1 VIEW: CPT | Mod: 26

## 2023-07-05 PROCEDURE — 99292 CRITICAL CARE ADDL 30 MIN: CPT

## 2023-07-05 PROCEDURE — 99291 CRITICAL CARE FIRST HOUR: CPT

## 2023-07-05 PROCEDURE — 71045 X-RAY EXAM CHEST 1 VIEW: CPT | Mod: 26,77

## 2023-07-05 PROCEDURE — 99232 SBSQ HOSP IP/OBS MODERATE 35: CPT

## 2023-07-05 RX ORDER — POTASSIUM CHLORIDE 20 MEQ
20 PACKET (EA) ORAL ONCE
Refills: 0 | Status: COMPLETED | OUTPATIENT
Start: 2023-07-05 | End: 2023-07-05

## 2023-07-05 RX ORDER — CALCIUM GLUCONATE 100 MG/ML
2 VIAL (ML) INTRAVENOUS ONCE
Refills: 0 | Status: COMPLETED | OUTPATIENT
Start: 2023-07-05 | End: 2023-07-05

## 2023-07-05 RX ORDER — AMIODARONE HYDROCHLORIDE 400 MG/1
400 TABLET ORAL EVERY 8 HOURS
Refills: 0 | Status: COMPLETED | OUTPATIENT
Start: 2023-07-05 | End: 2023-07-06

## 2023-07-05 RX ORDER — AMIODARONE HYDROCHLORIDE 400 MG/1
400 TABLET ORAL
Refills: 0 | Status: DISCONTINUED | OUTPATIENT
Start: 2023-07-05 | End: 2023-07-14

## 2023-07-05 RX ORDER — MILRINONE LACTATE 1 MG/ML
0.12 INJECTION, SOLUTION INTRAVENOUS
Qty: 20 | Refills: 0 | Status: DISCONTINUED | OUTPATIENT
Start: 2023-07-05 | End: 2023-07-06

## 2023-07-05 RX ORDER — IPRATROPIUM/ALBUTEROL SULFATE 18-103MCG
3 AEROSOL WITH ADAPTER (GRAM) INHALATION ONCE
Refills: 0 | Status: DISCONTINUED | OUTPATIENT
Start: 2023-07-05 | End: 2023-07-06

## 2023-07-05 RX ORDER — INSULIN GLARGINE 100 [IU]/ML
12 INJECTION, SOLUTION SUBCUTANEOUS AT BEDTIME
Refills: 0 | Status: DISCONTINUED | OUTPATIENT
Start: 2023-07-05 | End: 2023-07-05

## 2023-07-05 RX ORDER — INSULIN LISPRO 100/ML
VIAL (ML) SUBCUTANEOUS
Refills: 0 | Status: DISCONTINUED | OUTPATIENT
Start: 2023-07-05 | End: 2023-07-05

## 2023-07-05 RX ORDER — AMIODARONE HYDROCHLORIDE 400 MG/1
200 TABLET ORAL DAILY
Refills: 0 | Status: DISCONTINUED | OUTPATIENT
Start: 2023-07-06 | End: 2023-07-14

## 2023-07-05 RX ADMIN — BUMETANIDE 2.5 MG/HR: 0.25 INJECTION INTRAMUSCULAR; INTRAVENOUS at 10:52

## 2023-07-05 RX ADMIN — VASOPRESSIN 1.5 UNIT(S)/MIN: 20 INJECTION INTRAVENOUS at 13:12

## 2023-07-05 RX ADMIN — HEPARIN SODIUM 5000 UNIT(S): 5000 INJECTION INTRAVENOUS; SUBCUTANEOUS at 05:20

## 2023-07-05 RX ADMIN — CHLORHEXIDINE GLUCONATE 15 MILLILITER(S): 213 SOLUTION TOPICAL at 05:20

## 2023-07-05 RX ADMIN — HEPARIN SODIUM 5000 UNIT(S): 5000 INJECTION INTRAVENOUS; SUBCUTANEOUS at 22:55

## 2023-07-05 RX ADMIN — PANTOPRAZOLE SODIUM 40 MILLIGRAM(S): 20 TABLET, DELAYED RELEASE ORAL at 13:01

## 2023-07-05 RX ADMIN — Medication 100 MILLIEQUIVALENT(S): at 12:59

## 2023-07-05 RX ADMIN — DEXMEDETOMIDINE HYDROCHLORIDE IN 0.9% SODIUM CHLORIDE 8.33 MICROGRAM(S)/KG/HR: 4 INJECTION INTRAVENOUS at 09:50

## 2023-07-05 RX ADMIN — DEXMEDETOMIDINE HYDROCHLORIDE IN 0.9% SODIUM CHLORIDE 8.33 MICROGRAM(S)/KG/HR: 4 INJECTION INTRAVENOUS at 02:31

## 2023-07-05 RX ADMIN — HEPARIN SODIUM 5000 UNIT(S): 5000 INJECTION INTRAVENOUS; SUBCUTANEOUS at 13:00

## 2023-07-05 RX ADMIN — CHLORHEXIDINE GLUCONATE 1 APPLICATION(S): 213 SOLUTION TOPICAL at 05:20

## 2023-07-05 RX ADMIN — VASOPRESSIN 1.5 UNIT(S)/MIN: 20 INJECTION INTRAVENOUS at 02:31

## 2023-07-05 RX ADMIN — AMIODARONE HYDROCHLORIDE 400 MILLIGRAM(S): 400 TABLET ORAL at 05:20

## 2023-07-05 NOTE — PROGRESS NOTE ADULT - SUBJECTIVE AND OBJECTIVE BOX
Patient is a 78y Male seen and evaluated at bedside.       Meds:    aMIOdarone    Tablet 400 every 8 hours  aMIOdarone    Tablet 400   aspirin  chewable 81 daily  atorvastatin 40 at bedtime  buMETAnide Infusion 0.5 <Continuous>  chlorhexidine 0.12% Liquid 15 every 12 hours  chlorhexidine 2% Cloths 1 daily  dexMEDEtomidine Infusion 0.5 <Continuous>  dextrose 5%. 1000 <Continuous>  dextrose 5%. 1000 <Continuous>  dextrose 50% Injectable 50 every 15 minutes  dextrose Oral Gel 15 once PRN  DOBUTamine Infusion 5 <Continuous>  fentaNYL    Injectable 25 every 2 hours PRN  glucagon  Injectable 1 once  heparin   Injectable 5000 every 8 hours  insulin regular Infusion 1 <Continuous>  milrinone Infusion 0.125 <Continuous>  pantoprazole  Injectable 40 daily  polyethylene glycol 3350 17 daily  senna 2 at bedtime  sodium chloride 0.9%. 1000 <Continuous>  vasopressin Infusion 0.01 <Continuous>      T(C): , Max: 37.3 (07-05-23 @ 01:02)  T(F): , Max: 99.1 (07-05-23 @ 01:02)  HR: 76 (07-05-23 @ 10:00)  BP: --  BP(mean): --  RR: 14 (07-05-23 @ 10:00)  SpO2: 99% (07-05-23 @ 10:00)  Wt(kg): --    07-04 @ 07:01  -  07-05 @ 07:00  --------------------------------------------------------  IN: 1987.5 mL / OUT: 3893 mL / NET: -1905.5 mL    07-05 @ 07:01  -  07-05 @ 10:28  --------------------------------------------------------  IN: 264.3 mL / OUT: 502 mL / NET: -237.7 mL          Review of Systems:  ROS negative except as per HPI      PHYSICAL EXAM:  GENERAL: NAD  NECK: supple, No JVD  CHEST/LUNG: Clear to auscultation bilaterally  HEART: normal S1S2, RRR  ABDOMEN: Soft, Nontender, +BS, No flank tenderness bilateral  EXTREMITIES: No clubbing, cyanosis, or edema   NEUROLOGY: AAO x3, no focal neurological deficit  ACCESS: good thrill and bruit appreciated      LABS:                        9.5    10.03 )-----------( 102      ( 05 Jul 2023 03:23 )             26.8     07-05    131<L>  |  92<L>  |  68<H>  ----------------------------<  137<H>  3.9   |  24  |  2.39<H>    Ca    9.1      05 Jul 2023 03:24  Phos  4.8     07-05  Mg     2.0     07-05    TPro  6.1  /  Alb  3.6  /  TBili  1.8<H>  /  DBili  x   /  AST  20  /  ALT  <5<L>  /  AlkPhos  84  07-05      PT/INR - ( 05 Jul 2023 03:23 )   PT: 13.5 sec;   INR: 1.13          PTT - ( 05 Jul 2023 03:23 )  PTT:32.5 sec  Urinalysis Basic - ( 05 Jul 2023 03:24 )    Color: x / Appearance: x / SG: x / pH: x  Gluc: 137 mg/dL / Ketone: x  / Bili: x / Urobili: x   Blood: x / Protein: x / Nitrite: x   Leuk Esterase: x / RBC: x / WBC x   Sq Epi: x / Non Sq Epi: x / Bacteria: x      Sodium, Random Urine: 69 mmol/L (07-03 @ 18:07)  Creatinine, Random Urine: 46 mg/dL (07-03 @ 18:07)  Protein/Creatinine Ratio Calculation: 0.6 Ratio (07-03 @ 18:07)  Osmolality, Random Urine: 310 mosm/kg (07-03 @ 18:07)  Potassium, Random Urine: 47 mmol/L (07-03 @ 18:07)        RADIOLOGY & ADDITIONAL STUDIES:           Patient is a 78y Male seen and evaluated at bedside.       Meds:    aMIOdarone    Tablet 400 every 8 hours  aMIOdarone    Tablet 400   aspirin  chewable 81 daily  atorvastatin 40 at bedtime  buMETAnide Infusion 0.5 <Continuous>  chlorhexidine 0.12% Liquid 15 every 12 hours  chlorhexidine 2% Cloths 1 daily  dexMEDEtomidine Infusion 0.5 <Continuous>  dextrose 5%. 1000 <Continuous>  dextrose 5%. 1000 <Continuous>  dextrose 50% Injectable 50 every 15 minutes  dextrose Oral Gel 15 once PRN  DOBUTamine Infusion 5 <Continuous>  fentaNYL    Injectable 25 every 2 hours PRN  glucagon  Injectable 1 once  heparin   Injectable 5000 every 8 hours  insulin regular Infusion 1 <Continuous>  milrinone Infusion 0.125 <Continuous>  pantoprazole  Injectable 40 daily  polyethylene glycol 3350 17 daily  senna 2 at bedtime  sodium chloride 0.9%. 1000 <Continuous>  vasopressin Infusion 0.01 <Continuous>      T(C): , Max: 37.3 (07-05-23 @ 01:02)  T(F): , Max: 99.1 (07-05-23 @ 01:02)  HR: 76 (07-05-23 @ 10:00)  BP: --  BP(mean): --  RR: 14 (07-05-23 @ 10:00)  SpO2: 99% (07-05-23 @ 10:00)  Wt(kg): --    07-04 @ 07:01  -  07-05 @ 07:00  --------------------------------------------------------  IN: 1987.5 mL / OUT: 3893 mL / NET: -1905.5 mL    07-05 @ 07:01  -  07-05 @ 10:28  --------------------------------------------------------  IN: 264.3 mL / OUT: 502 mL / NET: -237.7 mL          Review of Systems:  Unable to obtain    PHYSICAL EXAM:  GENERAL: Alert, awake, mod distress due to SOB  CHEST/LUNG: Bilateral clear breath sounds  HEART: S1, S2  ABDOMEN: Soft, nontender, non distended  EXTREMITIES: trace dependent edema  Neurology: Awake, oriented      LABS:                        9.5    10.03 )-----------( 102      ( 05 Jul 2023 03:23 )             26.8     07-05    131<L>  |  92<L>  |  68<H>  ----------------------------<  137<H>  3.9   |  24  |  2.39<H>    Ca    9.1      05 Jul 2023 03:24  Phos  4.8     07-05  Mg     2.0     07-05    TPro  6.1  /  Alb  3.6  /  TBili  1.8<H>  /  DBili  x   /  AST  20  /  ALT  <5<L>  /  AlkPhos  84  07-05      PT/INR - ( 05 Jul 2023 03:23 )   PT: 13.5 sec;   INR: 1.13          PTT - ( 05 Jul 2023 03:23 )  PTT:32.5 sec  Urinalysis Basic - ( 05 Jul 2023 03:24 )    Color: x / Appearance: x / SG: x / pH: x  Gluc: 137 mg/dL / Ketone: x  / Bili: x / Urobili: x   Blood: x / Protein: x / Nitrite: x   Leuk Esterase: x / RBC: x / WBC x   Sq Epi: x / Non Sq Epi: x / Bacteria: x      Sodium, Random Urine: 69 mmol/L (07-03 @ 18:07)  Creatinine, Random Urine: 46 mg/dL (07-03 @ 18:07)  Protein/Creatinine Ratio Calculation: 0.6 Ratio (07-03 @ 18:07)  Osmolality, Random Urine: 310 mosm/kg (07-03 @ 18:07)  Potassium, Random Urine: 47 mmol/L (07-03 @ 18:07)        RADIOLOGY & ADDITIONAL STUDIES:

## 2023-07-05 NOTE — PROGRESS NOTE ADULT - ASSESSMENT
77 yo M w/ CKD3 admitted 6/28 for severe AS s/p AVR 6/30, post-op course c/b cardiogenic shock, pneumothorax and CAMMIE. Nephrology consulted 7/3 for CAMMIE Cr 2.3 and vol overload, s/p diuresis w/ bumex gtt and net neg 2.4L/48 hours, now pt euvolemic, bumex turned off, Cr stable.    #Non-oliguric CAMMIE on CKD3  BCr not known, admitted w/ Cr 1.4, since then gradual rise, now plateaued, at 2.39 today  UA 7/3 w/ proteinuria (UPCR 0.5), hematuria (not present on admission)  Dede 69 7/3 high due to diuretics  Fluid status: s/p bumex started 7/1-7/5, net neg 1.9L/24 hours, net neg 2.4L/48 hours, bumex held 7/5 AM   Etiology likely Cardiorenal syndrome i/s/o cardiogenic shock. Hematuria may be due to epstein    Recommend:   Vol status acceptable. Cr has been stable. Aim for net even to mild net neg fluid balance  Lytes noted. Ensure drips composition in NS instead of D5  Maintain MAP>70 for adequate renal perfusion  strict I/Os           Efrain Ferris M.D.  PGY 4 - Nephrology Fellow  [phone]

## 2023-07-05 NOTE — PROGRESS NOTE ADULT - ATTENDING COMMENTS
Pt seen on rounds this afternoon and interim events reviewed.  Developed cardiogenic shock on the evening of 7/3, was intubated and started on pressors.  Was also started on an insulin drip, which is still running at doses of 1-2 U/hr, surprisingly low given that he has also been started on tube feeds which have been at goal rate since yesterday.  The feeds were stopped late this morning in anticipation of extubation, which was carried out just prior to our visit  The pt is awake, answering questions.  Voice is still quite hoarse.  Lungs with scattered coarse rhonchi (but good air entry at the base) on the right, somewhat decreased air entry at the left base.  The plan is to start him back on a PO diet if he passes Speech/Swallow evaluation.  Will restart the Lantus, but only at 12 units hs given his rather low requirements on the drip.  Will not start any standing pre-meal lispro for a PO diet until adequacy of intake is assessed  If tube feeds restarted will add regular insulin (probably 3 units q6h) to cover the feeds while continuing the Lantus

## 2023-07-05 NOTE — PROGRESS NOTE ADULT - SUBJECTIVE AND OBJECTIVE BOX
SUBJECTIVE / INTERVAL HPI: Patient was seen and examined this morning. The patient has been intubated and on vasopressors for management of cardiogenic shock. He was started on tube feeds yesterday at 1PM with Nepro at 10 mL/hr, later increased to 20 mL/hr at 8 PM, then to 30 mL/hr at 10PM and finally, 40 mL/hr at 2 AM. Tube feeds were stopped earlier this morning at ~11 AM. He was kept on an insulin infusion for tight glycemic control. Glucose levels have remained mostly within target range.     CAPILLARY BLOOD GLUCOSE & INSULIN RECEIVED  184 mg/dL (07-04 @ 19:08) ? Insulin infusion at 3 units/hr.   190 mg/dL (07-04 @ 19:58) ? Insulin infusion at 3 units/hr.   169 mg/dL (07-04 @ 21:01) ? Insulin infusion at 3 units/hr.   131 mg/dL (07-04 @ 22:04) ? Insulin infusion at 3 units/hr.   96 mg/dL (07-04 @ 23:06) ? Insulin infusion at 2 units/hr.   73 mg/dL (07-05 @ 00:02) ? insulin infusion stopped.   77 mg/dL (07-05 @ 00:56) ? Ø  131 mg/dL (07-05 @ 02:26) ? Ø  118 mg/dL (07-05 @ 04:00) ? Insulin infusion restarted at 2 units/hr.   107 mg/dL (07-05 @ 05:12) ? Insulin infusion at 2 units/hr.   89 mg/dL (07-05 @ 05:56) ? Insulin infusion at 1.5 units/hr.   81 mg/dL (07-05 @ 07:07) ? Insulin infusion at 1 unit/hr.   85 mg/dL (07-05 @ 07:56) ? Insulin infusion at 1 unit/hr.   91 mg/dL (07-05 @ 08:27) ? Insulin infusion at 1 unit/hr.   123 mg/dL (07-05 @ 09:29) ? Insulin infusion at 1 unit/hr.   163 mg/dL (07-05 @ 10:28) ? Insulin infusion at 1.5 units/hr.   183 mg/dL (07-05 @ 11:24) ? Insulin infusion at 2 units/hr.     REVIEW OF SYSTEMS  Unable to obtain.     PHYSICAL EXAM  Vital Signs Last 24 Hrs  T(C): 36.1 (05 Jul 2023 10:04), Max: 37.3 (05 Jul 2023 01:02)  T(F): 97 (05 Jul 2023 10:04), Max: 99.1 (05 Jul 2023 01:02)  HR: 74 (05 Jul 2023 12:05) (72 - 94)  BP: --  BP(mean): --  RR: 16 (05 Jul 2023 12:05) (12 - 20)  SpO2: 97% (05 Jul 2023 12:05) (97% - 100%)    Parameters below as of 05 Jul 2023 12:05  Patient On (Oxygen Delivery Method): ventilator    O2 Concentration (%): 40    Constitutional: Awake, alert, in no acute distress.   HEENT: Normocephalic, atraumatic, SCARLETT.  Respiratory: Lungs clear to ausculation bilaterally.   Cardiovascular: regular rhythm, normal S1 and S2, no audible murmurs.   GI: soft, non-tender, non-distended, bowel sounds present.  Extremities: No lower extremity edema.  Psychiatric: AAO x 3. Normal affect/mood.     LABS  CBC - WBC/HGB/HTC/PLT: 9.34/9.5/27.8/104 (07-05-23)  BMP - Na/K/Cl/Bicarb/BUN/Cr/Gluc/AG/eGFR: 131/3.6/93/25/71/2.28/188/13/28 (07-05-23)  Ca - 8.8 (07-05-23)  Phos - 4.5 (07-05-23)  Mg - 2.0 (07-05-23)  LFT - Alb/Tprot/Tbili/Dbili/AlkPhos/ALT/AST: 3.5/--/1.8/--/85/<5/18 (07-05-23)  PT/aPTT/INR: 14.0/33.3/1.17 (07-05-23)   Thyroid Stimulating Hormone, Serum: 1.440 (06-28-23)    MEDICATIONS  MEDICATIONS  (STANDING):  albuterol/ipratropium for Nebulization 3 milliLiter(s) Nebulizer once  aMIOdarone    Tablet 400 milliGRAM(s) Oral every 8 hours  aMIOdarone    Tablet 400  Oral   aspirin  chewable 81 milliGRAM(s) Enteral Tube daily  atorvastatin 40 milliGRAM(s) Oral at bedtime  chlorhexidine 0.12% Liquid 15 milliLiter(s) Oral Mucosa every 12 hours  chlorhexidine 2% Cloths 1 Application(s) Topical daily  dexMEDEtomidine Infusion 0.5 MICROgram(s)/kG/Hr (8.33 mL/Hr) IV Continuous <Continuous>  dextrose 5%. 1000 milliLiter(s) (100 mL/Hr) IV Continuous <Continuous>  dextrose 5%. 1000 milliLiter(s) (50 mL/Hr) IV Continuous <Continuous>  dextrose 50% Injectable 50 milliLiter(s) IV Push every 15 minutes  DOBUTamine Infusion 4 MICROgram(s)/kG/Min (7.99 mL/Hr) IV Continuous <Continuous>  glucagon  Injectable 1 milliGRAM(s) IntraMuscular once  heparin   Injectable 5000 Unit(s) SubCutaneous every 8 hours  insulin regular Infusion 1 Unit(s)/Hr (1 mL/Hr) IV Continuous <Continuous>  pantoprazole  Injectable 40 milliGRAM(s) IV Push daily  polyethylene glycol 3350 17 Gram(s) Oral daily  potassium chloride  20 mEq/100 mL IVPB 20 milliEquivalent(s) IV Intermittent once  senna 2 Tablet(s) Oral at bedtime  sodium chloride 0.9%. 1000 milliLiter(s) (10 mL/Hr) IV Continuous <Continuous>  vasopressin Infusion 0.01 Unit(s)/Min (1.5 mL/Hr) IV Continuous <Continuous>    MEDICATIONS  (PRN):  dextrose Oral Gel 15 Gram(s) Oral once PRN Blood Glucose LESS THAN 70 milliGRAM(s)/deciliter  fentaNYL    Injectable 25 MICROGram(s) IV Push every 2 hours PRN Severe Pain (7 - 10)    ASSESSMENT / RECOMMENDATIONS  Mr. Johnson is a 78-year-old male with type 2 diabetes mellitus, hypertension and severe aortic stenosis who was transferred to [facility] for further work-up and intervention of severe aortic stenosis, now s/p aortic valve replacement (6/30/23). Hospitalization complicated by cardiogenic shock. Endocrinology was consulted for recommendations regarding diabetes management.     A1C: 8.6 %  BUN: 71  Creatinine: 2.28  GFR: 28  Weight: 66.6  BMI: 21.5  EF: 10-15%    # Type 2 diabetes mellitus with hyperglycemia  - Please continue lantus *** units at bedtime.   - Continue lispro *** units before each meal.  - Continue lispro moderate / low dose sliding scale before meals and at bedtime.  - Patient's fingerstick glucose goal is 100-180 mg/dL.    - Discharge recommendations to be discussed.   - Patient can follow up at discharge with [facility] by calling [phone] to make an appointment.      Case discussed with Dr. Caicedo. Primary team updated.       Milton Laura    Endocrinology Fellow    Service Pager: [phone]    SUBJECTIVE / INTERVAL HPI: Patient was seen and examined this morning. The patient has been intubated and on vasopressors for management of cardiogenic shock. He was started on tube feeds yesterday at 1PM with Nepro at 10 mL/hr, later increased to 20 mL/hr at 8 PM, then to 30 mL/hr at 10PM and finally, 40 mL/hr at 2 AM. Tube feeds were stopped earlier this morning at ~11 AM with plans to extubate later today. He was kept on an insulin infusion for tight glycemic control. Glucose levels have remained mostly within target range.     CAPILLARY BLOOD GLUCOSE & INSULIN RECEIVED  184 mg/dL (07-04 @ 19:08) ? Insulin infusion at 3 units/hr.   190 mg/dL (07-04 @ 19:58) ? Insulin infusion at 3 units/hr.   169 mg/dL (07-04 @ 21:01) ? Insulin infusion at 3 units/hr.   131 mg/dL (07-04 @ 22:04) ? Insulin infusion at 3 units/hr.   96 mg/dL (07-04 @ 23:06) ? Insulin infusion at 2 units/hr.   73 mg/dL (07-05 @ 00:02) ? insulin infusion stopped.   77 mg/dL (07-05 @ 00:56) ? Ø  131 mg/dL (07-05 @ 02:26) ? Ø  118 mg/dL (07-05 @ 04:00) ? Insulin infusion restarted at 2 units/hr.   107 mg/dL (07-05 @ 05:12) ? Insulin infusion at 2 units/hr.   89 mg/dL (07-05 @ 05:56) ? Insulin infusion at 1.5 units/hr.   81 mg/dL (07-05 @ 07:07) ? Insulin infusion at 1 unit/hr.   85 mg/dL (07-05 @ 07:56) ? Insulin infusion at 1 unit/hr.   91 mg/dL (07-05 @ 08:27) ? Insulin infusion at 1 unit/hr.   123 mg/dL (07-05 @ 09:29) ? Insulin infusion at 1 unit/hr.   163 mg/dL (07-05 @ 10:28) ? Insulin infusion at 1.5 units/hr.   183 mg/dL (07-05 @ 11:24) ? Insulin infusion at 2 units/hr.     REVIEW OF SYSTEMS  Unable to obtain.     PHYSICAL EXAM  Vital Signs Last 24 Hrs  T(C): 36.1 (05 Jul 2023 10:04), Max: 37.3 (05 Jul 2023 01:02)  T(F): 97 (05 Jul 2023 10:04), Max: 99.1 (05 Jul 2023 01:02)  HR: 74 (05 Jul 2023 12:05) (72 - 94)  BP: --  BP(mean): --  RR: 16 (05 Jul 2023 12:05) (12 - 20)  SpO2: 97% (05 Jul 2023 12:05) (97% - 100%)    Parameters below as of 05 Jul 2023 12:05  Patient On (Oxygen Delivery Method): ventilator    O2 Concentration (%): 40    Constitutional: Awake, alert, intubated, in no acute distress.   HEENT: Normocephalic, atraumatic, SCARLETT.  Respiratory: Lungs clear to ausculation bilaterally.   Cardiovascular: regular rhythm, normal S1 and S2, no audible murmurs.   GI: soft, non-tender, non-distended, bowel sounds present.  Extremities: No lower extremity edema.    LABS  CBC - WBC/HGB/HTC/PLT: 9.34/9.5/27.8/104 (07-05-23)  BMP - Na/K/Cl/Bicarb/BUN/Cr/Gluc/AG/eGFR: 131/3.6/93/25/71/2.28/188/13/28 (07-05-23)  Ca - 8.8 (07-05-23)  Phos - 4.5 (07-05-23)  Mg - 2.0 (07-05-23)  LFT - Alb/Tprot/Tbili/Dbili/AlkPhos/ALT/AST: 3.5/--/1.8/--/85/<5/18 (07-05-23)  PT/aPTT/INR: 14.0/33.3/1.17 (07-05-23)   Thyroid Stimulating Hormone, Serum: 1.440 (06-28-23)    MEDICATIONS  MEDICATIONS  (STANDING):  albuterol/ipratropium for Nebulization 3 milliLiter(s) Nebulizer once  aMIOdarone    Tablet 400 milliGRAM(s) Oral every 8 hours  aMIOdarone    Tablet 400  Oral   aspirin  chewable 81 milliGRAM(s) Enteral Tube daily  atorvastatin 40 milliGRAM(s) Oral at bedtime  chlorhexidine 0.12% Liquid 15 milliLiter(s) Oral Mucosa every 12 hours  chlorhexidine 2% Cloths 1 Application(s) Topical daily  dexMEDEtomidine Infusion 0.5 MICROgram(s)/kG/Hr (8.33 mL/Hr) IV Continuous <Continuous>  dextrose 5%. 1000 milliLiter(s) (100 mL/Hr) IV Continuous <Continuous>  dextrose 5%. 1000 milliLiter(s) (50 mL/Hr) IV Continuous <Continuous>  dextrose 50% Injectable 50 milliLiter(s) IV Push every 15 minutes  DOBUTamine Infusion 4 MICROgram(s)/kG/Min (7.99 mL/Hr) IV Continuous <Continuous>  glucagon  Injectable 1 milliGRAM(s) IntraMuscular once  heparin   Injectable 5000 Unit(s) SubCutaneous every 8 hours  insulin regular Infusion 1 Unit(s)/Hr (1 mL/Hr) IV Continuous <Continuous>  pantoprazole  Injectable 40 milliGRAM(s) IV Push daily  polyethylene glycol 3350 17 Gram(s) Oral daily  potassium chloride  20 mEq/100 mL IVPB 20 milliEquivalent(s) IV Intermittent once  senna 2 Tablet(s) Oral at bedtime  sodium chloride 0.9%. 1000 milliLiter(s) (10 mL/Hr) IV Continuous <Continuous>  vasopressin Infusion 0.01 Unit(s)/Min (1.5 mL/Hr) IV Continuous <Continuous>    MEDICATIONS  (PRN):  dextrose Oral Gel 15 Gram(s) Oral once PRN Blood Glucose LESS THAN 70 milliGRAM(s)/deciliter  fentaNYL    Injectable 25 MICROGram(s) IV Push every 2 hours PRN Severe Pain (7 - 10)    ASSESSMENT / RECOMMENDATIONS  Mr. Johnson is a 78-year-old male with type 2 diabetes mellitus, hypertension and severe aortic stenosis who was transferred to [facility] for further work-up and intervention of severe aortic stenosis, now s/p aortic valve replacement (6/30/23). Hospitalization complicated by cardiogenic shock. Endocrinology was consulted for recommendations regarding diabetes management.     A1C: 8.6 %  BUN: 71  Creatinine: 2.28  GFR: 28  Weight: 66.6  BMI: 21.5  EF: 10-15%    # Type 2 diabetes mellitus with hyperglycemia  - May transition to lantus 12 units at bedtime. If transitioning to lantus, continue with insulin drip for 2 more hours after dose of lantus is given.  - Would hold off starting premeal insulin given he likely won't be eating for now.   - Continue lispro moderate dose sliding scale before meals and at bedtime.  - Patient's fingerstick glucose goal is 100-180 mg/dL.    - Discharge recommendations to be discussed.   - Patient can follow up at discharge with [facility] by calling [phone] to make an appointment.      Case discussed with Dr. Caicedo. Primary team updated.       Milton Laura    Endocrinology Fellow    Service Pager: [phone]

## 2023-07-05 NOTE — PROGRESS NOTE ADULT - ASSESSMENT
77 Male w/ PMHx of HTN and DM who originally presented to [facility] on  c/o chest pain x 3 months. TTE revealed moderate AS and severe AR, nml LVEF. Patient referred for cardiac cath which revealed non-obstructive CAD. He was then transferred to [facility] under the care of Dr. Koehler for further work-up and intervention of severe AI and AS.  S/p Bio AVR  EF 55%     Post op course complicated by cardiogenic shock on Day 3  requiring reintubation and increasing inotrop support   Echo 7/3: severely reduced LVEF 15%/ reduced RV fx / no tamponade/ acute angina unlikely given clean preop cath   Unclear etiology for acute decopmensated HF and carediogenic shock post op   A/p  In sinus/ on Low dose Vaso to maintain MAP ~ 75   unchanged at 5/ Mil 0.125/ Marginal cardiac indices but normal perfusion parameters with brisk diuretic response, warm on exam   Leave inotrops at current range/ repeat echo today  Follow HF recs   Continue PO amio load for postop Afib/ Currently sinus with 1st degree AVB  CAMMIE : most likely perfusional, post valve surgery c/b cardiogenic shock-- Cr plateauing/ continue bumex drip for cardiac unloading w/severely reduced LV fx  Bicarb and K in good range   Hypoglycemic, TF advanced   Continue ASA and ICU ppx   Lines insertion sites assessed c/d/i  Introducer-swan  from OR/RIJ HD cath 7/3   Plan:   CPAP for extubation readiness  Repeat echo     ATTENDING: I have personally and independently provided 90 Min of critical care services.

## 2023-07-05 NOTE — PROGRESS NOTE ADULT - ASSESSMENT
77 Male w/ PMHx of HTN and DM who originally presented to [facility] on  c/o chest pain x 3 months. TTE revealed moderate AS and severe AR, nml LVEF. Patient referred for cardiac cath which revealed non-obstructive CAD. He was then transferred to [facility] under the care of Dr. Koehler for further work-up and intervention of severe AI and AS.  S/p Bio AVR  EF 55%     Post op course complicated by cardiogenic shock on Day 3  requiring reintubation and increasing inotrop support   Echo 7/3: severely reduced LVEF 15%/ reduced RV fx / no tamponade  Echo : LV Ef 40%/ mod-severe RV dysfunction/ mild to mod pericardial effusion   A/p  hemodynamics improving/marginal cardiac indices with thermodilution but better on Khushbu  Clinically improving, well perfused with good UOP, autodiuresing and normal lactate  weaning vaso/ leave Mil at 0.125 as mVO2 dropped after stopping with repeat echo revealing mod-severe RV dysfunction  Slow  wean monitoring perfusion parameters/ HF recs appreciated   Continue Po amio for post op afib   CAMMIE : most likely perfusional, post valve surgery with cardiogenic shock-- Cr plateauing and slowly trending down  lytes and bicarb in good range/clinically euvolemic/ autodiuresing Neg Fb 1.1 L   plan for PRN diuretics for CVP> 8 and goal FB Neg 1-2 given sig reduced RV fx   Endo recs for insulin regimen   Continue ASA/ICU ppx / asp precaution   Dysphagia scree and post extubation care   Continue ASA and ICU ppx       ATTENDING: I have personally and independently provided 90 Min of critical care services.

## 2023-07-05 NOTE — PROGRESS NOTE ADULT - SUBJECTIVE AND OBJECTIVE BOX
INTERVAL COURSE  CMV overnight/ propofol off in am, Alert and awake, following commands  Mil 0.25/  5 / Vaso 0.04  Good uop on bumex / FB -2 L     ICU Vital Signs Last 24 Hrs  T(C): 36.5 (2023 05:37), Max: 37.3 (2023 01:02)  T(F): 97.7 (2023 05:37), Max: 99.1 (2023 01:02)  HR: 82 (2023 08:00) (80 - 94)  ABP: 112/55 (2023 08:00) (100/47 - 147/70)  ABP(mean): 72 (2023 08:00) (61 - 93)  RR: 16 (2023 08:00) (12 - 20)  SpO2: 100% (2023 08:00) (98% - 100%)    O2 Parameters below as of 2023 08:00  Patient On (Oxygen Delivery Method): ventilator  O2 Concentration (%): 40      Adult Advanced Hemodynamics Last 24 Hrs  CVP(mm Hg): 12 (2023 08:00) (5 - 302)  CO: 3.3 (2023 05:00) (3.3 - 5)  CI: 1.8 (2023 05:00) (1.8 - 2.7)  PA: 47/20 (2023 08:00) (43/21 - 64/23)  PA(mean): 29 (2023 08:00) (26 - 37)  SVR: 1141 (2023 21:00) (799 - 1380)  SVRI: 2084 (2023 21:00) (1505 - 2530)  ABG - ( 2023 05:47 )  pH, Arterial: 7.49  pH, Blood: x     /  pCO2: 36    /  pO2: 116   / HCO3: 27    / Base Excess: 4.1   /  SaO2: 98.2        Mode: AC/ CMV (Assist Control/ Continuous Mandatory Ventilation)  RR (machine): 14  TV (machine): 500  FiO2: 40  PEEP: 5  ITime: 1  MAP: 9  PIP: 20    I&O's Summary  2023 07:01  -  2023 07:00  --------------------------------------------------------  IN: 1987.5 mL / OUT: 3893 mL / NET: -1905.5 mL    PHYSICAL EXAM  General: Alert and awake, following commands, NAD   Respiratory: CTA B/L; no wheezes  Cardiovascular: Regular rhythm/rate  Gastrointestinal: Soft; NTND   Extremities: Warm, trace pedal edema   Neurological: no obvious focal deficits    IMAGING/EKG/ETC  Reviewed.

## 2023-07-05 NOTE — PROGRESS NOTE ADULT - ATTENDING COMMENTS
77 YO M with a history of HTN, DM2, and probable CKD (Cr 1.4 on admission) who presented to [facility] with chest pain and dyspnea where he was found to have mod-severe AS/AR and transferred to [facility] for surgical evaluation. He underwent bioprosthetic AVR on 6/30 and had been doing well but decompensated 7/3 with hypoxia and CAMMIE with TTE revealing newly diagnosed severe biventricular dysfunction.    He is clinically improving and tolerating inotrope wean. TTE and hemodynamics suggest his cardiac function is also improving.    Hemodynamics  7/5 ( 4, mil 0.125): RA 5, PA 39/14, PCWP 14, PA sat 68% with Erika 4.9/2.7    REVIEW OF STUDIES  [place] non-obstructive CAD  [place] per report, mod-severe AS/AI, normal LVEF  TTE 7/3: LVEF 10-15%, severe RV dysfunction, well functioning AVR  TTE 7/5: LVEF 25-30%, moderate RV dysfunction    PLAN  # Cardiogenic shock  - Discontinue milrinone today  - Will likely start to wean dobutamine tomorrow  - Wean vasopressin as tolerates     # Acute systolic heart failure  - Etiology: unclear cause of acute dysfunction post-operatively, may have been overloaded and did not tolerate immediate reduction in afterload after AVR. LV function appears to be miproving  - GDMT: will introduce as tolerates, particularly if LV function remains reduced on repeat TTE   - Diuretics: euvolemic, aim to keep net even with goal CVP ~8. would stop bumex drip and switch to intermittent pushes  - Will repeat TTE when off inotropes     # CAMMIE on CKD  - improving    # s/p AVR  - well functioning on TTE    Case discussed with CTU team at bedside

## 2023-07-05 NOTE — PROGRESS NOTE ADULT - SUBJECTIVE AND OBJECTIVE BOX
VIPIN FRANCE  78y  Male    Patient is a 78y old  Male who presents with a chief complaint of severe AS and AI (04 Jul 2023 23:30)      INTERVAL HPI/OVERNIGHT EVENTS:      T(C): 36.5 (07-05-23 @ 05:37), Max: 37.3 (07-05-23 @ 01:02)  HR: 80 (07-05-23 @ 07:00) (80 - 94)  BP: --  RR: 14 (07-05-23 @ 07:00) (12 - 20)  SpO2: 100% (07-05-23 @ 07:00) (98% - 100%)  Wt(kg): --Vital Signs Last 24 Hrs  T(C): 36.5 (05 Jul 2023 05:37), Max: 37.3 (05 Jul 2023 01:02)  T(F): 97.7 (05 Jul 2023 05:37), Max: 99.1 (05 Jul 2023 01:02)  HR: 80 (05 Jul 2023 07:00) (80 - 94)  BP: --  BP(mean): --  RR: 14 (05 Jul 2023 07:00) (12 - 20)  SpO2: 100% (05 Jul 2023 07:00) (98% - 100%)    Parameters below as of 05 Jul 2023 07:00  Patient On (Oxygen Delivery Method): ventilator    O2 Concentration (%): 40    PHYSICAL EXAM:  GENERAL: NAD, well-groomed, well-developed  HEAD:  Atraumatic, Normocephalic  EYES: EOMI, PERRLA, conjunctiva and sclera clear  ENMT: No tonsillar erythema, exudates, or enlargement; Moist mucous membranes, Good dentition, No lesions  NECK: Supple, No JVD, Normal thyroid  NERVOUS SYSTEM:  Alert & Oriented X3, Good concentration; Motor Strength 5/5 B/L upper and lower extremities; DTRs 2+ intact and symmetric  CHEST/LUNG: Clear to auscultation bilaterally; No rales, rhonchi, wheezing, or rubs  HEART: Regular rate and rhythm; No murmurs, rubs, or gallops  ABDOMEN: Soft, Nontender, Nondistended; Bowel sounds present  EXTREMITIES:  WWP, No clubbing, cyanosis, or edema  Vascular: 2+ peripheral pulses x4  LYMPH: No lymphadenopathy noted  SKIN: No rashes or lesions    Consultant(s) Notes Reviewed:  [x ] YES  [ ] NO  Care Discussed with Consultants/Other Providers [ x] YES  [ ] NO    LABS:                        9.5    10.03 )-----------( 102      ( 05 Jul 2023 03:23 )             26.8     07-05    131<L>  |  92<L>  |  68<H>  ----------------------------<  137<H>  3.9   |  24  |  2.39<H>    Ca    9.1      05 Jul 2023 03:24  Phos  4.8     07-05  Mg     2.0     07-05    TPro  6.1  /  Alb  3.6  /  TBili  1.8<H>  /  DBili  x   /  AST  20  /  ALT  <5<L>  /  AlkPhos  84  07-05      PT/INR - ( 05 Jul 2023 03:23 )   PT: 13.5 sec;   INR: 1.13          PTT - ( 05 Jul 2023 03:23 )  PTT:32.5 sec  Urinalysis Basic - ( 05 Jul 2023 03:24 )    Color: x / Appearance: x / SG: x / pH: x  Gluc: 137 mg/dL / Ketone: x  / Bili: x / Urobili: x   Blood: x / Protein: x / Nitrite: x   Leuk Esterase: x / RBC: x / WBC x   Sq Epi: x / Non Sq Epi: x / Bacteria: x      CAPILLARY BLOOD GLUCOSE      POCT Blood Glucose.: 81 mg/dL (05 Jul 2023 07:07)  POCT Blood Glucose.: 89 mg/dL (05 Jul 2023 05:56)  POCT Blood Glucose.: 107 mg/dL (05 Jul 2023 05:12)  POCT Blood Glucose.: 118 mg/dL (05 Jul 2023 04:00)  POCT Blood Glucose.: 131 mg/dL (05 Jul 2023 02:26)  POCT Blood Glucose.: 77 mg/dL (05 Jul 2023 00:56)  POCT Blood Glucose.: 73 mg/dL (05 Jul 2023 00:02)  POCT Blood Glucose.: 96 mg/dL (04 Jul 2023 23:06)  POCT Blood Glucose.: 131 mg/dL (04 Jul 2023 22:04)  POCT Blood Glucose.: 169 mg/dL (04 Jul 2023 21:01)  POCT Blood Glucose.: 190 mg/dL (04 Jul 2023 19:58)  POCT Blood Glucose.: 184 mg/dL (04 Jul 2023 19:08)  POCT Blood Glucose.: 157 mg/dL (04 Jul 2023 15:36)  POCT Blood Glucose.: 74 mg/dL (04 Jul 2023 15:07)  POCT Blood Glucose.: 162 mg/dL (04 Jul 2023 13:11)  POCT Blood Glucose.: 126 mg/dL (04 Jul 2023 11:18)  POCT Blood Glucose.: 165 mg/dL (04 Jul 2023 09:12)  POCT Blood Glucose.: 185 mg/dL (04 Jul 2023 07:42)      ABG - ( 05 Jul 2023 05:47 )  pH, Arterial: 7.49  pH, Blood: x     /  pCO2: 36    /  pO2: 116   / HCO3: 27    / Base Excess: 4.1   /  SaO2: 98.2              Urinalysis Basic - ( 05 Jul 2023 03:24 )    Color: x / Appearance: x / SG: x / pH: x  Gluc: 137 mg/dL / Ketone: x  / Bili: x / Urobili: x   Blood: x / Protein: x / Nitrite: x   Leuk Esterase: x / RBC: x / WBC x   Sq Epi: x / Non Sq Epi: x / Bacteria: x        RADIOLOGY & ADDITIONAL TESTS:    Imaging Personally Reviewed:  [ ] YES  [ ] NO    HEALTH ISSUES - PROBLEM Dx:  Acute on chronic renal failure         VIPIN FRANCE  78y  Male    Patient is a 78y old  Male who presents with a chief complaint of severe AS and AI (04 Jul 2023 23:30)      INTERVAL HPI/OVERNIGHT EVENTS: Pt intubated       T(C): 36.5 (07-05-23 @ 05:37), Max: 37.3 (07-05-23 @ 01:02)  HR: 80 (07-05-23 @ 07:00) (80 - 94)  BP: --  RR: 14 (07-05-23 @ 07:00) (12 - 20)  SpO2: 100% (07-05-23 @ 07:00) (98% - 100%)  Wt(kg): --Vital Signs Last 24 Hrs  T(C): 36.5 (05 Jul 2023 05:37), Max: 37.3 (05 Jul 2023 01:02)  T(F): 97.7 (05 Jul 2023 05:37), Max: 99.1 (05 Jul 2023 01:02)  HR: 80 (05 Jul 2023 07:00) (80 - 94)  BP: --  BP(mean): --  RR: 14 (05 Jul 2023 07:00) (12 - 20)  SpO2: 100% (05 Jul 2023 07:00) (98% - 100%)    Parameters below as of 05 Jul 2023 07:00  Patient On (Oxygen Delivery Method): ventilator    O2 Concentration (%): 40    PHYSICAL EXAM:  NECK: Unable to eval JVD   CHEST/LUNG: Clear to auscultation bilaterally; No rales, rhonchi, wheezing, or rubs  HEART: Regular rate and rhythm; No murmurs, rubs, or gallops  ABDOMEN: Soft  EXTREMITIES:  WWP, No clubbing, cyanosis, or edema      Consultant(s) Notes Reviewed:  [x ] YES  [ ] NO  Care Discussed with Consultants/Other Providers [ x] YES  [ ] NO    LABS:                        9.5    10.03 )-----------( 102      ( 05 Jul 2023 03:23 )             26.8     07-05    131<L>  |  92<L>  |  68<H>  ----------------------------<  137<H>  3.9   |  24  |  2.39<H>    Ca    9.1      05 Jul 2023 03:24  Phos  4.8     07-05  Mg     2.0     07-05    TPro  6.1  /  Alb  3.6  /  TBili  1.8<H>  /  DBili  x   /  AST  20  /  ALT  <5<L>  /  AlkPhos  84  07-05      PT/INR - ( 05 Jul 2023 03:23 )   PT: 13.5 sec;   INR: 1.13          PTT - ( 05 Jul 2023 03:23 )  PTT:32.5 sec  Urinalysis Basic - ( 05 Jul 2023 03:24 )    Color: x / Appearance: x / SG: x / pH: x  Gluc: 137 mg/dL / Ketone: x  / Bili: x / Urobili: x   Blood: x / Protein: x / Nitrite: x   Leuk Esterase: x / RBC: x / WBC x   Sq Epi: x / Non Sq Epi: x / Bacteria: x      CAPILLARY BLOOD GLUCOSE      POCT Blood Glucose.: 81 mg/dL (05 Jul 2023 07:07)  POCT Blood Glucose.: 89 mg/dL (05 Jul 2023 05:56)  POCT Blood Glucose.: 107 mg/dL (05 Jul 2023 05:12)  POCT Blood Glucose.: 118 mg/dL (05 Jul 2023 04:00)  POCT Blood Glucose.: 131 mg/dL (05 Jul 2023 02:26)  POCT Blood Glucose.: 77 mg/dL (05 Jul 2023 00:56)  POCT Blood Glucose.: 73 mg/dL (05 Jul 2023 00:02)  POCT Blood Glucose.: 96 mg/dL (04 Jul 2023 23:06)  POCT Blood Glucose.: 131 mg/dL (04 Jul 2023 22:04)  POCT Blood Glucose.: 169 mg/dL (04 Jul 2023 21:01)  POCT Blood Glucose.: 190 mg/dL (04 Jul 2023 19:58)  POCT Blood Glucose.: 184 mg/dL (04 Jul 2023 19:08)  POCT Blood Glucose.: 157 mg/dL (04 Jul 2023 15:36)  POCT Blood Glucose.: 74 mg/dL (04 Jul 2023 15:07)  POCT Blood Glucose.: 162 mg/dL (04 Jul 2023 13:11)  POCT Blood Glucose.: 126 mg/dL (04 Jul 2023 11:18)  POCT Blood Glucose.: 165 mg/dL (04 Jul 2023 09:12)  POCT Blood Glucose.: 185 mg/dL (04 Jul 2023 07:42)      ABG - ( 05 Jul 2023 05:47 )  pH, Arterial: 7.49  pH, Blood: x     /  pCO2: 36    /  pO2: 116   / HCO3: 27    / Base Excess: 4.1   /  SaO2: 98.2              Urinalysis Basic - ( 05 Jul 2023 03:24 )    Color: x / Appearance: x / SG: x / pH: x  Gluc: 137 mg/dL / Ketone: x  / Bili: x / Urobili: x   Blood: x / Protein: x / Nitrite: x   Leuk Esterase: x / RBC: x / WBC x   Sq Epi: x / Non Sq Epi: x / Bacteria: x        RADIOLOGY & ADDITIONAL TESTS:    Imaging Personally Reviewed:  [ ] YES  [ ] NO    HEALTH ISSUES - PROBLEM Dx:  Acute on chronic renal failure

## 2023-07-05 NOTE — PROGRESS NOTE ADULT - SUBJECTIVE AND OBJECTIVE BOX
CTICU  CRITICAL  CARE  attending     Hand off received 					   Pertinent clinical, laboratory, radiographic, hemodynamic, echocardiographic, respiratory data, microbiologic data and chart were reviewed and analyzed frequently throughout the course of the day and night  Patient seen and examined with CTS/ SH attending at bedside    Pt is a 78y , Male, post op day # 5 s/p AVR (t); for AS/AI;    post op:    inotrope/pressor support  extubated initially   re-intubated for low flow state yesterday (POD # 3)    currently:    extubated today to HFNC  inotrope support with dobutamine/primacor  pressor support with vasopressin  Acute post hemorrhagic anemia  CAMMIE on CKD    TTE today: with improved LVEF ( to 40%)      Acute on chronic renal failure      , FAMILY HISTORY:  PAST MEDICAL & SURGICAL HISTORY:  HTN (hypertension)      DM (diabetes mellitus)      Severe aortic regurgitation      History of cholecystectomy      H/O prostatectomy        Patient is a 78y old  Male who presents with a chief complaint of severe AS and AI (05 Jul 2023 16:01)      14 system review limited 2/2 post op morbidity  acute changes include acute respiratory failure  Vital signs, hemodynamic and respiratory parameters were reviewed from the bedside nursing flowsheet.  ICU Vital Signs Last 24 Hrs  T(C): 36.8 (05 Jul 2023 17:31), Max: 37.3 (05 Jul 2023 01:02)  T(F): 98.2 (05 Jul 2023 17:31), Max: 99.1 (05 Jul 2023 01:02)  HR: 73 (05 Jul 2023 22:00) (72 - 87)  BP: --  BP(mean): --  ABP: 127/47 (05 Jul 2023 22:00) (108/52 - 151/53)  ABP(mean): 69 (05 Jul 2023 22:00) (65 - 93)  RR: 17 (05 Jul 2023 22:00) (14 - 20)  SpO2: 98% (05 Jul 2023 22:00) (97% - 100%)    O2 Parameters below as of 05 Jul 2023 22:00  Patient On (Oxygen Delivery Method): nasal cannula, high flow  O2 Flow (L/min): 40  O2 Concentration (%): 40      Adult Advanced Hemodynamics Last 24 Hrs  CVP(mm Hg): 3 (05 Jul 2023 22:00) (1 - 14)  CVP(cm H2O): --  CO: 4.4 (05 Jul 2023 21:00) (3.1 - 4.4)  CI: 2.4 (05 Jul 2023 21:00) (1.7 - 2.4)  PA: 60/13 (05 Jul 2023 22:00) (36/11 - 64/23)  PA(mean): 26 (05 Jul 2023 22:00) (17 - 36)  PCWP: --  SVR: 1361 (05 Jul 2023 21:00) (1361 - 1649)  SVRI: 2496 (05 Jul 2023 21:00) (2496 - 3008)  PVR: --  PVRI: --, ABG - ( 05 Jul 2023 17:29 )  pH, Arterial: 7.45  pH, Blood: x     /  pCO2: 40    /  pO2: 98    / HCO3: 28    / Base Excess: 3.5   /  SaO2: 98.8              Mode: standby    Intake and output was reviewed and the fluid balance was calculated  Daily     Daily   I&O's Summary    04 Jul 2023 07:01  -  05 Jul 2023 07:00  --------------------------------------------------------  IN: 1987.5 mL / OUT: 3893 mL / NET: -1905.5 mL    05 Jul 2023 07:01  -  05 Jul 2023 22:47  --------------------------------------------------------  IN: 733.2 mL / OUT: 2817 mL / NET: -2083.8 mL        All lines and drain sites were assessed  Glycemic trend was reviewedCAPILLARY BLOOD GLUCOSE      POCT Blood Glucose.: 131 mg/dL (05 Jul 2023 22:07)    No acute change in mental status  Auscultation of the chest reveals equal bs  Abdomen is soft  Extremities are warm and well perfused  Wounds appear clean and unremarkable  Antibiotics are periop    labs  CBC Full  -  ( 05 Jul 2023 17:28 )  WBC Count : 11.44 K/uL  RBC Count : 2.88 M/uL  Hemoglobin : 9.5 g/dL  Hematocrit : 26.5 %  Platelet Count - Automated : 102 K/uL  Mean Cell Volume : 92.0 fl  Mean Cell Hemoglobin : 33.0 pg  Mean Cell Hemoglobin Concentration : 35.8 gm/dL  Auto Neutrophil # : x  Auto Lymphocyte # : x  Auto Monocyte # : x  Auto Eosinophil # : x  Auto Basophil # : x  Auto Neutrophil % : x  Auto Lymphocyte % : x  Auto Monocyte % : x  Auto Eosinophil % : x  Auto Basophil % : x    07-05    133<L>  |  94<L>  |  72<H>  ----------------------------<  113<H>  3.8   |  26  |  2.21<H>    Ca    8.7      05 Jul 2023 17:28  Phos  4.3     07-05  Mg     2.0     07-05    TPro  6.1  /  Alb  3.5  /  TBili  1.8<H>  /  DBili  x   /  AST  18  /  ALT  <5<L>  /  AlkPhos  92  07-05    PT/INR - ( 05 Jul 2023 17:28 )   PT: 13.7 sec;   INR: 1.15          PTT - ( 05 Jul 2023 17:28 )  PTT:33.7 sec  The current medications were reviewed   MEDICATIONS  (STANDING):  albuterol/ipratropium for Nebulization 3 milliLiter(s) Nebulizer once  aMIOdarone    Tablet 400 milliGRAM(s) Oral every 8 hours  aMIOdarone    Tablet 400  Oral   aspirin  chewable 81 milliGRAM(s) Enteral Tube daily  atorvastatin 40 milliGRAM(s) Oral at bedtime  chlorhexidine 2% Cloths 1 Application(s) Topical daily  dexMEDEtomidine Infusion 0.5 MICROgram(s)/kG/Hr (8.33 mL/Hr) IV Continuous <Continuous>  dextrose 5%. 1000 milliLiter(s) (50 mL/Hr) IV Continuous <Continuous>  dextrose 5%. 1000 milliLiter(s) (100 mL/Hr) IV Continuous <Continuous>  dextrose 50% Injectable 50 milliLiter(s) IV Push every 15 minutes  DOBUTamine Infusion 4 MICROgram(s)/kG/Min (7.99 mL/Hr) IV Continuous <Continuous>  glucagon  Injectable 1 milliGRAM(s) IntraMuscular once  heparin   Injectable 5000 Unit(s) SubCutaneous every 8 hours  insulin regular Infusion 1 Unit(s)/Hr (1 mL/Hr) IV Continuous <Continuous>  milrinone Infusion 0.125 MICROgram(s)/kG/Min (2.5 mL/Hr) IV Continuous <Continuous>  pantoprazole  Injectable 40 milliGRAM(s) IV Push daily  polyethylene glycol 3350 17 Gram(s) Oral daily  senna 2 Tablet(s) Oral at bedtime  sodium chloride 0.9%. 1000 milliLiter(s) (10 mL/Hr) IV Continuous <Continuous>  vasopressin Infusion 0.01 Unit(s)/Min (1.5 mL/Hr) IV Continuous <Continuous>    MEDICATIONS  (PRN):  dextrose Oral Gel 15 Gram(s) Oral once PRN Blood Glucose LESS THAN 70 milliGRAM(s)/deciliter  fentaNYL    Injectable 25 MICROGram(s) IV Push every 2 hours PRN Severe Pain (7 - 10)       PROBLEM LIST/ ASSESSMENT:  HEALTH ISSUES - PROBLEM Dx:  Acute on chronic renal failure        ,   Patient is a 78y old  Male who presents with a chief complaint of severe AS and AI (05 Jul 2023 16:01)     s/p AVR (t)  acute changes include acute respiratory failure    My plan includes :    Titrate pressor support to maintain MAP >75-80  Titrate inotrope support to maintain CI >2.2/MVO2 >60  Trend lactate levels  supplemental O2 via HFNC  Titrate Fio2 to maintain Sao2 >95%  Serial ABGs  Trend H/H; transfuse if needed    close hemodynamic, ventilatory and drain monitoring and management per post op routine    Monitor for arrhythmias and monitor parameters for organ perfusion  monitor neurologic status  Head of the bed should remain elevated to 45 deg .   chest PT and IS will be encouraged  monitor adequacy of oxygenation and ventilation and attempt to wean oxygen  Nutritional goals will be met using po eventually , ensure adequate caloric intake and montior the same  Stress ulcer and VTE prophylaxis will be achieved    Glycemic control is satisfactory  Electrolytes have been repleted as necessary and wound care has been carried out. Pain control has been achieved.   agressive physical therapy and early mobility and ambulation goals will be met   The family was updated about the course and plan  CRITICAL CARE TIME SPENT in evaluation and management, reassessments, review and interpretation of labs and x-rays, ventilator and hemodynamic management, formulating a plan and coordinating care: ___30___ MIN.  Time does not include procedural time.  CTICU ATTENDING     					    Radu Gabriel MD

## 2023-07-05 NOTE — PROGRESS NOTE ADULT - ATTENDING COMMENTS
79 yo man  CAMMIE, underlying CKD3; admitted 6/28 for severe AS s/p AVR 6/30, post-op course c/b cardiogenic shock, pneumothorax Cr 2.3  over past 48H clinically improving, excellent urine output  did not need AQ or RRT  creat 2.39- plateauing  net negative balance at target desired for past 48H  agree with dc bumex drip and bolus as needed to maintain net even to mild negative balance

## 2023-07-05 NOTE — PROGRESS NOTE ADULT - ASSESSMENT
77M PMH HTN, DM, CAD (nonobstructive on [facility] 6/27/23), AS/AI s/p AVR 6/29/23 with course c/b hypotension and concern for cardiogenic shock.     TTE 7/3/23: LVIDd 3.9cm. LVEF 10-15% with global hypokinesis. ?Reverse takotsubo pattern. RV normal in size with reduced function. LA mildly dilated. BioAVR, MG 7, no AI. Trace MR. Mild to moderate TR. PASP 58. Small pericardial effusion.  [facility] 6/27/23 ([facility]): Nonobstructive CAD    Hemodynamics  7/4 AM: MvO2 64.6; Erika CO 4.4 CI 2.7  7/5 AM: MvO2 68.2; Erika CO 3.3 CI 1.8    #Cardiogenic shock  TTE with newly reduced EF compared to [facility] TTE, EF now severely reduced with EF 10-15% with global hypokinesis. Slight increase in MvO2 today compared to yesterday  -Wean vasopressin as tolerated  -Continue dobutamine as ordered  -HF management as below    #CHF  -Etiology: NICM given nonobstructive CAD on recent [facility]. Etiology unclear. Anticipate repeat TTE for LV function assessment once stable.   -GDMT: On hold while on inotropes  -Volume: CVPs currently ~11, would continue diuresis at current rate. Nephro following.   -Devices: Not appropriate at this time      Pending discussion with Dr. Cuellar  77M PMH HTN, DM, CAD (nonobstructive on [facility] 6/27/23), AS/AI s/p AVR 6/29/23 with course c/b hypotension and concern for cardiogenic shock.     TTE 7/3/23: LVIDd 3.9cm. LVEF 10-15% with global hypokinesis. ?Reverse takotsubo pattern. RV normal in size with reduced function. LA mildly dilated. BioAVR, MG 7, no AI. Trace MR. Mild to moderate TR. PASP 58. Small pericardial effusion.  [facility] 6/27/23 ([facility]): Nonobstructive CAD    Hemodynamics  7/4 AM: MvO2 72.8; Erika CO 4.4 CI 2.7  7/5 AM: MvO2 68.2; Erika CO 3.3 CI 1.8    #Cardiogenic shock  TTE with newly reduced EF compared to [facility] TTE, EF now severely reduced with EF 10-15% with global hypokinesis.   -Decrease in MvO2 today compared to yesterday  -Wean vasopressin as tolerated  -Continue dobutamine as ordered  -HF management as below    #CHF  -Etiology: NICM given nonobstructive CAD on recent [facility]. Etiology unclear. Anticipate repeat TTE for LV function assessment once stable.   -GDMT: On hold while on inotropes  -Volume: CVPs currently ~11, would continue diuresis at current rate. Nephro following.   -Devices: Not appropriate at this time      Pending discussion with Dr. Cuellar  77M PMH HTN, DM, CAD (nonobstructive on [facility] 6/27/23), AS/AI s/p AVR 6/29/23 with course c/b hypotension and concern for cardiogenic shock.     TTE 7/3/23: LVIDd 3.9cm. LVEF 10-15% with global hypokinesis. ?Reverse takotsubo pattern. RV normal in size with reduced function. LA mildly dilated. BioAVR, MG 7, no AI. Trace MR. Mild to moderate TR. PASP 58. Small pericardial effusion.  [facility] 6/27/23 ([facility]): Nonobstructive CAD    Hemodynamics  7/4 AM: MvO2 72.8; Erika CO 4.4 CI 2.7  7/5 AM: MvO2 68.2; Erika CO 3.3 CI 1.8, PAP 39/14, PCWP 12     #Cardiogenic shock  TTE with newly reduced EF compared to [facility] TTE, EF now severely reduced with EF 10-15% with global hypokinesis.   -Small decrease in MvO2 today compared to yesterday, D/C Milrinone   -Wean vasopressin as tolerated  -Continue dobutamine as ordered  -HF management as below    #CHF  -Etiology: NICM given nonobstructive CAD on recent [facility]. Etiology unclear. Anticipate repeat TTE for LV function assessment once stable.   -GDMT: On hold while on inotropes  -Volume: CVPs currently 5. D/C diuretics. Goal net even   -Devices: Not appropriate at this time      Discussed with Dr. Cuellar  77M PMH HTN, DM, CAD (nonobstructive on [facility] 6/27/23), AS/AI s/p AVR 6/29/23 with course c/b hypotension and concern for cardiogenic shock.     TTE 7/3/23: LVIDd 3.9cm. LVEF 10-15% with global hypokinesis. ?Reverse takotsubo pattern. RV normal in size with reduced function. LA mildly dilated. BioAVR, MG 7, no AI. Trace MR. Mild to moderate TR. PASP 58. Small pericardial effusion.  [facility] 6/27/23 ([facility]): Nonobstructive CAD    Hemodynamics  7/4 AM: MvO2 72.8; Erika CO 4.4 CI 2.7  7/5 AM: MvO2 68.2; Erika CO 3.3 CI 1.8, PAP 39/14, PCWP 12     #Cardiogenic shock  TTE with newly reduced EF compared to [facility] TTE, EF now severely reduced with EF 10-15% with global hypokinesis.   -Small decrease in MvO2 today compared to yesterday, D/C Milrinone   -Wean vasopressin as tolerated  -Continue dobutamine as ordered  -HF management as below    #HFrEF  -Etiology: NICM given nonobstructive CAD on recent [facility]. Etiology unclear. Anticipate repeat TTE for LV function assessment once stable.   -GDMT: On hold while on inotropes  -Volume: CVPs currently 5. D/C diuretics. Goal net even   -Devices: Not appropriate at this time      Discussed with Dr. Cuellar  none

## 2023-07-05 NOTE — PROGRESS NOTE ADULT - SUBJECTIVE AND OBJECTIVE BOX
INTERVAL COURSE  Hemodynamics stable, on low dose Mil and  at 4/ow dose vaso/ bumex stopped in am   Cpaping    ICU Vital Signs Last 24 Hrs  T(C): 36.7 (2023 14:00), Max: 37.3 (2023 01:02)  T(F): 98.1 (2023 14:00), Max: 99.1 (2023 01:02)  HR: 79 (2023 15:25) (72 - 93)  ABP: 140/60 (2023 15:00) (103/50 - 147/70)  ABP(mean): 81 (2023 15:00) (64 - 93)  RR: 20 (2023 15:25) (14 - 20)  SpO2: 99% (2023 15:25) (97% - 100%)    O2 Parameters below as of 2023 15:25  Patient On (Oxygen Delivery Method): nasal cannula, high flow  O2 Flow (L/min): 40  O2 Concentration (%): 40    Adult Advanced Hemodynamics Last 24 Hrs  CVP(mm Hg): 12 (2023 15:00) (4 - 15)  CO: 3.1 (2023 12:00) (3.1 - 5)  CI: 1.7 (2023 12:00) (1.7 - 2.7)  PA: 55/20 (2023 15:00) (36/11 - 64/23)  PA(mean): 30 (2023 15:00) (20 - 36)  SVR: 1649 (2023 12:00) (926 - 1649)  SVRI: 3008 (2023 12:00) (1716 - 3008)  ABG - ( 2023 14:10 )  pH, Arterial: 7.48  pH, Blood: x     /  pCO2: 35    /  pO2: 123   / HCO3: 26    / Base Excess: 2.8   /  SaO2: 98.8      Daily   I&O's Summary    2023 07:01  -  2023 07:00  --------------------------------------------------------  IN: 1987.5 mL / OUT: 3893 mL / NET: -1905.5 mL    2023 07:01  -  2023 16:02  --------------------------------------------------------  IN: 406.9 mL / OUT: 1507 mL / NET: -1100.1 mL    PHYSICAL EXAM  General: A&Ox 3; NAD  Respiratory: CTA B/L; no wheezes/crackles  Cardiovascular: Regular rhythm/rate  Gastrointestinal: Soft; NTND   Extremities: WWP; no edema  Neurological: no obvious focal deficits    LABS/IMAGING/EKG/ETC  Reviewed.

## 2023-07-06 LAB
ALBUMIN SERPL ELPH-MCNC: 3.5 G/DL — SIGNIFICANT CHANGE UP (ref 3.3–5)
ALBUMIN SERPL ELPH-MCNC: 3.6 G/DL — SIGNIFICANT CHANGE UP (ref 3.3–5)
ALBUMIN SERPL ELPH-MCNC: 3.7 G/DL — SIGNIFICANT CHANGE UP (ref 3.3–5)
ALBUMIN SERPL ELPH-MCNC: 3.8 G/DL — SIGNIFICANT CHANGE UP (ref 3.3–5)
ALP SERPL-CCNC: 100 U/L — SIGNIFICANT CHANGE UP (ref 40–120)
ALP SERPL-CCNC: 88 U/L — SIGNIFICANT CHANGE UP (ref 40–120)
ALP SERPL-CCNC: 88 U/L — SIGNIFICANT CHANGE UP (ref 40–120)
ALP SERPL-CCNC: 91 U/L — SIGNIFICANT CHANGE UP (ref 40–120)
ALP SERPL-CCNC: 95 U/L — SIGNIFICANT CHANGE UP (ref 40–120)
ALP SERPL-CCNC: 96 U/L — SIGNIFICANT CHANGE UP (ref 40–120)
ALT FLD-CCNC: <5 U/L — LOW (ref 10–45)
ANION GAP SERPL CALC-SCNC: 10 MMOL/L — SIGNIFICANT CHANGE UP (ref 5–17)
ANION GAP SERPL CALC-SCNC: 13 MMOL/L — SIGNIFICANT CHANGE UP (ref 5–17)
ANION GAP SERPL CALC-SCNC: 13 MMOL/L — SIGNIFICANT CHANGE UP (ref 5–17)
ANION GAP SERPL CALC-SCNC: 15 MMOL/L — SIGNIFICANT CHANGE UP (ref 5–17)
ANION GAP SERPL CALC-SCNC: 16 MMOL/L — SIGNIFICANT CHANGE UP (ref 5–17)
ANION GAP SERPL CALC-SCNC: 21 MMOL/L — HIGH (ref 5–17)
APPEARANCE UR: CLEAR — SIGNIFICANT CHANGE UP
APTT BLD: 35.6 SEC — HIGH (ref 27.5–35.5)
APTT BLD: 36.4 SEC — HIGH (ref 27.5–35.5)
APTT BLD: 36.8 SEC — HIGH (ref 27.5–35.5)
APTT BLD: 41.3 SEC — HIGH (ref 27.5–35.5)
AST SERPL-CCNC: 19 U/L — SIGNIFICANT CHANGE UP (ref 10–40)
AST SERPL-CCNC: 20 U/L — SIGNIFICANT CHANGE UP (ref 10–40)
AST SERPL-CCNC: 21 U/L — SIGNIFICANT CHANGE UP (ref 10–40)
AST SERPL-CCNC: 25 U/L — SIGNIFICANT CHANGE UP (ref 10–40)
B-OH-BUTYR SERPL-SCNC: 1.7 MMOL/L — HIGH
BACTERIA # UR AUTO: PRESENT /HPF
BASE EXCESS BLDV CALC-SCNC: -2.2 MMOL/L — LOW (ref -2–3)
BASE EXCESS BLDV CALC-SCNC: 2.5 MMOL/L — SIGNIFICANT CHANGE UP (ref -2–3)
BASE EXCESS BLDV CALC-SCNC: 2.7 MMOL/L — SIGNIFICANT CHANGE UP (ref -2–3)
BILIRUB SERPL-MCNC: 1.8 MG/DL — HIGH (ref 0.2–1.2)
BILIRUB SERPL-MCNC: 1.8 MG/DL — HIGH (ref 0.2–1.2)
BILIRUB SERPL-MCNC: 1.9 MG/DL — HIGH (ref 0.2–1.2)
BILIRUB SERPL-MCNC: 1.9 MG/DL — HIGH (ref 0.2–1.2)
BILIRUB SERPL-MCNC: 2 MG/DL — HIGH (ref 0.2–1.2)
BILIRUB SERPL-MCNC: 2.2 MG/DL — HIGH (ref 0.2–1.2)
BILIRUB UR-MCNC: NEGATIVE — SIGNIFICANT CHANGE UP
BUN SERPL-MCNC: 72 MG/DL — HIGH (ref 7–23)
BUN SERPL-MCNC: 74 MG/DL — HIGH (ref 7–23)
BUN SERPL-MCNC: 75 MG/DL — HIGH (ref 7–23)
BUN SERPL-MCNC: 77 MG/DL — HIGH (ref 7–23)
BUN SERPL-MCNC: 79 MG/DL — HIGH (ref 7–23)
BUN SERPL-MCNC: 80 MG/DL — HIGH (ref 7–23)
CA-I SERPL-SCNC: 1.19 MMOL/L — SIGNIFICANT CHANGE UP (ref 1.15–1.33)
CA-I SERPL-SCNC: 1.2 MMOL/L — SIGNIFICANT CHANGE UP (ref 1.15–1.33)
CALCIUM SERPL-MCNC: 9 MG/DL — SIGNIFICANT CHANGE UP (ref 8.4–10.5)
CALCIUM SERPL-MCNC: 9.1 MG/DL — SIGNIFICANT CHANGE UP (ref 8.4–10.5)
CALCIUM SERPL-MCNC: 9.3 MG/DL — SIGNIFICANT CHANGE UP (ref 8.4–10.5)
CALCIUM SERPL-MCNC: 9.4 MG/DL — SIGNIFICANT CHANGE UP (ref 8.4–10.5)
CALCIUM SERPL-MCNC: 9.6 MG/DL — SIGNIFICANT CHANGE UP (ref 8.4–10.5)
CALCIUM SERPL-MCNC: 9.6 MG/DL — SIGNIFICANT CHANGE UP (ref 8.4–10.5)
CHLORIDE SERPL-SCNC: 92 MMOL/L — LOW (ref 96–108)
CHLORIDE SERPL-SCNC: 92 MMOL/L — LOW (ref 96–108)
CHLORIDE SERPL-SCNC: 93 MMOL/L — LOW (ref 96–108)
CHLORIDE SERPL-SCNC: 95 MMOL/L — LOW (ref 96–108)
CO2 BLDV-SCNC: 23.5 MMOL/L — SIGNIFICANT CHANGE UP (ref 22–26)
CO2 BLDV-SCNC: 29.3 MMOL/L — HIGH (ref 22–26)
CO2 BLDV-SCNC: 30.4 MMOL/L — HIGH (ref 22–26)
CO2 SERPL-SCNC: 23 MMOL/L — SIGNIFICANT CHANGE UP (ref 22–31)
CO2 SERPL-SCNC: 27 MMOL/L — SIGNIFICANT CHANGE UP (ref 22–31)
CO2 SERPL-SCNC: 28 MMOL/L — SIGNIFICANT CHANGE UP (ref 22–31)
CO2 SERPL-SCNC: 28 MMOL/L — SIGNIFICANT CHANGE UP (ref 22–31)
COLOR SPEC: YELLOW — SIGNIFICANT CHANGE UP
CREAT ?TM UR-MCNC: 19 MG/DL — SIGNIFICANT CHANGE UP
CREAT SERPL-MCNC: 2.14 MG/DL — HIGH (ref 0.5–1.3)
CREAT SERPL-MCNC: 2.26 MG/DL — HIGH (ref 0.5–1.3)
CREAT SERPL-MCNC: 2.27 MG/DL — HIGH (ref 0.5–1.3)
CREAT SERPL-MCNC: 2.31 MG/DL — HIGH (ref 0.5–1.3)
CREAT SERPL-MCNC: 2.38 MG/DL — HIGH (ref 0.5–1.3)
CREAT SERPL-MCNC: 2.46 MG/DL — HIGH (ref 0.5–1.3)
CULTURE RESULTS: SIGNIFICANT CHANGE UP
DIFF PNL FLD: NEGATIVE — SIGNIFICANT CHANGE UP
EGFR: 26 ML/MIN/1.73M2 — LOW
EGFR: 27 ML/MIN/1.73M2 — LOW
EGFR: 28 ML/MIN/1.73M2 — LOW
EGFR: 28 ML/MIN/1.73M2 — LOW
EGFR: 29 ML/MIN/1.73M2 — LOW
EGFR: 31 ML/MIN/1.73M2 — LOW
EPI CELLS # UR: SIGNIFICANT CHANGE UP /HPF (ref 0–5)
GAS PNL BLDA: SIGNIFICANT CHANGE UP
GAS PNL BLDV: 131 MMOL/L — LOW (ref 136–145)
GAS PNL BLDV: 132 MMOL/L — LOW (ref 136–145)
GAS PNL BLDV: SIGNIFICANT CHANGE UP
GLUCOSE BLDC GLUCOMTR-MCNC: 105 MG/DL — HIGH (ref 70–99)
GLUCOSE BLDC GLUCOMTR-MCNC: 106 MG/DL — HIGH (ref 70–99)
GLUCOSE BLDC GLUCOMTR-MCNC: 107 MG/DL — HIGH (ref 70–99)
GLUCOSE BLDC GLUCOMTR-MCNC: 110 MG/DL — HIGH (ref 70–99)
GLUCOSE BLDC GLUCOMTR-MCNC: 110 MG/DL — HIGH (ref 70–99)
GLUCOSE BLDC GLUCOMTR-MCNC: 116 MG/DL — HIGH (ref 70–99)
GLUCOSE BLDC GLUCOMTR-MCNC: 119 MG/DL — HIGH (ref 70–99)
GLUCOSE BLDC GLUCOMTR-MCNC: 124 MG/DL — HIGH (ref 70–99)
GLUCOSE BLDC GLUCOMTR-MCNC: 130 MG/DL — HIGH (ref 70–99)
GLUCOSE BLDC GLUCOMTR-MCNC: 131 MG/DL — HIGH (ref 70–99)
GLUCOSE BLDC GLUCOMTR-MCNC: 133 MG/DL — HIGH (ref 70–99)
GLUCOSE BLDC GLUCOMTR-MCNC: 133 MG/DL — HIGH (ref 70–99)
GLUCOSE BLDC GLUCOMTR-MCNC: 145 MG/DL — HIGH (ref 70–99)
GLUCOSE BLDC GLUCOMTR-MCNC: 173 MG/DL — HIGH (ref 70–99)
GLUCOSE BLDC GLUCOMTR-MCNC: 87 MG/DL — SIGNIFICANT CHANGE UP (ref 70–99)
GLUCOSE BLDC GLUCOMTR-MCNC: 95 MG/DL — SIGNIFICANT CHANGE UP (ref 70–99)
GLUCOSE SERPL-MCNC: 105 MG/DL — HIGH (ref 70–99)
GLUCOSE SERPL-MCNC: 108 MG/DL — HIGH (ref 70–99)
GLUCOSE SERPL-MCNC: 115 MG/DL — HIGH (ref 70–99)
GLUCOSE SERPL-MCNC: 136 MG/DL — HIGH (ref 70–99)
GLUCOSE SERPL-MCNC: 137 MG/DL — HIGH (ref 70–99)
GLUCOSE SERPL-MCNC: 158 MG/DL — HIGH (ref 70–99)
GLUCOSE UR QL: NEGATIVE — SIGNIFICANT CHANGE UP
HCO3 BLDV-SCNC: 22 MMOL/L — SIGNIFICANT CHANGE UP (ref 22–29)
HCO3 BLDV-SCNC: 28 MMOL/L — SIGNIFICANT CHANGE UP (ref 22–29)
HCO3 BLDV-SCNC: 29 MMOL/L — SIGNIFICANT CHANGE UP (ref 22–29)
HCT VFR BLD CALC: 25 % — LOW (ref 39–50)
HCT VFR BLD CALC: 25.5 % — LOW (ref 39–50)
HCT VFR BLD CALC: 25.8 % — LOW (ref 39–50)
HCT VFR BLD CALC: 25.8 % — LOW (ref 39–50)
HGB BLD-MCNC: 8.5 G/DL — LOW (ref 13–17)
HGB BLD-MCNC: 8.8 G/DL — LOW (ref 13–17)
HGB BLD-MCNC: 8.8 G/DL — LOW (ref 13–17)
HGB BLD-MCNC: 9 G/DL — LOW (ref 13–17)
INR BLD: 1.12 — SIGNIFICANT CHANGE UP (ref 0.88–1.16)
INR BLD: 1.12 — SIGNIFICANT CHANGE UP (ref 0.88–1.16)
INR BLD: 1.13 — SIGNIFICANT CHANGE UP (ref 0.88–1.16)
INR BLD: 1.15 — SIGNIFICANT CHANGE UP (ref 0.88–1.16)
KETONES UR-MCNC: NEGATIVE — SIGNIFICANT CHANGE UP
LACTATE SERPL-SCNC: 0.7 MMOL/L — SIGNIFICANT CHANGE UP (ref 0.5–2)
LACTATE SERPL-SCNC: 0.7 MMOL/L — SIGNIFICANT CHANGE UP (ref 0.5–2)
LACTATE SERPL-SCNC: 0.9 MMOL/L — SIGNIFICANT CHANGE UP (ref 0.5–2)
LACTATE SERPL-SCNC: 1 MMOL/L — SIGNIFICANT CHANGE UP (ref 0.5–2)
LEUKOCYTE ESTERASE UR-ACNC: ABNORMAL
MAGNESIUM SERPL-MCNC: 2 MG/DL — SIGNIFICANT CHANGE UP (ref 1.6–2.6)
MAGNESIUM SERPL-MCNC: 2.1 MG/DL — SIGNIFICANT CHANGE UP (ref 1.6–2.6)
MCHC RBC-ENTMCNC: 32.1 PG — SIGNIFICANT CHANGE UP (ref 27–34)
MCHC RBC-ENTMCNC: 32.3 PG — SIGNIFICANT CHANGE UP (ref 27–34)
MCHC RBC-ENTMCNC: 32.4 PG — SIGNIFICANT CHANGE UP (ref 27–34)
MCHC RBC-ENTMCNC: 32.5 PG — SIGNIFICANT CHANGE UP (ref 27–34)
MCHC RBC-ENTMCNC: 34 GM/DL — SIGNIFICANT CHANGE UP (ref 32–36)
MCHC RBC-ENTMCNC: 34.1 GM/DL — SIGNIFICANT CHANGE UP (ref 32–36)
MCHC RBC-ENTMCNC: 34.5 GM/DL — SIGNIFICANT CHANGE UP (ref 32–36)
MCHC RBC-ENTMCNC: 34.9 GM/DL — SIGNIFICANT CHANGE UP (ref 32–36)
MCV RBC AUTO: 92.8 FL — SIGNIFICANT CHANGE UP (ref 80–100)
MCV RBC AUTO: 94.1 FL — SIGNIFICANT CHANGE UP (ref 80–100)
MCV RBC AUTO: 94.2 FL — SIGNIFICANT CHANGE UP (ref 80–100)
MCV RBC AUTO: 95.1 FL — SIGNIFICANT CHANGE UP (ref 80–100)
NITRITE UR-MCNC: NEGATIVE — SIGNIFICANT CHANGE UP
NRBC # BLD: 0 /100 WBCS — SIGNIFICANT CHANGE UP (ref 0–0)
PCO2 BLDV: 37 MMHG — LOW (ref 42–55)
PCO2 BLDV: 45 MMHG — SIGNIFICANT CHANGE UP (ref 42–55)
PCO2 BLDV: 50 MMHG — SIGNIFICANT CHANGE UP (ref 42–55)
PH BLDV: 7.37 — SIGNIFICANT CHANGE UP (ref 7.32–7.43)
PH BLDV: 7.39 — SIGNIFICANT CHANGE UP (ref 7.32–7.43)
PH BLDV: 7.4 — SIGNIFICANT CHANGE UP (ref 7.32–7.43)
PH UR: 6 — SIGNIFICANT CHANGE UP (ref 5–8)
PHOSPHATE SERPL-MCNC: 4.7 MG/DL — HIGH (ref 2.5–4.5)
PHOSPHATE SERPL-MCNC: 4.8 MG/DL — HIGH (ref 2.5–4.5)
PHOSPHATE SERPL-MCNC: 4.9 MG/DL — HIGH (ref 2.5–4.5)
PHOSPHATE SERPL-MCNC: 4.9 MG/DL — HIGH (ref 2.5–4.5)
PHOSPHATE SERPL-MCNC: 5.1 MG/DL — HIGH (ref 2.5–4.5)
PHOSPHATE SERPL-MCNC: 5.3 MG/DL — HIGH (ref 2.5–4.5)
PLATELET # BLD AUTO: 100 K/UL — LOW (ref 150–400)
PLATELET # BLD AUTO: 109 K/UL — LOW (ref 150–400)
PLATELET # BLD AUTO: 109 K/UL — LOW (ref 150–400)
PLATELET # BLD AUTO: 133 K/UL — LOW (ref 150–400)
PO2 BLDV: 45 MMHG — SIGNIFICANT CHANGE UP (ref 25–45)
PO2 BLDV: 45 MMHG — SIGNIFICANT CHANGE UP (ref 25–45)
PO2 BLDV: 55 MMHG — HIGH (ref 25–45)
POTASSIUM BLDV-SCNC: 3.4 MMOL/L — LOW (ref 3.5–5.1)
POTASSIUM BLDV-SCNC: 3.8 MMOL/L — SIGNIFICANT CHANGE UP (ref 3.5–5.1)
POTASSIUM SERPL-MCNC: 3.4 MMOL/L — LOW (ref 3.5–5.3)
POTASSIUM SERPL-MCNC: 3.8 MMOL/L — SIGNIFICANT CHANGE UP (ref 3.5–5.3)
POTASSIUM SERPL-MCNC: 4 MMOL/L — SIGNIFICANT CHANGE UP (ref 3.5–5.3)
POTASSIUM SERPL-MCNC: 4 MMOL/L — SIGNIFICANT CHANGE UP (ref 3.5–5.3)
POTASSIUM SERPL-MCNC: 4.3 MMOL/L — SIGNIFICANT CHANGE UP (ref 3.5–5.3)
POTASSIUM SERPL-MCNC: 4.5 MMOL/L — SIGNIFICANT CHANGE UP (ref 3.5–5.3)
POTASSIUM SERPL-SCNC: 3.4 MMOL/L — LOW (ref 3.5–5.3)
POTASSIUM SERPL-SCNC: 3.8 MMOL/L — SIGNIFICANT CHANGE UP (ref 3.5–5.3)
POTASSIUM SERPL-SCNC: 4 MMOL/L — SIGNIFICANT CHANGE UP (ref 3.5–5.3)
POTASSIUM SERPL-SCNC: 4 MMOL/L — SIGNIFICANT CHANGE UP (ref 3.5–5.3)
POTASSIUM SERPL-SCNC: 4.3 MMOL/L — SIGNIFICANT CHANGE UP (ref 3.5–5.3)
POTASSIUM SERPL-SCNC: 4.5 MMOL/L — SIGNIFICANT CHANGE UP (ref 3.5–5.3)
PROT SERPL-MCNC: 6 G/DL — SIGNIFICANT CHANGE UP (ref 6–8.3)
PROT SERPL-MCNC: 6.1 G/DL — SIGNIFICANT CHANGE UP (ref 6–8.3)
PROT SERPL-MCNC: 6.2 G/DL — SIGNIFICANT CHANGE UP (ref 6–8.3)
PROT SERPL-MCNC: 6.2 G/DL — SIGNIFICANT CHANGE UP (ref 6–8.3)
PROT SERPL-MCNC: 6.4 G/DL — SIGNIFICANT CHANGE UP (ref 6–8.3)
PROT SERPL-MCNC: 7 G/DL — SIGNIFICANT CHANGE UP (ref 6–8.3)
PROT UR-MCNC: NEGATIVE MG/DL — SIGNIFICANT CHANGE UP
PROTHROM AB SERPL-ACNC: 13.3 SEC — SIGNIFICANT CHANGE UP (ref 10.5–13.4)
PROTHROM AB SERPL-ACNC: 13.4 SEC — SIGNIFICANT CHANGE UP (ref 10.5–13.4)
PROTHROM AB SERPL-ACNC: 13.5 SEC — HIGH (ref 10.5–13.4)
PROTHROM AB SERPL-ACNC: 13.7 SEC — HIGH (ref 10.5–13.4)
RBC # BLD: 2.63 M/UL — LOW (ref 4.2–5.8)
RBC # BLD: 2.71 M/UL — LOW (ref 4.2–5.8)
RBC # BLD: 2.74 M/UL — LOW (ref 4.2–5.8)
RBC # BLD: 2.78 M/UL — LOW (ref 4.2–5.8)
RBC # FLD: 14.4 % — SIGNIFICANT CHANGE UP (ref 10.3–14.5)
RBC # FLD: 14.5 % — SIGNIFICANT CHANGE UP (ref 10.3–14.5)
RBC CASTS # UR COMP ASSIST: < 5 /HPF — SIGNIFICANT CHANGE UP
SAO2 % BLDV: 71.9 % — SIGNIFICANT CHANGE UP (ref 67–88)
SAO2 % BLDV: 73.7 % — SIGNIFICANT CHANGE UP (ref 67–88)
SAO2 % BLDV: 81.1 % — SIGNIFICANT CHANGE UP (ref 67–88)
SODIUM SERPL-SCNC: 132 MMOL/L — LOW (ref 135–145)
SODIUM SERPL-SCNC: 133 MMOL/L — LOW (ref 135–145)
SODIUM SERPL-SCNC: 133 MMOL/L — LOW (ref 135–145)
SODIUM SERPL-SCNC: 134 MMOL/L — LOW (ref 135–145)
SODIUM SERPL-SCNC: 137 MMOL/L — SIGNIFICANT CHANGE UP (ref 135–145)
SODIUM SERPL-SCNC: 137 MMOL/L — SIGNIFICANT CHANGE UP (ref 135–145)
SODIUM UR-SCNC: 102 MMOL/L — SIGNIFICANT CHANGE UP
SP GR SPEC: 1.01 — SIGNIFICANT CHANGE UP (ref 1–1.03)
SPECIMEN SOURCE: SIGNIFICANT CHANGE UP
SURGICAL PATHOLOGY STUDY: SIGNIFICANT CHANGE UP
UROBILINOGEN FLD QL: 0.2 E.U./DL — SIGNIFICANT CHANGE UP
UUN UR-MCNC: 199 MG/DL — SIGNIFICANT CHANGE UP
WBC # BLD: 12.09 K/UL — HIGH (ref 3.8–10.5)
WBC # BLD: 9.69 K/UL — SIGNIFICANT CHANGE UP (ref 3.8–10.5)
WBC # BLD: 9.74 K/UL — SIGNIFICANT CHANGE UP (ref 3.8–10.5)
WBC # BLD: 9.9 K/UL — SIGNIFICANT CHANGE UP (ref 3.8–10.5)
WBC # FLD AUTO: 12.09 K/UL — HIGH (ref 3.8–10.5)
WBC # FLD AUTO: 9.69 K/UL — SIGNIFICANT CHANGE UP (ref 3.8–10.5)
WBC # FLD AUTO: 9.74 K/UL — SIGNIFICANT CHANGE UP (ref 3.8–10.5)
WBC # FLD AUTO: 9.9 K/UL — SIGNIFICANT CHANGE UP (ref 3.8–10.5)
WBC UR QL: < 5 /HPF — SIGNIFICANT CHANGE UP

## 2023-07-06 PROCEDURE — 99291 CRITICAL CARE FIRST HOUR: CPT

## 2023-07-06 PROCEDURE — 99232 SBSQ HOSP IP/OBS MODERATE 35: CPT | Mod: GC

## 2023-07-06 PROCEDURE — 99232 SBSQ HOSP IP/OBS MODERATE 35: CPT

## 2023-07-06 PROCEDURE — 71045 X-RAY EXAM CHEST 1 VIEW: CPT | Mod: 26,76

## 2023-07-06 RX ORDER — PANTOPRAZOLE SODIUM 20 MG/1
40 TABLET, DELAYED RELEASE ORAL
Refills: 0 | Status: DISCONTINUED | OUTPATIENT
Start: 2023-07-07 | End: 2023-07-14

## 2023-07-06 RX ORDER — INSULIN LISPRO 100/ML
4 VIAL (ML) SUBCUTANEOUS
Refills: 0 | Status: DISCONTINUED | OUTPATIENT
Start: 2023-07-06 | End: 2023-07-07

## 2023-07-06 RX ORDER — ALBUMIN HUMAN 25 %
250 VIAL (ML) INTRAVENOUS ONCE
Refills: 0 | Status: COMPLETED | OUTPATIENT
Start: 2023-07-06 | End: 2023-07-06

## 2023-07-06 RX ORDER — HUMAN INSULIN 100 [IU]/ML
5 INJECTION, SUSPENSION SUBCUTANEOUS ONCE
Refills: 0 | Status: COMPLETED | OUTPATIENT
Start: 2023-07-06 | End: 2023-07-06

## 2023-07-06 RX ORDER — POTASSIUM CHLORIDE 20 MEQ
20 PACKET (EA) ORAL
Refills: 0 | Status: COMPLETED | OUTPATIENT
Start: 2023-07-06 | End: 2023-07-06

## 2023-07-06 RX ORDER — SODIUM CHLORIDE 9 MG/ML
1000 INJECTION, SOLUTION INTRAVENOUS
Refills: 0 | Status: DISCONTINUED | OUTPATIENT
Start: 2023-07-06 | End: 2023-07-14

## 2023-07-06 RX ORDER — ALBUMIN HUMAN 25 %
50 VIAL (ML) INTRAVENOUS
Refills: 0 | Status: COMPLETED | OUTPATIENT
Start: 2023-07-06 | End: 2023-07-06

## 2023-07-06 RX ORDER — HYDRALAZINE HCL 50 MG
10 TABLET ORAL EVERY 8 HOURS
Refills: 0 | Status: DISCONTINUED | OUTPATIENT
Start: 2023-07-06 | End: 2023-07-07

## 2023-07-06 RX ORDER — DEXTROSE 50 % IN WATER 50 %
25 SYRINGE (ML) INTRAVENOUS ONCE
Refills: 0 | Status: DISCONTINUED | OUTPATIENT
Start: 2023-07-06 | End: 2023-07-07

## 2023-07-06 RX ORDER — DEXTROSE 50 % IN WATER 50 %
12.5 SYRINGE (ML) INTRAVENOUS ONCE
Refills: 0 | Status: DISCONTINUED | OUTPATIENT
Start: 2023-07-06 | End: 2023-07-07

## 2023-07-06 RX ORDER — INSULIN GLARGINE 100 [IU]/ML
12 INJECTION, SOLUTION SUBCUTANEOUS AT BEDTIME
Refills: 0 | Status: DISCONTINUED | OUTPATIENT
Start: 2023-07-06 | End: 2023-07-06

## 2023-07-06 RX ORDER — INSULIN LISPRO 100/ML
VIAL (ML) SUBCUTANEOUS
Refills: 0 | Status: DISCONTINUED | OUTPATIENT
Start: 2023-07-06 | End: 2023-07-07

## 2023-07-06 RX ORDER — GLUCAGON INJECTION, SOLUTION 0.5 MG/.1ML
1 INJECTION, SOLUTION SUBCUTANEOUS ONCE
Refills: 0 | Status: DISCONTINUED | OUTPATIENT
Start: 2023-07-06 | End: 2023-07-14

## 2023-07-06 RX ORDER — ALBUMIN HUMAN 25 %
250 VIAL (ML) INTRAVENOUS ONCE
Refills: 0 | Status: DISCONTINUED | OUTPATIENT
Start: 2023-07-06 | End: 2023-07-06

## 2023-07-06 RX ORDER — POTASSIUM CHLORIDE 20 MEQ
20 PACKET (EA) ORAL ONCE
Refills: 0 | Status: COMPLETED | OUTPATIENT
Start: 2023-07-06 | End: 2023-07-06

## 2023-07-06 RX ORDER — DEXTROSE 50 % IN WATER 50 %
15 SYRINGE (ML) INTRAVENOUS ONCE
Refills: 0 | Status: DISCONTINUED | OUTPATIENT
Start: 2023-07-06 | End: 2023-07-07

## 2023-07-06 RX ORDER — INSULIN GLARGINE 100 [IU]/ML
16 INJECTION, SOLUTION SUBCUTANEOUS AT BEDTIME
Refills: 0 | Status: DISCONTINUED | OUTPATIENT
Start: 2023-07-06 | End: 2023-07-07

## 2023-07-06 RX ORDER — HYDRALAZINE HCL 50 MG
5 TABLET ORAL ONCE
Refills: 0 | Status: COMPLETED | OUTPATIENT
Start: 2023-07-06 | End: 2023-07-06

## 2023-07-06 RX ADMIN — Medication 10 MILLIGRAM(S): at 22:36

## 2023-07-06 RX ADMIN — HEPARIN SODIUM 5000 UNIT(S): 5000 INJECTION INTRAVENOUS; SUBCUTANEOUS at 13:07

## 2023-07-06 RX ADMIN — Medication 100 MILLIEQUIVALENT(S): at 12:59

## 2023-07-06 RX ADMIN — SENNA PLUS 2 TABLET(S): 8.6 TABLET ORAL at 22:37

## 2023-07-06 RX ADMIN — FENTANYL CITRATE 25 MICROGRAM(S): 50 INJECTION INTRAVENOUS at 21:45

## 2023-07-06 RX ADMIN — FENTANYL CITRATE 25 MICROGRAM(S): 50 INJECTION INTRAVENOUS at 22:00

## 2023-07-06 RX ADMIN — Medication 50 MILLIEQUIVALENT(S): at 06:11

## 2023-07-06 RX ADMIN — HEPARIN SODIUM 5000 UNIT(S): 5000 INJECTION INTRAVENOUS; SUBCUTANEOUS at 22:38

## 2023-07-06 RX ADMIN — Medication 50 MILLILITER(S): at 23:59

## 2023-07-06 RX ADMIN — Medication 125 MILLILITER(S): at 21:20

## 2023-07-06 RX ADMIN — CHLORHEXIDINE GLUCONATE 1 APPLICATION(S): 213 SOLUTION TOPICAL at 06:10

## 2023-07-06 RX ADMIN — Medication 5 MILLIGRAM(S): at 22:15

## 2023-07-06 RX ADMIN — Medication 2: at 18:51

## 2023-07-06 RX ADMIN — HEPARIN SODIUM 5000 UNIT(S): 5000 INJECTION INTRAVENOUS; SUBCUTANEOUS at 06:09

## 2023-07-06 RX ADMIN — PANTOPRAZOLE SODIUM 40 MILLIGRAM(S): 20 TABLET, DELAYED RELEASE ORAL at 12:59

## 2023-07-06 RX ADMIN — INSULIN GLARGINE 16 UNIT(S): 100 INJECTION, SOLUTION SUBCUTANEOUS at 23:58

## 2023-07-06 RX ADMIN — Medication 7.99 MICROGRAM(S)/KG/MIN: at 01:29

## 2023-07-06 RX ADMIN — HUMAN INSULIN 5 UNIT(S): 100 INJECTION, SUSPENSION SUBCUTANEOUS at 14:00

## 2023-07-06 RX ADMIN — MILRINONE LACTATE 2.5 MICROGRAM(S)/KG/MIN: 1 INJECTION, SOLUTION INTRAVENOUS at 08:37

## 2023-07-06 RX ADMIN — Medication 81 MILLIGRAM(S): at 12:59

## 2023-07-06 RX ADMIN — ATORVASTATIN CALCIUM 40 MILLIGRAM(S): 80 TABLET, FILM COATED ORAL at 22:37

## 2023-07-06 RX ADMIN — Medication 200 GRAM(S): at 00:28

## 2023-07-06 RX ADMIN — Medication 50 MILLILITER(S): at 23:27

## 2023-07-06 NOTE — PROGRESS NOTE ADULT - ATTENDING COMMENTS
77 YO M with a history of HTN, DM2, and probable CKD (Cr 1.4 on admission) who presented to [facility] with chest pain and dyspnea where he was found to have mod-severe AS/AR and transferred to [facility] for surgical evaluation. He underwent bioprosthetic AVR on 6/30 and had been doing well but decompensated 7/3 with hypoxia and CAMMIE with TTE revealing newly diagnosed severe biventricular dysfunction.    He is clinically improving and tolerating inotrope wean. TTE and hemodynamics suggest his cardiac function is also improving though has discrepant thermodilution and Erika indices. 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    Hemodynamics  7/5 ( 4, mil 0.125): RA 5, PA 39/14, PCWP 14, PA sat 68% with Erika 4.9/2.7  7/6 ( 3, mil 0.125): RA 5, PA 65/17 (32), PCWP 17, PA sat 81% with Erika 5.9/2.8, and TD 3.9/2.1    REVIEW OF STUDIES  [place] non-obstructive CAD  [place] per report, mod-severe AS/AI, normal LVEF  TTE 7/3: LVEF 10-15%, severe RV dysfunction, well functioning AVR  TTE 7/5 (on inotropes): LVEF read as 40% and visually appears ~30%, moderate RV dysfunction    PLAN  # Cardiogenic shock  - Discontinue milrinone today  - Add Saloni given elevated PA pressures with RV dysfunction, should likely help with inotrope wean and vasopressin requirements   - Would plan to wean dobutamine tomorrow pending hemodynamics   - Wean vasopressin as tolerates     # Acute systolic heart failure  - Etiology: unclear cause of acute dysfunction post-operatively, may have been overloaded and did not tolerate immediate reduction in afterload after AVR. LV function appears to be improving   - GDMT: will introduce as tolerates, particularly if LV function remains reduced on TTE off inotropes    - Diuretics: euvolemic, aim to keep net even with goal CVP ~8. 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

## 2023-07-06 NOTE — PROGRESS NOTE ADULT - SUBJECTIVE AND OBJECTIVE BOX
VIPIN FRANCE  78y  Male    Patient is a 78y old  Male who presents with a chief complaint of severe AS and AI (06 Jul 2023 09:34)      INTERVAL HPI/OVERNIGHT EVENTS: Patient examined on HFNC.       T(C): 35.8 (07-06-23 @ 05:01), Max: 36.8 (07-05-23 @ 17:31)  HR: 70 (07-06-23 @ 09:00) (68 - 79)  BP: --  RR: 16 (07-06-23 @ 09:00) (14 - 20)  SpO2: 100% (07-06-23 @ 09:00) (97% - 100%)  Wt(kg): --Vital Signs Last 24 Hrs  T(C): 35.8 (06 Jul 2023 05:01), Max: 36.8 (05 Jul 2023 17:31)  T(F): 96.5 (06 Jul 2023 05:01), Max: 98.2 (05 Jul 2023 17:31)  HR: 70 (06 Jul 2023 09:00) (68 - 79)  BP: --  BP(mean): --  RR: 16 (06 Jul 2023 09:00) (14 - 20)  SpO2: 100% (06 Jul 2023 09:00) (97% - 100%)    Parameters below as of 06 Jul 2023 09:00  Patient On (Oxygen Delivery Method): nasal cannula, high flow  O2 Flow (L/min): 40  O2 Concentration (%): 40    PHYSICAL EXAM:  NECK: No JVD  HEART: Regular rate and rhythm; No murmurs, rubs, or gallops  ABDOMEN: Soft  EXTREMITIES: +1 LE edema B/L     Consultant(s) Notes Reviewed:  [x ] YES  [ ] NO  Care Discussed with Consultants/Other Providers [ x] YES  [ ] NO    LABS:                        9.0    9.90  )-----------( 100      ( 06 Jul 2023 04:44 )             25.8     07-06    134<L>  |  92<L>  |  72<H>  ----------------------------<  115<H>  3.4<L>   |  27  |  2.31<H>    Ca    9.0      06 Jul 2023 04:44  Phos  5.3     07-06  Mg     2.1     07-06    TPro  6.0  /  Alb  3.5  /  TBili  1.9<H>  /  DBili  x   /  AST  19  /  ALT  <5<L>  /  AlkPhos  91  07-06      PT/INR - ( 06 Jul 2023 04:44 )   PT: 13.3 sec;   INR: 1.12          PTT - ( 06 Jul 2023 04:44 )  PTT:35.6 sec  Urinalysis Basic - ( 06 Jul 2023 04:44 )    Color: x / Appearance: x / SG: x / pH: x  Gluc: 115 mg/dL / Ketone: x  / Bili: x / Urobili: x   Blood: x / Protein: x / Nitrite: x   Leuk Esterase: x / RBC: x / WBC x   Sq Epi: x / Non Sq Epi: x / Bacteria: x      CAPILLARY BLOOD GLUCOSE      POCT Blood Glucose.: 116 mg/dL (06 Jul 2023 08:52)  POCT Blood Glucose.: 130 mg/dL (06 Jul 2023 08:02)  POCT Blood Glucose.: 131 mg/dL (06 Jul 2023 06:21)  POCT Blood Glucose.: 119 mg/dL (06 Jul 2023 05:07)  POCT Blood Glucose.: 110 mg/dL (06 Jul 2023 03:59)  POCT Blood Glucose.: 87 mg/dL (06 Jul 2023 03:11)  POCT Blood Glucose.: 95 mg/dL (06 Jul 2023 01:57)  POCT Blood Glucose.: 105 mg/dL (06 Jul 2023 01:26)  POCT Blood Glucose.: 107 mg/dL (06 Jul 2023 00:11)  POCT Blood Glucose.: 110 mg/dL (05 Jul 2023 23:08)  POCT Blood Glucose.: 131 mg/dL (05 Jul 2023 22:07)  POCT Blood Glucose.: 116 mg/dL (05 Jul 2023 21:02)  POCT Blood Glucose.: 136 mg/dL (05 Jul 2023 19:05)  POCT Blood Glucose.: 143 mg/dL (05 Jul 2023 18:11)  POCT Blood Glucose.: 105 mg/dL (05 Jul 2023 17:09)  POCT Blood Glucose.: 109 mg/dL (05 Jul 2023 16:15)  POCT Blood Glucose.: 116 mg/dL (05 Jul 2023 15:12)  POCT Blood Glucose.: 132 mg/dL (05 Jul 2023 14:03)  POCT Blood Glucose.: 196 mg/dL (05 Jul 2023 12:32)  POCT Blood Glucose.: 183 mg/dL (05 Jul 2023 11:24)  POCT Blood Glucose.: 163 mg/dL (05 Jul 2023 10:28)      ABG - ( 06 Jul 2023 04:35 )  pH, Arterial: 7.43  pH, Blood: x     /  pCO2: 44    /  pO2: 120   / HCO3: 29    / Base Excess: 4.2   /  SaO2: 99.0              Urinalysis Basic - ( 06 Jul 2023 04:44 )    Color: x / Appearance: x / SG: x / pH: x  Gluc: 115 mg/dL / Ketone: x  / Bili: x / Urobili: x   Blood: x / Protein: x / Nitrite: x   Leuk Esterase: x / RBC: x / WBC x   Sq Epi: x / Non Sq Epi: x / Bacteria: x        RADIOLOGY & ADDITIONAL TESTS:    Imaging Personally Reviewed:  [ ] YES  [ ] NO    HEALTH ISSUES - PROBLEM Dx:  Acute on chronic renal failure

## 2023-07-06 NOTE — PROGRESS NOTE ADULT - SUBJECTIVE AND OBJECTIVE BOX
SUBJECTIVE / INTERVAL HPI: Patient was seen and examined this morning. He was successfully extubated to [facility] yesterday. Failed bedside dysphagia test post-extubation, pending S&S evaluation to decided if to continue with oral nutrition versus restarting tube feeds.     CAPILLARY BLOOD GLUCOSE & INSULIN RECEIVED  183 mg/dL (07-05 @ 11:24) ? Insulin infusion at 2 units/hr.   196 mg/dL (07-05 @ 12:32) ? Insulin infusion at 2 units/hr.  132 mg/dL (07-05 @ 14:03) ? Insulin infusion at 2.5 units/hr.  116 mg/dL (07-05 @ 15:12) ? Insulin infusion at 2.5 units/hr.  109 mg/dL (07-05 @ 16:15) ? Insulin infusion at 1 units/hr.  105 mg/dL (07-05 @ 17:09) ? Insulin infusion at 1 units/hr.  143 mg/dL (07-05 @ 18:11) ? Insulin infusion at 1 units/hr.  136 mg/dL (07-05 @ 19:05) ? Insulin infusion at 1 units/hr.  116 mg/dL (07-05 @ 21:02) ? Insulin infusion at 1 units/hr.  131 mg/dL (07-05 @ 22:07) ? Insulin infusion at 1.5 units/hr.  110 mg/dL (07-05 @ 23:08) ? Insulin infusion at 1 units/hr.  107 mg/dL (07-06 @ 00:11) ? Insulin infusion at 1 units/hr.  105 mg/dL (07-06 @ 01:26) ? Insulin infusion at 1 units/hr.  95 mg/dL (07-06 @ 01:57) ? Insulin infusion at 1 units/hr.  87 mg/dL (07-06 @ 03:11) ? Insulin infusion at 1 units/hr.  110 mg/dL (07-06 @ 03:59) ? Insulin infusion stopped.   119 mg/dL (07-06 @ 05:07) ? Insulin infusion resumed at 0.5 units/hr.  131 mg/dL (07-06 @ 06:21) ? Insulin infusion at 1 units/hr.  130 mg/dL (07-06 @ 08:02) ? Insulin infusion at 1 units/hr.  116 mg/dL (07-06 @ 08:52) ? Insulin infusion at 1 units/hr.  106 mg/dL (07-06 @ 10:06) ? Insulin infusion at 1 units/hr.    REVIEW OF SYSTEMS  Constitutional:  Negative fever, chills or loss of appetite.  Eyes:  Negative blurry vision or double vision.  Cardiovascular:  Negative for chest pain or palpitations.  Respiratory:  Negative for cough, wheezing, or shortness of breath.    Gastrointestinal:  Negative for nausea, vomiting, diarrhea, constipation, or abdominal pain.  Genitourinary:  Negative frequency, urgency or dysuria.  Neurologic:  No headache, confusion, dizziness, lightheadedness.    PHYSICAL EXAM  Vital Signs Last 24 Hrs  T(C): 35.8 (06 Jul 2023 05:01), Max: 36.8 (05 Jul 2023 17:31)  T(F): 96.5 (06 Jul 2023 05:01), Max: 98.2 (05 Jul 2023 17:31)  HR: 74 (06 Jul 2023 10:00) (68 - 79)  BP: --  BP(mean): --  RR: 18 (06 Jul 2023 10:00) (14 - 20)  SpO2: 100% (06 Jul 2023 10:00) (97% - 100%)    Parameters below as of 06 Jul 2023 10:00  Patient On (Oxygen Delivery Method): nasal cannula, high flow  O2 Flow (L/min): 40  O2 Concentration (%): 40    Constitutional: Awake, alert, in no acute distress.   HEENT: Normocephalic, atraumatic, SCARLETT.  Respiratory: Lungs clear to ausculation bilaterally.   Cardiovascular: regular rhythm, normal S1 and S2, no audible murmurs.   GI: soft, non-tender, non-distended, bowel sounds present.  Extremities: No lower extremity edema.  Psychiatric: AAO x 3. Normal affect/mood.     LABS  CBC - WBC/HGB/HTC/PLT: 9.69/8.8/25.8/109 (07-06-23)  BMP - Na/K/Cl/Bicarb/BUN/Cr/Gluc/AG/eGFR: 134/3.4/92/27/72/2.31/115/15/28 (07-06-23)  Ca - 9.0 (07-06-23)  Phos - 5.3 (07-06-23)  Mg - 2.1 (07-06-23)  LFT - Alb/Tprot/Tbili/Dbili/AlkPhos/ALT/AST: 3.5/--/1.9/--/91/<5/19 (07-06-23)  PT/aPTT/INR: 13.7/41.3/1.15 (07-06-23)   Thyroid Stimulating Hormone, Serum: 1.440 (06-28-23)    MEDICATIONS  MEDICATIONS  (STANDING):  aMIOdarone    Tablet 400  Oral   aMIOdarone    Tablet 200 milliGRAM(s) Oral daily  aspirin  chewable 81 milliGRAM(s) Enteral Tube daily  atorvastatin 40 milliGRAM(s) Oral at bedtime  chlorhexidine 2% Cloths 1 Application(s) Topical daily  dextrose 5%. 1000 milliLiter(s) (50 mL/Hr) IV Continuous <Continuous>  dextrose 5%. 1000 milliLiter(s) (100 mL/Hr) IV Continuous <Continuous>  dextrose 50% Injectable 50 milliLiter(s) IV Push every 15 minutes  DOBUTamine Infusion 3 MICROgram(s)/kG/Min (5.99 mL/Hr) IV Continuous <Continuous>  glucagon  Injectable 1 milliGRAM(s) IntraMuscular once  heparin   Injectable 5000 Unit(s) SubCutaneous every 8 hours  insulin regular Infusion 1 Unit(s)/Hr (1 mL/Hr) IV Continuous <Continuous>  milrinone Infusion 0.125 MICROgram(s)/kG/Min (2.5 mL/Hr) IV Continuous <Continuous>  pantoprazole  Injectable 40 milliGRAM(s) IV Push daily  polyethylene glycol 3350 17 Gram(s) Oral daily  potassium chloride  20 mEq/100 mL IVPB 20 milliEquivalent(s) IV Intermittent every 2 hours  senna 2 Tablet(s) Oral at bedtime  sodium chloride 0.9%. 1000 milliLiter(s) (10 mL/Hr) IV Continuous <Continuous>  vasopressin Infusion 0.01 Unit(s)/Min (1.5 mL/Hr) IV Continuous <Continuous>    MEDICATIONS  (PRN):  dextrose Oral Gel 15 Gram(s) Oral once PRN Blood Glucose LESS THAN 70 milliGRAM(s)/deciliter  fentaNYL    Injectable 25 MICROGram(s) IV Push every 2 hours PRN Severe Pain (7 - 10)    ASSESSMENT / RECOMMENDATIONS    A1C: 8.6 %  BUN: 72  Creatinine: 2.31  GFR: 28  Weight: 66.6  BMI: 21.5  EF:     # Type 2 diabetes mellitus with hyperglycemia  - Please continue lantus *** units at bedtime.   - Continue lispro *** units before each meal.  - Continue lispro moderate / low dose sliding scale before meals and at bedtime.  - Patient's fingerstick glucose goal is 100-180 mg/dL.    - Discharge recommendations to be discussed.   - Patient can follow up at discharge with [facility] by calling [phone] to make an appointment.      Case discussed with Dr. Caicedo. Primary team updated.       Milton Laura    Endocrinology Fellow    Service Pager: [phone]    SUBJECTIVE / INTERVAL HPI: Patient was seen and examined this morning. He was successfully extubated to [facility] yesterday. He complained of experiencing hoarseness. Failed bedside dysphagia test post-extubation, pending S&S evaluation to decided if to continue with oral nutrition versus restarting tube feeds. He was kept on the insulin infusion overnight and glucose levels have been within target range. In average, he has been requiring 1 unit of insulin per hour.    CAPILLARY BLOOD GLUCOSE & INSULIN RECEIVED  183 mg/dL (07-05 @ 11:24) ? Insulin infusion at 2 units/hr.   196 mg/dL (07-05 @ 12:32) ? Insulin infusion at 2 units/hr.  132 mg/dL (07-05 @ 14:03) ? Insulin infusion at 2.5 units/hr.  116 mg/dL (07-05 @ 15:12) ? Insulin infusion at 2.5 units/hr.  109 mg/dL (07-05 @ 16:15) ? Insulin infusion at 1 units/hr.  105 mg/dL (07-05 @ 17:09) ? Insulin infusion at 1 units/hr.  143 mg/dL (07-05 @ 18:11) ? Insulin infusion at 1 units/hr.  136 mg/dL (07-05 @ 19:05) ? Insulin infusion at 1 units/hr.  116 mg/dL (07-05 @ 21:02) ? Insulin infusion at 1 units/hr.  131 mg/dL (07-05 @ 22:07) ? Insulin infusion at 1.5 units/hr.  110 mg/dL (07-05 @ 23:08) ? Insulin infusion at 1 units/hr.  107 mg/dL (07-06 @ 00:11) ? Insulin infusion at 1 units/hr.  105 mg/dL (07-06 @ 01:26) ? Insulin infusion at 1 units/hr.  95 mg/dL (07-06 @ 01:57) ? Insulin infusion at 1 units/hr.  87 mg/dL (07-06 @ 03:11) ? Insulin infusion at 1 units/hr.  110 mg/dL (07-06 @ 03:59) ? Insulin infusion stopped.   119 mg/dL (07-06 @ 05:07) ? Insulin infusion resumed at 0.5 units/hr.  131 mg/dL (07-06 @ 06:21) ? Insulin infusion at 1 units/hr.  130 mg/dL (07-06 @ 08:02) ? Insulin infusion at 1 units/hr.  116 mg/dL (07-06 @ 08:52) ? Insulin infusion at 1 units/hr.  106 mg/dL (07-06 @ 10:06) ? Insulin infusion at 1 units/hr.    REVIEW OF SYSTEMS  Constitutional:  Negative fever, chills or loss of appetite.  Eyes:  Negative blurry vision or double vision.  Cardiovascular:  Negative for chest pain or palpitations.  Respiratory:  Negative for cough, wheezing, or shortness of breath.    Gastrointestinal:  Negative for nausea, vomiting, diarrhea, constipation, or abdominal pain.  Genitourinary:  Negative frequency, urgency or dysuria.  Neurologic:  No headache, confusion, dizziness, lightheadedness.    PHYSICAL EXAM  Vital Signs Last 24 Hrs  T(C): 35.8 (06 Jul 2023 05:01), Max: 36.8 (05 Jul 2023 17:31)  T(F): 96.5 (06 Jul 2023 05:01), Max: 98.2 (05 Jul 2023 17:31)  HR: 74 (06 Jul 2023 10:00) (68 - 79)  BP: --  BP(mean): --  RR: 18 (06 Jul 2023 10:00) (14 - 20)  SpO2: 100% (06 Jul 2023 10:00) (97% - 100%)    Parameters below as of 06 Jul 2023 10:00  Patient On (Oxygen Delivery Method): nasal cannula, high flow  O2 Flow (L/min): 40  O2 Concentration (%): 40    Constitutional: Awake, alert, in no acute distress.   HEENT: Normocephalic, atraumatic, SCARLETT.  Respiratory: Lungs clear to ausculation bilaterally.   Cardiovascular: regular rhythm, normal S1 and S2, no audible murmurs.   GI: soft, non-tender, non-distended, bowel sounds present.  Extremities: No lower extremity edema.  Psychiatric: AAO x 3. Normal affect/mood.     LABS  CBC - WBC/HGB/HTC/PLT: 9.69/8.8/25.8/109 (07-06-23)  BMP - Na/K/Cl/Bicarb/BUN/Cr/Gluc/AG/eGFR: 134/3.4/92/27/72/2.31/115/15/28 (07-06-23)  Ca - 9.0 (07-06-23)  Phos - 5.3 (07-06-23)  Mg - 2.1 (07-06-23)  LFT - Alb/Tprot/Tbili/Dbili/AlkPhos/ALT/AST: 3.5/--/1.9/--/91/<5/19 (07-06-23)  PT/aPTT/INR: 13.7/41.3/1.15 (07-06-23)   Thyroid Stimulating Hormone, Serum: 1.440 (06-28-23)    MEDICATIONS  MEDICATIONS  (STANDING):  aMIOdarone    Tablet 400  Oral   aMIOdarone    Tablet 200 milliGRAM(s) Oral daily  aspirin  chewable 81 milliGRAM(s) Enteral Tube daily  atorvastatin 40 milliGRAM(s) Oral at bedtime  chlorhexidine 2% Cloths 1 Application(s) Topical daily  dextrose 5%. 1000 milliLiter(s) (50 mL/Hr) IV Continuous <Continuous>  dextrose 5%. 1000 milliLiter(s) (100 mL/Hr) IV Continuous <Continuous>  dextrose 50% Injectable 50 milliLiter(s) IV Push every 15 minutes  DOBUTamine Infusion 3 MICROgram(s)/kG/Min (5.99 mL/Hr) IV Continuous <Continuous>  glucagon  Injectable 1 milliGRAM(s) IntraMuscular once  heparin   Injectable 5000 Unit(s) SubCutaneous every 8 hours  insulin regular Infusion 1 Unit(s)/Hr (1 mL/Hr) IV Continuous <Continuous>  milrinone Infusion 0.125 MICROgram(s)/kG/Min (2.5 mL/Hr) IV Continuous <Continuous>  pantoprazole  Injectable 40 milliGRAM(s) IV Push daily  polyethylene glycol 3350 17 Gram(s) Oral daily  potassium chloride  20 mEq/100 mL IVPB 20 milliEquivalent(s) IV Intermittent every 2 hours  senna 2 Tablet(s) Oral at bedtime  sodium chloride 0.9%. 1000 milliLiter(s) (10 mL/Hr) IV Continuous <Continuous>  vasopressin Infusion 0.01 Unit(s)/Min (1.5 mL/Hr) IV Continuous <Continuous>    MEDICATIONS  (PRN):  dextrose Oral Gel 15 Gram(s) Oral once PRN Blood Glucose LESS THAN 70 milliGRAM(s)/deciliter  fentaNYL    Injectable 25 MICROGram(s) IV Push every 2 hours PRN Severe Pain (7 - 10)    ASSESSMENT / RECOMMENDATIONS    A1C: 8.6 %  BUN: 72  Creatinine: 2.31  GFR: 28  Weight: 66.6  BMI: 21.5  EF:     # Type 2 diabetes mellitus with hyperglycemia  - Please continue lantus *** units at bedtime.   - Continue lispro *** units before each meal.  - Continue lispro moderate / low dose sliding scale before meals and at bedtime.  - Patient's fingerstick glucose goal is 100-180 mg/dL.    - Discharge recommendations to be discussed.   - Patient can follow up at discharge with [facility] by calling [phone] to make an appointment.      Case discussed with Dr. Caicedo. Primary team updated.       Milton Laura    Endocrinology Fellow    Service Pager: [phone]    SUBJECTIVE / INTERVAL HPI: Patient was seen and examined this morning. He was successfully extubated to [facility] yesterday. He complained of experiencing hoarseness. Failed bedside dysphagia test post-extubation. He was evaluated by S&S today and his diet was advanced to pureed with mildly thick liquids. He was kept on the insulin infusion overnight and glucose levels have been within target range. In average, he was requiring 1 unit of insulin per hour since yesterday. He was transitioned to basal-bolus this afternoon with 5 units of NPH insulin.     CAPILLARY BLOOD GLUCOSE & INSULIN RECEIVED  183 mg/dL (07-05 @ 11:24) ? Insulin infusion at 2 units/hr.   196 mg/dL (07-05 @ 12:32) ? Insulin infusion at 2 units/hr.  132 mg/dL (07-05 @ 14:03) ? Insulin infusion at 2.5 units/hr.  116 mg/dL (07-05 @ 15:12) ? Insulin infusion at 2.5 units/hr.  109 mg/dL (07-05 @ 16:15) ? Insulin infusion at 1 units/hr.  105 mg/dL (07-05 @ 17:09) ? Insulin infusion at 1 units/hr.  143 mg/dL (07-05 @ 18:11) ? Insulin infusion at 1 units/hr.  136 mg/dL (07-05 @ 19:05) ? Insulin infusion at 1 units/hr.  116 mg/dL (07-05 @ 21:02) ? Insulin infusion at 1 units/hr.  131 mg/dL (07-05 @ 22:07) ? Insulin infusion at 1.5 units/hr.  110 mg/dL (07-05 @ 23:08) ? Insulin infusion at 1 units/hr.  107 mg/dL (07-06 @ 00:11) ? Insulin infusion at 1 units/hr.  105 mg/dL (07-06 @ 01:26) ? Insulin infusion at 1 units/hr.  95 mg/dL (07-06 @ 01:57) ? Insulin infusion at 1 units/hr.  87 mg/dL (07-06 @ 03:11) ? Insulin infusion at 1 units/hr.  110 mg/dL (07-06 @ 03:59) ? Insulin infusion stopped.   119 mg/dL (07-06 @ 05:07) ? Insulin infusion resumed at 0.5 units/hr.  131 mg/dL (07-06 @ 06:21) ? Insulin infusion at 1 units/hr.  130 mg/dL (07-06 @ 08:02) ? Insulin infusion at 1 units/hr.  116 mg/dL (07-06 @ 08:52) ? Insulin infusion at 1 units/hr.  106 mg/dL (07-06 @ 10:06) ? Insulin infusion at 1 units/hr.    REVIEW OF SYSTEMS  Constitutional:  (+) Decreased appetite. Negative fever or chills.   Cardiovascular:  Negative for chest pain or palpitations.  Respiratory:  (+) Hoarseness (+) Productive cough. Negative for wheezing, or shortness of breath.    Gastrointestinal:  Negative for nausea, vomiting, diarrhea, constipation, or abdominal pain.  Neurologic:  No headache, confusion, dizziness, lightheadedness.    PHYSICAL EXAM  Vital Signs Last 24 Hrs  T(C): 35.8 (06 Jul 2023 05:01), Max: 36.8 (05 Jul 2023 17:31)  T(F): 96.5 (06 Jul 2023 05:01), Max: 98.2 (05 Jul 2023 17:31)  HR: 74 (06 Jul 2023 10:00) (68 - 79)  BP: --  BP(mean): --  RR: 18 (06 Jul 2023 10:00) (14 - 20)  SpO2: 100% (06 Jul 2023 10:00) (97% - 100%)    Parameters below as of 06 Jul 2023 10:00  Patient On (Oxygen Delivery Method): nasal cannula, high flow  O2 Flow (L/min): 40  O2 Concentration (%): 40    Constitutional: Awake, alert, elderly male, in no acute distress.   HEENT: Normocephalic, atraumatic, SCARLETT.  Respiratory: Lungs clear to ausculation bilaterally.   Cardiovascular: regular rhythm, normal S1 and S2,  (+) midline sternotomy scar covered with sterile dressings (+) wound vac.   GI: soft, non-tender, non-distended, bowel sounds present.  Extremities: No lower extremity edema.   Psychiatric: AAO x 3.    LABS  CBC - WBC/HGB/HTC/PLT: 9.69/8.8/25.8/109 (07-06-23)  BMP - Na/K/Cl/Bicarb/BUN/Cr/Gluc/AG/eGFR: 134/3.4/92/27/72/2.31/115/15/28 (07-06-23)  Ca - 9.0 (07-06-23)  Phos - 5.3 (07-06-23)  Mg - 2.1 (07-06-23)  LFT - Alb/Tprot/Tbili/Dbili/AlkPhos/ALT/AST: 3.5/--/1.9/--/91/<5/19 (07-06-23)  PT/aPTT/INR: 13.7/41.3/1.15 (07-06-23)   Thyroid Stimulating Hormone, Serum: 1.440 (06-28-23)    MEDICATIONS  MEDICATIONS  (STANDING):  aMIOdarone    Tablet 400  Oral   aMIOdarone    Tablet 200 milliGRAM(s) Oral daily  aspirin  chewable 81 milliGRAM(s) Enteral Tube daily  atorvastatin 40 milliGRAM(s) Oral at bedtime  chlorhexidine 2% Cloths 1 Application(s) Topical daily  dextrose 5%. 1000 milliLiter(s) (50 mL/Hr) IV Continuous <Continuous>  dextrose 5%. 1000 milliLiter(s) (100 mL/Hr) IV Continuous <Continuous>  dextrose 50% Injectable 50 milliLiter(s) IV Push every 15 minutes  DOBUTamine Infusion 3 MICROgram(s)/kG/Min (5.99 mL/Hr) IV Continuous <Continuous>  glucagon  Injectable 1 milliGRAM(s) IntraMuscular once  heparin   Injectable 5000 Unit(s) SubCutaneous every 8 hours  insulin regular Infusion 1 Unit(s)/Hr (1 mL/Hr) IV Continuous <Continuous>  milrinone Infusion 0.125 MICROgram(s)/kG/Min (2.5 mL/Hr) IV Continuous <Continuous>  pantoprazole  Injectable 40 milliGRAM(s) IV Push daily  polyethylene glycol 3350 17 Gram(s) Oral daily  potassium chloride  20 mEq/100 mL IVPB 20 milliEquivalent(s) IV Intermittent every 2 hours  senna 2 Tablet(s) Oral at bedtime  sodium chloride 0.9%. 1000 milliLiter(s) (10 mL/Hr) IV Continuous <Continuous>  vasopressin Infusion 0.01 Unit(s)/Min (1.5 mL/Hr) IV Continuous <Continuous>    MEDICATIONS  (PRN):  dextrose Oral Gel 15 Gram(s) Oral once PRN Blood Glucose LESS THAN 70 milliGRAM(s)/deciliter  fentaNYL    Injectable 25 MICROGram(s) IV Push every 2 hours PRN Severe Pain (7 - 10)    ASSESSMENT / RECOMMENDATIONS  Mr. Johnson is a 78-year-old male with type 2 diabetes mellitus, hypertension and severe aortic stenosis who was transferred to [facility] for further work-up and intervention of severe aortic stenosis, now s/p aortic valve replacement (6/30/23). Endocrinology was consulted for recommendations regarding diabetes management.     A1C: 8.6 %  BUN: 72  Creatinine: 2.31  GFR: 28  Weight: 66.6  BMI: 21.5    # Type 2 diabetes mellitus with hyperglycemia  - The insulin infusion was stopped and he received 5 units of NPH insulin at 2 PM.   - Please continue with lantus 16 units at bedtime.   - Continue lispro 4 units before each meal.  - Continue lispro moderate dose sliding scale before meals and at bedtime.  - Patient's fingerstick glucose goal is 100-180 mg/dL.    - Discharge recommendations to be discussed.   - Patient can follow up at discharge with [facility] by calling [phone] to make an appointment.      Case discussed with Dr. Caicedo. Primary team updated.       Milton Laura    Endocrinology Fellow    Service Pager: [phone]

## 2023-07-06 NOTE — PROGRESS NOTE ADULT - ATTENDING COMMENTS
Pt seen on rounds this afternoon.  Was OOB in a chair, looked somewhat more animated, was breathing comfortably.  Failed initial swallow eval yesterday, but after formal S/S eval today has now been cleared for pureed diet with thick liquids.  Remained on insulin drip overnight and this morning at stable dose of 1 U/hr, with essentially all of his glucoses in the 115-130 range.  Was bridged with 5 units of NPH in the late morning.  Ate almost no breakfast, but did surprisingly well at lunch, ate 1/3-1/2 of the tray  Lungs fairly clear, but with decreased breath sounds at both bases, worse on the right.  --Based on his previous Lantus requirements and his insulin drip requirements, will restart Lantus at 16 units qhs  --Will leave the premeal lispro at 4 units, though he is likely to need more when appetite improves

## 2023-07-06 NOTE — CHART NOTE - NSCHARTNOTEFT_GEN_A_CORE
Admitting Diagnosis:   Patient is a 78y old  Male who presents with a chief complaint of severe AS and AI (2023 10:41)      PAST MEDICAL & SURGICAL HISTORY:  HTN (hypertension)      DM (diabetes mellitus)      Severe aortic regurgitation      History of cholecystectomy      H/O prostatectomy          Current Nutrition Order:  NPO    PO Intake: Good (%) [   ]  Fair (50-75%) [   ] Poor (<25%) [   ]--NA NPO     GI Issues:   Protonix MIRALAX and Senna ordered   LBM per flow sheets: 2023    Pain:  +Pain, bilateral heels   Meds noted     Skin Integrity:  Sherwin 15, 1+generalized Edema, No pressure ulcers- SX site noted     Labs:       137  |  93<L>  |  75<H>  ----------------------------<  136<H>  4.5   |  23  |  2.38<H>    Ca    9.6      2023 13:49  Phos  5.1     -  Mg     2.0     -    TPro  6.2  /  Alb  3.5  /  TBili  2.2<H>  /  DBili  x   /  AST  20  /  ALT  <5<L>  /  AlkPhos  95  07-    CAPILLARY BLOOD GLUCOSE      POCT Blood Glucose.: 110 mg/dL (2023 14:00)  POCT Blood Glucose.: 145 mg/dL (2023 12:30)  POCT Blood Glucose.: 124 mg/dL (2023 11:32)  POCT Blood Glucose.: 106 mg/dL (2023 10:06)  POCT Blood Glucose.: 116 mg/dL (2023 08:52)  POCT Blood Glucose.: 130 mg/dL (2023 08:02)  POCT Blood Glucose.: 131 mg/dL (2023 06:21)  POCT Blood Glucose.: 119 mg/dL (2023 05:07)  POCT Blood Glucose.: 110 mg/dL (2023 03:59)  POCT Blood Glucose.: 87 mg/dL (2023 03:11)  POCT Blood Glucose.: 95 mg/dL (2023 01:57)  POCT Blood Glucose.: 105 mg/dL (2023 01:26)  POCT Blood Glucose.: 107 mg/dL (2023 00:11)  POCT Blood Glucose.: 110 mg/dL (2023 23:08)  POCT Blood Glucose.: 131 mg/dL (2023 22:07)  POCT Blood Glucose.: 116 mg/dL (2023 21:02)  POCT Blood Glucose.: 136 mg/dL (2023 19:05)  POCT Blood Glucose.: 143 mg/dL (2023 18:11)  POCT Blood Glucose.: 105 mg/dL (2023 17:09)  POCT Blood Glucose.: 109 mg/dL (2023 16:15)      Medications:  MEDICATIONS  (STANDING):  aMIOdarone    Tablet 200 milliGRAM(s) Oral daily  aMIOdarone    Tablet 400  Oral   aspirin  chewable 81 milliGRAM(s) Oral daily  atorvastatin 40 milliGRAM(s) Oral at bedtime  chlorhexidine 2% Cloths 1 Application(s) Topical daily  dextrose 5%. 1000 milliLiter(s) (50 mL/Hr) IV Continuous <Continuous>  dextrose 5%. 1000 milliLiter(s) (100 mL/Hr) IV Continuous <Continuous>  dextrose 5%. 1000 milliLiter(s) (50 mL/Hr) IV Continuous <Continuous>  dextrose 5%. 1000 milliLiter(s) (100 mL/Hr) IV Continuous <Continuous>  dextrose 50% Injectable 50 milliLiter(s) IV Push every 15 minutes  dextrose 50% Injectable 25 Gram(s) IV Push once  dextrose 50% Injectable 25 Gram(s) IV Push once  dextrose 50% Injectable 12.5 Gram(s) IV Push once  DOBUTamine Infusion 3 MICROgram(s)/kG/Min (5.99 mL/Hr) IV Continuous <Continuous>  glucagon  Injectable 1 milliGRAM(s) IntraMuscular once  glucagon  Injectable 1 milliGRAM(s) IntraMuscular once  heparin   Injectable 5000 Unit(s) SubCutaneous every 8 hours  insulin glargine Injectable (LANTUS) 12 Unit(s) SubCutaneous at bedtime  insulin lispro (ADMELOG) corrective regimen sliding scale   SubCutaneous Before meals and at bedtime  insulin lispro Injectable (ADMELOG) 4 Unit(s) SubCutaneous three times a day before meals  insulin NPH human recombinant 5 Unit(s) SubCutaneous once  polyethylene glycol 3350 17 Gram(s) Oral daily  potassium chloride  20 mEq/100 mL IVPB 20 milliEquivalent(s) IV Intermittent every 2 hours  senna 2 Tablet(s) Oral at bedtime  sodium chloride 0.9%. 1000 milliLiter(s) (10 mL/Hr) IV Continuous <Continuous>  vasopressin Infusion 0.01 Unit(s)/Min (1.5 mL/Hr) IV Continuous <Continuous>    MEDICATIONS  (PRN):  dextrose Oral Gel 15 Gram(s) Oral once PRN Blood Glucose LESS THAN 70 milliGRAM(s)/deciliter  dextrose Oral Gel 15 Gram(s) Oral once PRN Blood Glucose LESS THAN 70 milliGRAM(s)/deciliter  fentaNYL    Injectable 25 MICROGram(s) IV Push every 2 hours PRN Severe Pain (7 - 10)      5'9''  pounds +-10%  Wt 146 pounds BMI 21.5 %[id number]    **No upated EMR wts at ass at this time     **PCM: Severe protein-calorie malnutrition  >> Weight loss: > 7.5% in 3 months  >> PO intake: < 75% for >/= 3 months    Estimated energy needs:   ABW used for calculations as pt between % of IBW   Adjust for age, S/p OR, Malnutrition, EF, Renal; Fluids per team   25-30kcal/k-1980kcal/day  1.2-1.4gm/k-92gm prot/day     Subjective: 77 YOMale PMHx of HTN and DM who originally presented to [facility]  c/o chest pain x 3 months. At [facility] he underwent a stress test which revealed small sized, reversible, myocardial perfusion defect of the anteroapical wall c/w ischemia. TTE revealed moderate AS and severe AR, nml LVEF. Pt referred for cardiac cath which revealed non-obstructive CAD. He was then transferred to [facility] under the care of Dr. Koehler for further work-up and intervention of severe AI and AS. S/p Replacement of aortic valve with tissue valve . Extuabted  However course complicated by cardiogenic shock, CAMMIE (likely iATN i/s/o cardiogenic shock) and reintubation 7/3. S/p Echo 7/3 with severely reduced LVEF 15%/ reduced RV fx / no tamponade. S/p Echo  with LV Ef 40%/ mod-severe RV dysfunction/ mild to mod pericardial effusion. Pt Extubated . Now with plan to repeat TTE when off inotropes.    Pt seen sleeping this AM on 9EAST. Spoke with RN. Pt Failed bedside dysphagia post extubation; pt pending SLP Eval during time of RD visit today. NPO at that time. Now s/p SLP with Recs for Puree/mildly thick liquids .   Labs: low HH, POCT 110 145 124, Lactate 0.9, K 4.5, BUN/Cr75/2.38, , ALT AGPT<5, Phos 5.1, Lipids WDL vs abnormal profile  (? if fasting vs nonfasted), A1c 8.7%, .   Please see below for nutritions recommendations.     Previous Nutrition Diagnosis: Malnutrition... Severe PCM RT inadequate energy intake to meet nutrition needs AEB significant wt loss, likely meeting <75% EER PTA, muscle/fat loss  Active [ X ]  Resolved [   ]  GOAL: Pt to meet >75% estimated nutrition needs consistently via feasible route    Recommendations:  1. Diet to be advanced in 24-48hrs as medically feasible.  - Adv to Low sodium/CONSCHO; Ensure MAX x1/day 150kcal/30gm prot; PO cons per SLP.  - Monitor %PO intake. If PO intake is Below %50% consider to Liberalize From Low sodium/CONSCHO.   2. Monitor Skin, Daily Wt, Pain, GI, GOC.  3. Labs: monitor BMP, CBC, glucose, lytes, trend renal indices, LFTs, POCT.  - Monitor need for phosphorus Binder.   4. RD to remain available for additional nutrition interventions as needed.     Education: NA @ this time     Risk Level: High [ X  ] Moderate [   ] Low [   ]

## 2023-07-06 NOTE — PROGRESS NOTE ADULT - ASSESSMENT
79 yo M w/ CKD3 admitted 6/28 for severe AS s/p AVR 6/30, post-op course c/b cardiogenic shock, pneumothorax and CAMMIE. Nephrology consulted 7/3 for CAMMIE Cr 2.3 and vol overload, s/p diuresis w/ bumex gtt and net neg 2.4L/48 hours, now pt euvolemic, bumex turned off, Cr stable.    #Non-oliguric CAMMIE on CKD3  BCr not known, admitted w/ Cr 1.4, since then gradual rise, now plateaued, at 2.39 today  UA 7/3 w/ proteinuria (UPCR 0.5), hematuria (not present on admission)  Dede 69 7/3 high due to diuretics  Fluid status: s/p bumex started 7/1-7/5, net neg 1.9L/24 hours, net neg 2.4L/48 hours, bumex held 7/5 AM   Etiology likely Cardiorenal syndrome i/s/o cardiogenic shock. Hematuria may be due to epstein    Recommend:   Vol status acceptable. Cr has been stable. Aim for net even to mild net neg fluid balance  Lytes noted. Ensure drips composition in NS instead of D5  Maintain MAP>70 for adequate renal perfusion  strict I/Os           Efrain Ferris M.D.  PGY 4 - Nephrology Fellow  [phone]   77 yo M w/ CKD3 admitted 6/28 for severe AS s/p AVR 6/30, post-op course c/b cardiogenic shock, pneumothorax and CAMMIE. Nephrology consulted 7/3 for CAMMIE Cr 2.3 and vol overload, s/p diuresis w/ bumex gtt and net neg 2.4L/48 hours, now pt euvolemic, bumex turned off, Cr stable.    #Non-oliguric CAMMIE on CKD3  BCr not known, admitted w/ Cr 1.4, since then gradual rise, now plateaued, at 2.31 today  UA 7/3 w/ proteinuria (UPCR 0.5), hematuria (not present on admission), GN unikely  Dede 69 7/3 high due to diuretics  Fluid status: s/p bumex 7/1-7/5, net neg 3.2L/24 hours, bumex held 7/5 AM   Etiology likely iATN i/s/o cardiogenic shock. Hematuria may be due to epstein    Recommend:   Pt euvolemic today. Cr stable. However, appears to be having post-ATN diuresis. Aim for net even fluid balance, may need fluids  Lytes noted. Ensure drips composition in NS instead of D5  Maintain MAP>70 for adequate renal perfusion  strict I/Os   Can D/C temp HD Cath          Efrain Ferris M.D.  PGY 4 - Nephrology Fellow  [phone]

## 2023-07-06 NOTE — SWALLOW BEDSIDE ASSESSMENT ADULT - SWALLOW EVAL: DIAGNOSIS
At least a moderate pharyngeal dysphagia characterized by +intermittent cough with thin liquids, suspect d/t incomplete airway closure.

## 2023-07-06 NOTE — PROGRESS NOTE ADULT - SUBJECTIVE AND OBJECTIVE BOX
INTERVAL COURSE  POD# 6 AVR  complicated by cardiogenic shock, CAMMIE and reintubation   Extubated 7/5  Mil 0.125/  at 4  Autodiuresing, Cr plateauing    ICU Vital Signs Last 24 Hrs  T(C): 35.8 (2023 05:01), Max: 36.8 (2023 17:31)  T(F): 96.5 (2023 05:01), Max: 98.2 (2023 17:31)  HR: 77 (2023 07:00) (68 - 82)  ABP: 133/47 (2023 07:00) (108/52 - 171/58)  ABP(mean): 69 (2023 07:00) (65 - 93)  RR: 16 (2023 07:00) (14 - 20)  SpO2: 99% (2023 07:00) (97% - 100%)    O2 Parameters below as of 2023 07:00  Patient On (Oxygen Delivery Method): nasal cannula, high flow  O2 Flow (L/min): 40  O2 Concentration (%): 40    Adult Advanced Hemodynamics Last 24 Hrs  CVP(mm Hg): 4 (2023 07:00) (1 - 14)  CO: 5.1 (2023 05:00) (3.1 - 5.1)  CI: 2.8 (2023 05:00) (1.7 - 2.8)  PA: 53/14 (2023 07:00) (36/11 - 67/12)  PA(mean): 25 (2023 07:00) (17 - 31)  SVR: 1398 (2023 01:00) (1361 - 1649)  SVRI: 2563 (2023 01:00) (2496 - 3008)  ABG - ( 2023 04:35 )  pH, Arterial: 7.43  pH, Blood: x     /  pCO2: 44    /  pO2: 120   / HCO3: 29    / Base Excess: 4.2   /  SaO2: 99.0        I&O's Summary    2023 07:01  -  2023 07:00  --------------------------------------------------------  IN: 1088.4 mL / OUT: 3877 mL / NET: -2788.6 mL    PHYSICAL EXAM  General: A&Ox 3; NAD  Head: NC/AT;   Respiratory: CTA B/L; no wheezes/crackles  Cardiovascular: Regular rhythm/rate  Gastrointestinal: Soft; NTNDal  Extremities: Warm, no edema   Neurological:  CNII-XII grossly intact; no obvious focal deficits    LABS/IMAGING/EKG/ETC  Reviewed.

## 2023-07-06 NOTE — SWALLOW BEDSIDE ASSESSMENT ADULT - PHARYNGEAL PHASE
Hyolaryngeal excursion palpated during swallow trigger & appears WNL. +cough w cup sips thin liquid only, suspect d/t incomplete glottic closure. Although no cough, throat clear or increase in wet voice quality after mildly thick, Pt was noted to have a slight increase in WOB with multiple trials of PO.

## 2023-07-06 NOTE — PROGRESS NOTE ADULT - ATTENDING COMMENTS
79 yo man  CAMMIE, underlying CKD3; admitted 6/28 for severe AS s/p AVR 6/30, post-op course c/b cardiogenic shock, pneumothorax Cr 2.3  over past 72 H clinically improving, excellent urine output  did not need AQ or RRT  creat 2.31- plateaued  okay to now maintain even balance now -if brisk OU continues may need IVF bolus

## 2023-07-06 NOTE — SWALLOW BEDSIDE ASSESSMENT ADULT - SLP GENERAL OBSERVATIONS
On HFNC. Language skills intact for at least basic conversation. Voice is mod-severely breathy, suspect d/t intubation trauma

## 2023-07-06 NOTE — SWALLOW BEDSIDE ASSESSMENT ADULT - SLP PERTINENT HISTORY OF CURRENT PROBLEM
79 yo M w h/o HTN, DM adm to [facility] from [facility] for severe AI and AS, now s/p AVR on 6/30 c/b cardiogenic shock, CAMMIE requiring reintubation. Extub 7/5

## 2023-07-06 NOTE — PROGRESS NOTE ADULT - SUBJECTIVE AND OBJECTIVE BOX
Patient is a 78y Male seen and evaluated at bedside. Laying comfortably.      Meds:    aMIOdarone    Tablet 400   aMIOdarone    Tablet 200 daily  aspirin  chewable 81 daily  atorvastatin 40 at bedtime  chlorhexidine 2% Cloths 1 daily  dextrose 5%. 1000 <Continuous>  dextrose 5%. 1000 <Continuous>  dextrose 50% Injectable 50 every 15 minutes  dextrose Oral Gel 15 once PRN  DOBUTamine Infusion 3 <Continuous>  fentaNYL    Injectable 25 every 2 hours PRN  glucagon  Injectable 1 once  heparin   Injectable 5000 every 8 hours  insulin regular Infusion 1 <Continuous>  milrinone Infusion 0.125 <Continuous>  pantoprazole  Injectable 40 daily  polyethylene glycol 3350 17 daily  potassium chloride  20 mEq/100 mL IVPB 20 every 2 hours  senna 2 at bedtime  sodium chloride 0.9%. 1000 <Continuous>  vasopressin Infusion 0.01 <Continuous>      T(C): , Max: 36.8 (07-05-23 @ 17:31)  T(F): , Max: 98.2 (07-05-23 @ 17:31)  HR: 70 (07-06-23 @ 09:00)  BP: --  BP(mean): --  RR: 16 (07-06-23 @ 09:00)  SpO2: 100% (07-06-23 @ 09:00)  Wt(kg): --    07-05 @ 07:01  -  07-06 @ 07:00  --------------------------------------------------------  IN: 1143.9 mL / OUT: 4407 mL / NET: -3263.1 mL    07-06 @ 07:01  -  07-06 @ 09:34  --------------------------------------------------------  IN: 68.4 mL / OUT: 425 mL / NET: -356.6 mL          Review of Systems:  ROS negative except as per HPI      PHYSICAL EXAM:  GENERAL: NAD  NECK: supple, No JVD  CHEST/LUNG: Clear to auscultation bilaterally  HEART: normal S1S2, RRR  ABDOMEN: Soft, Nontender, +BS, No flank tenderness bilateral  EXTREMITIES: No clubbing, cyanosis, or edema   NEUROLOGY: AAO x3, no focal neurological deficit  ACCESS: good thrill and bruit appreciated      LABS:                        9.0    9.90  )-----------( 100      ( 06 Jul 2023 04:44 )             25.8     07-06    134<L>  |  92<L>  |  72<H>  ----------------------------<  115<H>  3.4<L>   |  27  |  2.31<H>    Ca    9.0      06 Jul 2023 04:44  Phos  5.3     07-06  Mg     2.1     07-06    TPro  6.0  /  Alb  3.5  /  TBili  1.9<H>  /  DBili  x   /  AST  19  /  ALT  <5<L>  /  AlkPhos  91  07-06      PT/INR - ( 06 Jul 2023 04:44 )   PT: 13.3 sec;   INR: 1.12          PTT - ( 06 Jul 2023 04:44 )  PTT:35.6 sec  Urinalysis Basic - ( 06 Jul 2023 04:44 )    Color: x / Appearance: x / SG: x / pH: x  Gluc: 115 mg/dL / Ketone: x  / Bili: x / Urobili: x   Blood: x / Protein: x / Nitrite: x   Leuk Esterase: x / RBC: x / WBC x   Sq Epi: x / Non Sq Epi: x / Bacteria: x            RADIOLOGY & ADDITIONAL STUDIES:           Patient is a 78y Male seen and evaluated at bedside. Laying comfortably.      Meds:    aMIOdarone    Tablet 400   aMIOdarone    Tablet 200 daily  aspirin  chewable 81 daily  atorvastatin 40 at bedtime  chlorhexidine 2% Cloths 1 daily  dextrose 5%. 1000 <Continuous>  dextrose 5%. 1000 <Continuous>  dextrose 50% Injectable 50 every 15 minutes  dextrose Oral Gel 15 once PRN  DOBUTamine Infusion 3 <Continuous>  fentaNYL    Injectable 25 every 2 hours PRN  glucagon  Injectable 1 once  heparin   Injectable 5000 every 8 hours  insulin regular Infusion 1 <Continuous>  milrinone Infusion 0.125 <Continuous>  pantoprazole  Injectable 40 daily  polyethylene glycol 3350 17 daily  potassium chloride  20 mEq/100 mL IVPB 20 every 2 hours  senna 2 at bedtime  sodium chloride 0.9%. 1000 <Continuous>  vasopressin Infusion 0.01 <Continuous>      T(C): , Max: 36.8 (07-05-23 @ 17:31)  T(F): , Max: 98.2 (07-05-23 @ 17:31)  HR: 70 (07-06-23 @ 09:00)  BP: --  BP(mean): --  RR: 16 (07-06-23 @ 09:00)  SpO2: 100% (07-06-23 @ 09:00)  Wt(kg): --    07-05 @ 07:01  -  07-06 @ 07:00  --------------------------------------------------------  IN: 1143.9 mL / OUT: 4407 mL / NET: -3263.1 mL    07-06 @ 07:01  -  07-06 @ 09:34  --------------------------------------------------------  IN: 68.4 mL / OUT: 425 mL / NET: -356.6 mL          Review of Systems:  ROS negative except as per HPI      PHYSICAL EXAM:  GENERAL: Alert, awake, mod distress due to SOB  CHEST/LUNG: Bilateral clear breath sounds  HEART: S1, S2  ABDOMEN: Soft, nontender, non distended  EXTREMITIES: trace dependent edema  Neurology: Awake, oriented  ACCESS: Rt IJ S/Q HD Cath      LABS:                        9.0    9.90  )-----------( 100      ( 06 Jul 2023 04:44 )             25.8     07-06    134<L>  |  92<L>  |  72<H>  ----------------------------<  115<H>  3.4<L>   |  27  |  2.31<H>    Ca    9.0      06 Jul 2023 04:44  Phos  5.3     07-06  Mg     2.1     07-06    TPro  6.0  /  Alb  3.5  /  TBili  1.9<H>  /  DBili  x   /  AST  19  /  ALT  <5<L>  /  AlkPhos  91  07-06      PT/INR - ( 06 Jul 2023 04:44 )   PT: 13.3 sec;   INR: 1.12          PTT - ( 06 Jul 2023 04:44 )  PTT:35.6 sec  Urinalysis Basic - ( 06 Jul 2023 04:44 )    Color: x / Appearance: x / SG: x / pH: x  Gluc: 115 mg/dL / Ketone: x  / Bili: x / Urobili: x   Blood: x / Protein: x / Nitrite: x   Leuk Esterase: x / RBC: x / WBC x   Sq Epi: x / Non Sq Epi: x / Bacteria: x            RADIOLOGY & ADDITIONAL STUDIES:

## 2023-07-07 LAB
ALBUMIN SERPL ELPH-MCNC: 3.8 G/DL — SIGNIFICANT CHANGE UP (ref 3.3–5)
ALBUMIN SERPL ELPH-MCNC: 4 G/DL — SIGNIFICANT CHANGE UP (ref 3.3–5)
ALBUMIN SERPL ELPH-MCNC: 4.1 G/DL — SIGNIFICANT CHANGE UP (ref 3.3–5)
ALBUMIN SERPL ELPH-MCNC: 4.2 G/DL — SIGNIFICANT CHANGE UP (ref 3.3–5)
ALP SERPL-CCNC: 110 U/L — SIGNIFICANT CHANGE UP (ref 40–120)
ALP SERPL-CCNC: 116 U/L — SIGNIFICANT CHANGE UP (ref 40–120)
ALP SERPL-CCNC: 94 U/L — SIGNIFICANT CHANGE UP (ref 40–120)
ALP SERPL-CCNC: 99 U/L — SIGNIFICANT CHANGE UP (ref 40–120)
ALT FLD-CCNC: 7 U/L — LOW (ref 10–45)
ALT FLD-CCNC: <5 U/L — LOW (ref 10–45)
ANION GAP SERPL CALC-SCNC: 12 MMOL/L — SIGNIFICANT CHANGE UP (ref 5–17)
ANION GAP SERPL CALC-SCNC: 13 MMOL/L — SIGNIFICANT CHANGE UP (ref 5–17)
ANION GAP SERPL CALC-SCNC: 14 MMOL/L — SIGNIFICANT CHANGE UP (ref 5–17)
ANION GAP SERPL CALC-SCNC: 14 MMOL/L — SIGNIFICANT CHANGE UP (ref 5–17)
APTT BLD: 32.7 SEC — SIGNIFICANT CHANGE UP (ref 27.5–35.5)
APTT BLD: 33.1 SEC — SIGNIFICANT CHANGE UP (ref 27.5–35.5)
APTT BLD: 35.4 SEC — SIGNIFICANT CHANGE UP (ref 27.5–35.5)
APTT BLD: 42.5 SEC — HIGH (ref 27.5–35.5)
AST SERPL-CCNC: 22 U/L — SIGNIFICANT CHANGE UP (ref 10–40)
AST SERPL-CCNC: 24 U/L — SIGNIFICANT CHANGE UP (ref 10–40)
AST SERPL-CCNC: 26 U/L — SIGNIFICANT CHANGE UP (ref 10–40)
AST SERPL-CCNC: 32 U/L — SIGNIFICANT CHANGE UP (ref 10–40)
BILIRUB SERPL-MCNC: 2 MG/DL — HIGH (ref 0.2–1.2)
BILIRUB SERPL-MCNC: 2 MG/DL — HIGH (ref 0.2–1.2)
BILIRUB SERPL-MCNC: 2.3 MG/DL — HIGH (ref 0.2–1.2)
BILIRUB SERPL-MCNC: 2.5 MG/DL — HIGH (ref 0.2–1.2)
BUN SERPL-MCNC: 81 MG/DL — HIGH (ref 7–23)
BUN SERPL-MCNC: 81 MG/DL — HIGH (ref 7–23)
BUN SERPL-MCNC: 83 MG/DL — HIGH (ref 7–23)
BUN SERPL-MCNC: 86 MG/DL — HIGH (ref 7–23)
CALCIUM SERPL-MCNC: 9.3 MG/DL — SIGNIFICANT CHANGE UP (ref 8.4–10.5)
CALCIUM SERPL-MCNC: 9.4 MG/DL — SIGNIFICANT CHANGE UP (ref 8.4–10.5)
CHLORIDE SERPL-SCNC: 92 MMOL/L — LOW (ref 96–108)
CHLORIDE SERPL-SCNC: 93 MMOL/L — LOW (ref 96–108)
CO2 SERPL-SCNC: 27 MMOL/L — SIGNIFICANT CHANGE UP (ref 22–31)
CO2 SERPL-SCNC: 28 MMOL/L — SIGNIFICANT CHANGE UP (ref 22–31)
CO2 SERPL-SCNC: 29 MMOL/L — SIGNIFICANT CHANGE UP (ref 22–31)
CO2 SERPL-SCNC: 30 MMOL/L — SIGNIFICANT CHANGE UP (ref 22–31)
CREAT SERPL-MCNC: 2.35 MG/DL — HIGH (ref 0.5–1.3)
CREAT SERPL-MCNC: 2.47 MG/DL — HIGH (ref 0.5–1.3)
CREAT SERPL-MCNC: 2.47 MG/DL — HIGH (ref 0.5–1.3)
CREAT SERPL-MCNC: 2.5 MG/DL — HIGH (ref 0.5–1.3)
EGFR: 25 ML/MIN/1.73M2 — LOW
EGFR: 26 ML/MIN/1.73M2 — LOW
EGFR: 26 ML/MIN/1.73M2 — LOW
EGFR: 27 ML/MIN/1.73M2 — LOW
GAS PNL BLDA: SIGNIFICANT CHANGE UP
GLUCOSE BLDC GLUCOMTR-MCNC: 112 MG/DL — HIGH (ref 70–99)
GLUCOSE BLDC GLUCOMTR-MCNC: 134 MG/DL — HIGH (ref 70–99)
GLUCOSE BLDC GLUCOMTR-MCNC: 165 MG/DL — HIGH (ref 70–99)
GLUCOSE BLDC GLUCOMTR-MCNC: 202 MG/DL — HIGH (ref 70–99)
GLUCOSE BLDC GLUCOMTR-MCNC: 54 MG/DL — CRITICAL LOW (ref 70–99)
GLUCOSE BLDC GLUCOMTR-MCNC: 56 MG/DL — LOW (ref 70–99)
GLUCOSE SERPL-MCNC: 117 MG/DL — HIGH (ref 70–99)
GLUCOSE SERPL-MCNC: 170 MG/DL — HIGH (ref 70–99)
GLUCOSE SERPL-MCNC: 188 MG/DL — HIGH (ref 70–99)
GLUCOSE SERPL-MCNC: 69 MG/DL — LOW (ref 70–99)
HCT VFR BLD CALC: 24.2 % — LOW (ref 39–50)
HCT VFR BLD CALC: 25.9 % — LOW (ref 39–50)
HCT VFR BLD CALC: 25.9 % — LOW (ref 39–50)
HCT VFR BLD CALC: 26 % — LOW (ref 39–50)
HGB BLD-MCNC: 8.4 G/DL — LOW (ref 13–17)
HGB BLD-MCNC: 8.7 G/DL — LOW (ref 13–17)
HGB BLD-MCNC: 8.8 G/DL — LOW (ref 13–17)
HGB BLD-MCNC: 8.8 G/DL — LOW (ref 13–17)
INR BLD: 1.1 — SIGNIFICANT CHANGE UP (ref 0.88–1.16)
INR BLD: 1.1 — SIGNIFICANT CHANGE UP (ref 0.88–1.16)
INR BLD: 1.16 — SIGNIFICANT CHANGE UP (ref 0.88–1.16)
LACTATE SERPL-SCNC: 0.8 MMOL/L — SIGNIFICANT CHANGE UP (ref 0.5–2)
LACTATE SERPL-SCNC: 0.8 MMOL/L — SIGNIFICANT CHANGE UP (ref 0.5–2)
LACTATE SERPL-SCNC: 0.9 MMOL/L — SIGNIFICANT CHANGE UP (ref 0.5–2)
MAGNESIUM SERPL-MCNC: 2.1 MG/DL — SIGNIFICANT CHANGE UP (ref 1.6–2.6)
MAGNESIUM SERPL-MCNC: 2.2 MG/DL — SIGNIFICANT CHANGE UP (ref 1.6–2.6)
MCHC RBC-ENTMCNC: 32.2 PG — SIGNIFICANT CHANGE UP (ref 27–34)
MCHC RBC-ENTMCNC: 32.4 PG — SIGNIFICANT CHANGE UP (ref 27–34)
MCHC RBC-ENTMCNC: 32.5 PG — SIGNIFICANT CHANGE UP (ref 27–34)
MCHC RBC-ENTMCNC: 32.6 PG — SIGNIFICANT CHANGE UP (ref 27–34)
MCHC RBC-ENTMCNC: 33.6 GM/DL — SIGNIFICANT CHANGE UP (ref 32–36)
MCHC RBC-ENTMCNC: 33.8 GM/DL — SIGNIFICANT CHANGE UP (ref 32–36)
MCHC RBC-ENTMCNC: 34 GM/DL — SIGNIFICANT CHANGE UP (ref 32–36)
MCHC RBC-ENTMCNC: 34.7 GM/DL — SIGNIFICANT CHANGE UP (ref 32–36)
MCV RBC AUTO: 93.8 FL — SIGNIFICANT CHANGE UP (ref 80–100)
MCV RBC AUTO: 95.6 FL — SIGNIFICANT CHANGE UP (ref 80–100)
MCV RBC AUTO: 95.6 FL — SIGNIFICANT CHANGE UP (ref 80–100)
MCV RBC AUTO: 95.9 FL — SIGNIFICANT CHANGE UP (ref 80–100)
NRBC # BLD: 0 /100 WBCS — SIGNIFICANT CHANGE UP (ref 0–0)
PHOSPHATE SERPL-MCNC: 4.1 MG/DL — SIGNIFICANT CHANGE UP (ref 2.5–4.5)
PHOSPHATE SERPL-MCNC: 4.6 MG/DL — HIGH (ref 2.5–4.5)
PHOSPHATE SERPL-MCNC: 4.6 MG/DL — HIGH (ref 2.5–4.5)
PHOSPHATE SERPL-MCNC: 5 MG/DL — HIGH (ref 2.5–4.5)
PLATELET # BLD AUTO: 128 K/UL — LOW (ref 150–400)
PLATELET # BLD AUTO: 146 K/UL — LOW (ref 150–400)
PLATELET # BLD AUTO: 149 K/UL — LOW (ref 150–400)
PLATELET # BLD AUTO: 165 K/UL — SIGNIFICANT CHANGE UP (ref 150–400)
POTASSIUM SERPL-MCNC: 3.6 MMOL/L — SIGNIFICANT CHANGE UP (ref 3.5–5.3)
POTASSIUM SERPL-MCNC: 3.6 MMOL/L — SIGNIFICANT CHANGE UP (ref 3.5–5.3)
POTASSIUM SERPL-MCNC: 3.8 MMOL/L — SIGNIFICANT CHANGE UP (ref 3.5–5.3)
POTASSIUM SERPL-MCNC: 4.3 MMOL/L — SIGNIFICANT CHANGE UP (ref 3.5–5.3)
POTASSIUM SERPL-SCNC: 3.6 MMOL/L — SIGNIFICANT CHANGE UP (ref 3.5–5.3)
POTASSIUM SERPL-SCNC: 3.6 MMOL/L — SIGNIFICANT CHANGE UP (ref 3.5–5.3)
POTASSIUM SERPL-SCNC: 3.8 MMOL/L — SIGNIFICANT CHANGE UP (ref 3.5–5.3)
POTASSIUM SERPL-SCNC: 4.3 MMOL/L — SIGNIFICANT CHANGE UP (ref 3.5–5.3)
PROT SERPL-MCNC: 6.4 G/DL — SIGNIFICANT CHANGE UP (ref 6–8.3)
PROT SERPL-MCNC: 7 G/DL — SIGNIFICANT CHANGE UP (ref 6–8.3)
PROT SERPL-MCNC: 7.1 G/DL — SIGNIFICANT CHANGE UP (ref 6–8.3)
PROT SERPL-MCNC: 7.2 G/DL — SIGNIFICANT CHANGE UP (ref 6–8.3)
PROTHROM AB SERPL-ACNC: 13.1 SEC — SIGNIFICANT CHANGE UP (ref 10.5–13.4)
PROTHROM AB SERPL-ACNC: 13.1 SEC — SIGNIFICANT CHANGE UP (ref 10.5–13.4)
PROTHROM AB SERPL-ACNC: 13.8 SEC — HIGH (ref 10.5–13.4)
RBC # BLD: 2.58 M/UL — LOW (ref 4.2–5.8)
RBC # BLD: 2.7 M/UL — LOW (ref 4.2–5.8)
RBC # BLD: 2.71 M/UL — LOW (ref 4.2–5.8)
RBC # BLD: 2.72 M/UL — LOW (ref 4.2–5.8)
RBC # FLD: 14.4 % — SIGNIFICANT CHANGE UP (ref 10.3–14.5)
RBC # FLD: 14.5 % — SIGNIFICANT CHANGE UP (ref 10.3–14.5)
SODIUM SERPL-SCNC: 134 MMOL/L — LOW (ref 135–145)
SODIUM SERPL-SCNC: 136 MMOL/L — SIGNIFICANT CHANGE UP (ref 135–145)
UNFRACTIONATED HEPARIN INTERPRETATION: SIGNIFICANT CHANGE UP
UNFRACTIONATED HEPARIN RESULT: NEGATIVE — SIGNIFICANT CHANGE UP
UNFRACTIONATED HEPARIN-HIGH DOSE: 0 % — SIGNIFICANT CHANGE UP
UNFRACTIONATED HEPARIN-LOW DOSE: 2 % — SIGNIFICANT CHANGE UP
WBC # BLD: 10.33 K/UL — SIGNIFICANT CHANGE UP (ref 3.8–10.5)
WBC # BLD: 10.53 K/UL — HIGH (ref 3.8–10.5)
WBC # BLD: 10.75 K/UL — HIGH (ref 3.8–10.5)
WBC # BLD: 11.23 K/UL — HIGH (ref 3.8–10.5)
WBC # FLD AUTO: 10.33 K/UL — SIGNIFICANT CHANGE UP (ref 3.8–10.5)
WBC # FLD AUTO: 10.53 K/UL — HIGH (ref 3.8–10.5)
WBC # FLD AUTO: 10.75 K/UL — HIGH (ref 3.8–10.5)
WBC # FLD AUTO: 11.23 K/UL — HIGH (ref 3.8–10.5)

## 2023-07-07 PROCEDURE — 99292 CRITICAL CARE ADDL 30 MIN: CPT

## 2023-07-07 PROCEDURE — 71045 X-RAY EXAM CHEST 1 VIEW: CPT | Mod: 26

## 2023-07-07 PROCEDURE — 99233 SBSQ HOSP IP/OBS HIGH 50: CPT

## 2023-07-07 PROCEDURE — 99232 SBSQ HOSP IP/OBS MODERATE 35: CPT | Mod: GC

## 2023-07-07 PROCEDURE — 99291 CRITICAL CARE FIRST HOUR: CPT

## 2023-07-07 PROCEDURE — 99232 SBSQ HOSP IP/OBS MODERATE 35: CPT

## 2023-07-07 RX ORDER — DEXTROSE 50 % IN WATER 50 %
15 SYRINGE (ML) INTRAVENOUS ONCE
Refills: 0 | Status: DISCONTINUED | OUTPATIENT
Start: 2023-07-07 | End: 2023-07-14

## 2023-07-07 RX ORDER — BUMETANIDE 0.25 MG/ML
1 INJECTION INTRAMUSCULAR; INTRAVENOUS ONCE
Refills: 0 | Status: COMPLETED | OUTPATIENT
Start: 2023-07-07 | End: 2023-07-07

## 2023-07-07 RX ORDER — ISOSORBIDE DINITRATE 5 MG/1
10 TABLET ORAL THREE TIMES A DAY
Refills: 0 | Status: DISCONTINUED | OUTPATIENT
Start: 2023-07-07 | End: 2023-07-10

## 2023-07-07 RX ORDER — DEXTROSE 50 % IN WATER 50 %
25 SYRINGE (ML) INTRAVENOUS ONCE
Refills: 0 | Status: DISCONTINUED | OUTPATIENT
Start: 2023-07-07 | End: 2023-07-14

## 2023-07-07 RX ORDER — POTASSIUM CHLORIDE 20 MEQ
20 PACKET (EA) ORAL ONCE
Refills: 0 | Status: COMPLETED | OUTPATIENT
Start: 2023-07-07 | End: 2023-07-07

## 2023-07-07 RX ORDER — BUMETANIDE 0.25 MG/ML
1 INJECTION INTRAMUSCULAR; INTRAVENOUS EVERY 8 HOURS
Refills: 0 | Status: DISCONTINUED | OUTPATIENT
Start: 2023-07-07 | End: 2023-07-08

## 2023-07-07 RX ORDER — INSULIN GLARGINE 100 [IU]/ML
10 INJECTION, SOLUTION SUBCUTANEOUS AT BEDTIME
Refills: 0 | Status: DISCONTINUED | OUTPATIENT
Start: 2023-07-07 | End: 2023-07-09

## 2023-07-07 RX ORDER — DEXTROSE 50 % IN WATER 50 %
25 SYRINGE (ML) INTRAVENOUS ONCE
Refills: 0 | Status: COMPLETED | OUTPATIENT
Start: 2023-07-07 | End: 2023-07-07

## 2023-07-07 RX ORDER — DEXTROSE 50 % IN WATER 50 %
12.5 SYRINGE (ML) INTRAVENOUS ONCE
Refills: 0 | Status: DISCONTINUED | OUTPATIENT
Start: 2023-07-07 | End: 2023-07-14

## 2023-07-07 RX ORDER — HYDRALAZINE HCL 50 MG
25 TABLET ORAL EVERY 8 HOURS
Refills: 0 | Status: DISCONTINUED | OUTPATIENT
Start: 2023-07-07 | End: 2023-07-10

## 2023-07-07 RX ORDER — INSULIN GLARGINE 100 [IU]/ML
12 INJECTION, SOLUTION SUBCUTANEOUS AT BEDTIME
Refills: 0 | Status: DISCONTINUED | OUTPATIENT
Start: 2023-07-07 | End: 2023-07-07

## 2023-07-07 RX ORDER — INSULIN LISPRO 100/ML
VIAL (ML) SUBCUTANEOUS
Refills: 0 | Status: DISCONTINUED | OUTPATIENT
Start: 2023-07-07 | End: 2023-07-14

## 2023-07-07 RX ORDER — INSULIN LISPRO 100/ML
3 VIAL (ML) SUBCUTANEOUS
Refills: 0 | Status: DISCONTINUED | OUTPATIENT
Start: 2023-07-07 | End: 2023-07-09

## 2023-07-07 RX ADMIN — Medication 100 MILLIEQUIVALENT(S): at 20:01

## 2023-07-07 RX ADMIN — HEPARIN SODIUM 5000 UNIT(S): 5000 INJECTION INTRAVENOUS; SUBCUTANEOUS at 05:26

## 2023-07-07 RX ADMIN — INSULIN GLARGINE 10 UNIT(S): 100 INJECTION, SOLUTION SUBCUTANEOUS at 22:22

## 2023-07-07 RX ADMIN — SENNA PLUS 2 TABLET(S): 8.6 TABLET ORAL at 21:27

## 2023-07-07 RX ADMIN — Medication 25 GRAM(S): at 07:30

## 2023-07-07 RX ADMIN — Medication 20 MILLIEQUIVALENT(S): at 12:10

## 2023-07-07 RX ADMIN — HEPARIN SODIUM 5000 UNIT(S): 5000 INJECTION INTRAVENOUS; SUBCUTANEOUS at 21:26

## 2023-07-07 RX ADMIN — HEPARIN SODIUM 5000 UNIT(S): 5000 INJECTION INTRAVENOUS; SUBCUTANEOUS at 13:53

## 2023-07-07 RX ADMIN — ISOSORBIDE DINITRATE 10 MILLIGRAM(S): 5 TABLET ORAL at 16:52

## 2023-07-07 RX ADMIN — BUMETANIDE 1 MILLIGRAM(S): 0.25 INJECTION INTRAMUSCULAR; INTRAVENOUS at 12:11

## 2023-07-07 RX ADMIN — BUMETANIDE 1 MILLIGRAM(S): 0.25 INJECTION INTRAMUSCULAR; INTRAVENOUS at 21:27

## 2023-07-07 RX ADMIN — Medication 25 MILLIGRAM(S): at 21:27

## 2023-07-07 RX ADMIN — Medication 1: at 16:52

## 2023-07-07 RX ADMIN — Medication 81 MILLIGRAM(S): at 12:10

## 2023-07-07 RX ADMIN — ATORVASTATIN CALCIUM 40 MILLIGRAM(S): 80 TABLET, FILM COATED ORAL at 21:27

## 2023-07-07 RX ADMIN — CHLORHEXIDINE GLUCONATE 1 APPLICATION(S): 213 SOLUTION TOPICAL at 05:27

## 2023-07-07 RX ADMIN — Medication 25 MILLIGRAM(S): at 13:54

## 2023-07-07 RX ADMIN — Medication 50 MILLIGRAM(S): at 12:12

## 2023-07-07 RX ADMIN — AMIODARONE HYDROCHLORIDE 200 MILLIGRAM(S): 400 TABLET ORAL at 05:25

## 2023-07-07 RX ADMIN — Medication 3 UNIT(S): at 16:53

## 2023-07-07 RX ADMIN — PANTOPRAZOLE SODIUM 40 MILLIGRAM(S): 20 TABLET, DELAYED RELEASE ORAL at 07:36

## 2023-07-07 NOTE — CHART NOTE - NSCHARTNOTEFT_GEN_A_CORE
As per Dr. Charlton, mediastinal blakes x2 removed at bedside without incident. Occlusive dressing applied. Patient tolerated procedure well. Follow up CXR stable with no obvious ptx

## 2023-07-07 NOTE — PROGRESS NOTE ADULT - ASSESSMENT
77 yo M w/ CKD3 admitted 6/28 for severe AS s/p AVR 6/30, post-op course c/b cardiogenic shock, pneumothorax and CAMMIE. Nephrology consulted 7/3 for CAMMIE Cr 2.3 and vol overload, s/p diuresis w/ bumex gtt and net neg 5.6L in the next 72 hours and pt was then euvolemic 7/6 and bumex was turned off, Cr also remained stable      #Non-oliguric iATN on CKD3  BCr not known, admitted w/ Cr 1.4, since then gradual rise, now plateaued, at 2.50 today  UA 7/3 w/ proteinuria (UPCR 0.5), hematuria (not present on admission), GN unikely  Dede 69 7/3 high due to diuretics  Fluid status: s/p bumex 7/1-7/5, net neg 2.2L/24 hours   Etiology likely iATN i/s/o cardiogenic shock. Hematuria may be due to epstein    Recommend:   Pt euvolemic today. Cr stable. Post-ATN diuresis appears to be resolving. Pt now hypertensive. Can give bumex IV push for BP to further optimize intravascular vol. Aim for mild net neg fluid balance  Lytes noted. Ensure drips composition in NS instead of D5  Maintain MAP>70 for adequate renal perfusion  strict I/Os             Efrain Ferris M.D.  PGY 4 - Nephrology Fellow  [phone]

## 2023-07-07 NOTE — PROGRESS NOTE ADULT - SUBJECTIVE AND OBJECTIVE BOX
CTICU  CRITICAL  CARE  attending     Hand off received 					   Pertinent clinical, laboratory, radiographic, hemodynamic, echocardiographic, respiratory data, microbiologic data and chart were reviewed and analyzed frequently throughout the course of the day and night      77 year old Beninese-speaking Male with DM, HTN.  He presented to [facility] with ANGINA.   He had chest pain x 3 months. Chest pain associated with LE edema, cough and clear sputum.  He underwent a stress test which revealed small sized, reversible, myocardial perfusion defect of the anteroapical wall consistent with subendocardial ischemia.   ECHO: Moderate Aortic Stenosis. Severe Aortic Regurgitation, Normal LVEF.   Cardiac cath revealed non-obstructive CAD.   He was then transferred to [facility] under the care of Dr. Koehler for AVR.    S/P AVR.        FAMILY HISTORY:  PAST MEDICAL & SURGICAL HISTORY:  HTN (hypertension)  DM (diabetes mellitus)  Severe aortic regurgitation  History of cholecystectomy  H/O prostatectomy          14 system review was unremarkable    Vital signs, hemodynamic and respiratory parameters were reviewed from the bedside nursing flow sheet.  ICU Vital Signs Last 24 Hrs  T(C): 36.2 (07 Jul 2023 21:51), Max: 36.4 (07 Jul 2023 18:54)  T(F): 97.1 (07 Jul 2023 21:51), Max: 97.6 (07 Jul 2023 18:54)  HR: 69 (07 Jul 2023 21:00) (67 - 82)  BP: --  BP(mean): --  ABP: 176/61 (07 Jul 2023 21:00) (128/47 - 196/55)  ABP(mean): 93 (07 Jul 2023 21:00) (66 - 93)  RR: 17 (07 Jul 2023 21:00) (16 - 19)  SpO2: 100% (07 Jul 2023 21:00) (95% - 100%)    O2 Parameters below as of 07 Jul 2023 21:00  Patient On (Oxygen Delivery Method): nasal cannula w/ humidification  O2 Flow (L/min): 2        Adult Advanced Hemodynamics Last 24 Hrs  CVP(mm Hg): --  CVP(cm H2O): --  CO: --  CI: --  PA: --  PA(mean): --  PCWP: --  SVR: --  SVRI: --  PVR: --  PVRI: --, ABG - ( 07 Jul 2023 16:16 )  pH, Arterial: 7.42  pH, Blood: x     /  pCO2: 44    /  pO2: 166   / HCO3: 28    / Base Excess: 3.4   /  SaO2: 99.5                Intake and output was reviewed and the fluid balance was calculated  Daily     Daily   I&O's Summary    06 Jul 2023 07:01  -  07 Jul 2023 07:00  --------------------------------------------------------  IN: 828 mL / OUT: 2851 mL / NET: -2023 mL    07 Jul 2023 07:01  -  07 Jul 2023 21:56  --------------------------------------------------------  IN: 848 mL / OUT: 1119 mL / NET: -271 mL            Neuro: No Focal Motor Deficit.  Neck: No JVD.  CVS: S1, S2, No S3.  Lungs: Good air entry bilaterally.  Abd: Soft. No tenderness. + Bowel sounds.  Vascular: + DP/PT.  Extremities: No edema.  Lymphatic: Normal.  Skin: No abnormalities.      labs  CBC Full  -  ( 07 Jul 2023 16:18 )  WBC Count : 10.75 K/uL  RBC Count : 2.72 M/uL  Hemoglobin : 8.8 g/dL  Hematocrit : 26.0 %  Platelet Count - Automated : 149 K/uL  Mean Cell Volume : 95.6 fl  Mean Cell Hemoglobin : 32.4 pg  Mean Cell Hemoglobin Concentration : 33.8 gm/dL  Auto Neutrophil # : x  Auto Lymphocyte # : x  Auto Monocyte # : x  Auto Eosinophil # : x  Auto Basophil # : x  Auto Neutrophil % : x  Auto Lymphocyte % : x  Auto Monocyte % : x  Auto Eosinophil % : x  Auto Basophil % : x    07-07    136  |  93<L>  |  83<H>  ----------------------------<  170<H>  3.8   |  29  |  2.47<H>    Ca    9.4      07 Jul 2023 16:18  Phos  4.6     07-07  Mg     2.1     07-07    TPro  7.2  /  Alb  4.0  /  TBili  2.5<H>  /  DBili  x   /  AST  26  /  ALT  <5<L>  /  AlkPhos  110  07-07    PT/INR - ( 07 Jul 2023 16:18 )   PT: 13.1 sec;   INR: 1.10          PTT - ( 07 Jul 2023 16:18 )  PTT:35.4 sec  The current medications were reviewed   MEDICATIONS  (STANDING):  aMIOdarone    Tablet 200 milliGRAM(s) Oral daily  aMIOdarone    Tablet 400  Oral   aspirin  chewable 81 milliGRAM(s) Oral daily  atorvastatin 40 milliGRAM(s) Oral at bedtime  buMETAnide Injectable 1 milliGRAM(s) IV Push every 8 hours  chlorhexidine 2% Cloths 1 Application(s) Topical daily  dextrose 5%. 1000 milliLiter(s) (100 mL/Hr) IV Continuous <Continuous>  dextrose 5%. 1000 milliLiter(s) (50 mL/Hr) IV Continuous <Continuous>  dextrose 5%. 1000 milliLiter(s) (50 mL/Hr) IV Continuous <Continuous>  dextrose 5%. 1000 milliLiter(s) (100 mL/Hr) IV Continuous <Continuous>  dextrose 50% Injectable 25 Gram(s) IV Push once  dextrose 50% Injectable 25 Gram(s) IV Push once  dextrose 50% Injectable 12.5 Gram(s) IV Push once  glucagon  Injectable 1 milliGRAM(s) IntraMuscular once  glucagon  Injectable 1 milliGRAM(s) IntraMuscular once  heparin   Injectable 5000 Unit(s) SubCutaneous every 8 hours  hydrALAZINE 25 milliGRAM(s) Oral every 8 hours  insulin glargine Injectable (LANTUS) 10 Unit(s) SubCutaneous at bedtime  insulin lispro (ADMELOG) corrective regimen sliding scale   SubCutaneous Before meals and at bedtime  insulin lispro Injectable (ADMELOG) 3 Unit(s) SubCutaneous three times a day before meals  isosorbide   dinitrate Tablet (ISORDIL) 10 milliGRAM(s) Oral three times a day  pantoprazole   Suspension 40 milliGRAM(s) Oral before breakfast  polyethylene glycol 3350 17 Gram(s) Oral daily  senna 2 Tablet(s) Oral at bedtime  sodium chloride 0.9%. 1000 milliLiter(s) (10 mL/Hr) IV Continuous <Continuous>  testosterone 1% Gel 50 milliGRAM(s) Topical daily    MEDICATIONS  (PRN):  dextrose Oral Gel 15 Gram(s) Oral once PRN Blood Glucose LESS THAN 70 milliGRAM(s)/deciliter                 78 year old  Male admitted with AORTIC STENOSIS  S/P AVR  Postoperative course complicated by Uncontrolled DM, CAMMIE, Right pneumothorax, cardiogenic shock and respiratory failure.  Improvement in LV Function noted during the hospital course and inotropes weaned off.   Hypoglycemia noted earlier on.  Hemodynamically stable.  Fair oxygenation.  Good urine out put.        My plan includes :  Decrease insulin dosage as recommended by Endocrinology.  Continue Hear failure Rx.  Statin Rx.  Amiodarone and Betablocker.  PO nitroglycerin.  Close hemodynamic monitoring   Monitor for arrhythmias and monitor parameters for organ perfusion  Monitor neurologic status  Monitor renal function.  Afterload Reduction with PO hydralazine.  Head of the bed should remain elevated to 45 deg .   Chest PT and IS will be encouraged  Monitor adequacy of oxygenation   Nutritional goals will be met using po eventually , ensure adequate caloric intake and monitor the same  Stress ulcer and VTE prophylaxis will be achieved    Glycemic control is satisfactory  Electrolytes have been repleted as necessary and wound care has been carried out. Pain control has been achieved.   Aggressive physical therapy and early mobility and ambulation goals will be met   The family was updated about the course and plan  CRITICAL CARE TIME SPENT in evaluation and management, review and interpretation of labs and x-rays, hemodynamic management, formulating a plan and coordinating care: ___30____ MIN.  Time does not include procedural time.  CTICU ATTENDING     					    Leodan Alcantar MD

## 2023-07-07 NOTE — PROGRESS NOTE ADULT - SUBJECTIVE AND OBJECTIVE BOX
VIPIN FRANCE  78y  Male    Patient is a 78y old  Male who presents with a chief complaint of severe AS and AI (07 Jul 2023 10:35)      INTERVAL HPI/OVERNIGHT EVENTS: No complaints this morning. Pt was on Saloni overnight but off this morning when transitioned to NC.       T(C): 36 (07-07-23 @ 09:37), Max: 36.3 (07-06-23 @ 17:36)  HR: 74 (07-07-23 @ 10:00) (73 - 85)  BP: --  RR: 16 (07-07-23 @ 10:00) (16 - 19)  SpO2: 100% (07-07-23 @ 10:00) (95% - 100%)  Wt(kg): --Vital Signs Last 24 Hrs  T(C): 36 (07 Jul 2023 09:37), Max: 36.3 (06 Jul 2023 17:36)  T(F): 96.8 (07 Jul 2023 09:37), Max: 97.3 (06 Jul 2023 17:36)  HR: 74 (07 Jul 2023 10:00) (73 - 85)  BP: --  BP(mean): --  RR: 16 (07 Jul 2023 10:00) (16 - 19)  SpO2: 100% (07 Jul 2023 10:00) (95% - 100%)    Parameters below as of 07 Jul 2023 10:00  Patient On (Oxygen Delivery Method): nasal cannula w/ humidification  O2 Flow (L/min): 6      PHYSICAL EXAM:  NECK:No JVD  CHEST/LUNG: Clear to auscultation bilaterally; No rales, rhonchi, wheezing, or rubs  HEART: Regular rate and rhythm; No murmurs, rubs, or gallops  ABDOMEN: Soft  EXTREMITIES:  WWP, No clubbing, cyanosis, or edema      Consultant(s) Notes Reviewed:  [x ] YES  [ ] NO  Care Discussed with Consultants/Other Providers [ x] YES  [ ] NO    LABS:                        8.8    10.53 )-----------( 146      ( 07 Jul 2023 09:43 )             25.9     07-07    134<L>  |  92<L>  |  81<H>  ----------------------------<  117<H>  3.6   |  30  |  2.35<H>    Ca    9.3      07 Jul 2023 09:43  Phos  4.6     07-07  Mg     2.2     07-07    TPro  7.0  /  Alb  4.2  /  TBili  2.3<H>  /  DBili  x   /  AST  24  /  ALT  <5<L>  /  AlkPhos  99  07-07      PT/INR - ( 07 Jul 2023 03:50 )   PT: 13.8 sec;   INR: 1.16          PTT - ( 07 Jul 2023 09:43 )  PTT:32.7 sec  Urinalysis Basic - ( 07 Jul 2023 09:43 )    Color: x / Appearance: x / SG: x / pH: x  Gluc: 117 mg/dL / Ketone: x  / Bili: x / Urobili: x   Blood: x / Protein: x / Nitrite: x   Leuk Esterase: x / RBC: x / WBC x   Sq Epi: x / Non Sq Epi: x / Bacteria: x      CAPILLARY BLOOD GLUCOSE      POCT Blood Glucose.: 134 mg/dL (07 Jul 2023 08:35)  POCT Blood Glucose.: 56 mg/dL (07 Jul 2023 07:28)  POCT Blood Glucose.: 54 mg/dL (07 Jul 2023 07:27)  POCT Blood Glucose.: 133 mg/dL (06 Jul 2023 23:58)  POCT Blood Glucose.: 133 mg/dL (06 Jul 2023 22:40)  POCT Blood Glucose.: 173 mg/dL (06 Jul 2023 18:45)  POCT Blood Glucose.: 110 mg/dL (06 Jul 2023 14:00)  POCT Blood Glucose.: 145 mg/dL (06 Jul 2023 12:30)      ABG - ( 07 Jul 2023 09:54 )  pH, Arterial: 7.42  pH, Blood: x     /  pCO2: 45    /  pO2: 114   / HCO3: 29    / Base Excess: 4.0   /  SaO2: 99.1              Urinalysis Basic - ( 07 Jul 2023 09:43 )    Color: x / Appearance: x / SG: x / pH: x  Gluc: 117 mg/dL / Ketone: x  / Bili: x / Urobili: x   Blood: x / Protein: x / Nitrite: x   Leuk Esterase: x / RBC: x / WBC x   Sq Epi: x / Non Sq Epi: x / Bacteria: x        RADIOLOGY & ADDITIONAL TESTS:    Imaging Personally Reviewed:  [ ] YES  [ ] NO    HEALTH ISSUES - PROBLEM Dx:  Acute on chronic renal failure         No

## 2023-07-07 NOTE — PROGRESS NOTE ADULT - ATTENDING COMMENTS
77 yo man  CAMMIE, underlying CKD3; admitted 6/28 for severe AS s/p AVR 6/30, post-op course c/b cardiogenic shock, pneumothorax Cr 2.3  over past 4 days has clinically improved (did not need AQ or RRT)  creat  2.5 today- from 2.31 yesterday and 2.3 prior that- generally flat  yesterday was having brisk auto diuresis- now less so  okay to use bumex as needed to keep in mild neg balance  reviewed with CTS team

## 2023-07-07 NOTE — PROGRESS NOTE ADULT - ASSESSMENT
77 Male w/ PMHx of HTN and DM who originally presented to [facility] on 6/26 c/o chest pain x 3 months. TTE revealed moderate AS and severe AR, nml LVEF. Patient referred for cardiac cath which revealed non-obstructive CAD. He was then transferred to [facility] under the care of Dr. Koehler for further work-up and intervention of severe AI and AS.  S/p Bio AVR  EF 55%   6/30  Post op course complicated by cardiogenic shock on Day 3  requiring reintubation and increasing inotrop support   Echo 7/3: severely reduced LVEF 15%/ reduced RV fx / no tamponade  Echo 7/5: LV Ef 40%/ mod-severe RV dysfunction/ mild to mod pericardial effusion   A/p  Off inotrops, after load reduction increased to Hydral 25 q8 and isordil added 10 TID   Good UOP/ autodiuresing with stable kidney fx, follow up Lactate, LFTs and central venous sats pending   Nonoliguric CAMMIE/ cr plateauing/ euvolemic goal FB even top sl neg   Continue Po amio for post op afib   Insulin regimen adjusted per endo recs   Passed SLP eval for puress/ ADAT--diet advancement per speech an swallow   Continue ASA/ICU ppx / asp precaution   RIJ HD Cath dc'd   Lines:  2PIVs/ off drips can dc introducer tmr       ATTENDING: I have personally and independently provided 90 Min of critical care services.

## 2023-07-07 NOTE — PROGRESS NOTE ADULT - ATTENDING COMMENTS
Pt seen on rounds this afternoon.  Was OOB in a chair, looked somewhat better.  After eating surprisingly well at lunch yesterday his appetite then fell off, and intake since then has been poor.  Is tolerating thick liquids, but otherwise still seems to complain of food "not going down."   Lungs with decreased breath sounds at both bases, (worse on the right), but very limited inspiratory effort  Was hypoglycemic this morning after receiving 16 units of Lantus last night--will decrease to 10 units  Limit premeal lispro to 3 units  Trial of enteral supplements

## 2023-07-07 NOTE — PROGRESS NOTE ADULT - ASSESSMENT
77M PMH HTN, DM, CAD (nonobstructive on [facility] 6/27/23), AS/AI s/p AVR 6/29/23 with course c/b hypotension and concern for cardiogenic shock.     TTE 7/3/23: LVIDd 3.9cm. LVEF 10-15% with global hypokinesis. ?Reverse takotsubo pattern. RV normal in size with reduced function. LA mildly dilated. BioAVR, MG 7, no AI. Trace MR. Mild to moderate TR. PASP 58. Small pericardial effusion.  [facility] 6/27/23 ([facility]): Nonobstructive CAD    Hemodynamics  7/4 AM: MvO2 72.8; Erika CO 4.4 CI 2.7  7/5 AM: MvO2 68.2; Erika CO 3.3 CI 1.8, PAP 39/14, PCWP 12  7/6: AM: MvO2 81.1, Thermodilution CO 5.1, CI 2.8     #Cardiogenic shock  TTE with newly reduced EF compared to [facility] TTE, EF now severely reduced with EF 10-15% with global hypokinesis.   -Wean dobutamine   -HF management as below  -D/c Toro     #HFrEF  -Etiology: NICM given nonobstructive CAD on recent [facility]. Etiology unclear. Anticipate repeat TTE for LV function assessment once stable.   -GDMT: Will intorduce as tolerates. ECHO once off inotropes. Hydralazine 10mg TID  -Volume: Euvolemic. Goal net even   -Devices: Not appropriate at this time    Pending discussion with Dr. Beckman  77M PMH HTN, DM, CAD (nonobstructive on [facility] 6/27/23), AS/AI s/p AVR 6/29/23 with course c/b hypotension and concern for cardiogenic shock.     TTE 7/3/23: LVIDd 3.9cm. LVEF 10-15% with global hypokinesis. ?Reverse takotsubo pattern. RV normal in size with reduced function. LA mildly dilated. BioAVR, MG 7, no AI. Trace MR. Mild to moderate TR. PASP 58. Small pericardial effusion.  [facility] 6/27/23 ([facility]): Nonobstructive CAD    Hemodynamics  7/4 AM: MvO2 72.8; Erika CO 4.4 CI 2.7  7/5 AM: MvO2 68.2; Erika CO 3.3 CI 1.8, PAP 39/14, PCWP 12  7/6: AM: MvO2 81.1, Thermodilution CO 5.1, CI 2.8     #Cardiogenic shock  TTE with newly reduced EF compared to [facility] TTE, EF now severely reduced with EF 10-15% with global hypokinesis.   -Wean off dobutamine   -HF management as below      #HFrEF  -Etiology: NICM given nonobstructive CAD on recent [facility]. Etiology unclear. Anticipate repeat TTE for LV function assessment once stable.   -GDMT: Will intorduce as tolerates. ECHO once off inotropes. Increase Hydralazine to 25mg TID, Start Isordil 10mg TID   -Volume: Euvolemic. Goal net even   -Devices: Not appropriate at this time    Discussed with Dr. Beckman

## 2023-07-07 NOTE — PROGRESS NOTE ADULT - SUBJECTIVE AND OBJECTIVE BOX
INTERVAL COURSE  No issues overnight/ OOB in chair, remained on  at 2  Renal fx stable/ autodiuresing    VITALS  Vital Signs Last 24 Hrs  T(C): 36.2 (2023 14:00), Max: 36.3 (2023 17:36)  T(F): 97.2 (2023 14:00), Max: 97.3 (2023 17:36)  HR: 70 (2023 15:00) (67 - 82)  BP: 160/51   BP(mean): 80  RR: 16 (2023 15:00) (16 - 19)  SpO2: 100% (2023 15:00) (95% - 100%)    Parameters below as of 2023 16:00  Patient On (Oxygen Delivery Method): nasal cannula w/ humidification  O2 Flow (L/min): 6      I&O's Summary    2023 07:01  -  2023 07:00  --------------------------------------------------------  IN: 828 mL / OUT: 2851 mL / NET: - mL    2023 07:01  -  2023 15:25  --------------------------------------------------------  IN: 448 mL / OUT: 689 mL / NET: -241 mL    PHYSICAL EXAM  General: A&Ox 3; NAD  Respiratory:  Mild basilar crackles   Cardiovascular: Sinus   Gastrointestinal: Soft; NTND   Extremities: WWP; no edema   Neurological: no obvious focal deficits      LABS/NG/EKG/ETC  Reviewed.

## 2023-07-07 NOTE — PROGRESS NOTE ADULT - SUBJECTIVE AND OBJECTIVE BOX
Patient is a 78y Male seen and evaluated at bedside. Laying comfortably in bed. Denies any symptoms.      Meds:    aMIOdarone    Tablet 200 daily  aMIOdarone    Tablet 400   aspirin  chewable 81 daily  atorvastatin 40 at bedtime  chlorhexidine 2% Cloths 1 daily  dextrose 5%. 1000 <Continuous>  dextrose 5%. 1000 <Continuous>  dextrose 5%. 1000 <Continuous>  dextrose 5%. 1000 <Continuous>  dextrose 50% Injectable 12.5 once  dextrose 50% Injectable 50 every 15 minutes  dextrose 50% Injectable 25 once  dextrose 50% Injectable 25 once  dextrose Oral Gel 15 once PRN  dextrose Oral Gel 15 once PRN  DOBUTamine Infusion 1 <Continuous>  glucagon  Injectable 1 once  glucagon  Injectable 1 once  heparin   Injectable 5000 every 8 hours  hydrALAZINE 10 every 8 hours  insulin glargine Injectable (LANTUS) 16 at bedtime  insulin lispro (ADMELOG) corrective regimen sliding scale  Before meals and at bedtime  pantoprazole   Suspension 40 before breakfast  polyethylene glycol 3350 17 daily  senna 2 at bedtime  sodium chloride 0.9%. 1000 <Continuous>  testosterone 1% Gel 50 daily      T(C): , Max: 36.3 (07-06-23 @ 17:36)  T(F): , Max: 97.3 (07-06-23 @ 17:36)  HR: 74 (07-07-23 @ 10:00)  BP: --  BP(mean): --  RR: 16 (07-07-23 @ 10:00)  SpO2: 100% (07-07-23 @ 10:00)  Wt(kg): --    07-06 @ 07:01  -  07-07 @ 07:00  --------------------------------------------------------  IN: 828 mL / OUT: 2851 mL / NET: -2023 mL    07-07 @ 07:01  -  07-07 @ 10:36  --------------------------------------------------------  IN: 156 mL / OUT: 299 mL / NET: -143 mL          Review of Systems:  ROS negative except as per HPI      PHYSICAL EXAM:  GENERAL: Alert, awake, mod distress due to SOB  CHEST/LUNG: Bilateral clear breath sounds  HEART: S1, S2  ABDOMEN: Soft, nontender, non distended  EXTREMITIES: trace dependent edema  Neurology: Awake, oriented      LABS:                        8.8    10.53 )-----------( 146      ( 07 Jul 2023 09:43 )             25.9     07-07    134<L>  |  93<L>  |  81<H>  ----------------------------<  69<L>  3.6   |  28  |  2.50<H>    Ca    9.3      07 Jul 2023 03:50  Phos  5.0     07-07  Mg     2.2     07-07    TPro  6.4  /  Alb  4.1  /  TBili  2.0<H>  /  DBili  x   /  AST  22  /  ALT  <5<L>  /  AlkPhos  94  07-07      PT/INR - ( 07 Jul 2023 03:50 )   PT: 13.8 sec;   INR: 1.16          PTT - ( 07 Jul 2023 09:43 )  PTT:32.7 sec  Urinalysis Basic - ( 07 Jul 2023 03:50 )    Color: x / Appearance: x / SG: x / pH: x  Gluc: 69 mg/dL / Ketone: x  / Bili: x / Urobili: x   Blood: x / Protein: x / Nitrite: x   Leuk Esterase: x / RBC: x / WBC x   Sq Epi: x / Non Sq Epi: x / Bacteria: x      Sodium, Random Urine: 102 mmol/L (07-05 @ 20:10)  Creatinine, Random Urine: 19 mg/dL (07-05 @ 20:10)        RADIOLOGY & ADDITIONAL STUDIES:

## 2023-07-07 NOTE — PROGRESS NOTE ADULT - SUBJECTIVE AND OBJECTIVE BOX
SUBJECTIVE / INTERVAL HPI: Patient was seen and examined this morning. His diet was advanced to pureed and he reported having half of his tray yesterday at noon. However, his appetite has been poor since then. His glucose level decreased overnight from 133 mg/dL (07-06 @ 22:40) to 54 mg/dL (07-07 @ 07:27). He received Dextrose IVP with improvement of glucose. He mentioned that his hoarseness is slightly better today, but that he still feels difficulty having food pass through.     DIET  - Dinner: Jello.   - Breakfast: Ø  - Lunch: 1/2 apple sauce with a couple sips of thickened milk.     CAPILLARY BLOOD GLUCOSE & INSULIN RECEIVED  110 mg/dL (07-06 @ 14:00) ? 5 units of NPH insulin.   173 mg/dL (07-06 @ 18:45) ? 2 units of lispro sliding scale.   133 mg/dL (07-06 @ 22:40) ? 16 units of lantus.   54 mg/dL (07-07 @ 07:27) ? Ø  56 mg/dL (07-07 @ 07:28) ? Dextrose 50%, 25 grams of dextrose IVP once.   134 mg/dL (07-07 @ 08:35) ? Ø  112 mg/dL (07-07 @ 12:05) ? Ø    REVIEW OF SYSTEMS  Constitutional:  (+) Decreased appetite. Negative fever or chills.   Cardiovascular:  Negative for chest pain or palpitations.  Respiratory:  (+) Hoarseness (+) Productive cough. Negative for wheezing, or shortness of breath.    Gastrointestinal:  Negative for nausea, vomiting, diarrhea, constipation, or abdominal pain.  Neurologic:  No headache, confusion, dizziness, lightheadedness.    PHYSICAL EXAM  Vital Signs Last 24 Hrs  T(C): 36.2 (07 Jul 2023 14:00), Max: 36.3 (06 Jul 2023 17:36)  T(F): 97.2 (07 Jul 2023 14:00), Max: 97.3 (06 Jul 2023 17:36)  HR: 67 (07 Jul 2023 14:01) (67 - 82)  BP: --  BP(mean): --  RR: 16 (07 Jul 2023 14:01) (16 - 19)  SpO2: 100% (07 Jul 2023 14:01) (95% - 100%)    Parameters below as of 07 Jul 2023 14:01  Patient On (Oxygen Delivery Method): nasal cannula w/ humidification  O2 Flow (L/min): 6    Constitutional: Awake, alert, elderly male, in no acute distress.   HEENT: Normocephalic, atraumatic, SCARLETT.  Respiratory: Lungs clear to ausculation bilaterally.   Cardiovascular: regular rhythm, normal S1 and S2,  (+) midline sternotomy scar covered with sterile dressings (+) wound vac.   GI: soft, non-tender, non-distended, bowel sounds present.  Extremities: No lower extremity edema.   Psychiatric: AAO x 3.    LABS  CBC - WBC/HGB/HTC/PLT: 10.53/8.8/25.9/146 (07-07-23)  BMP - Na/K/Cl/Bicarb/BUN/Cr/Gluc/AG/eGFR: 134/3.6/92/30/81/2.35/117/12/27 (07-07-23)  Ca - 9.3 (07-07-23)  Phos - 4.6 (07-07-23)  Mg - 2.2 (07-07-23)  LFT - Alb/Tprot/Tbili/Dbili/AlkPhos/ALT/AST: 4.2/--/2.3/--/99/<5/24 (07-07-23)  PT/aPTT/INR: --/32.7/-- (07-07-23)   Thyroid Stimulating Hormone, Serum: 1.440 (06-28-23)    MEDICATIONS  MEDICATIONS  (STANDING):  aMIOdarone    Tablet 400  Oral   aMIOdarone    Tablet 200 milliGRAM(s) Oral daily  aspirin  chewable 81 milliGRAM(s) Oral daily  atorvastatin 40 milliGRAM(s) Oral at bedtime  chlorhexidine 2% Cloths 1 Application(s) Topical daily  dextrose 5%. 1000 milliLiter(s) (50 mL/Hr) IV Continuous <Continuous>  dextrose 5%. 1000 milliLiter(s) (50 mL/Hr) IV Continuous <Continuous>  dextrose 5%. 1000 milliLiter(s) (100 mL/Hr) IV Continuous <Continuous>  dextrose 5%. 1000 milliLiter(s) (100 mL/Hr) IV Continuous <Continuous>  dextrose 50% Injectable 25 Gram(s) IV Push once  dextrose 50% Injectable 25 Gram(s) IV Push once  dextrose 50% Injectable 50 milliLiter(s) IV Push every 15 minutes  dextrose 50% Injectable 12.5 Gram(s) IV Push once  glucagon  Injectable 1 milliGRAM(s) IntraMuscular once  glucagon  Injectable 1 milliGRAM(s) IntraMuscular once  heparin   Injectable 5000 Unit(s) SubCutaneous every 8 hours  hydrALAZINE 25 milliGRAM(s) Oral every 8 hours  insulin glargine Injectable (LANTUS) 12 Unit(s) SubCutaneous at bedtime  insulin lispro (ADMELOG) corrective regimen sliding scale   SubCutaneous Before meals and at bedtime  isosorbide   dinitrate Tablet (ISORDIL) 10 milliGRAM(s) Oral three times a day  pantoprazole   Suspension 40 milliGRAM(s) Oral before breakfast  polyethylene glycol 3350 17 Gram(s) Oral daily  senna 2 Tablet(s) Oral at bedtime  sodium chloride 0.9%. 1000 milliLiter(s) (10 mL/Hr) IV Continuous <Continuous>  testosterone 1% Gel 50 milliGRAM(s) Topical daily    MEDICATIONS  (PRN):  dextrose Oral Gel 15 Gram(s) Oral once PRN Blood Glucose LESS THAN 70 milliGRAM(s)/deciliter  dextrose Oral Gel 15 Gram(s) Oral once PRN Blood Glucose LESS THAN 70 milliGRAM(s)/deciliter    ASSESSMENT / RECOMMENDATIONS  Mr. Johnson is a 78-year-old male with type 2 diabetes mellitus, hypertension and severe aortic stenosis who was transferred to [facility] for further work-up and intervention of severe aortic stenosis, now s/p aortic valve replacement (6/30/23). Endocrinology was consulted for recommendations regarding diabetes management.     A1C: 8.6 %  BUN: 81  Creatinine: 2.35  GFR: 27  Weight: 66.6  BMI: 21.5    # Type 2 diabetes mellitus with hyperglycemia  - Glucose level decreased overnight from 133 mg/dL (07-06 @ 22:40) to 54 mg/dL (07-07 @ 07:27). In addition, he continues to have poor appetite.   - Please DECREASE lantus to *** units at bedtime.   - DECREASE lispro to *** units before each meal.  - Continue lispro moderate dose sliding scale before meals and at bedtime.  - Patient's fingerstick glucose goal is 100-180 mg/dL.    - Discharge recommendations to be discussed.   - Patient can follow up at discharge with [facility] by calling [phone] to make an appointment.      Case discussed with Dr. Caicedo. Primary team updated.       Milton Laura    Endocrinology Fellow    Service Pager: [phone]    SUBJECTIVE / INTERVAL HPI: Patient was seen and examined this morning. His diet was advanced to pureed and he reported having half of his tray yesterday at noon. However, his appetite has been poor since then. His glucose level decreased overnight from 133 mg/dL (07-06 @ 22:40) to 54 mg/dL (07-07 @ 07:27). He received Dextrose IVP with improvement of glucose. He mentioned that his hoarseness is slightly better today, but that he still feels difficulty having food pass through.     DIET  - Dinner: Jello.   - Breakfast: Ø  - Lunch: 1/2 apple sauce with a couple sips of thickened milk.     CAPILLARY BLOOD GLUCOSE & INSULIN RECEIVED  110 mg/dL (07-06 @ 14:00) ? 5 units of NPH insulin.   173 mg/dL (07-06 @ 18:45) ? 2 units of lispro sliding scale.   133 mg/dL (07-06 @ 22:40) ? 16 units of lantus.   54 mg/dL (07-07 @ 07:27) ? Ø  56 mg/dL (07-07 @ 07:28) ? Dextrose 50%, 25 grams of dextrose IVP once.   134 mg/dL (07-07 @ 08:35) ? Ø  112 mg/dL (07-07 @ 12:05) ? Ø    REVIEW OF SYSTEMS  Constitutional:  (+) Decreased appetite. Negative fever or chills.   Cardiovascular:  Negative for chest pain or palpitations.  Respiratory:  (+) Hoarseness (+) Productive cough. Negative for wheezing, or shortness of breath.    Gastrointestinal:  Negative for nausea, vomiting, diarrhea, constipation, or abdominal pain.  Neurologic:  No headache, confusion, dizziness, lightheadedness.    PHYSICAL EXAM  Vital Signs Last 24 Hrs  T(C): 36.2 (07 Jul 2023 14:00), Max: 36.3 (06 Jul 2023 17:36)  T(F): 97.2 (07 Jul 2023 14:00), Max: 97.3 (06 Jul 2023 17:36)  HR: 67 (07 Jul 2023 14:01) (67 - 82)  BP: --  BP(mean): --  RR: 16 (07 Jul 2023 14:01) (16 - 19)  SpO2: 100% (07 Jul 2023 14:01) (95% - 100%)    Parameters below as of 07 Jul 2023 14:01  Patient On (Oxygen Delivery Method): nasal cannula w/ humidification  O2 Flow (L/min): 6    Constitutional: Awake, alert, elderly male, in no acute distress.   HEENT: Normocephalic, atraumatic, SCARLETT.  Respiratory: Lungs clear to ausculation bilaterally.   Cardiovascular: regular rhythm, normal S1 and S2, (+) midline sternotomy scar covered with sterile dressings (+) wound vac.   GI: soft, non-tender, non-distended, bowel sounds present.  Extremities: No lower extremity edema.   Psychiatric: AAO x 3.    LABS  CBC - WBC/HGB/HTC/PLT: 10.53/8.8/25.9/146 (07-07-23)  BMP - Na/K/Cl/Bicarb/BUN/Cr/Gluc/AG/eGFR: 134/3.6/92/30/81/2.35/117/12/27 (07-07-23)  Ca - 9.3 (07-07-23)  Phos - 4.6 (07-07-23)  Mg - 2.2 (07-07-23)  LFT - Alb/Tprot/Tbili/Dbili/AlkPhos/ALT/AST: 4.2/--/2.3/--/99/<5/24 (07-07-23)  PT/aPTT/INR: --/32.7/-- (07-07-23)   Thyroid Stimulating Hormone, Serum: 1.440 (06-28-23)    MEDICATIONS  MEDICATIONS  (STANDING):  aMIOdarone    Tablet 400  Oral   aMIOdarone    Tablet 200 milliGRAM(s) Oral daily  aspirin  chewable 81 milliGRAM(s) Oral daily  atorvastatin 40 milliGRAM(s) Oral at bedtime  chlorhexidine 2% Cloths 1 Application(s) Topical daily  dextrose 5%. 1000 milliLiter(s) (50 mL/Hr) IV Continuous <Continuous>  dextrose 5%. 1000 milliLiter(s) (50 mL/Hr) IV Continuous <Continuous>  dextrose 5%. 1000 milliLiter(s) (100 mL/Hr) IV Continuous <Continuous>  dextrose 5%. 1000 milliLiter(s) (100 mL/Hr) IV Continuous <Continuous>  dextrose 50% Injectable 25 Gram(s) IV Push once  dextrose 50% Injectable 25 Gram(s) IV Push once  dextrose 50% Injectable 50 milliLiter(s) IV Push every 15 minutes  dextrose 50% Injectable 12.5 Gram(s) IV Push once  glucagon  Injectable 1 milliGRAM(s) IntraMuscular once  glucagon  Injectable 1 milliGRAM(s) IntraMuscular once  heparin   Injectable 5000 Unit(s) SubCutaneous every 8 hours  hydrALAZINE 25 milliGRAM(s) Oral every 8 hours  insulin glargine Injectable (LANTUS) 12 Unit(s) SubCutaneous at bedtime  insulin lispro (ADMELOG) corrective regimen sliding scale   SubCutaneous Before meals and at bedtime  isosorbide   dinitrate Tablet (ISORDIL) 10 milliGRAM(s) Oral three times a day  pantoprazole   Suspension 40 milliGRAM(s) Oral before breakfast  polyethylene glycol 3350 17 Gram(s) Oral daily  senna 2 Tablet(s) Oral at bedtime  sodium chloride 0.9%. 1000 milliLiter(s) (10 mL/Hr) IV Continuous <Continuous>  testosterone 1% Gel 50 milliGRAM(s) Topical daily    MEDICATIONS  (PRN):  dextrose Oral Gel 15 Gram(s) Oral once PRN Blood Glucose LESS THAN 70 milliGRAM(s)/deciliter  dextrose Oral Gel 15 Gram(s) Oral once PRN Blood Glucose LESS THAN 70 milliGRAM(s)/deciliter    ASSESSMENT / RECOMMENDATIONS  Mr. Johnson is a 78-year-old male with type 2 diabetes mellitus, hypertension and severe aortic stenosis who was transferred to [facility] for further work-up and intervention of severe aortic stenosis, now s/p aortic valve replacement (6/30/23). Endocrinology was consulted for recommendations regarding diabetes management.     A1C: 8.6 %  BUN: 81  Creatinine: 2.35  GFR: 27  Weight: 66.6  BMI: 21.5    # Type 2 diabetes mellitus with hyperglycemia  - Glucose level decreased overnight from 133 mg/dL (07-06 @ 22:40) to 54 mg/dL (07-07 @ 07:27). In addition, he continues to have poor appetite.   - Please DECREASE lantus to 10 units at bedtime.   - DECREASE lispro to 3 units before each meal.  - Change lispro sliding scale to a low dose before meals and at bedtime.  - Patient's fingerstick glucose goal is 100-180 mg/dL.    - Discharge recommendations to be discussed.   - Patient can follow up at discharge with [facility] by calling [phone] to make an appointment.      Case discussed with Dr. Caicedo. Primary team updated.       Milton Laura    Endocrinology Fellow    Service Pager: [phone]

## 2023-07-07 NOTE — PROGRESS NOTE ADULT - ATTENDING COMMENTS
rolling walker 77 YO M with a history of HTN, DM2, and probable CKD (Cr 1.4 on admission) who presented to [facility] with chest pain and dyspnea where he was found to have mod-severe AS/AR and transferred to [facility] for surgical evaluation. He underwent bioprosthetic AVR on 6/30 and had been doing well but decompensated 7/3 with hypoxia and CAMMIE with TTE revealing newly diagnosed severe biventricular dysfunction.    He is clinically improving and tolerating inotrope wean. TTE and hemodynamics suggest his cardiac function is also improving though has discrepant thermodilution and Erika indices. 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    Hemodynamics  7/5 ( 4, mil 0.125): RA 5, PA 39/14, PCWP 14, PA sat 68% with Erika 4.9/2.7  7/6 ( 3, mil 0.125): RA 5, PA 65/17 (32), PCWP 17, PA sat 81% with Erika 5.9/2.8, and TD 3.9/2.1    REVIEW OF STUDIES  LH ([facility]): non-obstructive CAD  TTE ([facility]): per report, mod-severe AS/AI, normal LVEF  TTE 7/3: LVEF 10-15%, severe RV dysfunction, well functioning AVR  TTE 7/5 (on inotropes): LVEF read as 40% and visually appears ~30%, moderate RV dysfunction    1. Cardiogenic shock   2. HFrEF   3. Pulmonary hypertension   4. CAMMIE on CKD  5. AVR    - Weaned off Saloni and Milrinone stable end-organ function  - Increase Hydralazine for afterload reduction. Ok with goal MAP of ~70. If tolerating hydralazine 25, would add Isordil 10 mg tid   - Well functioning aortic valve     Jkae Cerda MD

## 2023-07-08 LAB
ALBUMIN SERPL ELPH-MCNC: 3.6 G/DL — SIGNIFICANT CHANGE UP (ref 3.3–5)
ALBUMIN SERPL ELPH-MCNC: 3.9 G/DL — SIGNIFICANT CHANGE UP (ref 3.3–5)
ALBUMIN SERPL ELPH-MCNC: 4.1 G/DL — SIGNIFICANT CHANGE UP (ref 3.3–5)
ALP SERPL-CCNC: 106 U/L — SIGNIFICANT CHANGE UP (ref 40–120)
ALP SERPL-CCNC: 112 U/L — SIGNIFICANT CHANGE UP (ref 40–120)
ALP SERPL-CCNC: 136 U/L — HIGH (ref 40–120)
ALT FLD-CCNC: 14 U/L — SIGNIFICANT CHANGE UP (ref 10–45)
ALT FLD-CCNC: 5 U/L — LOW (ref 10–45)
ALT FLD-CCNC: 7 U/L — LOW (ref 10–45)
ANION GAP SERPL CALC-SCNC: 12 MMOL/L — SIGNIFICANT CHANGE UP (ref 5–17)
ANION GAP SERPL CALC-SCNC: 14 MMOL/L — SIGNIFICANT CHANGE UP (ref 5–17)
ANION GAP SERPL CALC-SCNC: 15 MMOL/L — SIGNIFICANT CHANGE UP (ref 5–17)
APTT BLD: 33.4 SEC — SIGNIFICANT CHANGE UP (ref 27.5–35.5)
APTT BLD: 33.5 SEC — SIGNIFICANT CHANGE UP (ref 27.5–35.5)
AST SERPL-CCNC: 28 U/L — SIGNIFICANT CHANGE UP (ref 10–40)
AST SERPL-CCNC: 28 U/L — SIGNIFICANT CHANGE UP (ref 10–40)
AST SERPL-CCNC: 52 U/L — HIGH (ref 10–40)
BASE EXCESS BLDV CALC-SCNC: 6.5 MMOL/L — HIGH (ref -2–3)
BILIRUB SERPL-MCNC: 1.9 MG/DL — HIGH (ref 0.2–1.2)
BILIRUB SERPL-MCNC: 2 MG/DL — HIGH (ref 0.2–1.2)
BILIRUB SERPL-MCNC: 2 MG/DL — HIGH (ref 0.2–1.2)
BUN SERPL-MCNC: 84 MG/DL — HIGH (ref 7–23)
BUN SERPL-MCNC: 87 MG/DL — HIGH (ref 7–23)
BUN SERPL-MCNC: 90 MG/DL — HIGH (ref 7–23)
CA-I SERPL-SCNC: 1.17 MMOL/L — SIGNIFICANT CHANGE UP (ref 1.15–1.33)
CALCIUM SERPL-MCNC: 10 MG/DL — SIGNIFICANT CHANGE UP (ref 8.4–10.5)
CALCIUM SERPL-MCNC: 9 MG/DL — SIGNIFICANT CHANGE UP (ref 8.4–10.5)
CALCIUM SERPL-MCNC: 9.1 MG/DL — SIGNIFICANT CHANGE UP (ref 8.4–10.5)
CHLORIDE SERPL-SCNC: 92 MMOL/L — LOW (ref 96–108)
CHLORIDE SERPL-SCNC: 92 MMOL/L — LOW (ref 96–108)
CHLORIDE SERPL-SCNC: 94 MMOL/L — LOW (ref 96–108)
CO2 BLDV-SCNC: 34.4 MMOL/L — HIGH (ref 22–26)
CO2 SERPL-SCNC: 28 MMOL/L — SIGNIFICANT CHANGE UP (ref 22–31)
CO2 SERPL-SCNC: 30 MMOL/L — SIGNIFICANT CHANGE UP (ref 22–31)
CO2 SERPL-SCNC: 30 MMOL/L — SIGNIFICANT CHANGE UP (ref 22–31)
CREAT SERPL-MCNC: 2.29 MG/DL — HIGH (ref 0.5–1.3)
CREAT SERPL-MCNC: 2.37 MG/DL — HIGH (ref 0.5–1.3)
CREAT SERPL-MCNC: 2.47 MG/DL — HIGH (ref 0.5–1.3)
EGFR: 26 ML/MIN/1.73M2 — LOW
EGFR: 27 ML/MIN/1.73M2 — LOW
EGFR: 28 ML/MIN/1.73M2 — LOW
GAS PNL BLDA: SIGNIFICANT CHANGE UP
GAS PNL BLDA: SIGNIFICANT CHANGE UP
GAS PNL BLDV: 131 MMOL/L — LOW (ref 136–145)
GAS PNL BLDV: SIGNIFICANT CHANGE UP
GAS PNL BLDV: SIGNIFICANT CHANGE UP
GLUCOSE BLDC GLUCOMTR-MCNC: 139 MG/DL — HIGH (ref 70–99)
GLUCOSE BLDC GLUCOMTR-MCNC: 139 MG/DL — HIGH (ref 70–99)
GLUCOSE BLDC GLUCOMTR-MCNC: 161 MG/DL — HIGH (ref 70–99)
GLUCOSE BLDC GLUCOMTR-MCNC: 166 MG/DL — HIGH (ref 70–99)
GLUCOSE BLDC GLUCOMTR-MCNC: 181 MG/DL — HIGH (ref 70–99)
GLUCOSE BLDC GLUCOMTR-MCNC: 202 MG/DL — HIGH (ref 70–99)
GLUCOSE SERPL-MCNC: 154 MG/DL — HIGH (ref 70–99)
GLUCOSE SERPL-MCNC: 157 MG/DL — HIGH (ref 70–99)
GLUCOSE SERPL-MCNC: 173 MG/DL — HIGH (ref 70–99)
HCO3 BLDV-SCNC: 33 MMOL/L — HIGH (ref 22–29)
HCT VFR BLD CALC: 25.3 % — LOW (ref 39–50)
HCT VFR BLD CALC: 26.4 % — LOW (ref 39–50)
HCT VFR BLD CALC: 28.9 % — LOW (ref 39–50)
HGB BLD-MCNC: 8.5 G/DL — LOW (ref 13–17)
HGB BLD-MCNC: 8.9 G/DL — LOW (ref 13–17)
HGB BLD-MCNC: 9.4 G/DL — LOW (ref 13–17)
INR BLD: 1.12 — SIGNIFICANT CHANGE UP (ref 0.88–1.16)
INR BLD: 1.12 — SIGNIFICANT CHANGE UP (ref 0.88–1.16)
LACTATE SERPL-SCNC: 1 MMOL/L — SIGNIFICANT CHANGE UP (ref 0.5–2)
LACTATE SERPL-SCNC: 2.1 MMOL/L — HIGH (ref 0.5–2)
MAGNESIUM SERPL-MCNC: 2.1 MG/DL — SIGNIFICANT CHANGE UP (ref 1.6–2.6)
MAGNESIUM SERPL-MCNC: 2.1 MG/DL — SIGNIFICANT CHANGE UP (ref 1.6–2.6)
MAGNESIUM SERPL-MCNC: 2.2 MG/DL — SIGNIFICANT CHANGE UP (ref 1.6–2.6)
MCHC RBC-ENTMCNC: 32.1 PG — SIGNIFICANT CHANGE UP (ref 27–34)
MCHC RBC-ENTMCNC: 32.4 PG — SIGNIFICANT CHANGE UP (ref 27–34)
MCHC RBC-ENTMCNC: 32.5 GM/DL — SIGNIFICANT CHANGE UP (ref 32–36)
MCHC RBC-ENTMCNC: 32.7 PG — SIGNIFICANT CHANGE UP (ref 27–34)
MCHC RBC-ENTMCNC: 33.6 GM/DL — SIGNIFICANT CHANGE UP (ref 32–36)
MCHC RBC-ENTMCNC: 33.7 GM/DL — SIGNIFICANT CHANGE UP (ref 32–36)
MCV RBC AUTO: 96.6 FL — SIGNIFICANT CHANGE UP (ref 80–100)
MCV RBC AUTO: 97.1 FL — SIGNIFICANT CHANGE UP (ref 80–100)
MCV RBC AUTO: 98.6 FL — SIGNIFICANT CHANGE UP (ref 80–100)
NRBC # BLD: 0 /100 WBCS — SIGNIFICANT CHANGE UP (ref 0–0)
PCO2 BLDV: 53 MMHG — SIGNIFICANT CHANGE UP (ref 42–55)
PH BLDV: 7.4 — SIGNIFICANT CHANGE UP (ref 7.32–7.43)
PHOSPHATE SERPL-MCNC: 3.2 MG/DL — SIGNIFICANT CHANGE UP (ref 2.5–4.5)
PHOSPHATE SERPL-MCNC: 3.6 MG/DL — SIGNIFICANT CHANGE UP (ref 2.5–4.5)
PHOSPHATE SERPL-MCNC: 4.2 MG/DL — SIGNIFICANT CHANGE UP (ref 2.5–4.5)
PLATELET # BLD AUTO: 167 K/UL — SIGNIFICANT CHANGE UP (ref 150–400)
PLATELET # BLD AUTO: 203 K/UL — SIGNIFICANT CHANGE UP (ref 150–400)
PLATELET # BLD AUTO: 219 K/UL — SIGNIFICANT CHANGE UP (ref 150–400)
PO2 BLDV: 42 MMHG — SIGNIFICANT CHANGE UP (ref 25–45)
POTASSIUM BLDV-SCNC: 3.6 MMOL/L — SIGNIFICANT CHANGE UP (ref 3.5–5.1)
POTASSIUM SERPL-MCNC: 3.6 MMOL/L — SIGNIFICANT CHANGE UP (ref 3.5–5.3)
POTASSIUM SERPL-MCNC: 3.8 MMOL/L — SIGNIFICANT CHANGE UP (ref 3.5–5.3)
POTASSIUM SERPL-MCNC: 4.2 MMOL/L — SIGNIFICANT CHANGE UP (ref 3.5–5.3)
POTASSIUM SERPL-SCNC: 3.6 MMOL/L — SIGNIFICANT CHANGE UP (ref 3.5–5.3)
POTASSIUM SERPL-SCNC: 3.8 MMOL/L — SIGNIFICANT CHANGE UP (ref 3.5–5.3)
POTASSIUM SERPL-SCNC: 4.2 MMOL/L — SIGNIFICANT CHANGE UP (ref 3.5–5.3)
PROT SERPL-MCNC: 6.7 G/DL — SIGNIFICANT CHANGE UP (ref 6–8.3)
PROT SERPL-MCNC: 7 G/DL — SIGNIFICANT CHANGE UP (ref 6–8.3)
PROT SERPL-MCNC: 7.6 G/DL — SIGNIFICANT CHANGE UP (ref 6–8.3)
PROTHROM AB SERPL-ACNC: 13.3 SEC — SIGNIFICANT CHANGE UP (ref 10.5–13.4)
PROTHROM AB SERPL-ACNC: 13.4 SEC — SIGNIFICANT CHANGE UP (ref 10.5–13.4)
RBC # BLD: 2.62 M/UL — LOW (ref 4.2–5.8)
RBC # BLD: 2.72 M/UL — LOW (ref 4.2–5.8)
RBC # BLD: 2.93 M/UL — LOW (ref 4.2–5.8)
RBC # FLD: 14.3 % — SIGNIFICANT CHANGE UP (ref 10.3–14.5)
RBC # FLD: 14.4 % — SIGNIFICANT CHANGE UP (ref 10.3–14.5)
RBC # FLD: 14.5 % — SIGNIFICANT CHANGE UP (ref 10.3–14.5)
SAO2 % BLDV: 68.1 % — SIGNIFICANT CHANGE UP (ref 67–88)
SODIUM SERPL-SCNC: 134 MMOL/L — LOW (ref 135–145)
SODIUM SERPL-SCNC: 134 MMOL/L — LOW (ref 135–145)
SODIUM SERPL-SCNC: 139 MMOL/L — SIGNIFICANT CHANGE UP (ref 135–145)
WBC # BLD: 9.42 K/UL — SIGNIFICANT CHANGE UP (ref 3.8–10.5)
WBC # BLD: 9.47 K/UL — SIGNIFICANT CHANGE UP (ref 3.8–10.5)
WBC # BLD: 9.67 K/UL — SIGNIFICANT CHANGE UP (ref 3.8–10.5)
WBC # FLD AUTO: 9.42 K/UL — SIGNIFICANT CHANGE UP (ref 3.8–10.5)
WBC # FLD AUTO: 9.47 K/UL — SIGNIFICANT CHANGE UP (ref 3.8–10.5)
WBC # FLD AUTO: 9.67 K/UL — SIGNIFICANT CHANGE UP (ref 3.8–10.5)

## 2023-07-08 PROCEDURE — 99292 CRITICAL CARE ADDL 30 MIN: CPT

## 2023-07-08 PROCEDURE — 99291 CRITICAL CARE FIRST HOUR: CPT

## 2023-07-08 PROCEDURE — 71045 X-RAY EXAM CHEST 1 VIEW: CPT | Mod: 26

## 2023-07-08 PROCEDURE — 99232 SBSQ HOSP IP/OBS MODERATE 35: CPT | Mod: GC

## 2023-07-08 RX ORDER — BUMETANIDE 0.25 MG/ML
1 INJECTION INTRAMUSCULAR; INTRAVENOUS EVERY 12 HOURS
Refills: 0 | Status: DISCONTINUED | OUTPATIENT
Start: 2023-07-08 | End: 2023-07-09

## 2023-07-08 RX ORDER — POLYETHYLENE GLYCOL 3350 17 G/17G
17 POWDER, FOR SOLUTION ORAL
Refills: 0 | Status: DISCONTINUED | OUTPATIENT
Start: 2023-07-08 | End: 2023-07-14

## 2023-07-08 RX ORDER — POTASSIUM CHLORIDE 20 MEQ
20 PACKET (EA) ORAL ONCE
Refills: 0 | Status: COMPLETED | OUTPATIENT
Start: 2023-07-08 | End: 2023-07-08

## 2023-07-08 RX ORDER — CALCIUM GLUCONATE 100 MG/ML
2 VIAL (ML) INTRAVENOUS ONCE
Refills: 0 | Status: COMPLETED | OUTPATIENT
Start: 2023-07-08 | End: 2023-07-08

## 2023-07-08 RX ORDER — HEPARIN SODIUM 5000 [USP'U]/ML
5000 INJECTION INTRAVENOUS; SUBCUTANEOUS EVERY 12 HOURS
Refills: 0 | Status: DISCONTINUED | OUTPATIENT
Start: 2023-07-08 | End: 2023-07-08

## 2023-07-08 RX ORDER — HEPARIN SODIUM 5000 [USP'U]/ML
5000 INJECTION INTRAVENOUS; SUBCUTANEOUS EVERY 8 HOURS
Refills: 0 | Status: DISCONTINUED | OUTPATIENT
Start: 2023-07-08 | End: 2023-07-14

## 2023-07-08 RX ADMIN — POLYETHYLENE GLYCOL 3350 17 GRAM(S): 17 POWDER, FOR SOLUTION ORAL at 17:03

## 2023-07-08 RX ADMIN — Medication 200 GRAM(S): at 11:13

## 2023-07-08 RX ADMIN — Medication 25 MILLIGRAM(S): at 13:19

## 2023-07-08 RX ADMIN — Medication 81 MILLIGRAM(S): at 11:13

## 2023-07-08 RX ADMIN — AMIODARONE HYDROCHLORIDE 200 MILLIGRAM(S): 400 TABLET ORAL at 05:36

## 2023-07-08 RX ADMIN — ATORVASTATIN CALCIUM 40 MILLIGRAM(S): 80 TABLET, FILM COATED ORAL at 21:50

## 2023-07-08 RX ADMIN — BUMETANIDE 1 MILLIGRAM(S): 0.25 INJECTION INTRAMUSCULAR; INTRAVENOUS at 19:59

## 2023-07-08 RX ADMIN — Medication 25 MILLIGRAM(S): at 23:16

## 2023-07-08 RX ADMIN — BUMETANIDE 1 MILLIGRAM(S): 0.25 INJECTION INTRAMUSCULAR; INTRAVENOUS at 05:36

## 2023-07-08 RX ADMIN — Medication 3 UNIT(S): at 08:26

## 2023-07-08 RX ADMIN — BUMETANIDE 1 MILLIGRAM(S): 0.25 INJECTION INTRAMUSCULAR; INTRAVENOUS at 12:12

## 2023-07-08 RX ADMIN — ISOSORBIDE DINITRATE 10 MILLIGRAM(S): 5 TABLET ORAL at 11:12

## 2023-07-08 RX ADMIN — Medication 2: at 17:02

## 2023-07-08 RX ADMIN — CHLORHEXIDINE GLUCONATE 1 APPLICATION(S): 213 SOLUTION TOPICAL at 05:42

## 2023-07-08 RX ADMIN — INSULIN GLARGINE 10 UNIT(S): 100 INJECTION, SOLUTION SUBCUTANEOUS at 21:50

## 2023-07-08 RX ADMIN — ISOSORBIDE DINITRATE 10 MILLIGRAM(S): 5 TABLET ORAL at 05:36

## 2023-07-08 RX ADMIN — POLYETHYLENE GLYCOL 3350 17 GRAM(S): 17 POWDER, FOR SOLUTION ORAL at 09:06

## 2023-07-08 RX ADMIN — HEPARIN SODIUM 5000 UNIT(S): 5000 INJECTION INTRAVENOUS; SUBCUTANEOUS at 13:19

## 2023-07-08 RX ADMIN — ISOSORBIDE DINITRATE 10 MILLIGRAM(S): 5 TABLET ORAL at 17:03

## 2023-07-08 RX ADMIN — Medication 50 MILLIGRAM(S): at 13:11

## 2023-07-08 RX ADMIN — HEPARIN SODIUM 5000 UNIT(S): 5000 INJECTION INTRAVENOUS; SUBCUTANEOUS at 23:16

## 2023-07-08 RX ADMIN — HEPARIN SODIUM 5000 UNIT(S): 5000 INJECTION INTRAVENOUS; SUBCUTANEOUS at 05:36

## 2023-07-08 RX ADMIN — SENNA PLUS 2 TABLET(S): 8.6 TABLET ORAL at 21:50

## 2023-07-08 RX ADMIN — Medication 3 UNIT(S): at 17:03

## 2023-07-08 RX ADMIN — Medication 5 MILLIGRAM(S): at 09:06

## 2023-07-08 RX ADMIN — Medication 1: at 12:11

## 2023-07-08 RX ADMIN — Medication 25 MILLIGRAM(S): at 05:36

## 2023-07-08 RX ADMIN — PANTOPRAZOLE SODIUM 40 MILLIGRAM(S): 20 TABLET, DELAYED RELEASE ORAL at 05:37

## 2023-07-08 RX ADMIN — Medication 20 MILLIEQUIVALENT(S): at 13:18

## 2023-07-08 RX ADMIN — Medication 3 UNIT(S): at 09:05

## 2023-07-08 NOTE — PROGRESS NOTE ADULT - ATTENDING COMMENTS
I:  Edema.  Cr 2.37.   A: Fluid overload. Improved CAMMIE.   P: No indications for dialysis. Continue BP meds. Follow SCr.

## 2023-07-08 NOTE — CHART NOTE - NSCHARTNOTEFT_GEN_A_CORE
Discussed on AM rounds with Dr. Charlton and Dr. Wilder. OK to remove pigtail after clamp trial. Pigtail was clamped follow up CXR with no pneumothorax. Pigtail removed. Occlusive dressing applied. Post-removal CXR with no pneumothorax. Patient tolerated procedure well. Discussed on AM rounds with Dr. Charlton and Dr. Wilder. OK to remove pigtail after clamp trial. Pigtail was clamped - follow up CXR with no pneumothorax. Pigtail unclamped prior to removal - no rush of air, no air leak. Pigtail removed. Occlusive dressing applied. Post-removal CXR with no pneumothorax. Patient tolerated procedure well.

## 2023-07-08 NOTE — PROGRESS NOTE ADULT - SUBJECTIVE AND OBJECTIVE BOX
CTICU  CRITICAL  CARE  attending     Hand off received 					   Pertinent clinical, laboratory, radiographic, hemodynamic, echocardiographic, respiratory data, microbiologic data and chart were reviewed and analyzed frequently throughout the course of the day and night        77 year old Kyrgyz-speaking Male with DM, HTN.  He presented to [facility] with ANGINA.   He had chest pain x 3 months. Chest pain associated with LE edema, cough and clear sputum.  He underwent a stress test which revealed small sized, reversible, myocardial perfusion defect of the anteroapical wall consistent with subendocardial ischemia.   ECHO: Moderate Aortic Stenosis. Severe Aortic Regurgitation, Normal LVEF.   Cardiac cath revealed non-obstructive CAD.   He was then transferred to [facility] under the care of Dr. Koehler for AVR.    S/P AVR.          FAMILY HISTORY:  PAST MEDICAL & SURGICAL HISTORY:  HTN (hypertension)  DM (diabetes mellitus)  Severe aortic regurgitation  History of cholecystectomy  H/O prostatectomy        14 system review was unremarkable    Vital signs, hemodynamic and respiratory parameters were reviewed from the bedside nursing flow sheet.  ICU Vital Signs Last 24 Hrs  T(C): 36.4 (08 Jul 2023 20:52), Max: 36.4 (08 Jul 2023 20:52)  T(F): 97.5 (08 Jul 2023 20:52), Max: 97.5 (08 Jul 2023 20:52)  HR: 71 (08 Jul 2023 22:00) (69 - 77)  BP: 131/60 (08 Jul 2023 22:00) (108/56 - 142/64)  BP(mean): 86 (08 Jul 2023 22:00) (76 - 94)  ABP: 146/51 (08 Jul 2023 15:00) (118/43 - 187/52)  ABP(mean): 79 (08 Jul 2023 15:00) (62 - 91)  RR: 16 (08 Jul 2023 22:00) (15 - 18)  SpO2: 100% (08 Jul 2023 22:00) (97% - 100%)    O2 Parameters below as of 08 Jul 2023 22:00  Patient On (Oxygen Delivery Method): nasal cannula w/ humidification  O2 Flow (L/min): 2        Adult Advanced Hemodynamics Last 24 Hrs  CVP(mm Hg): --  CVP(cm H2O): --  CO: --  CI: --  PA: --  PA(mean): --  PCWP: --  SVR: --  SVRI: --  PVR: --  PVRI: --, ABG - ( 08 Jul 2023 10:05 )  pH, Arterial: 7.43  pH, Blood: x     /  pCO2: 45    /  pO2: 99    / HCO3: 30    / Base Excess: 4.8   /  SaO2: 99.4                Intake and output was reviewed and the fluid balance was calculated  Daily     Daily   I&O's Summary    07 Jul 2023 07:01  -  08 Jul 2023 07:00  --------------------------------------------------------  IN: 1258 mL / OUT: 2590 mL / NET: -1332 mL    08 Jul 2023 07:01  -  08 Jul 2023 23:08  --------------------------------------------------------  IN: 650 mL / OUT: 1092 mL / NET: -442 mL          Neuro: No change in the Neuro status from the baseline.   Neck: No JVD.  CVS: S1, S2, No S3.  Lungs: Good air entry bilaterally.  Abd: Soft. No tenderness. + Bowel sounds.  Vascular: + DP/PT.  Extremities: No edema.  Lymphatic: Normal.  Skin: No abnormalities.      labs  CBC Full  -  ( 08 Jul 2023 18:20 )  WBC Count : 9.47 K/uL  RBC Count : 2.93 M/uL  Hemoglobin : 9.4 g/dL  Hematocrit : 28.9 %  Platelet Count - Automated : 219 K/uL  Mean Cell Volume : 98.6 fl  Mean Cell Hemoglobin : 32.1 pg  Mean Cell Hemoglobin Concentration : 32.5 gm/dL  Auto Neutrophil # : x  Auto Lymphocyte # : x  Auto Monocyte # : x  Auto Eosinophil # : x  Auto Basophil # : x  Auto Neutrophil % : x  Auto Lymphocyte % : x  Auto Monocyte % : x  Auto Eosinophil % : x  Auto Basophil % : x    07-08    139  |  94<L>  |  90<H>  ----------------------------<  157<H>  4.2   |  30  |  2.47<H>    Ca    10.0      08 Jul 2023 18:20  Phos  3.2     07-08  Mg     2.2     07-08    TPro  7.6  /  Alb  4.1  /  TBili  1.9<H>  /  DBili  x   /  AST  52<H>  /  ALT  14  /  AlkPhos  136<H>  07-08    PT/INR - ( 08 Jul 2023 10:09 )   PT: 13.3 sec;   INR: 1.12          PTT - ( 08 Jul 2023 10:09 )  PTT:33.5 sec  The current medications were reviewed   MEDICATIONS  (STANDING):  aMIOdarone    Tablet 400  Oral   aMIOdarone    Tablet 200 milliGRAM(s) Oral daily  aspirin  chewable 81 milliGRAM(s) Oral daily  atorvastatin 40 milliGRAM(s) Oral at bedtime  bisacodyl 5 milliGRAM(s) Oral daily  buMETAnide 1 milliGRAM(s) Oral every 12 hours  chlorhexidine 2% Cloths 1 Application(s) Topical daily  dextrose 5%. 1000 milliLiter(s) (50 mL/Hr) IV Continuous <Continuous>  dextrose 5%. 1000 milliLiter(s) (50 mL/Hr) IV Continuous <Continuous>  dextrose 5%. 1000 milliLiter(s) (100 mL/Hr) IV Continuous <Continuous>  dextrose 5%. 1000 milliLiter(s) (100 mL/Hr) IV Continuous <Continuous>  dextrose 50% Injectable 12.5 Gram(s) IV Push once  dextrose 50% Injectable 25 Gram(s) IV Push once  dextrose 50% Injectable 25 Gram(s) IV Push once  glucagon  Injectable 1 milliGRAM(s) IntraMuscular once  glucagon  Injectable 1 milliGRAM(s) IntraMuscular once  heparin   Injectable 5000 Unit(s) SubCutaneous every 8 hours  hydrALAZINE 25 milliGRAM(s) Oral every 8 hours  insulin glargine Injectable (LANTUS) 10 Unit(s) SubCutaneous at bedtime  insulin lispro (ADMELOG) corrective regimen sliding scale   SubCutaneous Before meals and at bedtime  insulin lispro Injectable (ADMELOG) 3 Unit(s) SubCutaneous three times a day before meals  isosorbide   dinitrate Tablet (ISORDIL) 10 milliGRAM(s) Oral three times a day  pantoprazole   Suspension 40 milliGRAM(s) Oral before breakfast  polyethylene glycol 3350 17 Gram(s) Oral two times a day  senna 2 Tablet(s) Oral at bedtime  sodium chloride 0.9%. 1000 milliLiter(s) (10 mL/Hr) IV Continuous <Continuous>  testosterone 1% Gel 50 milliGRAM(s) Topical daily    MEDICATIONS  (PRN):  dextrose Oral Gel 15 Gram(s) Oral once PRN Blood Glucose LESS THAN 70 milliGRAM(s)/deciliter            78 year old  Male admitted with AORTIC STENOSIS  S/P AVR  Postoperative course complicated by Uncontrolled DM, CAMMIE, Right pneumothorax, cardiogenic shock and respiratory failure.  Improvement in LV Function noted during the hospital course and inotropes weaned off.   Metabolic alkalosis.  Hemodynamically stable.  Good oxygenation.  Fair urine out put.        My plan includes :  Statin Rx.  Amiodarone and Betablocker.  Nitrates and hydralazine.  Close hemodynamic monitoring   Monitor for arrhythmias and monitor parameters for organ perfusion  Monitor neurologic status  Monitor renal function.  Head of the bed should remain elevated to 45 deg .   Chest PT and IS will be encouraged  Monitor adequacy of oxygenation   Nutritional goals will be met using po eventually , ensure adequate caloric intake and monitor the same  Stress ulcer and VTE prophylaxis will be achieved    Glycemic control is satisfactory  Electrolytes have been repleted as necessary and wound care has been carried out. Pain control has been achieved.   Aggressive physical therapy and early mobility and ambulation goals will be met   The family was updated about the course and plan  CRITICAL CARE TIME SPENT in evaluation and management, review and interpretation of labs and x-rays, hemodynamic management, formulating a plan and coordinating care: ___30____ MIN.  Time does not include procedural time.  CTICU ATTENDING     					    Leodan Alcantar MD

## 2023-07-08 NOTE — PROGRESS NOTE ADULT - ASSESSMENT
77 yo M w/ CKD3 admitted 6/28 for severe AS s/p AVR 6/30, post-op course c/b cardiogenic shock, pneumothorax and CAMMIE. Nephrology consulted 7/3 for CAMMIE Cr 2.3 and vol overload, s/p diuresis w/ bumex gtt and net neg 5.6L in the next 72 hours and pt was then euvolemic 7/6 and bumex was turned off, Cr also remained stable. Pt was demonstrating post-ATN diuresis 7/6, so diuresis was held, now UOP not as brisk, resumed bumex 7/7 to keep mild net neg as pt also now hypertensive      #Non-oliguric iATN on CKD3  BCr not known, admitted w/ Cr 1.4, since then gradual rise, now plateaued, at 2.37 today  UA 7/3 w/ proteinuria (UPCR 0.5), hematuria (not present on admission), GN unlikely  Dede 69 7/3 high due to diuretics  Fluid status: s/p bumex 7/1-7/5, net neg 1.3L/24 hours, net neg 3.3L/48 hours   Etiology likely iATN i/s/o cardiogenic shock. Hematuria may be due to epstein    Recommend:   Pt euvolemic now. Cr stable. Post-ATN diuresis appears to be resolving. Pt now hypertensive. C/w bumex IV pushes for BP to further optimize intravascular vol. Aim for mild net neg fluid balance  Lytes noted. Ensure drips composition in NS instead of D5  Maintain MAP>70 for adequate renal perfusion  strict I/Os             Efrain Ferris M.D.  PGY 4 - Nephrology Fellow  [phone]

## 2023-07-08 NOTE — PROGRESS NOTE ADULT - SUBJECTIVE AND OBJECTIVE BOX
Patient is a 78y Male seen and evaluated at bedside.       Meds:    aMIOdarone    Tablet 200 daily  aMIOdarone    Tablet 400   aspirin  chewable 81 daily  atorvastatin 40 at bedtime  buMETAnide Injectable 1 every 8 hours  chlorhexidine 2% Cloths 1 daily  dextrose 5%. 1000 <Continuous>  dextrose 5%. 1000 <Continuous>  dextrose 5%. 1000 <Continuous>  dextrose 5%. 1000 <Continuous>  dextrose 50% Injectable 12.5 once  dextrose 50% Injectable 25 once  dextrose 50% Injectable 25 once  dextrose Oral Gel 15 once PRN  glucagon  Injectable 1 once  glucagon  Injectable 1 once  heparin   Injectable 5000 every 8 hours  hydrALAZINE 25 every 8 hours  insulin glargine Injectable (LANTUS) 10 at bedtime  insulin lispro (ADMELOG) corrective regimen sliding scale  Before meals and at bedtime  insulin lispro Injectable (ADMELOG) 3 three times a day before meals  isosorbide   dinitrate Tablet (ISORDIL) 10 three times a day  pantoprazole   Suspension 40 before breakfast  polyethylene glycol 3350 17 daily  senna 2 at bedtime  sodium chloride 0.9%. 1000 <Continuous>  testosterone 1% Gel 50 daily      T(C): , Max: 36.4 (07-07-23 @ 18:54)  T(F): , Max: 97.6 (07-07-23 @ 18:54)  HR: 72 (07-08-23 @ 08:00)  BP: --  BP(mean): --  RR: 17 (07-08-23 @ 08:00)  SpO2: 100% (07-08-23 @ 08:00)  Wt(kg): --    07-07 @ 07:01  -  07-08 @ 07:00  --------------------------------------------------------  IN: 1258 mL / OUT: 2590 mL / NET: -1332 mL    07-08 @ 07:01  -  07-08 @ 08:36  --------------------------------------------------------  IN: 10 mL / OUT: 150 mL / NET: -140 mL          Review of Systems:  ROS negative except as per HPI      PHYSICAL EXAM:  GENERAL: Alert, awake, mod distress due to SOB  CHEST/LUNG: Bilateral clear breath sounds  HEART: S1, S2  ABDOMEN: Soft, nontender, non distended  EXTREMITIES: trace dependent edema  Neurology: Awake, oriented    LABS:                        8.5    9.42  )-----------( 167      ( 08 Jul 2023 03:28 )             25.3     07-08    134<L>  |  92<L>  |  84<H>  ----------------------------<  154<H>  3.8   |  28  |  2.37<H>    Ca    9.1      08 Jul 2023 03:28  Phos  4.2     07-08  Mg     2.1     07-08    TPro  6.7  /  Alb  3.6  /  TBili  2.0<H>  /  DBili  x   /  AST  28  /  ALT  5<L>  /  AlkPhos  106  07-08      PT/INR - ( 08 Jul 2023 03:28 )   PT: 13.4 sec;   INR: 1.12          PTT - ( 08 Jul 2023 03:28 )  PTT:33.4 sec  Urinalysis Basic - ( 08 Jul 2023 03:28 )    Color: x / Appearance: x / SG: x / pH: x  Gluc: 154 mg/dL / Ketone: x  / Bili: x / Urobili: x   Blood: x / Protein: x / Nitrite: x   Leuk Esterase: x / RBC: x / WBC x   Sq Epi: x / Non Sq Epi: x / Bacteria: x            RADIOLOGY & ADDITIONAL STUDIES:

## 2023-07-08 NOTE — PROGRESS NOTE ADULT - SUBJECTIVE AND OBJECTIVE BOX
CTICU  CRITICAL  CARE  attending     Hand off received 					   Pertinent clinical, laboratory, radiographic, hemodynamic, echocardiographic, respiratory data, microbiologic data and chart were reviewed and analyzed frequently throughout the course of the day and night  Patient seen and examined with CTS/ SH attending at bedside  Pt is a 78y , Male, HEALTH ISSUES - PROBLEM Dx:  Acute on chronic renal failure        , FAMILY HISTORY:  PAST MEDICAL & SURGICAL HISTORY:  HTN (hypertension)      DM (diabetes mellitus)      Severe aortic regurgitation      History of cholecystectomy      H/O prostatectomy        Patient is a 78y old  Male who presents with a chief complaint of severe AS and AI (08 Jul 2023 08:36)      14 system review was unremarkable    Vital signs, hemodynamic and respiratory parameters were reviewed from the bedside nursing flowsheet.  ICU Vital Signs Last 24 Hrs  T(C): 36 (08 Jul 2023 17:06), Max: 36.2 (07 Jul 2023 21:51)  T(F): 96.8 (08 Jul 2023 17:06), Max: 97.2 (08 Jul 2023 08:51)  HR: 70 (08 Jul 2023 19:00) (69 - 77)  BP: 111/55 (08 Jul 2023 19:00) (108/56 - 142/64)  BP(mean): 77 (08 Jul 2023 19:00) (76 - 94)  ABP: 146/51 (08 Jul 2023 15:00) (118/43 - 187/52)  ABP(mean): 79 (08 Jul 2023 15:00) (62 - 93)  RR: 16 (08 Jul 2023 19:00) (15 - 18)  SpO2: 100% (08 Jul 2023 19:00) (97% - 100%)    O2 Parameters below as of 08 Jul 2023 19:00  Patient On (Oxygen Delivery Method): nasal cannula w/ humidification  O2 Flow (L/min): 2        Adult Advanced Hemodynamics Last 24 Hrs  CVP(mm Hg): --  CVP(cm H2O): --  CO: --  CI: --  PA: --  PA(mean): --  PCWP: --  SVR: --  SVRI: --  PVR: --  PVRI: --, ABG - ( 08 Jul 2023 10:05 )  pH, Arterial: 7.43  pH, Blood: x     /  pCO2: 45    /  pO2: 99    / HCO3: 30    / Base Excess: 4.8   /  SaO2: 99.4                Intake and output was reviewed and the fluid balance was calculated  Daily     Daily   I&O's Summary    07 Jul 2023 07:01  -  08 Jul 2023 07:00  --------------------------------------------------------  IN: 1258 mL / OUT: 2590 mL / NET: -1332 mL    08 Jul 2023 07:01  -  08 Jul 2023 20:04  --------------------------------------------------------  IN: 530 mL / OUT: 780 mL / NET: -250 mL        All lines and drain sites were assessed  Glycemic trend was reviewedCAPILLARY BLOOD GLUCOSE      POCT Blood Glucose.: 202 mg/dL (08 Jul 2023 16:52)    No acute change in mental status  Auscultation of the chest reveals equal bs  Abdomen is soft  Extremities are warm and well perfused  Wounds appear clean and unremarkable  Antibiotics are periop    labs  CBC Full  -  ( 08 Jul 2023 18:20 )  WBC Count : 9.47 K/uL  RBC Count : 2.93 M/uL  Hemoglobin : 9.4 g/dL  Hematocrit : 28.9 %  Platelet Count - Automated : 219 K/uL  Mean Cell Volume : 98.6 fl  Mean Cell Hemoglobin : 32.1 pg  Mean Cell Hemoglobin Concentration : 32.5 gm/dL  Auto Neutrophil # : x  Auto Lymphocyte # : x  Auto Monocyte # : x  Auto Eosinophil # : x  Auto Basophil # : x  Auto Neutrophil % : x  Auto Lymphocyte % : x  Auto Monocyte % : x  Auto Eosinophil % : x  Auto Basophil % : x    07-08    139  |  94<L>  |  90<H>  ----------------------------<  157<H>  4.2   |  30  |  2.47<H>    Ca    10.0      08 Jul 2023 18:20  Phos  3.2     07-08  Mg     2.2     07-08    TPro  7.6  /  Alb  4.1  /  TBili  1.9<H>  /  DBili  x   /  AST  52<H>  /  ALT  14  /  AlkPhos  136<H>  07-08    PT/INR - ( 08 Jul 2023 10:09 )   PT: 13.3 sec;   INR: 1.12          PTT - ( 08 Jul 2023 10:09 )  PTT:33.5 sec  The current medications were reviewed   MEDICATIONS  (STANDING):  aMIOdarone    Tablet 200 milliGRAM(s) Oral daily  aMIOdarone    Tablet 400  Oral   aspirin  chewable 81 milliGRAM(s) Oral daily  atorvastatin 40 milliGRAM(s) Oral at bedtime  bisacodyl 5 milliGRAM(s) Oral daily  buMETAnide 1 milliGRAM(s) Oral every 12 hours  chlorhexidine 2% Cloths 1 Application(s) Topical daily  dextrose 5%. 1000 milliLiter(s) (50 mL/Hr) IV Continuous <Continuous>  dextrose 5%. 1000 milliLiter(s) (100 mL/Hr) IV Continuous <Continuous>  dextrose 5%. 1000 milliLiter(s) (50 mL/Hr) IV Continuous <Continuous>  dextrose 5%. 1000 milliLiter(s) (100 mL/Hr) IV Continuous <Continuous>  dextrose 50% Injectable 12.5 Gram(s) IV Push once  dextrose 50% Injectable 25 Gram(s) IV Push once  dextrose 50% Injectable 25 Gram(s) IV Push once  glucagon  Injectable 1 milliGRAM(s) IntraMuscular once  glucagon  Injectable 1 milliGRAM(s) IntraMuscular once  heparin   Injectable 5000 Unit(s) SubCutaneous every 12 hours  hydrALAZINE 25 milliGRAM(s) Oral every 8 hours  insulin glargine Injectable (LANTUS) 10 Unit(s) SubCutaneous at bedtime  insulin lispro (ADMELOG) corrective regimen sliding scale   SubCutaneous Before meals and at bedtime  insulin lispro Injectable (ADMELOG) 3 Unit(s) SubCutaneous three times a day before meals  isosorbide   dinitrate Tablet (ISORDIL) 10 milliGRAM(s) Oral three times a day  pantoprazole   Suspension 40 milliGRAM(s) Oral before breakfast  polyethylene glycol 3350 17 Gram(s) Oral two times a day  senna 2 Tablet(s) Oral at bedtime  sodium chloride 0.9%. 1000 milliLiter(s) (10 mL/Hr) IV Continuous <Continuous>  testosterone 1% Gel 50 milliGRAM(s) Topical daily    MEDICATIONS  (PRN):  dextrose Oral Gel 15 Gram(s) Oral once PRN Blood Glucose LESS THAN 70 milliGRAM(s)/deciliter       PROBLEM LIST/ ASSESSMENT:  HEALTH ISSUES - PROBLEM Dx:  Acute on chronic renal failure        ,   Patient is a 78y old  Male who presents with a chief complaint of severe AS and AI (08 Jul 2023 08:36)     s/p cardiac surgery    Acute systolic and diastolic heart failure evidenced by SOB and parenchymal infiltrates; will treat with diuresis    Cardiogenic shock on ionotropy    Acidosis evidenced by anion gap and negative base excess    Acute kidney injury - creatinine > 0.3 due to combined prerenal and intrarenal factors can presume ATN      My plan includes :  close hemodynamic, ventilatory and drain monitoring and management per post op routine    Monitor for arrhythmias and monitor parameters for organ perfusion  beta blockade not administered due to hemodynamic instability and bradycardia  monitor neurologic status  Head of the bed should remain elevated to 45 deg .   chest PT and IS will be encouraged  monitor adequacy of oxygenation and ventilation and attempt to wean oxygen  antibiotic regimen will be tailored to the clinical, laboratory and microbiologic data  Nutritional goals will be met using po eventually , ensure adequate caloric intake and montior the same  Stress ulcer and VTE prophylaxis will be achieved    Glycemic control is satisfactory  Electrolytes have been repleted as necessary and wound care has been carried out. Pain control has been achieved.   agressive physical therapy and early mobility and ambulation goals will be met   The family was updated about the course and plan  CRITICAL CARE TIME Upon my evaluation, this patient had a high probability of imminent or life-threatening deterioration due to the above problems which required my direct attention, intervention, and personal management.  I have personally provided 110 minutes of critical care time exclusive of time spent on separately billable procedures. Time included review of laboratory data, radiology results, discussion with consultants, and monitoring for potential decompensation. Interventions were performed as documented abovepersonally provided by me  in evaluation and management, reassessments, review and interpretation of labs and x-rays, ventilator and hemodynamic management, formulating a plan and coordinating care: ___110___ MIN.  Time does not include procedural time.    CTICU ATTENDING     					    Alex Charlton MD

## 2023-07-09 LAB
ALBUMIN SERPL ELPH-MCNC: 3.7 G/DL — SIGNIFICANT CHANGE UP (ref 3.3–5)
ALBUMIN SERPL ELPH-MCNC: 3.9 G/DL — SIGNIFICANT CHANGE UP (ref 3.3–5)
ALBUMIN SERPL ELPH-MCNC: 4.1 G/DL — SIGNIFICANT CHANGE UP (ref 3.3–5)
ALP SERPL-CCNC: 120 U/L — SIGNIFICANT CHANGE UP (ref 40–120)
ALP SERPL-CCNC: 120 U/L — SIGNIFICANT CHANGE UP (ref 40–120)
ALP SERPL-CCNC: 125 U/L — HIGH (ref 40–120)
ALT FLD-CCNC: 12 U/L — SIGNIFICANT CHANGE UP (ref 10–45)
ALT FLD-CCNC: 13 U/L — SIGNIFICANT CHANGE UP (ref 10–45)
ALT FLD-CCNC: 13 U/L — SIGNIFICANT CHANGE UP (ref 10–45)
ANION GAP SERPL CALC-SCNC: 12 MMOL/L — SIGNIFICANT CHANGE UP (ref 5–17)
ANION GAP SERPL CALC-SCNC: 12 MMOL/L — SIGNIFICANT CHANGE UP (ref 5–17)
ANION GAP SERPL CALC-SCNC: 14 MMOL/L — SIGNIFICANT CHANGE UP (ref 5–17)
APTT BLD: 34.2 SEC — SIGNIFICANT CHANGE UP (ref 27.5–35.5)
AST SERPL-CCNC: 39 U/L — SIGNIFICANT CHANGE UP (ref 10–40)
AST SERPL-CCNC: 41 U/L — HIGH (ref 10–40)
AST SERPL-CCNC: 41 U/L — HIGH (ref 10–40)
BILIRUB SERPL-MCNC: 1.3 MG/DL — HIGH (ref 0.2–1.2)
BILIRUB SERPL-MCNC: 1.4 MG/DL — HIGH (ref 0.2–1.2)
BILIRUB SERPL-MCNC: 1.8 MG/DL — HIGH (ref 0.2–1.2)
BLD GP AB SCN SERPL QL: NEGATIVE — SIGNIFICANT CHANGE UP
BUN SERPL-MCNC: 101 MG/DL — HIGH (ref 7–23)
BUN SERPL-MCNC: 92 MG/DL — HIGH (ref 7–23)
BUN SERPL-MCNC: 96 MG/DL — HIGH (ref 7–23)
CALCIUM SERPL-MCNC: 9.2 MG/DL — SIGNIFICANT CHANGE UP (ref 8.4–10.5)
CALCIUM SERPL-MCNC: 9.3 MG/DL — SIGNIFICANT CHANGE UP (ref 8.4–10.5)
CALCIUM SERPL-MCNC: 9.6 MG/DL — SIGNIFICANT CHANGE UP (ref 8.4–10.5)
CHLORIDE SERPL-SCNC: 93 MMOL/L — LOW (ref 96–108)
CHLORIDE SERPL-SCNC: 93 MMOL/L — LOW (ref 96–108)
CHLORIDE SERPL-SCNC: 94 MMOL/L — LOW (ref 96–108)
CO2 SERPL-SCNC: 30 MMOL/L — SIGNIFICANT CHANGE UP (ref 22–31)
CO2 SERPL-SCNC: 30 MMOL/L — SIGNIFICANT CHANGE UP (ref 22–31)
CO2 SERPL-SCNC: 32 MMOL/L — HIGH (ref 22–31)
CREAT SERPL-MCNC: 2.32 MG/DL — HIGH (ref 0.5–1.3)
CREAT SERPL-MCNC: 2.32 MG/DL — HIGH (ref 0.5–1.3)
CREAT SERPL-MCNC: 2.52 MG/DL — HIGH (ref 0.5–1.3)
EGFR: 25 ML/MIN/1.73M2 — LOW
EGFR: 28 ML/MIN/1.73M2 — LOW
EGFR: 28 ML/MIN/1.73M2 — LOW
GLUCOSE BLDC GLUCOMTR-MCNC: 129 MG/DL — HIGH (ref 70–99)
GLUCOSE BLDC GLUCOMTR-MCNC: 175 MG/DL — HIGH (ref 70–99)
GLUCOSE BLDC GLUCOMTR-MCNC: 188 MG/DL — HIGH (ref 70–99)
GLUCOSE BLDC GLUCOMTR-MCNC: 217 MG/DL — HIGH (ref 70–99)
GLUCOSE BLDC GLUCOMTR-MCNC: 249 MG/DL — HIGH (ref 70–99)
GLUCOSE SERPL-MCNC: 147 MG/DL — HIGH (ref 70–99)
GLUCOSE SERPL-MCNC: 158 MG/DL — HIGH (ref 70–99)
GLUCOSE SERPL-MCNC: 231 MG/DL — HIGH (ref 70–99)
HCT VFR BLD CALC: 25.5 % — LOW (ref 39–50)
HCT VFR BLD CALC: 26.3 % — LOW (ref 39–50)
HCT VFR BLD CALC: 26.6 % — LOW (ref 39–50)
HGB BLD-MCNC: 8.5 G/DL — LOW (ref 13–17)
HGB BLD-MCNC: 8.6 G/DL — LOW (ref 13–17)
HGB BLD-MCNC: 8.7 G/DL — LOW (ref 13–17)
INR BLD: 1.07 — SIGNIFICANT CHANGE UP (ref 0.88–1.16)
LACTATE SERPL-SCNC: 0.8 MMOL/L — SIGNIFICANT CHANGE UP (ref 0.5–2)
LACTATE SERPL-SCNC: 1.7 MMOL/L — SIGNIFICANT CHANGE UP (ref 0.5–2)
LACTATE SERPL-SCNC: 2.5 MMOL/L — HIGH (ref 0.5–2)
MAGNESIUM SERPL-MCNC: 2 MG/DL — SIGNIFICANT CHANGE UP (ref 1.6–2.6)
MAGNESIUM SERPL-MCNC: 2.1 MG/DL — SIGNIFICANT CHANGE UP (ref 1.6–2.6)
MAGNESIUM SERPL-MCNC: 2.2 MG/DL — SIGNIFICANT CHANGE UP (ref 1.6–2.6)
MCHC RBC-ENTMCNC: 32.3 PG — SIGNIFICANT CHANGE UP (ref 27–34)
MCHC RBC-ENTMCNC: 32.7 GM/DL — SIGNIFICANT CHANGE UP (ref 32–36)
MCHC RBC-ENTMCNC: 32.7 GM/DL — SIGNIFICANT CHANGE UP (ref 32–36)
MCHC RBC-ENTMCNC: 33 PG — SIGNIFICANT CHANGE UP (ref 27–34)
MCHC RBC-ENTMCNC: 33 PG — SIGNIFICANT CHANGE UP (ref 27–34)
MCHC RBC-ENTMCNC: 33.3 GM/DL — SIGNIFICANT CHANGE UP (ref 32–36)
MCV RBC AUTO: 100.8 FL — HIGH (ref 80–100)
MCV RBC AUTO: 100.8 FL — HIGH (ref 80–100)
MCV RBC AUTO: 97 FL — SIGNIFICANT CHANGE UP (ref 80–100)
NRBC # BLD: 0 /100 WBCS — SIGNIFICANT CHANGE UP (ref 0–0)
PHOSPHATE SERPL-MCNC: 2.9 MG/DL — SIGNIFICANT CHANGE UP (ref 2.5–4.5)
PHOSPHATE SERPL-MCNC: 2.9 MG/DL — SIGNIFICANT CHANGE UP (ref 2.5–4.5)
PHOSPHATE SERPL-MCNC: 3.6 MG/DL — SIGNIFICANT CHANGE UP (ref 2.5–4.5)
PLATELET # BLD AUTO: 212 K/UL — SIGNIFICANT CHANGE UP (ref 150–400)
PLATELET # BLD AUTO: 251 K/UL — SIGNIFICANT CHANGE UP (ref 150–400)
PLATELET # BLD AUTO: 261 K/UL — SIGNIFICANT CHANGE UP (ref 150–400)
POTASSIUM SERPL-MCNC: 3.9 MMOL/L — SIGNIFICANT CHANGE UP (ref 3.5–5.3)
POTASSIUM SERPL-MCNC: 4.7 MMOL/L — SIGNIFICANT CHANGE UP (ref 3.5–5.3)
POTASSIUM SERPL-MCNC: 5.3 MMOL/L — SIGNIFICANT CHANGE UP (ref 3.5–5.3)
POTASSIUM SERPL-SCNC: 3.9 MMOL/L — SIGNIFICANT CHANGE UP (ref 3.5–5.3)
POTASSIUM SERPL-SCNC: 4.7 MMOL/L — SIGNIFICANT CHANGE UP (ref 3.5–5.3)
POTASSIUM SERPL-SCNC: 5.3 MMOL/L — SIGNIFICANT CHANGE UP (ref 3.5–5.3)
PROT SERPL-MCNC: 6.9 G/DL — SIGNIFICANT CHANGE UP (ref 6–8.3)
PROT SERPL-MCNC: 7 G/DL — SIGNIFICANT CHANGE UP (ref 6–8.3)
PROT SERPL-MCNC: 7.3 G/DL — SIGNIFICANT CHANGE UP (ref 6–8.3)
PROTHROM AB SERPL-ACNC: 12.8 SEC — SIGNIFICANT CHANGE UP (ref 10.5–13.4)
RBC # BLD: 2.61 M/UL — LOW (ref 4.2–5.8)
RBC # BLD: 2.63 M/UL — LOW (ref 4.2–5.8)
RBC # BLD: 2.64 M/UL — LOW (ref 4.2–5.8)
RBC # FLD: 14.1 % — SIGNIFICANT CHANGE UP (ref 10.3–14.5)
RBC # FLD: 14.2 % — SIGNIFICANT CHANGE UP (ref 10.3–14.5)
RBC # FLD: 14.3 % — SIGNIFICANT CHANGE UP (ref 10.3–14.5)
RH IG SCN BLD-IMP: POSITIVE — SIGNIFICANT CHANGE UP
SODIUM SERPL-SCNC: 135 MMOL/L — SIGNIFICANT CHANGE UP (ref 135–145)
SODIUM SERPL-SCNC: 137 MMOL/L — SIGNIFICANT CHANGE UP (ref 135–145)
SODIUM SERPL-SCNC: 138 MMOL/L — SIGNIFICANT CHANGE UP (ref 135–145)
WBC # BLD: 8.95 K/UL — SIGNIFICANT CHANGE UP (ref 3.8–10.5)
WBC # BLD: 9.02 K/UL — SIGNIFICANT CHANGE UP (ref 3.8–10.5)
WBC # BLD: 9.66 K/UL — SIGNIFICANT CHANGE UP (ref 3.8–10.5)
WBC # FLD AUTO: 8.95 K/UL — SIGNIFICANT CHANGE UP (ref 3.8–10.5)
WBC # FLD AUTO: 9.02 K/UL — SIGNIFICANT CHANGE UP (ref 3.8–10.5)
WBC # FLD AUTO: 9.66 K/UL — SIGNIFICANT CHANGE UP (ref 3.8–10.5)

## 2023-07-09 PROCEDURE — 99291 CRITICAL CARE FIRST HOUR: CPT

## 2023-07-09 PROCEDURE — 99292 CRITICAL CARE ADDL 30 MIN: CPT

## 2023-07-09 PROCEDURE — 71045 X-RAY EXAM CHEST 1 VIEW: CPT | Mod: 26

## 2023-07-09 PROCEDURE — 93010 ELECTROCARDIOGRAM REPORT: CPT

## 2023-07-09 PROCEDURE — 76604 US EXAM CHEST: CPT | Mod: 26

## 2023-07-09 RX ORDER — DEXTROSE 50 % IN WATER 50 %
25 SYRINGE (ML) INTRAVENOUS ONCE
Refills: 0 | Status: COMPLETED | OUTPATIENT
Start: 2023-07-09 | End: 2023-07-09

## 2023-07-09 RX ORDER — CALCIUM GLUCONATE 100 MG/ML
2 VIAL (ML) INTRAVENOUS ONCE
Refills: 0 | Status: COMPLETED | OUTPATIENT
Start: 2023-07-09 | End: 2023-07-09

## 2023-07-09 RX ORDER — POTASSIUM CHLORIDE 20 MEQ
40 PACKET (EA) ORAL ONCE
Refills: 0 | Status: COMPLETED | OUTPATIENT
Start: 2023-07-09 | End: 2023-07-09

## 2023-07-09 RX ORDER — SODIUM ZIRCONIUM CYCLOSILICATE 10 G/10G
10 POWDER, FOR SUSPENSION ORAL ONCE
Refills: 0 | Status: COMPLETED | OUTPATIENT
Start: 2023-07-09 | End: 2023-07-09

## 2023-07-09 RX ORDER — INSULIN HUMAN 100 [IU]/ML
5 INJECTION, SOLUTION SUBCUTANEOUS ONCE
Refills: 0 | Status: COMPLETED | OUTPATIENT
Start: 2023-07-09 | End: 2023-07-09

## 2023-07-09 RX ORDER — ALBUMIN HUMAN 25 %
250 VIAL (ML) INTRAVENOUS ONCE
Refills: 0 | Status: COMPLETED | OUTPATIENT
Start: 2023-07-09 | End: 2023-07-09

## 2023-07-09 RX ORDER — METOCLOPRAMIDE HCL 10 MG
10 TABLET ORAL ONCE
Refills: 0 | Status: COMPLETED | OUTPATIENT
Start: 2023-07-09 | End: 2023-07-09

## 2023-07-09 RX ORDER — INSULIN GLARGINE 100 [IU]/ML
14 INJECTION, SOLUTION SUBCUTANEOUS AT BEDTIME
Refills: 0 | Status: DISCONTINUED | OUTPATIENT
Start: 2023-07-09 | End: 2023-07-10

## 2023-07-09 RX ORDER — INSULIN LISPRO 100/ML
5 VIAL (ML) SUBCUTANEOUS
Refills: 0 | Status: DISCONTINUED | OUTPATIENT
Start: 2023-07-09 | End: 2023-07-10

## 2023-07-09 RX ORDER — SODIUM CHLORIDE 9 MG/ML
1000 INJECTION INTRAMUSCULAR; INTRAVENOUS; SUBCUTANEOUS ONCE
Refills: 0 | Status: COMPLETED | OUTPATIENT
Start: 2023-07-09 | End: 2023-07-09

## 2023-07-09 RX ORDER — INSULIN HUMAN 100 [IU]/ML
5 INJECTION, SOLUTION SUBCUTANEOUS ONCE
Refills: 0 | Status: DISCONTINUED | OUTPATIENT
Start: 2023-07-09 | End: 2023-07-09

## 2023-07-09 RX ADMIN — HEPARIN SODIUM 5000 UNIT(S): 5000 INJECTION INTRAVENOUS; SUBCUTANEOUS at 05:56

## 2023-07-09 RX ADMIN — HEPARIN SODIUM 5000 UNIT(S): 5000 INJECTION INTRAVENOUS; SUBCUTANEOUS at 22:12

## 2023-07-09 RX ADMIN — ISOSORBIDE DINITRATE 10 MILLIGRAM(S): 5 TABLET ORAL at 12:01

## 2023-07-09 RX ADMIN — Medication 81 MILLIGRAM(S): at 12:01

## 2023-07-09 RX ADMIN — SENNA PLUS 2 TABLET(S): 8.6 TABLET ORAL at 22:12

## 2023-07-09 RX ADMIN — PANTOPRAZOLE SODIUM 40 MILLIGRAM(S): 20 TABLET, DELAYED RELEASE ORAL at 05:57

## 2023-07-09 RX ADMIN — Medication 25 MILLIGRAM(S): at 22:12

## 2023-07-09 RX ADMIN — Medication 200 GRAM(S): at 17:39

## 2023-07-09 RX ADMIN — Medication 1: at 12:10

## 2023-07-09 RX ADMIN — ISOSORBIDE DINITRATE 10 MILLIGRAM(S): 5 TABLET ORAL at 18:14

## 2023-07-09 RX ADMIN — Medication 3 UNIT(S): at 16:30

## 2023-07-09 RX ADMIN — INSULIN GLARGINE 14 UNIT(S): 100 INJECTION, SOLUTION SUBCUTANEOUS at 22:11

## 2023-07-09 RX ADMIN — POLYETHYLENE GLYCOL 3350 17 GRAM(S): 17 POWDER, FOR SOLUTION ORAL at 05:57

## 2023-07-09 RX ADMIN — Medication 25 MILLILITER(S): at 17:34

## 2023-07-09 RX ADMIN — Medication 50 MILLIGRAM(S): at 12:14

## 2023-07-09 RX ADMIN — SODIUM ZIRCONIUM CYCLOSILICATE 10 GRAM(S): 10 POWDER, FOR SUSPENSION ORAL at 17:41

## 2023-07-09 RX ADMIN — INSULIN HUMAN 5 UNIT(S): 100 INJECTION, SOLUTION SUBCUTANEOUS at 18:38

## 2023-07-09 RX ADMIN — HEPARIN SODIUM 5000 UNIT(S): 5000 INJECTION INTRAVENOUS; SUBCUTANEOUS at 14:15

## 2023-07-09 RX ADMIN — POLYETHYLENE GLYCOL 3350 17 GRAM(S): 17 POWDER, FOR SOLUTION ORAL at 18:14

## 2023-07-09 RX ADMIN — ATORVASTATIN CALCIUM 40 MILLIGRAM(S): 80 TABLET, FILM COATED ORAL at 22:12

## 2023-07-09 RX ADMIN — Medication 40 MILLIEQUIVALENT(S): at 05:59

## 2023-07-09 RX ADMIN — SODIUM CHLORIDE 166.67 MILLILITER(S): 9 INJECTION INTRAMUSCULAR; INTRAVENOUS; SUBCUTANEOUS at 17:01

## 2023-07-09 RX ADMIN — Medication 5 UNIT(S): at 18:14

## 2023-07-09 RX ADMIN — Medication 3 UNIT(S): at 07:51

## 2023-07-09 RX ADMIN — Medication 3 UNIT(S): at 12:11

## 2023-07-09 RX ADMIN — CHLORHEXIDINE GLUCONATE 1 APPLICATION(S): 213 SOLUTION TOPICAL at 05:57

## 2023-07-09 RX ADMIN — AMIODARONE HYDROCHLORIDE 200 MILLIGRAM(S): 400 TABLET ORAL at 05:57

## 2023-07-09 RX ADMIN — Medication 2: at 16:30

## 2023-07-09 RX ADMIN — Medication 1: at 22:15

## 2023-07-09 RX ADMIN — Medication 125 MILLILITER(S): at 17:00

## 2023-07-09 RX ADMIN — Medication 10 MILLIGRAM(S): at 07:51

## 2023-07-09 RX ADMIN — Medication 5 MILLIGRAM(S): at 12:01

## 2023-07-09 NOTE — PROGRESS NOTE ADULT - SUBJECTIVE AND OBJECTIVE BOX
CTICU  CRITICAL  CARE  attending     Hand off received 					   Pertinent clinical, laboratory, radiographic, hemodynamic, echocardiographic, respiratory data, microbiologic data and chart were reviewed and analyzed frequently throughout the course of the day and night        77 year old Mongolian-speaking Male with DM, HTN.  He presented to [facility] with ANGINA.   He had chest pain x 3 months. Chest pain associated with LE edema, cough and clear sputum.  He underwent a stress test which revealed small sized, reversible, myocardial perfusion defect of the anteroapical wall consistent with subendocardial ischemia.   ECHO: Moderate Aortic Stenosis. Severe Aortic Regurgitation, Normal LVEF.   Cardiac cath revealed non-obstructive CAD.   He was then transferred to [facility] under the care of Dr. Koehler for AVR.    S/P AVR.  Postoperative course complicated by cardiogenic shock and vent dependent respiratory failure with good recovery.        FAMILY HISTORY:  PAST MEDICAL & SURGICAL HISTORY:  HTN (hypertension)  DM (diabetes mellitus)  Severe aortic regurgitation  History of cholecystectomy  H/O prostatectomy          14 system review was unremarkable    Vital signs, hemodynamic and respiratory parameters were reviewed from the bedside nursing flow sheet.  ICU Vital Signs Last 24 Hrs  T(C): 36.3 (09 Jul 2023 21:05), Max: 36.6 (09 Jul 2023 14:01)  T(F): 97.3 (09 Jul 2023 21:05), Max: 97.8 (09 Jul 2023 14:01)  HR: 72 (09 Jul 2023 22:00) (61 - 76)  BP: 142/66 (09 Jul 2023 22:00) (99/50 - 146/66)  BP(mean): 95 (09 Jul 2023 22:00) (72 - 102)  ABP: --  ABP(mean): --  RR: 16 (09 Jul 2023 22:00) (12 - 18)  SpO2: 99% (09 Jul 2023 22:00) (92% - 100%)    O2 Parameters below as of 09 Jul 2023 22:00  Patient On (Oxygen Delivery Method): nasal cannula w/ humidification  O2 Flow (L/min): 2        Adult Advanced Hemodynamics Last 24 Hrs  CVP(mm Hg): --  CVP(cm H2O): --  CO: --  CI: --  PA: --  PA(mean): --  PCWP: --  SVR: --  SVRI: --  PVR: --  PVRI: --, ABG - ( 08 Jul 2023 10:05 )  pH, Arterial: 7.43  pH, Blood: x     /  pCO2: 45    /  pO2: 99    / HCO3: 30    / Base Excess: 4.8   /  SaO2: 99.4                Intake and output was reviewed and the fluid balance was calculated  Daily     Daily   I&O's Summary    08 Jul 2023 07:01  -  09 Jul 2023 07:00  --------------------------------------------------------  IN: 1130 mL / OUT: 2292 mL / NET: -1162 mL    09 Jul 2023 07:01  -  09 Jul 2023 22:17  --------------------------------------------------------  IN: 2192 mL / OUT: 1085 mL / NET: 1107 mL            Neuro: No focal motor deficit.  Neck: No JVD.  CVS: S1, S2, No S3.  Lungs: Good air entry bilaterally.  Abd: Soft. No tenderness. + Bowel sounds.  Vascular: + DP/PT.  Extremities: No edema.  Lymphatic: Normal.  Skin: No abnormalities.      labs  CBC Full  -  ( 09 Jul 2023 18:04 )  WBC Count : 8.95 K/uL  RBC Count : 2.61 M/uL  Hemoglobin : 8.6 g/dL  Hematocrit : 26.3 %  Platelet Count - Automated : 251 K/uL  Mean Cell Volume : 100.8 fl  Mean Cell Hemoglobin : 33.0 pg  Mean Cell Hemoglobin Concentration : 32.7 gm/dL  Auto Neutrophil # : x  Auto Lymphocyte # : x  Auto Monocyte # : x  Auto Eosinophil # : x  Auto Basophil # : x  Auto Neutrophil % : x  Auto Lymphocyte % : x  Auto Monocyte % : x  Auto Eosinophil % : x  Auto Basophil % : x    07-09    135  |  93<L>  |  96<H>  ----------------------------<  158<H>  4.7   |  30  |  2.32<H>    Ca    9.6      09 Jul 2023 18:04  Phos  2.9     07-09  Mg     2.0     07-09    TPro  7.3  /  Alb  4.1  /  TBili  1.3<H>  /  DBili  x   /  AST  39  /  ALT  13  /  AlkPhos  120  07-09    PT/INR - ( 09 Jul 2023 04:01 )   PT: 12.8 sec;   INR: 1.07          PTT - ( 09 Jul 2023 04:01 )  PTT:34.2 sec  The current medications were reviewed   MEDICATIONS  (STANDING):  aMIOdarone    Tablet 400  Oral   aMIOdarone    Tablet 200 milliGRAM(s) Oral daily  aspirin  chewable 81 milliGRAM(s) Oral daily  atorvastatin 40 milliGRAM(s) Oral at bedtime  bisacodyl 5 milliGRAM(s) Oral daily  chlorhexidine 2% Cloths 1 Application(s) Topical daily  dextrose 5%. 1000 milliLiter(s) (50 mL/Hr) IV Continuous <Continuous>  dextrose 5%. 1000 milliLiter(s) (50 mL/Hr) IV Continuous <Continuous>  dextrose 5%. 1000 milliLiter(s) (100 mL/Hr) IV Continuous <Continuous>  dextrose 5%. 1000 milliLiter(s) (100 mL/Hr) IV Continuous <Continuous>  dextrose 50% Injectable 25 Gram(s) IV Push once  dextrose 50% Injectable 25 Gram(s) IV Push once  dextrose 50% Injectable 12.5 Gram(s) IV Push once  glucagon  Injectable 1 milliGRAM(s) IntraMuscular once  glucagon  Injectable 1 milliGRAM(s) IntraMuscular once  heparin   Injectable 5000 Unit(s) SubCutaneous every 8 hours  hydrALAZINE 25 milliGRAM(s) Oral every 8 hours  insulin glargine Injectable (LANTUS) 14 Unit(s) SubCutaneous at bedtime  insulin lispro (ADMELOG) corrective regimen sliding scale   SubCutaneous Before meals and at bedtime  insulin lispro Injectable (ADMELOG) 5 Unit(s) SubCutaneous three times a day before meals  isosorbide   dinitrate Tablet (ISORDIL) 10 milliGRAM(s) Oral three times a day  pantoprazole   Suspension 40 milliGRAM(s) Oral before breakfast  polyethylene glycol 3350 17 Gram(s) Oral two times a day  senna 2 Tablet(s) Oral at bedtime  sodium chloride 0.9%. 1000 milliLiter(s) (10 mL/Hr) IV Continuous <Continuous>  testosterone 1% Gel 50 milliGRAM(s) Topical daily    MEDICATIONS  (PRN):  dextrose Oral Gel 15 Gram(s) Oral once PRN Blood Glucose LESS THAN 70 milliGRAM(s)/deciliter          78 year old  Male admitted with AORTIC STENOSIS  S/P AVR  Postoperative course complicated by Uncontrolled DM, CAMMIE, Right pneumothorax, cardiogenic shock and respiratory failure.  Rising lactate noted today.  Bedside ECHO negative for tamponade. No significant pericardial effusion. Good LV function.  Hemodynamically stable.  Good oxygenation.  Borderline urine out put.  ECHO showed improving LV function.         My plan includes :  Gentle Diuresis.  Statin Rx  Amiodarone and Betablocker.  Isordil and hydralazine.   Close hemodynamic monitoring   Monitor for arrhythmias and monitor parameters for organ perfusion  Monitor neurologic status  Monitor renal function.  Head of the bed should remain elevated to 45 deg .   Chest PT and IS will be encouraged  Monitor adequacy of oxygenation   Nutritional goals will be met using po eventually , ensure adequate caloric intake and monitor the same  Stress ulcer and VTE prophylaxis will be achieved    Glycemic control is satisfactory  Electrolytes have been repleted as necessary and wound care has been carried out. Pain control has been achieved.   Aggressive physical therapy and early mobility and ambulation goals will be met   The family was updated about the course and plan  CRITICAL CARE TIME SPENT in evaluation and management,  review and interpretation of labs and x-rays, hemodynamic management, formulating a plan and coordinating care: ___30____ MIN.  Time does not include procedural time.  CTICU ATTENDING     					    Leodan Alcantar MD

## 2023-07-09 NOTE — PROGRESS NOTE ADULT - SUBJECTIVE AND OBJECTIVE BOX
Point of care ultrasound performed to rule out pericardial effusion  parasternal long axis view , apical 4 chamber and bilateral pleural views performed   no pericardial effusion,   no pleural effusion noted  lung sliding present

## 2023-07-09 NOTE — PROGRESS NOTE ADULT - SUBJECTIVE AND OBJECTIVE BOX
CTICU  CRITICAL  CARE  attending     Hand off received 					   Pertinent clinical, laboratory, radiographic, hemodynamic, echocardiographic, respiratory data, microbiologic data and chart were reviewed and analyzed frequently throughout the course of the day and night  Patient seen and examined with CTS/ SH attending at bedside  Pt is a 78y , Male, HEALTH ISSUES - PROBLEM Dx:  Acute on chronic renal failure        , FAMILY HISTORY:  PAST MEDICAL & SURGICAL HISTORY:  HTN (hypertension)      DM (diabetes mellitus)      Severe aortic regurgitation      History of cholecystectomy      H/O prostatectomy        Patient is a 78y old  Male who presents with a chief complaint of severe AS and AI (09 Jul 2023 22:17)      14 system review was unremarkable    Vital signs, hemodynamic and respiratory parameters were reviewed from the bedside nursing flowsheet.  ICU Vital Signs Last 24 Hrs  T(C): 36.3 (09 Jul 2023 21:05), Max: 36.6 (09 Jul 2023 14:01)  T(F): 97.3 (09 Jul 2023 21:05), Max: 97.8 (09 Jul 2023 14:01)  HR: 70 (10 Jul 2023 00:00) (61 - 76)  BP: 162/73 (10 Jul 2023 00:00) (99/50 - 162/73)  BP(mean): 105 (10 Jul 2023 00:00) (72 - 105)  ABP: --  ABP(mean): --  RR: 17 (10 Jul 2023 00:00) (12 - 18)  SpO2: 96% (10 Jul 2023 00:00) (92% - 100%)    O2 Parameters below as of 10 Jul 2023 01:00  Patient On (Oxygen Delivery Method): nasal cannula w/ humidification  O2 Flow (L/min): 2        Adult Advanced Hemodynamics Last 24 Hrs  CVP(mm Hg): --  CVP(cm H2O): --  CO: --  CI: --  PA: --  PA(mean): --  PCWP: --  SVR: --  SVRI: --  PVR: --  PVRI: --, ABG - ( 08 Jul 2023 10:05 )  pH, Arterial: 7.43  pH, Blood: x     /  pCO2: 45    /  pO2: 99    / HCO3: 30    / Base Excess: 4.8   /  SaO2: 99.4                Intake and output was reviewed and the fluid balance was calculated  Daily     Daily   I&O's Summary    08 Jul 2023 07:01  -  09 Jul 2023 07:00  --------------------------------------------------------  IN: 1130 mL / OUT: 2292 mL / NET: -1162 mL    09 Jul 2023 07:01  -  10 Jul 2023 00:44  --------------------------------------------------------  IN: 2492 mL / OUT: 1315 mL / NET: 1177 mL        All lines and drain sites were assessed  [facility]      POCT Blood Glucose.: 188 mg/dL (09 Jul 2023 22:10)    No acute change in mental status  Auscultation of the chest reveals equal bs  Abdomen is soft  Extremities are warm and well perfused  Wounds appear clean and unremarkable  Antibiotics are periop    labs  CBC Full  -  ( 09 Jul 2023 18:04 )  WBC Count : 8.95 K/uL  RBC Count : 2.61 M/uL  Hemoglobin : 8.6 g/dL  Hematocrit : 26.3 %  Platelet Count - Automated : 251 K/uL  Mean Cell Volume : 100.8 fl  Mean Cell Hemoglobin : 33.0 pg  Mean Cell Hemoglobin Concentration : 32.7 gm/dL  Auto Neutrophil # : x  Auto Lymphocyte # : x  Auto Monocyte # : x  Auto Eosinophil # : x  Auto Basophil # : x  Auto Neutrophil % : x  Auto Lymphocyte % : x  Auto Monocyte % : x  Auto Eosinophil % : x  Auto Basophil % : x    07-09    135  |  93<L>  |  96<H>  ----------------------------<  158<H>  4.7   |  30  |  2.32<H>    Ca    9.6      09 Jul 2023 18:04  Phos  2.9     07-09  Mg     2.0     07-09    TPro  7.3  /  Alb  4.1  /  TBili  1.3<H>  /  DBili  x   /  AST  39  /  ALT  13  /  AlkPhos  120  07-09    PT/INR - ( 09 Jul 2023 04:01 )   PT: 12.8 sec;   INR: 1.07          PTT - ( 09 Jul 2023 04:01 )  PTT:34.2 sec  The current medications were reviewed   MEDICATIONS  (STANDING):  aMIOdarone    Tablet 200 milliGRAM(s) Oral daily  aMIOdarone    Tablet 400  Oral   aspirin  chewable 81 milliGRAM(s) Oral daily  atorvastatin 40 milliGRAM(s) Oral at bedtime  bisacodyl 5 milliGRAM(s) Oral daily  chlorhexidine 2% Cloths 1 Application(s) Topical daily  dextrose 5%. 1000 milliLiter(s) (50 mL/Hr) IV Continuous <Continuous>  dextrose 5%. 1000 milliLiter(s) (50 mL/Hr) IV Continuous <Continuous>  dextrose 5%. 1000 milliLiter(s) (100 mL/Hr) IV Continuous <Continuous>  dextrose 5%. 1000 milliLiter(s) (100 mL/Hr) IV Continuous <Continuous>  dextrose 50% Injectable 25 Gram(s) IV Push once  dextrose 50% Injectable 12.5 Gram(s) IV Push once  dextrose 50% Injectable 25 Gram(s) IV Push once  glucagon  Injectable 1 milliGRAM(s) IntraMuscular once  glucagon  Injectable 1 milliGRAM(s) IntraMuscular once  heparin   Injectable 5000 Unit(s) SubCutaneous every 8 hours  hydrALAZINE 25 milliGRAM(s) Oral every 8 hours  insulin glargine Injectable (LANTUS) 14 Unit(s) SubCutaneous at bedtime  insulin lispro (ADMELOG) corrective regimen sliding scale   SubCutaneous Before meals and at bedtime  insulin lispro Injectable (ADMELOG) 5 Unit(s) SubCutaneous three times a day before meals  isosorbide   dinitrate Tablet (ISORDIL) 10 milliGRAM(s) Oral three times a day  pantoprazole   Suspension 40 milliGRAM(s) Oral before breakfast  polyethylene glycol 3350 17 Gram(s) Oral two times a day  senna 2 Tablet(s) Oral at bedtime  sodium chloride 0.9%. 1000 milliLiter(s) (10 mL/Hr) IV Continuous <Continuous>  testosterone 1% Gel 50 milliGRAM(s) Topical daily    MEDICATIONS  (PRN):  dextrose Oral Gel 15 Gram(s) Oral once PRN Blood Glucose LESS THAN 70 milliGRAM(s)/deciliter       PROBLEM LIST/ ASSESSMENT:  HEALTH ISSUES - PROBLEM Dx:  Acute on chronic renal failure        ,   Patient is a 78y old  Male who presents with a chief complaint of severe AS and AI (09 Jul 2023 22:17)     s/p cardiac surgery    Acute systolic and diastolic heart failure evidenced by SOB and parenchymal infiltrates; will treat with diuresis    expected post - op Hypovolemic shock - > 20% intravascular depletion will replete volume    Acute blood loss anemia with relative hypotension treated with > 1 unit PC        Acute kidney injury - creatinine > 0.3 due to combined prerenal and intrarenal factors can presume ATN              My plan includes :    volumize  close hemodynamic, ventilatory and drain monitoring and management per post op routine    Monitor for arrhythmias and monitor parameters for organ perfusion  beta blockade not administered due to hemodynamic instability and bradycardia  monitor neurologic status  Head of the bed should remain elevated to 45 deg .   chest PT and IS will be encouraged  monitor adequacy of oxygenation and ventilation and attempt to wean oxygen  antibiotic regimen will be tailored to the clinical, laboratory and microbiologic data  Nutritional goals will be met using po eventually , ensure adequate caloric intake and montior the same  Stress ulcer and VTE prophylaxis will be achieved    Glycemic control is satisfactory  Electrolytes have been repleted as necessary and wound care has been carried out. Pain control has been achieved.   agressive physical therapy and early mobility and ambulation goals will be met   The family was updated about the course and plan  CRITICAL CARE TIME Upon my evaluation, this patient had a high probability of imminent or life-threatening deterioration due to the above problems which required my direct attention, intervention, and personal management.  I have personally provided 110 minutes of critical care time exclusive of time spent on separately billable procedures. Time included review of laboratory data, radiology results, discussion with consultants, and monitoring for potential decompensation. Interventions were performed as documented abovepersonally provided by me  in evaluation and management, reassessments, review and interpretation of labs and x-rays, ventilator and hemodynamic management, formulating a plan and coordinating care: ___110___ MIN.  Time does not include procedural time.    CTICU ATTENDING     					    Alex Charlton MD

## 2023-07-10 LAB
ALBUMIN SERPL ELPH-MCNC: 3.6 G/DL — SIGNIFICANT CHANGE UP (ref 3.3–5)
ALBUMIN SERPL ELPH-MCNC: 3.7 G/DL — SIGNIFICANT CHANGE UP (ref 3.3–5)
ALP SERPL-CCNC: 108 U/L — SIGNIFICANT CHANGE UP (ref 40–120)
ALP SERPL-CCNC: 110 U/L — SIGNIFICANT CHANGE UP (ref 40–120)
ALT FLD-CCNC: 13 U/L — SIGNIFICANT CHANGE UP (ref 10–45)
ALT FLD-CCNC: 17 U/L — SIGNIFICANT CHANGE UP (ref 10–45)
ANION GAP SERPL CALC-SCNC: 12 MMOL/L — SIGNIFICANT CHANGE UP (ref 5–17)
ANION GAP SERPL CALC-SCNC: 12 MMOL/L — SIGNIFICANT CHANGE UP (ref 5–17)
APTT BLD: 33.1 SEC — SIGNIFICANT CHANGE UP (ref 27.5–35.5)
APTT BLD: 40.4 SEC — HIGH (ref 27.5–35.5)
AST SERPL-CCNC: 36 U/L — SIGNIFICANT CHANGE UP (ref 10–40)
AST SERPL-CCNC: 43 U/L — HIGH (ref 10–40)
BILIRUB SERPL-MCNC: 1.1 MG/DL — SIGNIFICANT CHANGE UP (ref 0.2–1.2)
BILIRUB SERPL-MCNC: 2.2 MG/DL — HIGH (ref 0.2–1.2)
BUN SERPL-MCNC: 101 MG/DL — HIGH (ref 7–23)
BUN SERPL-MCNC: 101 MG/DL — HIGH (ref 7–23)
CALCIUM SERPL-MCNC: 8.6 MG/DL — SIGNIFICANT CHANGE UP (ref 8.4–10.5)
CALCIUM SERPL-MCNC: 9 MG/DL — SIGNIFICANT CHANGE UP (ref 8.4–10.5)
CHLORIDE SERPL-SCNC: 90 MMOL/L — LOW (ref 96–108)
CHLORIDE SERPL-SCNC: 93 MMOL/L — LOW (ref 96–108)
CO2 SERPL-SCNC: 28 MMOL/L — SIGNIFICANT CHANGE UP (ref 22–31)
CO2 SERPL-SCNC: 29 MMOL/L — SIGNIFICANT CHANGE UP (ref 22–31)
CREAT SERPL-MCNC: 2.06 MG/DL — HIGH (ref 0.5–1.3)
CREAT SERPL-MCNC: 2.26 MG/DL — HIGH (ref 0.5–1.3)
EGFR: 29 ML/MIN/1.73M2 — LOW
EGFR: 32 ML/MIN/1.73M2 — LOW
GLUCOSE BLDC GLUCOMTR-MCNC: 104 MG/DL — HIGH (ref 70–99)
GLUCOSE BLDC GLUCOMTR-MCNC: 123 MG/DL — HIGH (ref 70–99)
GLUCOSE BLDC GLUCOMTR-MCNC: 212 MG/DL — HIGH (ref 70–99)
GLUCOSE BLDC GLUCOMTR-MCNC: 224 MG/DL — HIGH (ref 70–99)
GLUCOSE SERPL-MCNC: 162 MG/DL — HIGH (ref 70–99)
GLUCOSE SERPL-MCNC: 181 MG/DL — HIGH (ref 70–99)
HCT VFR BLD CALC: 27.4 % — LOW (ref 39–50)
HCT VFR BLD CALC: 28 % — LOW (ref 39–50)
HGB BLD-MCNC: 9.2 G/DL — LOW (ref 13–17)
HGB BLD-MCNC: 9.3 G/DL — LOW (ref 13–17)
INR BLD: 1.07 — SIGNIFICANT CHANGE UP (ref 0.88–1.16)
INR BLD: 1.12 — SIGNIFICANT CHANGE UP (ref 0.88–1.16)
LACTATE SERPL-SCNC: 0.9 MMOL/L — SIGNIFICANT CHANGE UP (ref 0.5–2)
MAGNESIUM SERPL-MCNC: 1.9 MG/DL — SIGNIFICANT CHANGE UP (ref 1.6–2.6)
MAGNESIUM SERPL-MCNC: 2 MG/DL — SIGNIFICANT CHANGE UP (ref 1.6–2.6)
MCHC RBC-ENTMCNC: 32.1 PG — SIGNIFICANT CHANGE UP (ref 27–34)
MCHC RBC-ENTMCNC: 32.3 PG — SIGNIFICANT CHANGE UP (ref 27–34)
MCHC RBC-ENTMCNC: 33.2 GM/DL — SIGNIFICANT CHANGE UP (ref 32–36)
MCHC RBC-ENTMCNC: 33.6 GM/DL — SIGNIFICANT CHANGE UP (ref 32–36)
MCV RBC AUTO: 96.1 FL — SIGNIFICANT CHANGE UP (ref 80–100)
MCV RBC AUTO: 96.6 FL — SIGNIFICANT CHANGE UP (ref 80–100)
NRBC # BLD: 0 /100 WBCS — SIGNIFICANT CHANGE UP (ref 0–0)
NRBC # BLD: 0 /100 WBCS — SIGNIFICANT CHANGE UP (ref 0–0)
PHOSPHATE SERPL-MCNC: 2.9 MG/DL — SIGNIFICANT CHANGE UP (ref 2.5–4.5)
PHOSPHATE SERPL-MCNC: 3.1 MG/DL — SIGNIFICANT CHANGE UP (ref 2.5–4.5)
PLATELET # BLD AUTO: 243 K/UL — SIGNIFICANT CHANGE UP (ref 150–400)
PLATELET # BLD AUTO: 262 K/UL — SIGNIFICANT CHANGE UP (ref 150–400)
POTASSIUM SERPL-MCNC: 4.2 MMOL/L — SIGNIFICANT CHANGE UP (ref 3.5–5.3)
POTASSIUM SERPL-MCNC: 4.5 MMOL/L — SIGNIFICANT CHANGE UP (ref 3.5–5.3)
POTASSIUM SERPL-SCNC: 4.2 MMOL/L — SIGNIFICANT CHANGE UP (ref 3.5–5.3)
POTASSIUM SERPL-SCNC: 4.5 MMOL/L — SIGNIFICANT CHANGE UP (ref 3.5–5.3)
PROT SERPL-MCNC: 6.7 G/DL — SIGNIFICANT CHANGE UP (ref 6–8.3)
PROT SERPL-MCNC: 7.1 G/DL — SIGNIFICANT CHANGE UP (ref 6–8.3)
PROTHROM AB SERPL-ACNC: 12.7 SEC — SIGNIFICANT CHANGE UP (ref 10.5–13.4)
PROTHROM AB SERPL-ACNC: 13.4 SEC — SIGNIFICANT CHANGE UP (ref 10.5–13.4)
RBC # BLD: 2.85 M/UL — LOW (ref 4.2–5.8)
RBC # BLD: 2.9 M/UL — LOW (ref 4.2–5.8)
RBC # FLD: 16 % — HIGH (ref 10.3–14.5)
RBC # FLD: 16.2 % — HIGH (ref 10.3–14.5)
SODIUM SERPL-SCNC: 130 MMOL/L — LOW (ref 135–145)
SODIUM SERPL-SCNC: 134 MMOL/L — LOW (ref 135–145)
WBC # BLD: 10.06 K/UL — SIGNIFICANT CHANGE UP (ref 3.8–10.5)
WBC # BLD: 9.29 K/UL — SIGNIFICANT CHANGE UP (ref 3.8–10.5)
WBC # FLD AUTO: 10.06 K/UL — SIGNIFICANT CHANGE UP (ref 3.8–10.5)
WBC # FLD AUTO: 9.29 K/UL — SIGNIFICANT CHANGE UP (ref 3.8–10.5)

## 2023-07-10 PROCEDURE — 93321 DOPPLER ECHO F-UP/LMTD STD: CPT | Mod: 26

## 2023-07-10 PROCEDURE — 71045 X-RAY EXAM CHEST 1 VIEW: CPT | Mod: 26,77

## 2023-07-10 PROCEDURE — 99232 SBSQ HOSP IP/OBS MODERATE 35: CPT

## 2023-07-10 PROCEDURE — 93308 TTE F-UP OR LMTD: CPT | Mod: 26

## 2023-07-10 PROCEDURE — 99232 SBSQ HOSP IP/OBS MODERATE 35: CPT | Mod: GC

## 2023-07-10 PROCEDURE — ZZZZZ: CPT

## 2023-07-10 PROCEDURE — 99291 CRITICAL CARE FIRST HOUR: CPT

## 2023-07-10 PROCEDURE — 71045 X-RAY EXAM CHEST 1 VIEW: CPT | Mod: 26

## 2023-07-10 RX ORDER — MAGNESIUM OXIDE 400 MG ORAL TABLET 241.3 MG
800 TABLET ORAL ONCE
Refills: 0 | Status: COMPLETED | OUTPATIENT
Start: 2023-07-10 | End: 2023-07-10

## 2023-07-10 RX ORDER — HYDRALAZINE HCL 50 MG
50 TABLET ORAL EVERY 8 HOURS
Refills: 0 | Status: DISCONTINUED | OUTPATIENT
Start: 2023-07-10 | End: 2023-07-12

## 2023-07-10 RX ORDER — INSULIN LISPRO 100/ML
7 VIAL (ML) SUBCUTANEOUS
Refills: 0 | Status: DISCONTINUED | OUTPATIENT
Start: 2023-07-10 | End: 2023-07-14

## 2023-07-10 RX ORDER — HYDRALAZINE HCL 50 MG
25 TABLET ORAL EVERY 8 HOURS
Refills: 0 | Status: DISCONTINUED | OUTPATIENT
Start: 2023-07-10 | End: 2023-07-10

## 2023-07-10 RX ORDER — ISOSORBIDE DINITRATE 5 MG/1
10 TABLET ORAL THREE TIMES A DAY
Refills: 0 | Status: DISCONTINUED | OUTPATIENT
Start: 2023-07-10 | End: 2023-07-12

## 2023-07-10 RX ORDER — INSULIN GLARGINE 100 [IU]/ML
12 INJECTION, SOLUTION SUBCUTANEOUS AT BEDTIME
Refills: 0 | Status: DISCONTINUED | OUTPATIENT
Start: 2023-07-10 | End: 2023-07-14

## 2023-07-10 RX ADMIN — PANTOPRAZOLE SODIUM 40 MILLIGRAM(S): 20 TABLET, DELAYED RELEASE ORAL at 06:36

## 2023-07-10 RX ADMIN — Medication 50 MILLIGRAM(S): at 11:23

## 2023-07-10 RX ADMIN — Medication 5 UNIT(S): at 16:57

## 2023-07-10 RX ADMIN — Medication 25 MILLIGRAM(S): at 15:20

## 2023-07-10 RX ADMIN — ISOSORBIDE DINITRATE 10 MILLIGRAM(S): 5 TABLET ORAL at 17:10

## 2023-07-10 RX ADMIN — Medication 2: at 11:17

## 2023-07-10 RX ADMIN — AMIODARONE HYDROCHLORIDE 200 MILLIGRAM(S): 400 TABLET ORAL at 06:36

## 2023-07-10 RX ADMIN — INSULIN GLARGINE 12 UNIT(S): 100 INJECTION, SOLUTION SUBCUTANEOUS at 22:01

## 2023-07-10 RX ADMIN — ISOSORBIDE DINITRATE 10 MILLIGRAM(S): 5 TABLET ORAL at 11:18

## 2023-07-10 RX ADMIN — Medication 81 MILLIGRAM(S): at 11:15

## 2023-07-10 RX ADMIN — Medication 5 UNIT(S): at 11:18

## 2023-07-10 RX ADMIN — HEPARIN SODIUM 5000 UNIT(S): 5000 INJECTION INTRAVENOUS; SUBCUTANEOUS at 22:01

## 2023-07-10 RX ADMIN — SENNA PLUS 2 TABLET(S): 8.6 TABLET ORAL at 22:02

## 2023-07-10 RX ADMIN — Medication 25 MILLIGRAM(S): at 06:36

## 2023-07-10 RX ADMIN — Medication 2: at 16:56

## 2023-07-10 RX ADMIN — MAGNESIUM OXIDE 400 MG ORAL TABLET 800 MILLIGRAM(S): 241.3 TABLET ORAL at 22:02

## 2023-07-10 RX ADMIN — CHLORHEXIDINE GLUCONATE 1 APPLICATION(S): 213 SOLUTION TOPICAL at 06:46

## 2023-07-10 RX ADMIN — ATORVASTATIN CALCIUM 40 MILLIGRAM(S): 80 TABLET, FILM COATED ORAL at 22:02

## 2023-07-10 RX ADMIN — ISOSORBIDE DINITRATE 10 MILLIGRAM(S): 5 TABLET ORAL at 06:36

## 2023-07-10 RX ADMIN — HEPARIN SODIUM 5000 UNIT(S): 5000 INJECTION INTRAVENOUS; SUBCUTANEOUS at 06:36

## 2023-07-10 RX ADMIN — Medication 50 MILLIGRAM(S): at 22:02

## 2023-07-10 RX ADMIN — HEPARIN SODIUM 5000 UNIT(S): 5000 INJECTION INTRAVENOUS; SUBCUTANEOUS at 15:20

## 2023-07-10 RX ADMIN — Medication 5 UNIT(S): at 07:05

## 2023-07-10 NOTE — PROGRESS NOTE ADULT - ATTENDING COMMENTS
79 YO M with a history of HTN, DM2, and probable CKD (Cr 1.4 on admission) who presented to [facility] with chest pain and dyspnea where he was found to have mod-severe AS/AR and transferred to [facility] for surgical evaluation. He underwent bioprosthetic AVR on 6/30 and had been doing well but decompensated 7/3 with hypoxia and CAMMIE with TTE revealing newly diagnosed severe biventricular dysfunction.    He is clinically improving and tolerating inotrope wean. TTE and hemodynamics suggest his cardiac function is also improving though has discrepant thermodilution and Erika indices. 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    Hemodynamics  7/5 ( 4, mil 0.125): RA 5, PA 39/14, PCWP 14, PA sat 68% with Erika 4.9/2.7  7/6 ( 3, mil 0.125): RA 5, PA 65/17 (32), PCWP 17, PA sat 81% with Erika 5.9/2.8, and TD 3.9/2.1    REVIEW OF STUDIES  [facility] ([facility]): non-obstructive CAD  TTE ([facility]): per report, mod-severe AS/AI, normal LVEF  TTE 7/3: LVEF 10-15%, severe RV dysfunction, well functioning AVR  TTE 7/5 (on inotropes): LVEF read as 40% and visually appears ~30%, moderate RV dysfunction    1. Cardiogenic shock   2. HFrEF   3. Pulmonary hypertension   4. CAMMIE on CKD  5. AVR    Plan:  off inotropes over the weekend  will increase hydralazine 50mg TID  will cont isordil  will consider BB next  Will do PRN diuretics  will rpt TTE off inotrope to assess LV function    will cont follow

## 2023-07-10 NOTE — PROGRESS NOTE ADULT - SUBJECTIVE AND OBJECTIVE BOX
SUBJECTIVE / INTERVAL HPI: Patient was seen and examined this morning.     CAPILLARY BLOOD GLUCOSE & INSULIN RECEIVED  157 mg/dL (07-08 @ 18:20) ? 3 units of lispro + 2 units of lispro sliding scale.   166 mg/dL (07-08 @ 21:47) ? 10 units of lispro.   147 mg/dL (07-09 @ 04:01) ? Ø  129 mg/dL (07-09 @ 07:22) ? 10 units of lispro.   175 mg/dL (07-09 @ 12:06) ? 10 units of lispro + 1 unit of lispro sliding scale.   249 mg/dL (07-09 @ 16:27) ? 10 units of lispro + 2 unit of lispro sliding scale.   188 mg/dL (07-09 @ 22:10) ? 14 units of lantus + 1 unit of lispro sliding scale.   123 mg/dL (07-10 @ 07:01) ? 5 units of lispro.     REVIEW OF SYSTEMS  Constitutional:  Negative fever, chills or loss of appetite.  Eyes:  Negative blurry vision or double vision.  Cardiovascular:  Negative for chest pain or palpitations.  Respiratory:  Negative for cough, wheezing, or shortness of breath.    Gastrointestinal:  Negative for nausea, vomiting, diarrhea, constipation, or abdominal pain.  Genitourinary:  Negative frequency, urgency or dysuria.  Neurologic:  No headache, confusion, dizziness, lightheadedness.    PHYSICAL EXAM  Vital Signs Last 24 Hrs  T(C): 36.4 (10 Jul 2023 09:03), Max: 36.6 (09 Jul 2023 14:01)  T(F): 97.5 (10 Jul 2023 09:03), Max: 97.8 (09 Jul 2023 14:01)  HR: 70 (10 Jul 2023 10:00) (61 - 73)  BP: 138/63 (10 Jul 2023 10:00) (99/50 - 162/73)  BP(mean): 91 (10 Jul 2023 10:00) (72 - 105)  RR: 18 (10 Jul 2023 10:00) (12 - 18)  SpO2: 99% (10 Jul 2023 10:00) (92% - 100%)    Parameters below as of 10 Jul 2023 10:00  Patient On (Oxygen Delivery Method): nasal cannula w/ humidification  O2 Flow (L/min): 2    Constitutional: Awake, alert, in no acute distress.   HEENT: Normocephalic, atraumatic, SCARLETT.  Respiratory: Lungs clear to ausculation bilaterally.   Cardiovascular: regular rhythm, normal S1 and S2, no audible murmurs.   GI: soft, non-tender, non-distended, bowel sounds present.  Extremities: No lower extremity edema.  Psychiatric: AAO x 3. Normal affect/mood.     LABS  CBC - WBC/HGB/HTC/PLT: 9.29/9.3/28.0/243 (07-10-23)  BMP - Na/K/Cl/Bicarb/BUN/Cr/Gluc/AG/eGFR: 134/4.2/93/29/101/2.26/162/12/29 (07-10-23)  Ca - 9.0 (07-10-23)  Phos - 3.1 (07-10-23)  Mg - 2.0 (07-10-23)  LFT - Alb/Tprot/Tbili/Dbili/AlkPhos/ALT/AST: 3.6/--/2.2/--/108/13/36 (07-10-23)  PT/aPTT/INR: 12.7/40.4/1.07 (07-10-23)   Thyroid Stimulating Hormone, Serum: 1.440 (06-28-23)    MEDICATIONS  MEDICATIONS  (STANDING):  aMIOdarone    Tablet 400  Oral   aMIOdarone    Tablet 200 milliGRAM(s) Oral daily  aspirin  chewable 81 milliGRAM(s) Oral daily  atorvastatin 40 milliGRAM(s) Oral at bedtime  bisacodyl 5 milliGRAM(s) Oral daily  chlorhexidine 2% Cloths 1 Application(s) Topical daily  dextrose 5%. 1000 milliLiter(s) (50 mL/Hr) IV Continuous <Continuous>  dextrose 5%. 1000 milliLiter(s) (100 mL/Hr) IV Continuous <Continuous>  dextrose 5%. 1000 milliLiter(s) (50 mL/Hr) IV Continuous <Continuous>  dextrose 5%. 1000 milliLiter(s) (100 mL/Hr) IV Continuous <Continuous>  dextrose 50% Injectable 12.5 Gram(s) IV Push once  dextrose 50% Injectable 25 Gram(s) IV Push once  dextrose 50% Injectable 25 Gram(s) IV Push once  glucagon  Injectable 1 milliGRAM(s) IntraMuscular once  glucagon  Injectable 1 milliGRAM(s) IntraMuscular once  heparin   Injectable 5000 Unit(s) SubCutaneous every 8 hours  hydrALAZINE 25 milliGRAM(s) Oral every 8 hours  insulin glargine Injectable (LANTUS) 14 Unit(s) SubCutaneous at bedtime  insulin lispro (ADMELOG) corrective regimen sliding scale   SubCutaneous Before meals and at bedtime  insulin lispro Injectable (ADMELOG) 5 Unit(s) SubCutaneous three times a day before meals  isosorbide   dinitrate Tablet (ISORDIL) 10 milliGRAM(s) Oral three times a day  pantoprazole   Suspension 40 milliGRAM(s) Oral before breakfast  polyethylene glycol 3350 17 Gram(s) Oral two times a day  senna 2 Tablet(s) Oral at bedtime  sodium chloride 0.9%. 1000 milliLiter(s) (10 mL/Hr) IV Continuous <Continuous>  testosterone 1% Gel 50 milliGRAM(s) Topical daily    MEDICATIONS  (PRN):  dextrose Oral Gel 15 Gram(s) Oral once PRN Blood Glucose LESS THAN 70 milliGRAM(s)/deciliter    ASSESSMENT / RECOMMENDATIONS    A1C: 8.6 %  BUN: 101  Creatinine: 2.26  GFR: 29  Weight: 66.6  BMI: 21.5  EF:     # Type 2 diabetes mellitus with hyperglycemia  - Please continue lantus *** units at bedtime.   - Continue lispro *** units before each meal.  - Continue lispro moderate / low dose sliding scale before meals and at bedtime.  - Patient's fingerstick glucose goal is 100-180 mg/dL.    - Discharge recommendations to be discussed.   - Patient can follow up at discharge with [facility] by calling [phone] to make an appointment.      Case discussed with Dr. Caicedo. Primary team updated.       Milton Laura    Endocrinology Fellow    Service Pager: [phone]    SUBJECTIVE / INTERVAL HPI: Patient was seen and examined this morning. Over the weekend, his insulin doses were increased by primary team, currently on lantus 14 units at bedtime and lispro 5 units three times daily before meals.     CAPILLARY BLOOD GLUCOSE & INSULIN RECEIVED  157 mg/dL (07-08 @ 18:20) ? 3 units of lispro + 2 units of lispro sliding scale.   166 mg/dL (07-08 @ 21:47) ? 10 units of lispro.   147 mg/dL (07-09 @ 04:01) ? Ø  129 mg/dL (07-09 @ 07:22) ? 10 units of lispro.   175 mg/dL (07-09 @ 12:06) ? 10 units of lispro + 1 unit of lispro sliding scale.   249 mg/dL (07-09 @ 16:27) ? 10 units of lispro + 2 unit of lispro sliding scale.   188 mg/dL (07-09 @ 22:10) ? 14 units of lantus + 1 unit of lispro sliding scale.   123 mg/dL (07-10 @ 07:01) ? 5 units of lispro.     REVIEW OF SYSTEMS  Constitutional:  (+) Decreased appetite. Negative fever or chills.   Cardiovascular:  Negative for chest pain or palpitations.  Respiratory:  (+) Hoarseness (+) Productive cough. Negative for wheezing, or shortness of breath.    Gastrointestinal:  Negative for nausea, vomiting, diarrhea, constipation, or abdominal pain.  Neurologic:  No headache, confusion, dizziness, lightheadedness.    PHYSICAL EXAM  Vital Signs Last 24 Hrs  T(C): 36.4 (10 Jul 2023 09:03), Max: 36.6 (09 Jul 2023 14:01)  T(F): 97.5 (10 Jul 2023 09:03), Max: 97.8 (09 Jul 2023 14:01)  HR: 70 (10 Jul 2023 10:00) (61 - 73)  BP: 138/63 (10 Jul 2023 10:00) (99/50 - 162/73)  BP(mean): 91 (10 Jul 2023 10:00) (72 - 105)  RR: 18 (10 Jul 2023 10:00) (12 - 18)  SpO2: 99% (10 Jul 2023 10:00) (92% - 100%)    Parameters below as of 10 Jul 2023 10:00  Patient On (Oxygen Delivery Method): nasal cannula w/ humidification  O2 Flow (L/min): 2    Constitutional: Awake, alert, elderly male, in no acute distress.   HEENT: Normocephalic, atraumatic, SCARLETT.  Respiratory: Lungs clear to ausculation bilaterally.   Cardiovascular: regular rhythm, normal S1 and S2, (+) midline sternotomy scar covered with sterile dressings (+) wound vac.   GI: soft, non-tender, non-distended, bowel sounds present.  Extremities: No lower extremity edema.   Psychiatric: AAO x 3.    LABS  CBC - WBC/HGB/HTC/PLT: 9.29/9.3/28.0/243 (07-10-23)  BMP - Na/K/Cl/Bicarb/BUN/Cr/Gluc/AG/eGFR: 134/4.2/93/29/101/2.26/162/12/29 (07-10-23)  Ca - 9.0 (07-10-23)  Phos - 3.1 (07-10-23)  Mg - 2.0 (07-10-23)  LFT - Alb/Tprot/Tbili/Dbili/AlkPhos/ALT/AST: 3.6/--/2.2/--/108/13/36 (07-10-23)  PT/aPTT/INR: 12.7/40.4/1.07 (07-10-23)   Thyroid Stimulating Hormone, Serum: 1.440 (06-28-23)    MEDICATIONS  MEDICATIONS  (STANDING):  aMIOdarone    Tablet 400  Oral   aMIOdarone    Tablet 200 milliGRAM(s) Oral daily  aspirin  chewable 81 milliGRAM(s) Oral daily  atorvastatin 40 milliGRAM(s) Oral at bedtime  bisacodyl 5 milliGRAM(s) Oral daily  chlorhexidine 2% Cloths 1 Application(s) Topical daily  dextrose 5%. 1000 milliLiter(s) (50 mL/Hr) IV Continuous <Continuous>  dextrose 5%. 1000 milliLiter(s) (100 mL/Hr) IV Continuous <Continuous>  dextrose 5%. 1000 milliLiter(s) (50 mL/Hr) IV Continuous <Continuous>  dextrose 5%. 1000 milliLiter(s) (100 mL/Hr) IV Continuous <Continuous>  dextrose 50% Injectable 12.5 Gram(s) IV Push once  dextrose 50% Injectable 25 Gram(s) IV Push once  dextrose 50% Injectable 25 Gram(s) IV Push once  glucagon  Injectable 1 milliGRAM(s) IntraMuscular once  glucagon  Injectable 1 milliGRAM(s) IntraMuscular once  heparin   Injectable 5000 Unit(s) SubCutaneous every 8 hours  hydrALAZINE 25 milliGRAM(s) Oral every 8 hours  insulin glargine Injectable (LANTUS) 14 Unit(s) SubCutaneous at bedtime  insulin lispro (ADMELOG) corrective regimen sliding scale   SubCutaneous Before meals and at bedtime  insulin lispro Injectable (ADMELOG) 5 Unit(s) SubCutaneous three times a day before meals  isosorbide   dinitrate Tablet (ISORDIL) 10 milliGRAM(s) Oral three times a day  pantoprazole   Suspension 40 milliGRAM(s) Oral before breakfast  polyethylene glycol 3350 17 Gram(s) Oral two times a day  senna 2 Tablet(s) Oral at bedtime  sodium chloride 0.9%. 1000 milliLiter(s) (10 mL/Hr) IV Continuous <Continuous>  testosterone 1% Gel 50 milliGRAM(s) Topical daily    MEDICATIONS  (PRN):  dextrose Oral Gel 15 Gram(s) Oral once PRN Blood Glucose LESS THAN 70 milliGRAM(s)/deciliter    ASSESSMENT / RECOMMENDATIONS  Mr. Johnson is a 78-year-old male with type 2 diabetes mellitus, hypertension and severe aortic stenosis who was transferred to [facility] for further work-up and intervention of severe aortic stenosis, now s/p aortic valve replacement (6/30/23). Endocrinology was consulted for recommendations regarding diabetes management.     A1C: 8.6 %  BUN: 101  Creatinine: 2.26  GFR: 29  Weight: 66.6  BMI: 21.5  EF: 40% (7/5/23)    # Type 2 diabetes mellitus with hyperglycemia  - Please continue lantus *** units at bedtime.   - Continue lispro *** units before each meal.  - Continue lispro moderate / low dose sliding scale before meals and at bedtime.  - Patient's fingerstick glucose goal is 100-180 mg/dL.    - Discharge recommendations to be discussed.   - Patient can follow up at discharge with [facility] by calling [phone] to make an appointment.      Case discussed with Dr. Caicedo. Primary team updated.       Milton Laura    Endocrinology Fellow    Service Pager: [phone]    SUBJECTIVE / INTERVAL HPI: Patient was seen and examined this morning. Over the weekend, his insulin doses were increased by primary team, currently on lantus 14 units at bedtime and lispro 5 units three times daily before meals.     CAPILLARY BLOOD GLUCOSE & INSULIN RECEIVED  157 mg/dL (07-08 @ 18:20) ? 3 units of lispro + 2 units of lispro sliding scale.   166 mg/dL (07-08 @ 21:47) ? 10 units of lantus.   147 mg/dL (07-09 @ 04:01) ? Ø  129 mg/dL (07-09 @ 07:22) ? 3 units of lispro.   175 mg/dL (07-09 @ 12:06) ? 3 units of lispro + 1 unit of lispro sliding scale.   249 mg/dL (07-09 @ 16:27) ? 3 units of lispro + 2 unit of lispro sliding scale.   188 mg/dL (07-09 @ 22:10) ? 14 units of lantus + 1 unit of lispro sliding scale.   123 mg/dL (07-10 @ 07:01) ? 5 units of lispro.   224 mg/dL (07-10 @ 11:10) ? 5 units of lispro + 2 units of lispro sliding scale.     REVIEW OF SYSTEMS  Constitutional:  (+) Decreased appetite. Negative fever or chills.   Cardiovascular:  Negative for chest pain or palpitations.  Respiratory:  (+) Hoarseness (+) Productive cough. Negative for wheezing, or shortness of breath.    Gastrointestinal:  Negative for nausea, vomiting, diarrhea, constipation, or abdominal pain.  Neurologic:  No headache, confusion, dizziness, lightheadedness.    PHYSICAL EXAM  Vital Signs Last 24 Hrs  T(C): 36.4 (10 Jul 2023 09:03), Max: 36.6 (09 Jul 2023 14:01)  T(F): 97.5 (10 Jul 2023 09:03), Max: 97.8 (09 Jul 2023 14:01)  HR: 70 (10 Jul 2023 10:00) (61 - 73)  BP: 138/63 (10 Jul 2023 10:00) (99/50 - 162/73)  BP(mean): 91 (10 Jul 2023 10:00) (72 - 105)  RR: 18 (10 Jul 2023 10:00) (12 - 18)  SpO2: 99% (10 Jul 2023 10:00) (92% - 100%)    Parameters below as of 10 Jul 2023 10:00  Patient On (Oxygen Delivery Method): nasal cannula w/ humidification  O2 Flow (L/min): 2    Constitutional: Awake, alert, elderly male, in no acute distress.   HEENT: Normocephalic, atraumatic, SCARLETT.  Respiratory: Lungs clear to ausculation bilaterally.   Cardiovascular: regular rhythm, normal S1 and S2, (+) midline sternotomy scar covered with sterile dressings (+) wound vac.   GI: soft, non-tender, non-distended, bowel sounds present.  Extremities: No lower extremity edema.   Psychiatric: AAO x 3.    LABS  CBC - WBC/HGB/HTC/PLT: 9.29/9.3/28.0/243 (07-10-23)  BMP - Na/K/Cl/Bicarb/BUN/Cr/Gluc/AG/eGFR: 134/4.2/93/29/101/2.26/162/12/29 (07-10-23)  Ca - 9.0 (07-10-23)  Phos - 3.1 (07-10-23)  Mg - 2.0 (07-10-23)  LFT - Alb/Tprot/Tbili/Dbili/AlkPhos/ALT/AST: 3.6/--/2.2/--/108/13/36 (07-10-23)  PT/aPTT/INR: 12.7/40.4/1.07 (07-10-23)   Thyroid Stimulating Hormone, Serum: 1.440 (06-28-23)    MEDICATIONS  MEDICATIONS  (STANDING):  aMIOdarone    Tablet 400  Oral   aMIOdarone    Tablet 200 milliGRAM(s) Oral daily  aspirin  chewable 81 milliGRAM(s) Oral daily  atorvastatin 40 milliGRAM(s) Oral at bedtime  bisacodyl 5 milliGRAM(s) Oral daily  chlorhexidine 2% Cloths 1 Application(s) Topical daily  dextrose 5%. 1000 milliLiter(s) (50 mL/Hr) IV Continuous <Continuous>  dextrose 5%. 1000 milliLiter(s) (100 mL/Hr) IV Continuous <Continuous>  dextrose 5%. 1000 milliLiter(s) (50 mL/Hr) IV Continuous <Continuous>  dextrose 5%. 1000 milliLiter(s) (100 mL/Hr) IV Continuous <Continuous>  dextrose 50% Injectable 12.5 Gram(s) IV Push once  dextrose 50% Injectable 25 Gram(s) IV Push once  dextrose 50% Injectable 25 Gram(s) IV Push once  glucagon  Injectable 1 milliGRAM(s) IntraMuscular once  glucagon  Injectable 1 milliGRAM(s) IntraMuscular once  heparin   Injectable 5000 Unit(s) SubCutaneous every 8 hours  hydrALAZINE 25 milliGRAM(s) Oral every 8 hours  insulin glargine Injectable (LANTUS) 14 Unit(s) SubCutaneous at bedtime  insulin lispro (ADMELOG) corrective regimen sliding scale   SubCutaneous Before meals and at bedtime  insulin lispro Injectable (ADMELOG) 5 Unit(s) SubCutaneous three times a day before meals  isosorbide   dinitrate Tablet (ISORDIL) 10 milliGRAM(s) Oral three times a day  pantoprazole   Suspension 40 milliGRAM(s) Oral before breakfast  polyethylene glycol 3350 17 Gram(s) Oral two times a day  senna 2 Tablet(s) Oral at bedtime  sodium chloride 0.9%. 1000 milliLiter(s) (10 mL/Hr) IV Continuous <Continuous>  testosterone 1% Gel 50 milliGRAM(s) Topical daily    MEDICATIONS  (PRN):  dextrose Oral Gel 15 Gram(s) Oral once PRN Blood Glucose LESS THAN 70 milliGRAM(s)/deciliter    ASSESSMENT / RECOMMENDATIONS  Mr. Johnson is a 78-year-old male with type 2 diabetes mellitus, hypertension and severe aortic stenosis who was transferred to [facility] for further work-up and intervention of severe aortic stenosis, now s/p aortic valve replacement (6/30/23). Endocrinology was consulted for recommendations regarding diabetes management.     A1C: 8.6 %  BUN: 101  Creatinine: 2.26  GFR: 29  Weight: 66.6  BMI: 21.5  EF: 40% (7/5/23)    # Type 2 diabetes mellitus with hyperglycemia  - Please continue lantus *** units at bedtime.   - Continue lispro *** units before each meal.  - Continue lispro moderate / low dose sliding scale before meals and at bedtime.  - Patient's fingerstick glucose goal is 100-180 mg/dL.    - Discharge recommendations to be discussed.   - Patient can follow up at discharge with [facility] by calling [phone] to make an appointment.      Case discussed with Dr. Caicedo. Primary team updated.       Milton Laura    Endocrinology Fellow    Service Pager: [phone]    SUBJECTIVE / INTERVAL HPI: Patient was seen and examined this morning. Over the weekend, his insulin doses were increased by primary team, currently on lantus 14 units at bedtime and lispro 5 units three times daily before meals. He reports improved appetite and says that he has been able to eat most of his meals since last night. His diet was advanced from pureed to soft and bite sized.     DIET  - Dinner (7/9/23): Doesn't remember what it was, but says that it was a tray with pureed food. He ate 1/2 of the tray.   - Breakfast (7/10/23): Mashed potatoes, with ?beans (Pureed consistency) and coffee.   - Lunch (7/10/23): Mashed potatoes, chicken and salad.     CAPILLARY BLOOD GLUCOSE & INSULIN RECEIVED  157 mg/dL (07-08 @ 18:20) ? 3 units of lispro + 2 units of lispro sliding scale.   166 mg/dL (07-08 @ 21:47) ? 10 units of lantus.   147 mg/dL (07-09 @ 04:01) ? Ø  129 mg/dL (07-09 @ 07:22) ? 3 units of lispro.   175 mg/dL (07-09 @ 12:06) ? 3 units of lispro + 1 unit of lispro sliding scale.   249 mg/dL (07-09 @ 16:27) ? 3 units of lispro + 2 unit of lispro sliding scale.   188 mg/dL (07-09 @ 22:10) ? 14 units of lantus + 1 unit of lispro sliding scale.   123 mg/dL (07-10 @ 07:01) ? 5 units of lispro.   224 mg/dL (07-10 @ 11:10) ? 5 units of lispro + 2 units of lispro sliding scale.     REVIEW OF SYSTEMS  Constitutional:  (+) Decreased appetite. Negative fever or chills.   Cardiovascular:  Negative for chest pain or palpitations.  Respiratory:  (+) Hoarseness (+) Productive cough. Negative for wheezing, or shortness of breath.    Gastrointestinal:  Negative for nausea, vomiting, diarrhea, constipation, or abdominal pain.  Neurologic:  No headache, confusion, dizziness, lightheadedness.    PHYSICAL EXAM  Vital Signs Last 24 Hrs  T(C): 36.4 (10 Jul 2023 09:03), Max: 36.6 (09 Jul 2023 14:01)  T(F): 97.5 (10 Jul 2023 09:03), Max: 97.8 (09 Jul 2023 14:01)  HR: 70 (10 Jul 2023 10:00) (61 - 73)  BP: 138/63 (10 Jul 2023 10:00) (99/50 - 162/73)  BP(mean): 91 (10 Jul 2023 10:00) (72 - 105)  RR: 18 (10 Jul 2023 10:00) (12 - 18)  SpO2: 99% (10 Jul 2023 10:00) (92% - 100%)    Parameters below as of 10 Jul 2023 10:00  Patient On (Oxygen Delivery Method): nasal cannula w/ humidification  O2 Flow (L/min): 2    Constitutional: Awake, alert, elderly male, in no acute distress.   HEENT: Normocephalic, atraumatic, SCARLETT.  Respiratory: Lungs clear to ausculation bilaterally.   Cardiovascular: regular rhythm, normal S1 and S2, (+) midline sternotomy scar.   GI: soft, non-tender, non-distended, bowel sounds present.  Extremities: No lower extremity edema.   Psychiatric: AAO x 3.    LABS  CBC - WBC/HGB/HTC/PLT: 9.29/9.3/28.0/243 (07-10-23)  BMP - Na/K/Cl/Bicarb/BUN/Cr/Gluc/AG/eGFR: 134/4.2/93/29/101/2.26/162/12/29 (07-10-23)  Ca - 9.0 (07-10-23)  Phos - 3.1 (07-10-23)  Mg - 2.0 (07-10-23)  LFT - Alb/Tprot/Tbili/Dbili/AlkPhos/ALT/AST: 3.6/--/2.2/--/108/13/36 (07-10-23)  PT/aPTT/INR: 12.7/40.4/1.07 (07-10-23)   Thyroid Stimulating Hormone, Serum: 1.440 (06-28-23)    MEDICATIONS  MEDICATIONS  (STANDING):  aMIOdarone    Tablet 400  Oral   aMIOdarone    Tablet 200 milliGRAM(s) Oral daily  aspirin  chewable 81 milliGRAM(s) Oral daily  atorvastatin 40 milliGRAM(s) Oral at bedtime  bisacodyl 5 milliGRAM(s) Oral daily  chlorhexidine 2% Cloths 1 Application(s) Topical daily  dextrose 5%. 1000 milliLiter(s) (50 mL/Hr) IV Continuous <Continuous>  dextrose 5%. 1000 milliLiter(s) (100 mL/Hr) IV Continuous <Continuous>  dextrose 5%. 1000 milliLiter(s) (50 mL/Hr) IV Continuous <Continuous>  dextrose 5%. 1000 milliLiter(s) (100 mL/Hr) IV Continuous <Continuous>  dextrose 50% Injectable 12.5 Gram(s) IV Push once  dextrose 50% Injectable 25 Gram(s) IV Push once  dextrose 50% Injectable 25 Gram(s) IV Push once  glucagon  Injectable 1 milliGRAM(s) IntraMuscular once  glucagon  Injectable 1 milliGRAM(s) IntraMuscular once  heparin   Injectable 5000 Unit(s) SubCutaneous every 8 hours  hydrALAZINE 25 milliGRAM(s) Oral every 8 hours  insulin glargine Injectable (LANTUS) 14 Unit(s) SubCutaneous at bedtime  insulin lispro (ADMELOG) corrective regimen sliding scale   SubCutaneous Before meals and at bedtime  insulin lispro Injectable (ADMELOG) 5 Unit(s) SubCutaneous three times a day before meals  isosorbide   dinitrate Tablet (ISORDIL) 10 milliGRAM(s) Oral three times a day  pantoprazole   Suspension 40 milliGRAM(s) Oral before breakfast  polyethylene glycol 3350 17 Gram(s) Oral two times a day  senna 2 Tablet(s) Oral at bedtime  sodium chloride 0.9%. 1000 milliLiter(s) (10 mL/Hr) IV Continuous <Continuous>  testosterone 1% Gel 50 milliGRAM(s) Topical daily    MEDICATIONS  (PRN):  dextrose Oral Gel 15 Gram(s) Oral once PRN Blood Glucose LESS THAN 70 milliGRAM(s)/deciliter    ASSESSMENT / RECOMMENDATIONS  Mr. Johnson is a 78-year-old male with type 2 diabetes mellitus, hypertension and severe aortic stenosis who was transferred to [facility] for further work-up and intervention of severe aortic stenosis, now s/p aortic valve replacement (6/30/23). Endocrinology was consulted for recommendations regarding diabetes management.     A1C: 8.6 %  BUN: 101  Creatinine: 2.26  GFR: 29  Weight: 66.6  BMI: 21.5  EF: 40% (7/5/23)    # Type 2 diabetes mellitus with hyperglycemia  - Please continue lantus *** units at bedtime.   - Continue lispro *** units before each meal.  - Continue lispro moderate / low dose sliding scale before meals and at bedtime.  - Patient's fingerstick glucose goal is 100-180 mg/dL.    - Discharge recommendations to be discussed.   - Patient can follow up at discharge with [facility] by calling [phone] to make an appointment.      Case discussed with Dr. Caicedo. Primary team updated.       Milton Laura    Endocrinology Fellow    Service Pager: [phone]    SUBJECTIVE / INTERVAL HPI: Patient was seen and examined this morning. Over the weekend, his insulin doses were increased by primary team, currently on lantus 14 units at bedtime and lispro 5 units three times daily before meals. His glucose level decreased overnight from 188 mg/dL (07-09 @ 22:10) to 123 mg/dL (07-10 @ 07:01). In addition, his glucose appears to be increasing postprandially despite increase dose of premeal insulin. He reports improved appetite and says that he has been able to eat most of his meals since last night. His diet was advanced from pureed to soft and bite sized.     DIET  - Dinner (7/9/23): Doesn't remember what it was, but says that it was a tray with pureed food. He ate 1/2 of the tray.   - Breakfast (7/10/23): Mashed potatoes, with ?beans (Pureed consistency) and coffee.   - Lunch (7/10/23): Mashed potatoes, chicken and salad.     CAPILLARY BLOOD GLUCOSE & INSULIN RECEIVED  157 mg/dL (07-08 @ 18:20) ? 3 units of lispro + 2 units of lispro sliding scale.   166 mg/dL (07-08 @ 21:47) ? 10 units of lantus.   147 mg/dL (07-09 @ 04:01) ? Ø  129 mg/dL (07-09 @ 07:22) ? 3 units of lispro.   175 mg/dL (07-09 @ 12:06) ? 3 units of lispro + 1 unit of lispro sliding scale.   249 mg/dL (07-09 @ 16:27) ? 3 units of lispro + 2 unit of lispro sliding scale.   188 mg/dL (07-09 @ 22:10) ? 14 units of lantus + 1 unit of lispro sliding scale.   123 mg/dL (07-10 @ 07:01) ? 5 units of lispro.   224 mg/dL (07-10 @ 11:10) ? 5 units of lispro + 2 units of lispro sliding scale.     REVIEW OF SYSTEMS  Constitutional:  Negative fever or chills.   Cardiovascular:  Negative for chest pain or palpitations.  Respiratory:  (+) Productive cough. Negative for wheezing, or shortness of breath.    Gastrointestinal:  Negative for nausea, vomiting, diarrhea, constipation, or abdominal pain.  Neurologic:  No headache, confusion, dizziness, lightheadedness.    PHYSICAL EXAM  Vital Signs Last 24 Hrs  T(C): 36.4 (10 Jul 2023 09:03), Max: 36.6 (09 Jul 2023 14:01)  T(F): 97.5 (10 Jul 2023 09:03), Max: 97.8 (09 Jul 2023 14:01)  HR: 70 (10 Jul 2023 10:00) (61 - 73)  BP: 138/63 (10 Jul 2023 10:00) (99/50 - 162/73)  BP(mean): 91 (10 Jul 2023 10:00) (72 - 105)  RR: 18 (10 Jul 2023 10:00) (12 - 18)  SpO2: 99% (10 Jul 2023 10:00) (92% - 100%)    Parameters below as of 10 Jul 2023 10:00  Patient On (Oxygen Delivery Method): nasal cannula w/ humidification  O2 Flow (L/min): 2    Constitutional: Awake, alert, elderly male, in no acute distress.   HEENT: Normocephalic, atraumatic, SCARLETT.  Respiratory: Lungs clear to ausculation bilaterally.   Cardiovascular: regular rhythm, normal S1 and S2, (+) midline sternotomy scar.   GI: soft, non-tender, non-distended, bowel sounds present.  Extremities: No lower extremity edema.   Psychiatric: AAO x 3.    LABS  CBC - WBC/HGB/HTC/PLT: 9.29/9.3/28.0/243 (07-10-23)  BMP - Na/K/Cl/Bicarb/BUN/Cr/Gluc/AG/eGFR: 134/4.2/93/29/101/2.26/162/12/29 (07-10-23)  Ca - 9.0 (07-10-23)  Phos - 3.1 (07-10-23)  Mg - 2.0 (07-10-23)  LFT - Alb/Tprot/Tbili/Dbili/AlkPhos/ALT/AST: 3.6/--/2.2/--/108/13/36 (07-10-23)  PT/aPTT/INR: 12.7/40.4/1.07 (07-10-23)   Thyroid Stimulating Hormone, Serum: 1.440 (06-28-23)    MEDICATIONS  MEDICATIONS  (STANDING):  aMIOdarone    Tablet 400  Oral   aMIOdarone    Tablet 200 milliGRAM(s) Oral daily  aspirin  chewable 81 milliGRAM(s) Oral daily  atorvastatin 40 milliGRAM(s) Oral at bedtime  bisacodyl 5 milliGRAM(s) Oral daily  chlorhexidine 2% Cloths 1 Application(s) Topical daily  dextrose 5%. 1000 milliLiter(s) (50 mL/Hr) IV Continuous <Continuous>  dextrose 5%. 1000 milliLiter(s) (100 mL/Hr) IV Continuous <Continuous>  dextrose 5%. 1000 milliLiter(s) (50 mL/Hr) IV Continuous <Continuous>  dextrose 5%. 1000 milliLiter(s) (100 mL/Hr) IV Continuous <Continuous>  dextrose 50% Injectable 12.5 Gram(s) IV Push once  dextrose 50% Injectable 25 Gram(s) IV Push once  dextrose 50% Injectable 25 Gram(s) IV Push once  glucagon  Injectable 1 milliGRAM(s) IntraMuscular once  glucagon  Injectable 1 milliGRAM(s) IntraMuscular once  heparin   Injectable 5000 Unit(s) SubCutaneous every 8 hours  hydrALAZINE 25 milliGRAM(s) Oral every 8 hours  insulin glargine Injectable (LANTUS) 14 Unit(s) SubCutaneous at bedtime  insulin lispro (ADMELOG) corrective regimen sliding scale   SubCutaneous Before meals and at bedtime  insulin lispro Injectable (ADMELOG) 5 Unit(s) SubCutaneous three times a day before meals  isosorbide   dinitrate Tablet (ISORDIL) 10 milliGRAM(s) Oral three times a day  pantoprazole   Suspension 40 milliGRAM(s) Oral before breakfast  polyethylene glycol 3350 17 Gram(s) Oral two times a day  senna 2 Tablet(s) Oral at bedtime  sodium chloride 0.9%. 1000 milliLiter(s) (10 mL/Hr) IV Continuous <Continuous>  testosterone 1% Gel 50 milliGRAM(s) Topical daily    MEDICATIONS  (PRN):  dextrose Oral Gel 15 Gram(s) Oral once PRN Blood Glucose LESS THAN 70 milliGRAM(s)/deciliter    ASSESSMENT / RECOMMENDATIONS  Mr. Johnson is a 78-year-old male with type 2 diabetes mellitus, hypertension and severe aortic stenosis who was transferred to [facility] for further work-up and intervention of severe aortic stenosis, now s/p aortic valve replacement (6/30/23). Endocrinology was consulted for recommendations regarding diabetes management.     A1C: 8.6 %  BUN: 101  Creatinine: 2.26  GFR: 29  Weight: 66.6  BMI: 21.5  EF: 40% (7/5/23)    # Type 2 diabetes mellitus with hyperglycemia  - His glucose level decreased overnight from 188 mg/dL (07-09 @ 22:10) to 123 mg/dL (07-10 @ 07:01). In addition, his glucose appears to be increasing postprandially despite increase dose of premeal insulin. Therefore, would slightly decrease the basal insulin and increase the premeal insulin.   - Please DECREASE lantus to 12 units at bedtime.   - INCREASE lispro to 7 units before each meal.  - Continue lispro low dose sliding scale before meals and at bedtime.  - Patient's fingerstick glucose goal is 100-180 mg/dL.    - Discharge recommendations to be discussed.   - Patient can follow up at discharge with [facility] by calling [phone] to make an appointment.      Case discussed with Dr. Caicedo. Primary team updated.       Milton Laura    Endocrinology Fellow    Service Pager: [phone]

## 2023-07-10 NOTE — PROGRESS NOTE ADULT - SUBJECTIVE AND OBJECTIVE BOX
VIPIN FRANCE  78y  Male    Patient is a 78y old  Male who presents with a chief complaint of severe AS and AI (09 Jul 2023 22:17)      INTERVAL HPI/OVERNIGHT EVENTS: Pt still on 2L NC with no complaints of sob, cp, dizziness.       T(C): 36.4 (07-10-23 @ 09:03), Max: 36.6 (07-09-23 @ 14:01)  HR: 72 (07-10-23 @ 09:00) (61 - 73)  BP: 138/63 (07-10-23 @ 09:00) (99/50 - 162/73)  RR: 18 (07-10-23 @ 09:00) (12 - 18)  SpO2: 100% (07-10-23 @ 09:00) (92% - 100%)  Wt(kg): --Vital Signs Last 24 Hrs  T(C): 36.4 (10 Jul 2023 09:03), Max: 36.6 (09 Jul 2023 14:01)  T(F): 97.5 (10 Jul 2023 09:03), Max: 97.8 (09 Jul 2023 14:01)  HR: 72 (10 Jul 2023 09:00) (61 - 73)  BP: 138/63 (10 Jul 2023 09:00) (99/50 - 162/73)  BP(mean): 90 (10 Jul 2023 09:00) (72 - 105)  RR: 18 (10 Jul 2023 09:00) (12 - 18)  SpO2: 100% (10 Jul 2023 09:00) (92% - 100%)    Parameters below as of 10 Jul 2023 09:00  Patient On (Oxygen Delivery Method): nasal cannula w/ humidification  O2 Flow (L/min): 2      PHYSICAL EXAM:  NECK: No JVD  CHEST/LUNG: Clear to auscultation bilaterally; No rales, rhonchi, wheezing, or rubs  HEART: Regular rate and rhythm; No murmurs, rubs, or gallops  ABDOMEN: Soft  EXTREMITIES:  WWP, No clubbing, cyanosis, or edema      Consultant(s) Notes Reviewed:  [x ] YES  [ ] NO  Care Discussed with Consultants/Other Providers [ x] YES  [ ] NO    LABS:                        9.3    9.29  )-----------( 243      ( 10 Jul 2023 02:30 )             28.0     07-10    134<L>  |  93<L>  |  101<H>  ----------------------------<  162<H>  4.2   |  29  |  2.26<H>    Ca    9.0      10 Jul 2023 02:30  Phos  3.1     07-10  Mg     2.0     07-10    TPro  6.7  /  Alb  3.6  /  TBili  2.2<H>  /  DBili  x   /  AST  36  /  ALT  13  /  AlkPhos  108  07-10      PT/INR - ( 10 Jul 2023 02:30 )   PT: 12.7 sec;   INR: 1.07          PTT - ( 10 Jul 2023 02:30 )  PTT:40.4 sec  Urinalysis Basic - ( 10 Jul 2023 02:30 )    Color: x / Appearance: x / SG: x / pH: x  Gluc: 162 mg/dL / Ketone: x  / Bili: x / Urobili: x   Blood: x / Protein: x / Nitrite: x   Leuk Esterase: x / RBC: x / WBC x   Sq Epi: x / Non Sq Epi: x / Bacteria: x      CAPILLARY BLOOD GLUCOSE      POCT Blood Glucose.: 123 mg/dL (10 Jul 2023 07:01)  POCT Blood Glucose.: 188 mg/dL (09 Jul 2023 22:10)  POCT Blood Glucose.: 217 mg/dL (09 Jul 2023 18:05)  POCT Blood Glucose.: 249 mg/dL (09 Jul 2023 16:27)  POCT Blood Glucose.: 175 mg/dL (09 Jul 2023 12:06)      ABG - ( 08 Jul 2023 10:05 )  pH, Arterial: 7.43  pH, Blood: x     /  pCO2: 45    /  pO2: 99    / HCO3: 30    / Base Excess: 4.8   /  SaO2: 99.4              Urinalysis Basic - ( 10 Jul 2023 02:30 )    Color: x / Appearance: x / SG: x / pH: x  Gluc: 162 mg/dL / Ketone: x  / Bili: x / Urobili: x   Blood: x / Protein: x / Nitrite: x   Leuk Esterase: x / RBC: x / WBC x   Sq Epi: x / Non Sq Epi: x / Bacteria: x        RADIOLOGY & ADDITIONAL TESTS:    Imaging Personally Reviewed:  [ ] YES  [ ] NO    HEALTH ISSUES - PROBLEM Dx:  Acute on chronic renal failure

## 2023-07-10 NOTE — PROGRESS NOTE ADULT - SUBJECTIVE AND OBJECTIVE BOX
INTERVAL HPI/OVERNIGHT EVENTS:    6/30: AVR (tissue)  EF normal     77yo Arabic speaking Male Hx HTN, DM presenting to [facility] 6/26 w/CP sxs x 3 mo   Pt lives in [facility] & never been evaluated for these sxs   NST: small sized, reversible, myocardial perfusion defect of the anteroapical wall c/w ischemia. ECHO: Mod AS and severe AR, EF normal    Cath: non-obstructive CAD.   transferred to [facility] 6/28  OR 6/30 - no intraop blood products    7/3 - rising LA/Cr and shock state   ECHO : Mild symmetric LVH. EF 10-15% with global hypokinesis. RV normal size - fxn slightly reduced. Mildly dilated LA. AVR (Bio) - peak transvalvular velocity 1.64 m/s, the mean transvalvular gradient is 7.00 mmHg, and the LVOT/AV velocity ratio is 0.59. The peak transaortic gradient is 10.76 mmHg. No AR.  mild-mod TR. Mod Pulm HTN (58), sm pericardial eff    inotropic support started for post-op cardiogenic shock   HF consulted     7/5: Extubated to HF  improvement   repeat ECHO: EF 40%     no acute events overnight reported  patient up and ambulating with staff assist      ICU Vital Signs Last 24 Hrs  T(C): 36.4 (10 Jul 2023 17:07), Max: 36.8 (10 Jul 2023 13:02)  T(F): 97.6 (10 Jul 2023 17:07), Max: 98.2 (10 Jul 2023 13:02)  HR: 63 (10 Jul 2023 15:00) (63 - 73) sinus   BP: 111/55 (10 Jul 2023 15:00) (111/55 - 162/73)  BP(mean): 76 (10 Jul 2023 15:00) (76 - 105)  RR: 19 (10 Jul 2023 15:00) (12 - 19)  SpO2: 100% (10 Jul 2023 15:00) (92% - 100%) 2L NC    Qtts: none     I&O's Summary    09 Jul 2023 07:01  -  10 Jul 2023 07:00  --------------------------------------------------------  IN: 2552 mL / OUT: 2215 mL / NET: 337 mL    10 Jul 2023 07:01  -  10 Jul 2023 17:14  --------------------------------------------------------  IN: 0 mL / OUT: 595 mL / NET: -595 mL    Physical Exam    Heart - regular (-)rub/gallop  Lungs - CTA anterior - no rhonchi/wheeze  Abd - (+)BS soft NTND (-)r/r/g  Ext - warm to touch no cyanosis/clubbing  Chest - incision site clean and dry   Neuro - alert/oriented and interactive  Skin - no rash     LABS:                        9.3    9.29  )-----------( 243      ( 10 Jul 2023 02:30 )             28.0     07-10    134<L>  |  93<L>  |  101<H>  ----------------------------<  162<H>  4.2   |  29  |  2.26<H>    Ca    9.0      10 Jul 2023 02:30  Phos  3.1     07-10  Mg     2.0     07-10    TPro  6.7  /  Alb  3.6  /  TBili  2.2<H>  /  DBili  x   /  AST  36  /  ALT  13  /  AlkPhos  108  07-10    PT/INR - ( 10 Jul 2023 02:30 )   PT: 12.7 sec;   INR: 1.07     PTT - ( 10 Jul 2023 02:30 )  PTT:40.4 sec    RADIOLOGY & ADDITIONAL STUDIES: reviewed       Patient wiht anginal sxs found on assessment to have moderate AS/severe AR to OR 6/30 iwht postop cardiogenic shock - requiring inotropic support since titrate off and now titrating HF meds    1. CV  stable hemodynamics  plan inc hydralazine to 50mg and if BP allows plan inc isordil thereafter   ASA/statin  sinus with 1st degree  amiodarone (PO load)  limited ECHO today to assess EF now off inotropes     2. Pulm   incentive spirometry  ambulation with staff assist     3. Renal   monitor UO/Lytes and Cr  holding diuretic for now  Cr stable and trending down   UO 40-75/h last 5 hours  avoid nephrotoxins    maintain glycemic control   bowel regimen - no documented BM per flowsheet    possible transfer to floor soon     d/w patient/staff/Heart failure and CTS

## 2023-07-10 NOTE — PROGRESS NOTE ADULT - SUBJECTIVE AND OBJECTIVE BOX
CTICU  CRITICAL  CARE  attending     Hand off received 					   Pertinent clinical, laboratory, radiographic, hemodynamic, echocardiographic, respiratory data, microbiologic data and chart were reviewed and analyzed frequently throughout the course of the day and night      77 year old Chilean-speaking Male with DM, HTN.  He presented to [facility] with ANGINA.   He had chest pain x 3 months. Chest pain associated with LE edema, cough and clear sputum.  He underwent a stress test which revealed small sized, reversible, myocardial perfusion defect of the anteroapical wall consistent with subendocardial ischemia.   ECHO: Moderate Aortic Stenosis. Severe Aortic Regurgitation, Normal LVEF.   Cardiac cath revealed non-obstructive CAD.   He was then transferred to [facility] under the care of Dr. Koehler for AVR.    S/P AVR.  Postoperative course complicated by cardiogenic shock and vent dependent respiratory failure with good recovery.        FAMILY HISTORY:  PAST MEDICAL & SURGICAL HISTORY:  HTN (hypertension)  DM (diabetes mellitus)  Severe aortic regurgitation  History of cholecystectomy  H/O prostatectomy          14 system review was unremarkable    Vital signs, hemodynamic and respiratory parameters were reviewed from the bedside nursing flow sheet.  ICU Vital Signs Last 24 Hrs  T(C): 36.8 (10 Jul 2023 22:40), Max: 36.8 (10 Jul 2023 13:02)  T(F): 98.2 (10 Jul 2023 22:40), Max: 98.2 (10 Jul 2023 13:02)  HR: 70 (10 Jul 2023 23:00) (63 - 73)  BP: 132/61 (10 Jul 2023 23:00) (111/55 - 162/73)  BP(mean): 88 (10 Jul 2023 23:00) (76 - 105)  ABP: --  ABP(mean): --  RR: 18 (10 Jul 2023 23:00) (16 - 20)  SpO2: 95% (10 Jul 2023 23:00) (93% - 100%)    O2 Parameters below as of 10 Jul 2023 23:00  Patient On (Oxygen Delivery Method): nasal cannula w/ humidification  O2 Flow (L/min): 2        Adult Advanced Hemodynamics Last 24 Hrs  CVP(mm Hg): --  CVP(cm H2O): --  CO: --  CI: --  PA: --  PA(mean): --  PCWP: --  SVR: --  SVRI: --  PVR: --  PVRI: --,     Intake and output was reviewed and the fluid balance was calculated  Daily     Daily   I&O's Summary    09 Jul 2023 07:01  -  10 Jul 2023 07:00  --------------------------------------------------------  IN: 2552 mL / OUT: 2215 mL / NET: 337 mL    10 Jul 2023 07:01  -  10 Jul 2023 23:56  --------------------------------------------------------  IN: 60 mL / OUT: 1150 mL / NET: -1090 mL          Neuro: No change in the mental status from the baseline.   Neck: No JVD.  CVS: S1, S2, No S3.  Lungs: Good air entry bilaterally.  Abd: Soft. No tenderness. + Bowel sounds.  Vascular: + DP/PT.  Extremities: No edema.  Lymphatic: Normal.  Skin: No abnormalities.      labs  CBC Full  -  ( 10 Jul 2023 17:08 )  WBC Count : 10.06 K/uL  RBC Count : 2.85 M/uL  Hemoglobin : 9.2 g/dL  Hematocrit : 27.4 %  Platelet Count - Automated : 262 K/uL  Mean Cell Volume : 96.1 fl  Mean Cell Hemoglobin : 32.3 pg  Mean Cell Hemoglobin Concentration : 33.6 gm/dL  Auto Neutrophil # : x  Auto Lymphocyte # : x  Auto Monocyte # : x  Auto Eosinophil # : x  Auto Basophil # : x  Auto Neutrophil % : x  Auto Lymphocyte % : x  Auto Monocyte % : x  Auto Eosinophil % : x  Auto Basophil % : x    07-10    130<L>  |  90<L>  |  101<H>  ----------------------------<  181<H>  4.5   |  28  |  2.06<H>    Ca    8.6      10 Jul 2023 17:08  Phos  2.9     07-10  Mg     1.9     07-10    TPro  7.1  /  Alb  3.7  /  TBili  1.1  /  DBili  x   /  AST  43<H>  /  ALT  17  /  AlkPhos  110  07-10    PT/INR - ( 10 Jul 2023 17:08 )   PT: 13.4 sec;   INR: 1.12          PTT - ( 10 Jul 2023 17:08 )  PTT:33.1 sec  The current medications were reviewed   MEDICATIONS  (STANDING):  aMIOdarone    Tablet 400  Oral   aMIOdarone    Tablet 200 milliGRAM(s) Oral daily  aspirin  chewable 81 milliGRAM(s) Oral daily  atorvastatin 40 milliGRAM(s) Oral at bedtime  bisacodyl 5 milliGRAM(s) Oral daily  dextrose 5%. 1000 milliLiter(s) (50 mL/Hr) IV Continuous <Continuous>  dextrose 5%. 1000 milliLiter(s) (50 mL/Hr) IV Continuous <Continuous>  dextrose 5%. 1000 milliLiter(s) (100 mL/Hr) IV Continuous <Continuous>  dextrose 5%. 1000 milliLiter(s) (100 mL/Hr) IV Continuous <Continuous>  dextrose 50% Injectable 12.5 Gram(s) IV Push once  dextrose 50% Injectable 25 Gram(s) IV Push once  dextrose 50% Injectable 25 Gram(s) IV Push once  glucagon  Injectable 1 milliGRAM(s) IntraMuscular once  glucagon  Injectable 1 milliGRAM(s) IntraMuscular once  heparin   Injectable 5000 Unit(s) SubCutaneous every 8 hours  hydrALAZINE 50 milliGRAM(s) Oral every 8 hours  insulin glargine Injectable (LANTUS) 12 Unit(s) SubCutaneous at bedtime  insulin lispro (ADMELOG) corrective regimen sliding scale   SubCutaneous Before meals and at bedtime  insulin lispro Injectable (ADMELOG) 7 Unit(s) SubCutaneous three times a day before meals  isosorbide   dinitrate Tablet (ISORDIL) 10 milliGRAM(s) Oral three times a day  pantoprazole   Suspension 40 milliGRAM(s) Oral before breakfast  polyethylene glycol 3350 17 Gram(s) Oral two times a day  senna 2 Tablet(s) Oral at bedtime  sodium chloride 0.9%. 1000 milliLiter(s) (10 mL/Hr) IV Continuous <Continuous>  testosterone 1% Gel 50 milliGRAM(s) Topical daily    MEDICATIONS  (PRN):  dextrose Oral Gel 15 Gram(s) Oral once PRN Blood Glucose LESS THAN 70 milliGRAM(s)/deciliter          Hemodynamically stable.  Good oxygenation.  Fair urine out put.        My plan includes :  Heart Failure Rx.  Statin Rx.  Amiodarone and Betablocker.  Close hemodynamic monitoring  Monitor for arrhythmias and monitor parameters for organ perfusion  Monitor neurologic status  Monitor renal function.  Head of the bed should remain elevated to 45 deg .   Chest PT and IS will be encouraged  Monitor adequacy of oxygenation   Nutritional goals will be met using po eventually , ensure adequate caloric intake and monitor the same  Stress ulcer and VTE prophylaxis will be achieved    Glycemic control is satisfactory  Electrolytes have been repleted as necessary and wound care has been carried out. Pain control has been achieved.   Aggressive physical therapy and early mobility and ambulation goals will be met   The family was updated about the course and plan  CRITICAL CARE TIME SPENT in evaluation and management, review and interpretation of labs and x-rays, hemodynamic management, formulating a plan and coordinating care: ___30____ MIN.  Time does not include procedural time.  CTICU ATTENDING     					    Leodan Alcantar MD                        	 CTICU  CRITICAL  CARE  attending     Hand off received 					   Pertinent clinical, laboratory, radiographic, hemodynamic, echocardiographic, respiratory data, microbiologic data and chart were reviewed and analyzed frequently throughout the course of the day and night      77 year old Afghan-speaking Male with DM, HTN.  He presented to [facility] with ANGINA.   He had chest pain x 3 months. Chest pain associated with LE edema, cough and clear sputum.  He underwent a stress test which revealed small sized, reversible, myocardial perfusion defect of the anteroapical wall consistent with subendocardial ischemia.   ECHO: Moderate Aortic Stenosis. Severe Aortic Regurgitation, Normal LVEF.   Cardiac cath revealed non-obstructive CAD.   He was then transferred to [facility] under the care of Dr. Koehler for AVR.    S/P AVR.  Postoperative course complicated by cardiogenic shock and vent dependent respiratory failure with good recovery.        FAMILY HISTORY:  PAST MEDICAL & SURGICAL HISTORY:  HTN (hypertension)  DM (diabetes mellitus)  Severe aortic regurgitation  History of cholecystectomy  H/O prostatectomy          14 system review was unremarkable    Vital signs, hemodynamic and respiratory parameters were reviewed from the bedside nursing flow sheet.  ICU Vital Signs Last 24 Hrs  T(C): 36.8 (10 Jul 2023 22:40), Max: 36.8 (10 Jul 2023 13:02)  T(F): 98.2 (10 Jul 2023 22:40), Max: 98.2 (10 Jul 2023 13:02)  HR: 70 (10 Jul 2023 23:00) (63 - 73)  BP: 132/61 (10 Jul 2023 23:00) (111/55 - 162/73)  BP(mean): 88 (10 Jul 2023 23:00) (76 - 105)  ABP: --  ABP(mean): --  RR: 18 (10 Jul 2023 23:00) (16 - 20)  SpO2: 95% (10 Jul 2023 23:00) (93% - 100%)    O2 Parameters below as of 10 Jul 2023 23:00  Patient On (Oxygen Delivery Method): nasal cannula w/ humidification  O2 Flow (L/min): 2        Adult Advanced Hemodynamics Last 24 Hrs  CVP(mm Hg): --  CVP(cm H2O): --  CO: --  CI: --  PA: --  PA(mean): --  PCWP: --  SVR: --  SVRI: --  PVR: --  PVRI: --,     Intake and output was reviewed and the fluid balance was calculated  Daily     Daily   I&O's Summary    09 Jul 2023 07:01  -  10 Jul 2023 07:00  --------------------------------------------------------  IN: 2552 mL / OUT: 2215 mL / NET: 337 mL    10 Jul 2023 07:01  -  10 Jul 2023 23:56  --------------------------------------------------------  IN: 60 mL / OUT: 1150 mL / NET: -1090 mL          Neuro: No change in the mental status from the baseline.   Neck: No JVD.  CVS: S1, S2, No S3.  Lungs: Good air entry bilaterally.  Abd: Soft. No tenderness. + Bowel sounds.  Vascular: + DP/PT.  Extremities: No edema.  Lymphatic: Normal.  Skin: No abnormalities.      labs  CBC Full  -  ( 10 Jul 2023 17:08 )  WBC Count : 10.06 K/uL  RBC Count : 2.85 M/uL  Hemoglobin : 9.2 g/dL  Hematocrit : 27.4 %  Platelet Count - Automated : 262 K/uL  Mean Cell Volume : 96.1 fl  Mean Cell Hemoglobin : 32.3 pg  Mean Cell Hemoglobin Concentration : 33.6 gm/dL  Auto Neutrophil # : x  Auto Lymphocyte # : x  Auto Monocyte # : x  Auto Eosinophil # : x  Auto Basophil # : x  Auto Neutrophil % : x  Auto Lymphocyte % : x  Auto Monocyte % : x  Auto Eosinophil % : x  Auto Basophil % : x    07-10    130<L>  |  90<L>  |  101<H>  ----------------------------<  181<H>  4.5   |  28  |  2.06<H>    Ca    8.6      10 Jul 2023 17:08  Phos  2.9     07-10  Mg     1.9     07-10    TPro  7.1  /  Alb  3.7  /  TBili  1.1  /  DBili  x   /  AST  43<H>  /  ALT  17  /  AlkPhos  110  07-10    PT/INR - ( 10 Jul 2023 17:08 )   PT: 13.4 sec;   INR: 1.12          PTT - ( 10 Jul 2023 17:08 )  PTT:33.1 sec  The current medications were reviewed   MEDICATIONS  (STANDING):  aMIOdarone    Tablet 400  Oral   aMIOdarone    Tablet 200 milliGRAM(s) Oral daily  aspirin  chewable 81 milliGRAM(s) Oral daily  atorvastatin 40 milliGRAM(s) Oral at bedtime  bisacodyl 5 milliGRAM(s) Oral daily  dextrose 5%. 1000 milliLiter(s) (50 mL/Hr) IV Continuous <Continuous>  dextrose 5%. 1000 milliLiter(s) (50 mL/Hr) IV Continuous <Continuous>  dextrose 5%. 1000 milliLiter(s) (100 mL/Hr) IV Continuous <Continuous>  dextrose 5%. 1000 milliLiter(s) (100 mL/Hr) IV Continuous <Continuous>  dextrose 50% Injectable 12.5 Gram(s) IV Push once  dextrose 50% Injectable 25 Gram(s) IV Push once  dextrose 50% Injectable 25 Gram(s) IV Push once  glucagon  Injectable 1 milliGRAM(s) IntraMuscular once  glucagon  Injectable 1 milliGRAM(s) IntraMuscular once  heparin   Injectable 5000 Unit(s) SubCutaneous every 8 hours  hydrALAZINE 50 milliGRAM(s) Oral every 8 hours  insulin glargine Injectable (LANTUS) 12 Unit(s) SubCutaneous at bedtime  insulin lispro (ADMELOG) corrective regimen sliding scale   SubCutaneous Before meals and at bedtime  insulin lispro Injectable (ADMELOG) 7 Unit(s) SubCutaneous three times a day before meals  isosorbide   dinitrate Tablet (ISORDIL) 10 milliGRAM(s) Oral three times a day  pantoprazole   Suspension 40 milliGRAM(s) Oral before breakfast  polyethylene glycol 3350 17 Gram(s) Oral two times a day  senna 2 Tablet(s) Oral at bedtime  sodium chloride 0.9%. 1000 milliLiter(s) (10 mL/Hr) IV Continuous <Continuous>  testosterone 1% Gel 50 milliGRAM(s) Topical daily    MEDICATIONS  (PRN):  dextrose Oral Gel 15 Gram(s) Oral once PRN Blood Glucose LESS THAN 70 milliGRAM(s)/deciliter        78 year old  Male admitted with AORTIC STENOSIS  S/P AVR  Postoperative course complicated by Uncontrolled DM, CAMMIE, Right pneumothorax, cardiogenic shock and respiratory failure.  Rising lactate noted today.  Bedside ECHO negative for tamponade. No significant pericardial effusion. Good LV function.  Hemodynamically stable.  Good oxygenation.  Fair urine out put.        My plan includes :  Heart Failure Rx.  Statin Rx.  Amiodarone and Betablocker.  Close hemodynamic monitoring  Monitor for arrhythmias and monitor parameters for organ perfusion  Monitor neurologic status  Monitor renal function.  Head of the bed should remain elevated to 45 deg .   Chest PT and IS will be encouraged  Monitor adequacy of oxygenation   Nutritional goals will be met using po eventually , ensure adequate caloric intake and monitor the same  Stress ulcer and VTE prophylaxis will be achieved    Glycemic control is satisfactory  Electrolytes have been repleted as necessary and wound care has been carried out. Pain control has been achieved.   Aggressive physical therapy and early mobility and ambulation goals will be met   The family was updated about the course and plan  CRITICAL CARE TIME SPENT in evaluation and management, review and interpretation of labs and x-rays, hemodynamic management, formulating a plan and coordinating care: ___30____ MIN.  Time does not include procedural time.  CTICU ATTENDING     					    Leodan Alcantar MD

## 2023-07-10 NOTE — CHART NOTE - NSCHARTNOTEFT_GEN_A_CORE
A/V Wire removal:     Pt seen and examined at the bedside. Pt not requiring pacing from A/V wires and in own intrinsic rhythm, hemodynamically stable. Area cleaned with chlorhexidine x3. Gentle traction were applied to wires, and wires were pulled without incident. Pt tolerated the procedure well and remained hemodynamically stable.

## 2023-07-10 NOTE — CHART NOTE - NSCHARTNOTEFT_GEN_A_CORE
Mediastinal hayley Removal:    Pt seen and examined at bedside.  Case discussed with Dr. Koehler and is recommending removal of hayley drain.  Minimal output from drain and removed without incident.  Occlusive dressing and tie down placed. Follow up CXR with no obvious PTX noted.  Pt tolerated procedure well and remained hemodynamically stable.

## 2023-07-10 NOTE — PROGRESS NOTE ADULT - ATTENDING COMMENTS
Pt seen on rounds this afternoon and events of the weekend reviewed.  Was OOB in a chair at the time of our visit, looked somewhat stronger and more animated  PO intake has improved, and he no longer complains of food "not going down"  Voice close to normal, but he becomes hoarse after talking for more than a minute  Lungs are almost entirely clear, with excellent air entry at the bases.  (He can also pull 1000 cc on the incentive spirometer)  Lantus was increased by the team to 14 units over the weekend.  Pre-meal lispro is at 5 units, but post-prandials are elevated as PO intake improved.  --Given decrease in glucose overnight (123 this morning), decrease Lantus back to 12 units  --Increase the premeal lispro to 7 units with improved intake

## 2023-07-10 NOTE — PROGRESS NOTE ADULT - ASSESSMENT
77M PMH HTN, DM, CAD (nonobstructive on [facility] 23), AS/AI s/p AVR 23 with course c/b hypotension and concern for cardiogenic shock.     TTE 7/3/23: LVIDd 3.9cm. LVEF 10-15% with global hypokinesis. ?Reverse takotsubo pattern. RV normal in size with reduced function. LA mildly dilated. BioAVR, MG 7, no AI. Trace MR. Mild to moderate TR. PASP 58. Small pericardial effusion.  [facility] 23 ([facility]): Nonobstructive CAD    Hemodynamics   AM: MvO2 72.8; Erika CO 4.4 CI 2.7   AM: MvO2 68.2; Erika CO 3.3 CI 1.8, PAP 39/14, PCWP 12  : AM: MvO2 81.1, Thermodilution CO 5.1, CI 2.8     #Cardiogenic shock  TTE with newly reduced EF compared to [facility] TTE, EF now severely reduced with EF 10-15% with global hypokinesis. ECHO  with EF of 40% while on . Off  and vaso since  AM.   -Resolved   -HF management as below      #HFrEF  -Etiology: NICM given nonobstructive CAD on recent [facility]. Etiology unclear.  -GDMT: Hydralazine to 25mg TID, Isordil 10mg TID. GFR currently 29 with improving Cr, will continue to monitor kidney function to determine initiation of RAAS-i/MRA. Further medication adjustment dependent on new ECHO    -Volume: Euvolemic. Goal net even   -Devices: Not appropriate at this time    - Get a repeat ECHO now that patient is off inotropes since     Pending discussion with Dr. Marie  77M PMH HTN, DM, CAD (nonobstructive on [facility] 23), AS/AI s/p AVR 23 with course c/b hypotension and concern for cardiogenic shock.     TTE 7/3/23: LVIDd 3.9cm. LVEF 10-15% with global hypokinesis. ?Reverse takotsubo pattern. RV normal in size with reduced function. LA mildly dilated. BioAVR, MG 7, no AI. Trace MR. Mild to moderate TR. PASP 58. Small pericardial effusion.  [facility] 23 ([facility]): Nonobstructive CAD    Hemodynamics   AM: MvO2 72.8; Erika CO 4.4 CI 2.7   AM: MvO2 68.2; Erika CO 3.3 CI 1.8, PAP 39/14, PCWP 12  : AM: MvO2 81.1, Thermodilution CO 5.1, CI 2.8     #Cardiogenic shock  TTE with newly reduced EF compared to [facility] TTE, EF now severely reduced with EF 10-15% with global hypokinesis. ECHO  with EF of 40% while on . Off  and vaso since  AM.   -Resolved   -HF management as below      #HFrEF  -Etiology: NICM given nonobstructive CAD on recent [facility]. Etiology unclear.  -GDMT: Increase Hydralazine to 50mg TID, Isordil 10mg TID. GFR currently 29 with improving Cr, will continue to monitor kidney function to determine initiation of RAAS-i/MRA. Consider BB initiation tomorrow dpeending on BP   -Volume: Euvolemic. Goal net even   -Devices: Not appropriate at this time    - Get a repeat limited ECHO to eval for LV function now that patient is off inotropes since     Discussed with Dr. Marie

## 2023-07-11 LAB
ALBUMIN SERPL ELPH-MCNC: 3.4 G/DL — SIGNIFICANT CHANGE UP (ref 3.3–5)
ALP SERPL-CCNC: 102 U/L — SIGNIFICANT CHANGE UP (ref 40–120)
ALT FLD-CCNC: 15 U/L — SIGNIFICANT CHANGE UP (ref 10–45)
ANION GAP SERPL CALC-SCNC: 10 MMOL/L — SIGNIFICANT CHANGE UP (ref 5–17)
APTT BLD: 32.5 SEC — SIGNIFICANT CHANGE UP (ref 27.5–35.5)
AST SERPL-CCNC: 34 U/L — SIGNIFICANT CHANGE UP (ref 10–40)
BILIRUB SERPL-MCNC: 1.4 MG/DL — HIGH (ref 0.2–1.2)
BUN SERPL-MCNC: 97 MG/DL — HIGH (ref 7–23)
CALCIUM SERPL-MCNC: 8.6 MG/DL — SIGNIFICANT CHANGE UP (ref 8.4–10.5)
CHLORIDE SERPL-SCNC: 91 MMOL/L — LOW (ref 96–108)
CO2 SERPL-SCNC: 29 MMOL/L — SIGNIFICANT CHANGE UP (ref 22–31)
CREAT SERPL-MCNC: 1.95 MG/DL — HIGH (ref 0.5–1.3)
EGFR: 34 ML/MIN/1.73M2 — LOW
GLUCOSE BLDC GLUCOMTR-MCNC: 140 MG/DL — HIGH (ref 70–99)
GLUCOSE BLDC GLUCOMTR-MCNC: 153 MG/DL — HIGH (ref 70–99)
GLUCOSE BLDC GLUCOMTR-MCNC: 194 MG/DL — HIGH (ref 70–99)
GLUCOSE BLDC GLUCOMTR-MCNC: 232 MG/DL — HIGH (ref 70–99)
GLUCOSE SERPL-MCNC: 144 MG/DL — HIGH (ref 70–99)
HCT VFR BLD CALC: 25.7 % — LOW (ref 39–50)
HGB BLD-MCNC: 8.7 G/DL — LOW (ref 13–17)
INR BLD: 1.14 — SIGNIFICANT CHANGE UP (ref 0.88–1.16)
MAGNESIUM SERPL-MCNC: 1.9 MG/DL — SIGNIFICANT CHANGE UP (ref 1.6–2.6)
MCHC RBC-ENTMCNC: 31.6 PG — SIGNIFICANT CHANGE UP (ref 27–34)
MCHC RBC-ENTMCNC: 33.9 GM/DL — SIGNIFICANT CHANGE UP (ref 32–36)
MCV RBC AUTO: 93.5 FL — SIGNIFICANT CHANGE UP (ref 80–100)
NRBC # BLD: 0 /100 WBCS — SIGNIFICANT CHANGE UP (ref 0–0)
PHOSPHATE SERPL-MCNC: 2.7 MG/DL — SIGNIFICANT CHANGE UP (ref 2.5–4.5)
PLATELET # BLD AUTO: 262 K/UL — SIGNIFICANT CHANGE UP (ref 150–400)
POTASSIUM SERPL-MCNC: 4.3 MMOL/L — SIGNIFICANT CHANGE UP (ref 3.5–5.3)
POTASSIUM SERPL-SCNC: 4.3 MMOL/L — SIGNIFICANT CHANGE UP (ref 3.5–5.3)
PROT SERPL-MCNC: 6.6 G/DL — SIGNIFICANT CHANGE UP (ref 6–8.3)
PROTHROM AB SERPL-ACNC: 13.6 SEC — HIGH (ref 10.5–13.4)
RBC # BLD: 2.75 M/UL — LOW (ref 4.2–5.8)
RBC # FLD: 15.9 % — HIGH (ref 10.3–14.5)
SODIUM SERPL-SCNC: 130 MMOL/L — LOW (ref 135–145)
WBC # BLD: 11.19 K/UL — HIGH (ref 3.8–10.5)
WBC # FLD AUTO: 11.19 K/UL — HIGH (ref 3.8–10.5)

## 2023-07-11 PROCEDURE — 99232 SBSQ HOSP IP/OBS MODERATE 35: CPT | Mod: GC

## 2023-07-11 PROCEDURE — 71045 X-RAY EXAM CHEST 1 VIEW: CPT | Mod: 26

## 2023-07-11 PROCEDURE — 99233 SBSQ HOSP IP/OBS HIGH 50: CPT

## 2023-07-11 RX ORDER — SODIUM CHLORIDE 9 MG/ML
3 INJECTION INTRAMUSCULAR; INTRAVENOUS; SUBCUTANEOUS EVERY 8 HOURS
Refills: 0 | Status: DISCONTINUED | OUTPATIENT
Start: 2023-07-11 | End: 2023-07-14

## 2023-07-11 RX ORDER — CHLORHEXIDINE GLUCONATE 213 G/1000ML
1 SOLUTION TOPICAL DAILY
Refills: 0 | Status: DISCONTINUED | OUTPATIENT
Start: 2023-07-11 | End: 2023-07-14

## 2023-07-11 RX ORDER — FUROSEMIDE 40 MG
40 TABLET ORAL ONCE
Refills: 0 | Status: COMPLETED | OUTPATIENT
Start: 2023-07-11 | End: 2023-07-11

## 2023-07-11 RX ORDER — MAGNESIUM OXIDE 400 MG ORAL TABLET 241.3 MG
800 TABLET ORAL ONCE
Refills: 0 | Status: COMPLETED | OUTPATIENT
Start: 2023-07-11 | End: 2023-07-11

## 2023-07-11 RX ADMIN — SODIUM CHLORIDE 3 MILLILITER(S): 9 INJECTION INTRAMUSCULAR; INTRAVENOUS; SUBCUTANEOUS at 14:09

## 2023-07-11 RX ADMIN — Medication 2: at 21:38

## 2023-07-11 RX ADMIN — HEPARIN SODIUM 5000 UNIT(S): 5000 INJECTION INTRAVENOUS; SUBCUTANEOUS at 21:39

## 2023-07-11 RX ADMIN — Medication 1: at 16:52

## 2023-07-11 RX ADMIN — AMIODARONE HYDROCHLORIDE 200 MILLIGRAM(S): 400 TABLET ORAL at 06:05

## 2023-07-11 RX ADMIN — Medication 50 MILLIGRAM(S): at 06:05

## 2023-07-11 RX ADMIN — Medication 1: at 11:51

## 2023-07-11 RX ADMIN — Medication 50 MILLIGRAM(S): at 11:08

## 2023-07-11 RX ADMIN — Medication 7 UNIT(S): at 07:31

## 2023-07-11 RX ADMIN — HEPARIN SODIUM 5000 UNIT(S): 5000 INJECTION INTRAVENOUS; SUBCUTANEOUS at 14:05

## 2023-07-11 RX ADMIN — Medication 81 MILLIGRAM(S): at 11:07

## 2023-07-11 RX ADMIN — INSULIN GLARGINE 12 UNIT(S): 100 INJECTION, SOLUTION SUBCUTANEOUS at 22:24

## 2023-07-11 RX ADMIN — PANTOPRAZOLE SODIUM 40 MILLIGRAM(S): 20 TABLET, DELAYED RELEASE ORAL at 06:05

## 2023-07-11 RX ADMIN — SODIUM CHLORIDE 3 MILLILITER(S): 9 INJECTION INTRAMUSCULAR; INTRAVENOUS; SUBCUTANEOUS at 21:58

## 2023-07-11 RX ADMIN — Medication 50 MILLIGRAM(S): at 14:05

## 2023-07-11 RX ADMIN — SENNA PLUS 2 TABLET(S): 8.6 TABLET ORAL at 21:39

## 2023-07-11 RX ADMIN — MAGNESIUM OXIDE 400 MG ORAL TABLET 800 MILLIGRAM(S): 241.3 TABLET ORAL at 06:04

## 2023-07-11 RX ADMIN — Medication 7 UNIT(S): at 16:52

## 2023-07-11 RX ADMIN — POLYETHYLENE GLYCOL 3350 17 GRAM(S): 17 POWDER, FOR SOLUTION ORAL at 17:00

## 2023-07-11 RX ADMIN — ISOSORBIDE DINITRATE 10 MILLIGRAM(S): 5 TABLET ORAL at 06:05

## 2023-07-11 RX ADMIN — ISOSORBIDE DINITRATE 10 MILLIGRAM(S): 5 TABLET ORAL at 11:07

## 2023-07-11 RX ADMIN — Medication 40 MILLIGRAM(S): at 15:48

## 2023-07-11 RX ADMIN — ISOSORBIDE DINITRATE 10 MILLIGRAM(S): 5 TABLET ORAL at 16:53

## 2023-07-11 RX ADMIN — Medication 50 MILLIGRAM(S): at 21:39

## 2023-07-11 RX ADMIN — Medication 7 UNIT(S): at 11:50

## 2023-07-11 RX ADMIN — CHLORHEXIDINE GLUCONATE 1 APPLICATION(S): 213 SOLUTION TOPICAL at 21:39

## 2023-07-11 RX ADMIN — ATORVASTATIN CALCIUM 40 MILLIGRAM(S): 80 TABLET, FILM COATED ORAL at 21:39

## 2023-07-11 RX ADMIN — HEPARIN SODIUM 5000 UNIT(S): 5000 INJECTION INTRAVENOUS; SUBCUTANEOUS at 06:04

## 2023-07-11 NOTE — PROGRESS NOTE ADULT - ATTENDING COMMENTS
Rpt TTE with Improved EF 50%, bio AVR  appears volume up  will dose Lasix 40 x 1  Will switch hydral/Isordil --> ARB as renal function improves  Will will start Metoprolol 12.5mg   will cont follow up

## 2023-07-11 NOTE — CHART NOTE - NSCHARTNOTEFT_GEN_A_CORE
Admitting Diagnosis:   Patient is a 78y old  Male who presents with a chief complaint of severe AS and AI (2023 12:14)      PAST MEDICAL & SURGICAL HISTORY:  HTN (hypertension)      DM (diabetes mellitus)      Severe aortic regurgitation      History of cholecystectomy      H/O prostatectomy          Current Nutrition Order:  Soft/bite sized, CONSCHO DASH TLC Diet     PO Intake: Good (%) [   ]  Fair (50-75%) [   ] Poor (<25%) [   ]  >>This AM able to report having coffee/milk and 2 cereals, did not consume rest of meal and could not remember what it was     GI Issues:   DULCOLAX Protonix MIRALAX and Senna ordered   LBM per flow sheets:      Pain:  None noted per flow sheets     Skin Integrity:  Sherwin 19, 2+BL Foot Edema, No pressure ulcers- SX site noted     Labs:       130<L>  |  91<L>  |  97<H>  ----------------------------<  144<H>  4.3   |  29  |  1.95<H>    Ca    8.6      2023 03:39  Phos  2.7       Mg     1.9         TPro  6.6  /  Alb  3.4  /  TBili  1.4<H>  /  DBili  x   /  AST  34  /  ALT  15  /  AlkPhos  102  -    CAPILLARY BLOOD GLUCOSE      POCT Blood Glucose.: 153 mg/dL (2023 11:48)  POCT Blood Glucose.: 140 mg/dL (2023 07:21)  POCT Blood Glucose.: 104 mg/dL (10 Jul 2023 21:57)  POCT Blood Glucose.: 212 mg/dL (10 Jul 2023 16:42)      Medications:  MEDICATIONS  (STANDING):  aMIOdarone    Tablet 200 milliGRAM(s) Oral daily  aMIOdarone    Tablet 400  Oral   aspirin  chewable 81 milliGRAM(s) Oral daily  atorvastatin 40 milliGRAM(s) Oral at bedtime  bisacodyl 5 milliGRAM(s) Oral daily  chlorhexidine 2% Cloths 1 Application(s) Topical daily  dextrose 5%. 1000 milliLiter(s) (50 mL/Hr) IV Continuous <Continuous>  dextrose 5%. 1000 milliLiter(s) (100 mL/Hr) IV Continuous <Continuous>  dextrose 5%. 1000 milliLiter(s) (50 mL/Hr) IV Continuous <Continuous>  dextrose 5%. 1000 milliLiter(s) (100 mL/Hr) IV Continuous <Continuous>  dextrose 50% Injectable 25 Gram(s) IV Push once  dextrose 50% Injectable 12.5 Gram(s) IV Push once  dextrose 50% Injectable 25 Gram(s) IV Push once  glucagon  Injectable 1 milliGRAM(s) IntraMuscular once  glucagon  Injectable 1 milliGRAM(s) IntraMuscular once  heparin   Injectable 5000 Unit(s) SubCutaneous every 8 hours  hydrALAZINE 50 milliGRAM(s) Oral every 8 hours  insulin glargine Injectable (LANTUS) 12 Unit(s) SubCutaneous at bedtime  insulin lispro (ADMELOG) corrective regimen sliding scale   SubCutaneous Before meals and at bedtime  insulin lispro Injectable (ADMELOG) 7 Unit(s) SubCutaneous three times a day before meals  isosorbide   dinitrate Tablet (ISORDIL) 10 milliGRAM(s) Oral three times a day  pantoprazole   Suspension 40 milliGRAM(s) Oral before breakfast  polyethylene glycol 3350 17 Gram(s) Oral two times a day  senna 2 Tablet(s) Oral at bedtime  sodium chloride 0.9% lock flush 3 milliLiter(s) IV Push every 8 hours  sodium chloride 0.9%. 1000 milliLiter(s) (10 mL/Hr) IV Continuous <Continuous>  testosterone 1% Gel 50 milliGRAM(s) Topical daily    MEDICATIONS  (PRN):  dextrose Oral Gel 15 Gram(s) Oral once PRN Blood Glucose LESS THAN 70 milliGRAM(s)/deciliter          5'9''  pounds +-10%  Wt 146 pounds BMI 21.5 %[id number]    **No upated EMR wts at asses at this time     **PCM: Severe protein-calorie malnutrition  >> Weight loss: > 7.5% in 3 months  >> PO intake: < 75% for >/= 3 months    Estimated energy needs:   ABW used for calculations as pt between % of IBW   Adjust for age, S/p OR, Malnutrition, EF, Renal; Fluids per team   25-30kcal/k-1980kcal/day  1.2-1.4gm/k-92gm prot/day - lower END     Subjective: 77 YOMale PMHx of HTN and DM who originally presented to [facility]  c/o chest pain x 3 months. At [facility] he underwent a stress test which revealed small sized, reversible, myocardial perfusion defect of the anteroapical wall c/w ischemia. TTE revealed moderate AS and severe AR, nml LVEF. Pt referred for cardiac cath which revealed non-obstructive CAD. He was then transferred to [facility] under the care of Dr. Koehler for further work-up and intervention of severe AI and AS. S/p Replacement of aortic valve with tissue valve . Extuabted  However course complicated by cardiogenic shock, CAMMIE (likely iATN i/s/o cardiogenic shock) and reintubation 7/3. S/p Echo 7/3 with severely reduced LVEF 15%/ reduced RV fx / no tamponade. S/p Echo  with LV Ef 40%/ mod-severe RV dysfunction/ mild to mod pericardial effusion. Pt Extubated . Now with plan to repeat TTE when off inotropes- have bene off since . Pigtail removed after clamp trial, no pneumo . Noted possible transfer to floor soon.     Pt seen this AM on [facility]. Spoke with pt via  #[id number]. SLP following during admit, most recent f/u 7/10 With Recs for soft/bite sized and thin liquids. Pt had been on puree diet prior to upgrade. Pt reports eating at this time however is asking for PO supplements and downgrade back to puree. Pt reports puree is easier to chew and "better for his throat." RD confirmed desire for change to puree x2 with pt.   Labs: low HH, POCT 140 104 212, BUN/Cr 97/1.95, Na 130, , K Mg Phos WDL, Lipids WDL vs abnormal profile  (? if fasting vs nonfasted), A1c 8.7%.  Please see below for nutritions recommendations-d/w team.      Previous Nutrition Diagnosis: Malnutrition... Severe PCM RT inadequate energy intake to meet nutrition needs AEB significant wt loss, likely meeting <75% EER PTA, muscle/fat loss  Active [ X ]  Resolved [   ]  GOAL: Pt to meet >75% estimated nutrition needs consistently via feasible route    Recommendations:  1. Low sodium/CONSCHO; Ensure MAX x1/day 150kcal/30gm prot.  - Pt currently ordered for soft/bite sized per SLP Recs 7/10. Pt asking for puree diet today. Change to puree per pt request.   - Monitor %PO intake. If PO intake is Below %50% consider to Liberalize From Low sodium/CONSCHO.   2. Monitor diet tolerance; Should pt be noted with s/s of issues swallowing, recommend NPO/SLP.  3. Labs: monitor BMP, CBC, glucose, lytes, trend renal indices, LFTs, POCT.  4. Monitor Skin, Daily Wt, Pain, GI, GOC  5. RD to remain available for additional nutrition interventions as needed.     Education: Provided      Risk Level: High [ X  ] Moderate [   ] Low [   ].

## 2023-07-11 NOTE — PROGRESS NOTE ADULT - SUBJECTIVE AND OBJECTIVE BOX
SUBJECTIVE / INTERVAL HPI: Patient was seen and examined this morning.     CAPILLARY BLOOD GLUCOSE & INSULIN RECEIVED  212 mg/dL (07-10 @ 16:42) ? 5 units of lispro + 2 units of lispro sliding scale.   104 mg/dL (07-10 @ 21:57) ? 12 units of lantus.   140 mg/dL (07-11 @ 07:21) ? 7 units of lispro.   153 mg/dL (07-11 @ 11:48) ? 7 units of lispro + 1 unit of lispro sliding scale.     REVIEW OF SYSTEMS  Constitutional:  Negative fever or chills.   Cardiovascular:  Negative for chest pain or palpitations.  Respiratory:  (+) Productive cough. Negative for wheezing, or shortness of breath.    Gastrointestinal:  Negative for nausea, vomiting, diarrhea, constipation, or abdominal pain.  Neurologic:  No headache, confusion, dizziness, lightheadedness.    PHYSICAL EXAM  Vital Signs Last 24 Hrs  T(C): 36.5 (11 Jul 2023 09:01), Max: 36.8 (10 Jul 2023 13:02)  T(F): 97.7 (11 Jul 2023 09:01), Max: 98.2 (10 Jul 2023 13:02)  HR: 70 (11 Jul 2023 11:00) (63 - 75)  BP: 106/53 (11 Jul 2023 11:00) (106/53 - 145/67)  BP(mean): 76 (11 Jul 2023 11:00) (76 - 97)  RR: 16 (11 Jul 2023 11:00) (16 - 20)  SpO2: 96% (11 Jul 2023 11:00) (93% - 100%)    Parameters below as of 11 Jul 2023 11:00  Patient On (Oxygen Delivery Method): room air    Constitutional: Awake, alert, elderly male, in no acute distress.   HEENT: Normocephalic, atraumatic, SCARLETT.  Respiratory: Lungs clear to ausculation bilaterally.   Cardiovascular: regular rhythm, normal S1 and S2, (+) midline sternotomy scar.   GI: soft, non-tender, non-distended, bowel sounds present.  Extremities: No lower extremity edema.   Psychiatric: AAO x 3.    LABS  CBC - WBC/HGB/HTC/PLT: 11.19/8.7/25.7/262 (07-11-23)  BMP - Na/K/Cl/Bicarb/BUN/Cr/Gluc/AG/eGFR: 130/4.3/91/29/97/1.95/144/10/34 (07-11-23)  Ca - 8.6 (07-11-23)  Phos - 2.7 (07-11-23)  Mg - 1.9 (07-11-23)  LFT - Alb/Tprot/Tbili/Dbili/AlkPhos/ALT/AST: 3.4/--/1.4/--/102/15/34 (07-11-23)  PT/aPTT/INR: 13.6/32.5/1.14 (07-11-23)   Thyroid Stimulating Hormone, Serum: 1.440 (06-28-23)    MEDICATIONS  MEDICATIONS  (STANDING):  aMIOdarone    Tablet 400  Oral   aMIOdarone    Tablet 200 milliGRAM(s) Oral daily  aspirin  chewable 81 milliGRAM(s) Oral daily  atorvastatin 40 milliGRAM(s) Oral at bedtime  bisacodyl 5 milliGRAM(s) Oral daily  chlorhexidine 2% Cloths 1 Application(s) Topical daily  dextrose 5%. 1000 milliLiter(s) (50 mL/Hr) IV Continuous <Continuous>  dextrose 5%. 1000 milliLiter(s) (100 mL/Hr) IV Continuous <Continuous>  dextrose 5%. 1000 milliLiter(s) (50 mL/Hr) IV Continuous <Continuous>  dextrose 5%. 1000 milliLiter(s) (100 mL/Hr) IV Continuous <Continuous>  dextrose 50% Injectable 25 Gram(s) IV Push once  dextrose 50% Injectable 12.5 Gram(s) IV Push once  dextrose 50% Injectable 25 Gram(s) IV Push once  glucagon  Injectable 1 milliGRAM(s) IntraMuscular once  glucagon  Injectable 1 milliGRAM(s) IntraMuscular once  heparin   Injectable 5000 Unit(s) SubCutaneous every 8 hours  hydrALAZINE 50 milliGRAM(s) Oral every 8 hours  insulin glargine Injectable (LANTUS) 12 Unit(s) SubCutaneous at bedtime  insulin lispro (ADMELOG) corrective regimen sliding scale   SubCutaneous Before meals and at bedtime  insulin lispro Injectable (ADMELOG) 7 Unit(s) SubCutaneous three times a day before meals  isosorbide   dinitrate Tablet (ISORDIL) 10 milliGRAM(s) Oral three times a day  pantoprazole   Suspension 40 milliGRAM(s) Oral before breakfast  polyethylene glycol 3350 17 Gram(s) Oral two times a day  senna 2 Tablet(s) Oral at bedtime  sodium chloride 0.9%. 1000 milliLiter(s) (10 mL/Hr) IV Continuous <Continuous>  testosterone 1% Gel 50 milliGRAM(s) Topical daily    MEDICATIONS  (PRN):  dextrose Oral Gel 15 Gram(s) Oral once PRN Blood Glucose LESS THAN 70 milliGRAM(s)/deciliter    ASSESSMENT / RECOMMENDATIONS  Mr. Johnson is a 78-year-old male with type 2 diabetes mellitus, hypertension and severe aortic stenosis who was transferred to [facility] for further work-up and intervention of severe aortic stenosis, now s/p aortic valve replacement (6/30/23). Endocrinology was consulted for recommendations regarding diabetes management.     A1C: 8.6 %  BUN: 97  Creatinine: 1.95  GFR: 34  Weight: 66.6  BMI: 21.5  EF:     # Type 2 diabetes mellitus with hyperglycemia  - Please continue lantus *** units at bedtime.   - Continue lispro *** units before each meal.  - Continue lispro moderate / low dose sliding scale before meals and at bedtime.  - Patient's fingerstick glucose goal is 100-180 mg/dL.    - Discharge recommendations to be discussed.   - Patient can follow up at discharge with [facility] by calling [phone] to make an appointment.      Case discussed with Dr. Caicedo. Primary team updated.       Milton Laura    Endocrinology Fellow    Service Pager: [phone]    SUBJECTIVE / INTERVAL HPI: Patient was seen and examined this morning. He reports having decreased appetite and requesting to have ensure supplements again. He states that his hoarseness is slightly better. Glucose levels have been within target range.     CAPILLARY BLOOD GLUCOSE & INSULIN RECEIVED  212 mg/dL (07-10 @ 16:42) ? 5 units of lispro + 2 units of lispro sliding scale.   104 mg/dL (07-10 @ 21:57) ? 12 units of lantus.   140 mg/dL (07-11 @ 07:21) ? 7 units of lispro.   153 mg/dL (07-11 @ 11:48) ? 7 units of lispro + 1 unit of lispro sliding scale.     REVIEW OF SYSTEMS  Constitutional:  Negative fever or chills.   Cardiovascular:  Negative for chest pain or palpitations.  Respiratory:  (+) Productive cough. Negative for wheezing, or shortness of breath.    Gastrointestinal:  Negative for nausea, vomiting, diarrhea, constipation, or abdominal pain.  Neurologic:  No headache, confusion, dizziness, lightheadedness.    PHYSICAL EXAM  Vital Signs Last 24 Hrs  T(C): 36.5 (11 Jul 2023 09:01), Max: 36.8 (10 Jul 2023 13:02)  T(F): 97.7 (11 Jul 2023 09:01), Max: 98.2 (10 Jul 2023 13:02)  HR: 70 (11 Jul 2023 11:00) (63 - 75)  BP: 106/53 (11 Jul 2023 11:00) (106/53 - 145/67)  BP(mean): 76 (11 Jul 2023 11:00) (76 - 97)  RR: 16 (11 Jul 2023 11:00) (16 - 20)  SpO2: 96% (11 Jul 2023 11:00) (93% - 100%)    Parameters below as of 11 Jul 2023 11:00  Patient On (Oxygen Delivery Method): room air    Constitutional: Awake, alert, elderly male, in no acute distress.   HEENT: Normocephalic, atraumatic, SCARLETT.  Respiratory: Lungs clear to ausculation bilaterally.   Cardiovascular: regular rhythm, normal S1 and S2, (+) midline sternotomy scar.   GI: soft, non-tender, non-distended, bowel sounds present.  Extremities: No lower extremity edema.   Psychiatric: AAO x 3.    LABS  CBC - WBC/HGB/HTC/PLT: 11.19/8.7/25.7/262 (07-11-23)  BMP - Na/K/Cl/Bicarb/BUN/Cr/Gluc/AG/eGFR: 130/4.3/91/29/97/1.95/144/10/34 (07-11-23)  Ca - 8.6 (07-11-23)  Phos - 2.7 (07-11-23)  Mg - 1.9 (07-11-23)  LFT - Alb/Tprot/Tbili/Dbili/AlkPhos/ALT/AST: 3.4/--/1.4/--/102/15/34 (07-11-23)  PT/aPTT/INR: 13.6/32.5/1.14 (07-11-23)   Thyroid Stimulating Hormone, Serum: 1.440 (06-28-23)    MEDICATIONS  MEDICATIONS  (STANDING):  aMIOdarone    Tablet 400  Oral   aMIOdarone    Tablet 200 milliGRAM(s) Oral daily  aspirin  chewable 81 milliGRAM(s) Oral daily  atorvastatin 40 milliGRAM(s) Oral at bedtime  bisacodyl 5 milliGRAM(s) Oral daily  chlorhexidine 2% Cloths 1 Application(s) Topical daily  dextrose 5%. 1000 milliLiter(s) (50 mL/Hr) IV Continuous <Continuous>  dextrose 5%. 1000 milliLiter(s) (100 mL/Hr) IV Continuous <Continuous>  dextrose 5%. 1000 milliLiter(s) (50 mL/Hr) IV Continuous <Continuous>  dextrose 5%. 1000 milliLiter(s) (100 mL/Hr) IV Continuous <Continuous>  dextrose 50% Injectable 25 Gram(s) IV Push once  dextrose 50% Injectable 12.5 Gram(s) IV Push once  dextrose 50% Injectable 25 Gram(s) IV Push once  glucagon  Injectable 1 milliGRAM(s) IntraMuscular once  glucagon  Injectable 1 milliGRAM(s) IntraMuscular once  heparin   Injectable 5000 Unit(s) SubCutaneous every 8 hours  hydrALAZINE 50 milliGRAM(s) Oral every 8 hours  insulin glargine Injectable (LANTUS) 12 Unit(s) SubCutaneous at bedtime  insulin lispro (ADMELOG) corrective regimen sliding scale   SubCutaneous Before meals and at bedtime  insulin lispro Injectable (ADMELOG) 7 Unit(s) SubCutaneous three times a day before meals  isosorbide   dinitrate Tablet (ISORDIL) 10 milliGRAM(s) Oral three times a day  pantoprazole   Suspension 40 milliGRAM(s) Oral before breakfast  polyethylene glycol 3350 17 Gram(s) Oral two times a day  senna 2 Tablet(s) Oral at bedtime  sodium chloride 0.9%. 1000 milliLiter(s) (10 mL/Hr) IV Continuous <Continuous>  testosterone 1% Gel 50 milliGRAM(s) Topical daily    MEDICATIONS  (PRN):  dextrose Oral Gel 15 Gram(s) Oral once PRN Blood Glucose LESS THAN 70 milliGRAM(s)/deciliter    ASSESSMENT / RECOMMENDATIONS  Mr. Johnson is a 78-year-old male with type 2 diabetes mellitus, hypertension and severe aortic stenosis who was transferred to [facility] for further work-up and intervention of severe aortic stenosis, now s/p aortic valve replacement (6/30/23). Endocrinology was consulted for recommendations regarding diabetes management.     A1C: 8.6 %  BUN: 97  Creatinine: 1.95  GFR: 34  Weight: 66.6  BMI: 21.5    # Type 2 diabetes mellitus with hyperglycemia  - Please continue lantus 12 units at bedtime.   - Continue lispro 7 units before each meal.  - Continue lispro moderate dose sliding scale before meals and at bedtime.  - Patient's fingerstick glucose goal is 100-180 mg/dL.    - Restart Ensure Max supplements, one container three times daily with meals.   - Discharge recommendations to be discussed.   - Patient can follow up at discharge with [facility] by calling [phone] to make an appointment.      Case discussed with Dr. Caicedo. Primary team updated.       Milton Laura    Endocrinology Fellow    Service Pager: [phone]

## 2023-07-11 NOTE — PROGRESS NOTE ADULT - ASSESSMENT
77M PMH HTN, DM, CAD (nonobstructive on [facility] 23), AS/AI s/p AVR 23 with course c/b hypotension and concern for cardiogenic shock.     TTE 7/3/23: LVIDd 3.9cm. LVEF 10-15% with global hypokinesis. ?Reverse takotsubo pattern. RV normal in size with reduced function. LA mildly dilated. BioAVR, MG 7, no AI. Trace MR. Mild to moderate TR. PASP 58. Small pericardial effusion.  [facility] 23 ([facility]): Nonobstructive CAD    Hemodynamics   AM: MvO2 72.8; Erika CO 4.4 CI 2.7   AM: MvO2 68.2; Erika CO 3.3 CI 1.8, PAP 39/14, PCWP 12  : AM: MvO2 81.1, Thermodilution CO 5.1, CI 2.8     #Cardiogenic shock  TTE with newly reduced EF compared to [facility] TTE, EF now severely reduced with EF 10-15% with global hypokinesis. ECHO  with EF of 40% while on . Off  and vaso since  AM.   -Resolved   -HF management as below      #HFrEF  -Etiology: NICM given nonobstructive CAD on recent [facility]. Etiology unclear.  -GDMT: Hydralazine to 50mg TID, Isordil 10mg TID. GFR currently 29 with improving Cr, will continue to monitor kidney function to determine initiation of RAAS-i/MRA. Can hold off on BB   -Volume: Euvolemic. Goal net even   -Devices: Not appropriate at this time    - Get a repeat limited ECHO to eval for LV function now that patient is off inotropes since --->EF 50%       Recommendations above are preliminary pending discussion with an attending caridologist  We'll continue to follow, thank you for the consultation      Steven Hernandez ([id number])  Cardiovascular Disease Fellow           77M PMH HTN, DM, CAD (nonobstructive on [facility] 23), AS/AI s/p AVR 23 with course c/b hypotension and concern for cardiogenic shock.     TTE 7/3/23: LVIDd 3.9cm. LVEF 10-15% with global hypokinesis. ?Reverse takotsubo pattern. RV normal in size with reduced function. LA mildly dilated. BioAVR, MG 7, no AI. Trace MR. Mild to moderate TR. PASP 58. Small pericardial effusion.  [facility] 23 ([facility]): Nonobstructive CAD    Hemodynamics   AM: MvO2 72.8; Erika CO 4.4 CI 2.7   AM: MvO2 68.2; Erika CO 3.3 CI 1.8, PAP 39/14, PCWP 12  : AM: MvO2 81.1, Thermodilution CO 5.1, CI 2.8     #Cardiogenic shock  TTE with newly reduced EF compared to [facility] TTE, EF now severely reduced with EF 10-15% with global hypokinesis. ECHO  with EF of 40% while on . Off  and vaso since  AM.   -Resolved   -HF management as below      #HFrEF  -Etiology: NICM given nonobstructive CAD on recent [facility]. Etiology unclear.  -GDMT: Hydralazine to 50mg TID, Isordil 10mg TID. Improving Cr, will continue to monitor kidney function to determine initiation of RAAS-i/MRA, can d/c hydral/isordil tomorrow () and transition to an ARB for afterload control. Can start Toprol 12.5mg QD tomorrow ()    -Volume: Euvolemic. Goal net even   -Devices: Not appropriate at this time    - Get a repeat limited ECHO to eval for LV function now that patient is off inotropes since --->EF 50%       Recommendations above disucssed with an attending caridologist  We'll continue to follow, thank you for the consultation      Steven Hernandez ([id number])  Cardiovascular Disease Fellow           77M PMH HTN, DM, CAD (nonobstructive on [facility] 23), AS/AI s/p AVR 23 with course c/b hypotension and concern for cardiogenic shock.     TTE 7/3/23: LVIDd 3.9cm. LVEF 10-15% with global hypokinesis. ?Reverse takotsubo pattern. RV normal in size with reduced function. LA mildly dilated. BioAVR, MG 7, no AI. Trace MR. Mild to moderate TR. PASP 58. Small pericardial effusion.  [facility] 23 ([facility]): Nonobstructive CAD    Hemodynamics   AM: MvO2 72.8; Erika CO 4.4 CI 2.7   AM: MvO2 68.2; Erika CO 3.3 CI 1.8, PAP 39/14, PCWP 12  : AM: MvO2 81.1, Thermodilution CO 5.1, CI 2.8     #Cardiogenic shock  TTE with newly reduced EF compared to [facility] TTE, EF now severely reduced with EF 10-15% with global hypokinesis. ECHO  with EF of 40% while on . Off  and vaso since  AM.   -Resolved   -HF management as below      #HFrEF  -Etiology: NICM given nonobstructive CAD on recent [facility]. Etiology unclear.  -GDMT: Hydralazine to 50mg TID, Isordil 10mg TID. Improving Cr, will continue to monitor kidney function to determine initiation of RAAS-i/MRA, can d/c hydral/isordil tomorrow () and transition to an ARB for afterload control. Can start Toprol 12.5mg QD tomorrow ()    -Volume: 40mg PO lasix x1 now, will reeval for additional doses tomorrow   -Devices: Not appropriate at this time    - Get a repeat limited ECHO to eval for LV function now that patient is off inotropes since --->EF 50%       Recommendations above discussed with an attending caridologist  We'll continue to follow, thank you for the consultation      Steven Hernandez ([id number])  Cardiovascular Disease Fellow

## 2023-07-11 NOTE — PROGRESS NOTE ADULT - ATTENDING COMMENTS
Pt seen on rounds this afternoon.  Was again OOB in a chair, but did not appear as animated as yesterday.  Said he had chills earlier in the afternoon, possibly true rigors  Has possibly had an increase in his cough, which remains mildly productive  On exam his air entry at the bases did not seem as good as yesterday, and there were crackles at the left base  PO intake has fallen off considerably.  He is asking for enteral supplements, and he should be on these given his poor intake of regular food  Glucoses have been 104/140/153 since 10 PM last night  Will continue Lantus at 12 units, premeal lispro at 7 units  Ensure Max ordered for every meal

## 2023-07-11 NOTE — PROGRESS NOTE ADULT - SUBJECTIVE AND OBJECTIVE BOX
VIPIN FRANCE  78y  Male    Patient is a 78y old  Male who presents with a chief complaint of severe AS and AI (10 Jul 2023 23:56)      INTERVAL HPI/OVERNIGHT EVENTS:      T(C): 36.5 (07-11-23 @ 05:01), Max: 36.8 (07-10-23 @ 13:02)  HR: 70 (07-11-23 @ 08:00) (63 - 74)  BP: 112/56 (07-11-23 @ 08:00) (109/54 - 145/67)  RR: 17 (07-11-23 @ 08:00) (16 - 20)  SpO2: 97% (07-11-23 @ 08:00) (93% - 100%)  Wt(kg): --Vital Signs Last 24 Hrs  T(C): 36.5 (11 Jul 2023 05:01), Max: 36.8 (10 Jul 2023 13:02)  T(F): 97.7 (11 Jul 2023 05:01), Max: 98.2 (10 Jul 2023 13:02)  HR: 70 (11 Jul 2023 08:00) (63 - 74)  BP: 112/56 (11 Jul 2023 08:00) (109/54 - 145/67)  BP(mean): 80 (11 Jul 2023 08:00) (76 - 97)  RR: 17 (11 Jul 2023 08:00) (16 - 20)  SpO2: 97% (11 Jul 2023 08:00) (93% - 100%)    Parameters below as of 11 Jul 2023 08:00  Patient On (Oxygen Delivery Method): room air        PHYSICAL EXAM:  GENERAL: NAD, well-groomed, well-developed  HEAD:  Atraumatic, Normocephalic  EYES: EOMI, PERRLA, conjunctiva and sclera clear  ENMT: No tonsillar erythema, exudates, or enlargement; Moist mucous membranes, Good dentition, No lesions  NECK: Supple, No JVD, Normal thyroid  NERVOUS SYSTEM:  Alert & Oriented X3, Good concentration; Motor Strength 5/5 B/L upper and lower extremities; DTRs 2+ intact and symmetric  CHEST/LUNG: Clear to auscultation bilaterally; No rales, rhonchi, wheezing, or rubs  HEART: Regular rate and rhythm; No murmurs, rubs, or gallops  ABDOMEN: Soft, Nontender, Nondistended; Bowel sounds present  EXTREMITIES:  WWP, No clubbing, cyanosis, or edema  Vascular: 2+ peripheral pulses x4  LYMPH: No lymphadenopathy noted  SKIN: No rashes or lesions    Consultant(s) Notes Reviewed:  [x ] YES  [ ] NO  Care Discussed with Consultants/Other Providers [ x] YES  [ ] NO    LABS:                        8.7    11.19 )-----------( 262      ( 11 Jul 2023 03:39 )             25.7     07-11    130<L>  |  91<L>  |  97<H>  ----------------------------<  144<H>  4.3   |  29  |  1.95<H>    Ca    8.6      11 Jul 2023 03:39  Phos  2.7     07-11  Mg     1.9     07-11    TPro  6.6  /  Alb  3.4  /  TBili  1.4<H>  /  DBili  x   /  AST  34  /  ALT  15  /  AlkPhos  102  07-11      PT/INR - ( 11 Jul 2023 03:39 )   PT: 13.6 sec;   INR: 1.14          PTT - ( 11 Jul 2023 03:39 )  PTT:32.5 sec  Urinalysis Basic - ( 11 Jul 2023 03:39 )    Color: x / Appearance: x / SG: x / pH: x  Gluc: 144 mg/dL / Ketone: x  / Bili: x / Urobili: x   Blood: x / Protein: x / Nitrite: x   Leuk Esterase: x / RBC: x / WBC x   Sq Epi: x / Non Sq Epi: x / Bacteria: x      CAPILLARY BLOOD GLUCOSE      POCT Blood Glucose.: 140 mg/dL (11 Jul 2023 07:21)  POCT Blood Glucose.: 104 mg/dL (10 Jul 2023 21:57)  POCT Blood Glucose.: 212 mg/dL (10 Jul 2023 16:42)  POCT Blood Glucose.: 224 mg/dL (10 Jul 2023 11:10)        Urinalysis Basic - ( 11 Jul 2023 03:39 )    Color: x / Appearance: x / SG: x / pH: x  Gluc: 144 mg/dL / Ketone: x  / Bili: x / Urobili: x   Blood: x / Protein: x / Nitrite: x   Leuk Esterase: x / RBC: x / WBC x   Sq Epi: x / Non Sq Epi: x / Bacteria: x        RADIOLOGY & ADDITIONAL TESTS:    Imaging Personally Reviewed:  [ ] YES  [ ] NO    HEALTH ISSUES - PROBLEM Dx:  Acute on chronic renal failure         VIPIN FRANCE  78y  Male    Patient is a 78y old  Male who presents with a chief complaint of severe AS and AI (10 Jul 2023 23:56)      INTERVAL HPI/OVERNIGHT EVENTS: No complaints this morning, seen walking with no resp distress.       T(C): 36.5 (07-11-23 @ 05:01), Max: 36.8 (07-10-23 @ 13:02)  HR: 70 (07-11-23 @ 08:00) (63 - 74)  BP: 112/56 (07-11-23 @ 08:00) (109/54 - 145/67)  RR: 17 (07-11-23 @ 08:00) (16 - 20)  SpO2: 97% (07-11-23 @ 08:00) (93% - 100%)  Wt(kg): --Vital Signs Last 24 Hrs  T(C): 36.5 (11 Jul 2023 05:01), Max: 36.8 (10 Jul 2023 13:02)  T(F): 97.7 (11 Jul 2023 05:01), Max: 98.2 (10 Jul 2023 13:02)  HR: 70 (11 Jul 2023 08:00) (63 - 74)  BP: 112/56 (11 Jul 2023 08:00) (109/54 - 145/67)  BP(mean): 80 (11 Jul 2023 08:00) (76 - 97)  RR: 17 (11 Jul 2023 08:00) (16 - 20)  SpO2: 97% (11 Jul 2023 08:00) (93% - 100%)    Parameters below as of 11 Jul 2023 08:00  Patient On (Oxygen Delivery Method): room air        PHYSICAL EXAM:  GENERAL: NAD  NECK: No JVD  CHEST/LUNG: Clear to auscultation bilaterally; No rales, rhonchi, wheezing, or rubs  HEART: Regular rate and rhythm; No murmurs, rubs, or gallops  ABDOMEN: Soft, Nontender, Nondistended; Bowel sounds present  EXTREMITIES: Pedal edema B/L     Consultant(s) Notes Reviewed:  [x ] YES  [ ] NO  Care Discussed with Consultants/Other Providers [ x] YES  [ ] NO    LABS:                        8.7    11.19 )-----------( 262      ( 11 Jul 2023 03:39 )             25.7     07-11    130<L>  |  91<L>  |  97<H>  ----------------------------<  144<H>  4.3   |  29  |  1.95<H>    Ca    8.6      11 Jul 2023 03:39  Phos  2.7     07-11  Mg     1.9     07-11    TPro  6.6  /  Alb  3.4  /  TBili  1.4<H>  /  DBili  x   /  AST  34  /  ALT  15  /  AlkPhos  102  07-11      PT/INR - ( 11 Jul 2023 03:39 )   PT: 13.6 sec;   INR: 1.14          PTT - ( 11 Jul 2023 03:39 )  PTT:32.5 sec  Urinalysis Basic - ( 11 Jul 2023 03:39 )    Color: x / Appearance: x / SG: x / pH: x  Gluc: 144 mg/dL / Ketone: x  / Bili: x / Urobili: x   Blood: x / Protein: x / Nitrite: x   Leuk Esterase: x / RBC: x / WBC x   Sq Epi: x / Non Sq Epi: x / Bacteria: x      CAPILLARY BLOOD GLUCOSE      POCT Blood Glucose.: 140 mg/dL (11 Jul 2023 07:21)  POCT Blood Glucose.: 104 mg/dL (10 Jul 2023 21:57)  POCT Blood Glucose.: 212 mg/dL (10 Jul 2023 16:42)  POCT Blood Glucose.: 224 mg/dL (10 Jul 2023 11:10)        Urinalysis Basic - ( 11 Jul 2023 03:39 )    Color: x / Appearance: x / SG: x / pH: x  Gluc: 144 mg/dL / Ketone: x  / Bili: x / Urobili: x   Blood: x / Protein: x / Nitrite: x   Leuk Esterase: x / RBC: x / WBC x   Sq Epi: x / Non Sq Epi: x / Bacteria: x        RADIOLOGY & ADDITIONAL TESTS:    Imaging Personally Reviewed:  [ ] YES  [ ] NO    HEALTH ISSUES - PROBLEM Dx:  Acute on chronic renal failure         VIPIN FRANCE  78y  Male    Patient is a 78y old  Male who presents with a chief complaint of severe AS and AI (10 Jul 2023 23:56)      INTERVAL HPI/OVERNIGHT EVENTS: No complaints this morning, seen walking with no resp distress.       T(C): 36.5 (07-11-23 @ 05:01), Max: 36.8 (07-10-23 @ 13:02)  HR: 70 (07-11-23 @ 08:00) (63 - 74)  BP: 112/56 (07-11-23 @ 08:00) (109/54 - 145/67)  RR: 17 (07-11-23 @ 08:00) (16 - 20)  SpO2: 97% (07-11-23 @ 08:00) (93% - 100%)  Wt(kg): --Vital Signs Last 24 Hrs  T(C): 36.5 (11 Jul 2023 05:01), Max: 36.8 (10 Jul 2023 13:02)  T(F): 97.7 (11 Jul 2023 05:01), Max: 98.2 (10 Jul 2023 13:02)  HR: 70 (11 Jul 2023 08:00) (63 - 74)  BP: 112/56 (11 Jul 2023 08:00) (109/54 - 145/67)  BP(mean): 80 (11 Jul 2023 08:00) (76 - 97)  RR: 17 (11 Jul 2023 08:00) (16 - 20)  SpO2: 97% (11 Jul 2023 08:00) (93% - 100%)    Parameters below as of 11 Jul 2023 08:00  Patient On (Oxygen Delivery Method): room air        PHYSICAL EXAM:  GENERAL: NAD  NECK: No JVD  CHEST/LUNG: Clear to auscultation bilaterally; No rales, rhonchi, wheezing, or rubs  HEART: Regular rate and rhythm; No murmurs, rubs, or gallops  ABDOMEN: Soft, Nontender, Nondistended; Bowel sounds present  EXTREMITIES: 1+ LE edema, Pedal edema B/L     Consultant(s) Notes Reviewed:  [x ] YES  [ ] NO  Care Discussed with Consultants/Other Providers [ x] YES  [ ] NO    LABS:                        8.7    11.19 )-----------( 262      ( 11 Jul 2023 03:39 )             25.7     07-11    130<L>  |  91<L>  |  97<H>  ----------------------------<  144<H>  4.3   |  29  |  1.95<H>    Ca    8.6      11 Jul 2023 03:39  Phos  2.7     07-11  Mg     1.9     07-11    TPro  6.6  /  Alb  3.4  /  TBili  1.4<H>  /  DBili  x   /  AST  34  /  ALT  15  /  AlkPhos  102  07-11      PT/INR - ( 11 Jul 2023 03:39 )   PT: 13.6 sec;   INR: 1.14          PTT - ( 11 Jul 2023 03:39 )  PTT:32.5 sec  Urinalysis Basic - ( 11 Jul 2023 03:39 )    Color: x / Appearance: x / SG: x / pH: x  Gluc: 144 mg/dL / Ketone: x  / Bili: x / Urobili: x   Blood: x / Protein: x / Nitrite: x   Leuk Esterase: x / RBC: x / WBC x   Sq Epi: x / Non Sq Epi: x / Bacteria: x      CAPILLARY BLOOD GLUCOSE      POCT Blood Glucose.: 140 mg/dL (11 Jul 2023 07:21)  POCT Blood Glucose.: 104 mg/dL (10 Jul 2023 21:57)  POCT Blood Glucose.: 212 mg/dL (10 Jul 2023 16:42)  POCT Blood Glucose.: 224 mg/dL (10 Jul 2023 11:10)        Urinalysis Basic - ( 11 Jul 2023 03:39 )    Color: x / Appearance: x / SG: x / pH: x  Gluc: 144 mg/dL / Ketone: x  / Bili: x / Urobili: x   Blood: x / Protein: x / Nitrite: x   Leuk Esterase: x / RBC: x / WBC x   Sq Epi: x / Non Sq Epi: x / Bacteria: x        RADIOLOGY & ADDITIONAL TESTS:    Imaging Personally Reviewed:  [ ] YES  [ ] NO    HEALTH ISSUES - PROBLEM Dx:  Acute on chronic renal failure

## 2023-07-12 LAB
ANION GAP SERPL CALC-SCNC: 6 MMOL/L — SIGNIFICANT CHANGE UP (ref 5–17)
APTT BLD: 38.3 SEC — HIGH (ref 27.5–35.5)
BUN SERPL-MCNC: 84 MG/DL — HIGH (ref 7–23)
CALCIUM SERPL-MCNC: 8.6 MG/DL — SIGNIFICANT CHANGE UP (ref 8.4–10.5)
CHLORIDE SERPL-SCNC: 90 MMOL/L — LOW (ref 96–108)
CO2 SERPL-SCNC: 31 MMOL/L — SIGNIFICANT CHANGE UP (ref 22–31)
CREAT SERPL-MCNC: 2.01 MG/DL — HIGH (ref 0.5–1.3)
EGFR: 33 ML/MIN/1.73M2 — LOW
GLUCOSE BLDC GLUCOMTR-MCNC: 118 MG/DL — HIGH (ref 70–99)
GLUCOSE BLDC GLUCOMTR-MCNC: 143 MG/DL — HIGH (ref 70–99)
GLUCOSE BLDC GLUCOMTR-MCNC: 168 MG/DL — HIGH (ref 70–99)
GLUCOSE BLDC GLUCOMTR-MCNC: 85 MG/DL — SIGNIFICANT CHANGE UP (ref 70–99)
GLUCOSE SERPL-MCNC: 139 MG/DL — HIGH (ref 70–99)
HCT VFR BLD CALC: 23.6 % — LOW (ref 39–50)
HGB BLD-MCNC: 8 G/DL — LOW (ref 13–17)
INR BLD: 1.16 — SIGNIFICANT CHANGE UP (ref 0.88–1.16)
MAGNESIUM SERPL-MCNC: 2 MG/DL — SIGNIFICANT CHANGE UP (ref 1.6–2.6)
MCHC RBC-ENTMCNC: 32 PG — SIGNIFICANT CHANGE UP (ref 27–34)
MCHC RBC-ENTMCNC: 33.9 GM/DL — SIGNIFICANT CHANGE UP (ref 32–36)
MCV RBC AUTO: 94.4 FL — SIGNIFICANT CHANGE UP (ref 80–100)
NRBC # BLD: 0 /100 WBCS — SIGNIFICANT CHANGE UP (ref 0–0)
PHOSPHATE SERPL-MCNC: 3 MG/DL — SIGNIFICANT CHANGE UP (ref 2.5–4.5)
PLATELET # BLD AUTO: 281 K/UL — SIGNIFICANT CHANGE UP (ref 150–400)
POTASSIUM SERPL-MCNC: 4.8 MMOL/L — SIGNIFICANT CHANGE UP (ref 3.5–5.3)
POTASSIUM SERPL-SCNC: 4.8 MMOL/L — SIGNIFICANT CHANGE UP (ref 3.5–5.3)
PROTHROM AB SERPL-ACNC: 13.8 SEC — HIGH (ref 10.5–13.4)
RBC # BLD: 2.5 M/UL — LOW (ref 4.2–5.8)
RBC # FLD: 15.4 % — HIGH (ref 10.3–14.5)
SODIUM SERPL-SCNC: 127 MMOL/L — LOW (ref 135–145)
WBC # BLD: 9.35 K/UL — SIGNIFICANT CHANGE UP (ref 3.8–10.5)
WBC # FLD AUTO: 9.35 K/UL — SIGNIFICANT CHANGE UP (ref 3.8–10.5)

## 2023-07-12 PROCEDURE — 99232 SBSQ HOSP IP/OBS MODERATE 35: CPT | Mod: GC

## 2023-07-12 PROCEDURE — 99233 SBSQ HOSP IP/OBS HIGH 50: CPT

## 2023-07-12 PROCEDURE — 71045 X-RAY EXAM CHEST 1 VIEW: CPT | Mod: 26

## 2023-07-12 RX ORDER — SODIUM CHLORIDE 9 MG/ML
3 INJECTION INTRAMUSCULAR; INTRAVENOUS; SUBCUTANEOUS EVERY 8 HOURS
Refills: 0 | Status: DISCONTINUED | OUTPATIENT
Start: 2023-07-12 | End: 2023-07-14

## 2023-07-12 RX ORDER — METOPROLOL TARTRATE 50 MG
12.5 TABLET ORAL DAILY
Refills: 0 | Status: DISCONTINUED | OUTPATIENT
Start: 2023-07-12 | End: 2023-07-14

## 2023-07-12 RX ORDER — VALSARTAN 80 MG/1
20 TABLET ORAL EVERY 12 HOURS
Refills: 0 | Status: DISCONTINUED | OUTPATIENT
Start: 2023-07-12 | End: 2023-07-13

## 2023-07-12 RX ORDER — METOPROLOL TARTRATE 50 MG
12.5 TABLET ORAL DAILY
Refills: 0 | Status: DISCONTINUED | OUTPATIENT
Start: 2023-07-12 | End: 2023-07-12

## 2023-07-12 RX ADMIN — ATORVASTATIN CALCIUM 40 MILLIGRAM(S): 80 TABLET, FILM COATED ORAL at 21:42

## 2023-07-12 RX ADMIN — ISOSORBIDE DINITRATE 10 MILLIGRAM(S): 5 TABLET ORAL at 06:36

## 2023-07-12 RX ADMIN — Medication 81 MILLIGRAM(S): at 11:41

## 2023-07-12 RX ADMIN — AMIODARONE HYDROCHLORIDE 200 MILLIGRAM(S): 400 TABLET ORAL at 06:35

## 2023-07-12 RX ADMIN — INSULIN GLARGINE 12 UNIT(S): 100 INJECTION, SOLUTION SUBCUTANEOUS at 21:42

## 2023-07-12 RX ADMIN — Medication 1: at 17:33

## 2023-07-12 RX ADMIN — Medication 7 UNIT(S): at 12:22

## 2023-07-12 RX ADMIN — SODIUM CHLORIDE 3 MILLILITER(S): 9 INJECTION INTRAMUSCULAR; INTRAVENOUS; SUBCUTANEOUS at 06:03

## 2023-07-12 RX ADMIN — SODIUM CHLORIDE 3 MILLILITER(S): 9 INJECTION INTRAMUSCULAR; INTRAVENOUS; SUBCUTANEOUS at 14:36

## 2023-07-12 RX ADMIN — Medication 7 UNIT(S): at 17:33

## 2023-07-12 RX ADMIN — SENNA PLUS 2 TABLET(S): 8.6 TABLET ORAL at 21:42

## 2023-07-12 RX ADMIN — CHLORHEXIDINE GLUCONATE 1 APPLICATION(S): 213 SOLUTION TOPICAL at 12:10

## 2023-07-12 RX ADMIN — Medication 5 MILLIGRAM(S): at 11:41

## 2023-07-12 RX ADMIN — SODIUM CHLORIDE 3 MILLILITER(S): 9 INJECTION INTRAMUSCULAR; INTRAVENOUS; SUBCUTANEOUS at 21:22

## 2023-07-12 RX ADMIN — Medication 50 MILLIGRAM(S): at 06:35

## 2023-07-12 RX ADMIN — POLYETHYLENE GLYCOL 3350 17 GRAM(S): 17 POWDER, FOR SOLUTION ORAL at 06:36

## 2023-07-12 RX ADMIN — Medication 7 UNIT(S): at 07:53

## 2023-07-12 RX ADMIN — PANTOPRAZOLE SODIUM 40 MILLIGRAM(S): 20 TABLET, DELAYED RELEASE ORAL at 06:36

## 2023-07-12 RX ADMIN — HEPARIN SODIUM 5000 UNIT(S): 5000 INJECTION INTRAVENOUS; SUBCUTANEOUS at 14:58

## 2023-07-12 RX ADMIN — VALSARTAN 20 MILLIGRAM(S): 80 TABLET ORAL at 18:23

## 2023-07-12 RX ADMIN — Medication 50 MILLIGRAM(S): at 12:10

## 2023-07-12 RX ADMIN — HEPARIN SODIUM 5000 UNIT(S): 5000 INJECTION INTRAVENOUS; SUBCUTANEOUS at 21:42

## 2023-07-12 RX ADMIN — HEPARIN SODIUM 5000 UNIT(S): 5000 INJECTION INTRAVENOUS; SUBCUTANEOUS at 06:36

## 2023-07-12 RX ADMIN — POLYETHYLENE GLYCOL 3350 17 GRAM(S): 17 POWDER, FOR SOLUTION ORAL at 18:23

## 2023-07-12 RX ADMIN — Medication 12.5 MILLIGRAM(S): at 14:58

## 2023-07-12 NOTE — PROGRESS NOTE ADULT - SUBJECTIVE AND OBJECTIVE BOX
SUBJECTIVE / INTERVAL HPI: Patient was seen and examined this morning. His diet was advanced to regular consistent carb yesterday for dinner. His glucose increased after dinner up to 232 mg/dL (07-11 @ 21:30). Overnight, his glucose decreased to 118 mg/dL (07-12 @ 07:44). In addition, his glucose dropped to 85 mg/dL after having breakfast. The current dose of premeal insulin didn't appear to be enough to cover his dinner; however, it was too much for his breakfast.     CAPILLARY BLOOD GLUCOSE & INSULIN RECEIVED  140 mg/dL (07-11 @ 07:21) ? 7 units of lispro.   153 mg/dL (07-11 @ 11:48) ? 7 units of lispro + 1 unit of lispro sliding scale.  194 mg/dL (07-11 @ 16:49) ? 7 units of lispro + 1 unit of lispro sliding scale.  232 mg/dL (07-11 @ 21:30) ? 12 units of lantus + 2 units of lispro sliding scale.   118 mg/dL (07-12 @ 07:44) ? 7 units of lispro.  85 mg/dL (07-12 @ 11:28) ? 7 units of lispro.    REVIEW OF SYSTEMS  Constitutional:  Negative fever or chills.   Cardiovascular:  Negative for chest pain or palpitations.  Respiratory:  (+) Productive cough. Negative for wheezing, or shortness of breath.    Gastrointestinal:  Negative for nausea, vomiting, diarrhea, constipation, or abdominal pain.  Neurologic:  No headache, confusion, dizziness, lightheadedness.    PHYSICAL EXAM  Vital Signs Last 24 Hrs  T(C): 36.2 (12 Jul 2023 09:00), Max: 36.9 (11 Jul 2023 17:22)  T(F): 97.1 (12 Jul 2023 09:00), Max: 98.5 (11 Jul 2023 17:22)  HR: 66 (12 Jul 2023 10:40) (63 - 71)  BP: 112/56 (12 Jul 2023 09:00) (95/47 - 128/59)  BP(mean): 81 (12 Jul 2023 09:00) (68 - 90)  RR: 16 (12 Jul 2023 09:00) (16 - 20)  SpO2: 96% (12 Jul 2023 10:40) (90% - 100%)    Parameters below as of 12 Jul 2023 10:40  Patient On (Oxygen Delivery Method): room air    Constitutional: Awake, alert, elderly male, in no acute distress.   HEENT: Normocephalic, atraumatic, SCARLETT.  Respiratory: Lungs clear to ausculation bilaterally.   Cardiovascular: regular rhythm, normal S1 and S2, (+) midline sternotomy scar.   GI: soft, non-tender, non-distended, bowel sounds present.  Extremities: No lower extremity edema.   Psychiatric: AAO x 3.    LABS  CBC - WBC/HGB/HTC/PLT: 9.35/8.0/23.6/281 (07-12-23)  BMP - Na/K/Cl/Bicarb/BUN/Cr/Gluc/AG/eGFR: 127/4.8/90/31/84/2.01/139/6/33 (07-12-23)  Ca - 8.6 (07-12-23)  Phos - 3.0 (07-12-23)  Mg - 2.0 (07-12-23)  LFT - Alb/Tprot/Tbili/Dbili/AlkPhos/ALT/AST: 3.4/--/1.4/--/102/15/34 (07-11-23)  PT/aPTT/INR: 13.8/38.3/1.16 (07-12-23)   Thyroid Stimulating Hormone, Serum: 1.440 (06-28-23)    MEDICATIONS  MEDICATIONS  (STANDING):  aMIOdarone    Tablet 400  Oral   aMIOdarone    Tablet 200 milliGRAM(s) Oral daily  aspirin  chewable 81 milliGRAM(s) Oral daily  atorvastatin 40 milliGRAM(s) Oral at bedtime  bisacodyl 5 milliGRAM(s) Oral daily  chlorhexidine 2% Cloths 1 Application(s) Topical daily  dextrose 5%. 1000 milliLiter(s) (50 mL/Hr) IV Continuous <Continuous>  dextrose 5%. 1000 milliLiter(s) (50 mL/Hr) IV Continuous <Continuous>  dextrose 5%. 1000 milliLiter(s) (100 mL/Hr) IV Continuous <Continuous>  dextrose 5%. 1000 milliLiter(s) (100 mL/Hr) IV Continuous <Continuous>  dextrose 50% Injectable 12.5 Gram(s) IV Push once  dextrose 50% Injectable 25 Gram(s) IV Push once  dextrose 50% Injectable 25 Gram(s) IV Push once  glucagon  Injectable 1 milliGRAM(s) IntraMuscular once  glucagon  Injectable 1 milliGRAM(s) IntraMuscular once  heparin   Injectable 5000 Unit(s) SubCutaneous every 8 hours  insulin glargine Injectable (LANTUS) 12 Unit(s) SubCutaneous at bedtime  insulin lispro (ADMELOG) corrective regimen sliding scale   SubCutaneous Before meals and at bedtime  insulin lispro Injectable (ADMELOG) 7 Unit(s) SubCutaneous three times a day before meals  metoprolol succinate ER 12.5 milliGRAM(s) Oral daily  pantoprazole   Suspension 40 milliGRAM(s) Oral before breakfast  polyethylene glycol 3350 17 Gram(s) Oral two times a day  senna 2 Tablet(s) Oral at bedtime  sodium chloride 0.9% lock flush 3 milliLiter(s) IV Push every 8 hours  sodium chloride 0.9% lock flush 3 milliLiter(s) IV Push every 8 hours  sodium chloride 0.9%. 1000 milliLiter(s) (10 mL/Hr) IV Continuous <Continuous>  testosterone 1% Gel 50 milliGRAM(s) Topical daily  valsartan 20 milliGRAM(s) Oral every 12 hours    MEDICATIONS  (PRN):  dextrose Oral Gel 15 Gram(s) Oral once PRN Blood Glucose LESS THAN 70 milliGRAM(s)/deciliter    ASSESSMENT / RECOMMENDATIONS  Mr. Johnson is a 78-year-old male with type 2 diabetes mellitus, hypertension and severe aortic stenosis who was transferred to [facility] for further work-up and intervention of severe aortic stenosis, now s/p aortic valve replacement (6/30/23). Endocrinology was consulted for recommendations regarding diabetes management.     A1C: 8.6 %  BUN: 84  Creatinine: 2.01  GFR: 33  Weight: 66.6  BMI: 21.5    # Type 2 diabetes mellitus with hyperglycemia  - Please continue lantus *** units at bedtime.   - Continue lispro *** units before each meal.  - Continue lispro moderate / low dose sliding scale before meals and at bedtime.  - Patient's fingerstick glucose goal is 100-180 mg/dL.    - Discharge recommendations to be discussed.   - Patient can follow up at discharge with [facility] by calling [phone] to make an appointment.      Case discussed with Dr. Caicedo. Primary team updated.       Milton Laura    Endocrinology Fellow    Service Pager: [phone]    SUBJECTIVE / INTERVAL HPI: Patient was seen and examined this morning. His diet was advanced to regular consistent carb yesterday for dinner. His glucose increased after dinner up to 232 mg/dL (07-11 @ 21:30). Overnight, his glucose decreased to 118 mg/dL (07-12 @ 07:44). In addition, his glucose dropped to 85 mg/dL after having breakfast. The current dose of premeal insulin didn't appear to be enough to cover his dinner; however, it was too much for his breakfast.     CAPILLARY BLOOD GLUCOSE & INSULIN RECEIVED  140 mg/dL (07-11 @ 07:21) ? 7 units of lispro.   153 mg/dL (07-11 @ 11:48) ? 7 units of lispro + 1 unit of lispro sliding scale.  194 mg/dL (07-11 @ 16:49) ? 7 units of lispro + 1 unit of lispro sliding scale.  232 mg/dL (07-11 @ 21:30) ? 12 units of lantus + 2 units of lispro sliding scale.   118 mg/dL (07-12 @ 07:44) ? 7 units of lispro.  85 mg/dL (07-12 @ 11:28) ? 7 units of lispro.    REVIEW OF SYSTEMS  Constitutional:  Negative fever or chills.   Cardiovascular:  Negative for chest pain or palpitations.  Respiratory:  (+) Productive cough. Negative for wheezing, or shortness of breath.    Gastrointestinal:  Negative for nausea, vomiting, diarrhea, constipation, or abdominal pain.  Neurologic:  No headache, confusion, dizziness, lightheadedness.    PHYSICAL EXAM  Vital Signs Last 24 Hrs  T(C): 36.2 (12 Jul 2023 09:00), Max: 36.9 (11 Jul 2023 17:22)  T(F): 97.1 (12 Jul 2023 09:00), Max: 98.5 (11 Jul 2023 17:22)  HR: 66 (12 Jul 2023 10:40) (63 - 71)  BP: 112/56 (12 Jul 2023 09:00) (95/47 - 128/59)  BP(mean): 81 (12 Jul 2023 09:00) (68 - 90)  RR: 16 (12 Jul 2023 09:00) (16 - 20)  SpO2: 96% (12 Jul 2023 10:40) (90% - 100%)    Parameters below as of 12 Jul 2023 10:40  Patient On (Oxygen Delivery Method): room air    Constitutional: Awake, alert, elderly male, in no acute distress.   HEENT: Normocephalic, atraumatic, SCARLETT.  Respiratory: Lungs clear to ausculation bilaterally.   Cardiovascular: regular rhythm, normal S1 and S2, (+) midline sternotomy scar.   GI: soft, non-tender, non-distended, bowel sounds present.  Extremities: No lower extremity edema.   Psychiatric: AAO x 3.    LABS  CBC - WBC/HGB/HTC/PLT: 9.35/8.0/23.6/281 (07-12-23)  BMP - Na/K/Cl/Bicarb/BUN/Cr/Gluc/AG/eGFR: 127/4.8/90/31/84/2.01/139/6/33 (07-12-23)  Ca - 8.6 (07-12-23)  Phos - 3.0 (07-12-23)  Mg - 2.0 (07-12-23)  LFT - Alb/Tprot/Tbili/Dbili/AlkPhos/ALT/AST: 3.4/--/1.4/--/102/15/34 (07-11-23)  PT/aPTT/INR: 13.8/38.3/1.16 (07-12-23)   Thyroid Stimulating Hormone, Serum: 1.440 (06-28-23)    MEDICATIONS  MEDICATIONS  (STANDING):  aMIOdarone    Tablet 400  Oral   aMIOdarone    Tablet 200 milliGRAM(s) Oral daily  aspirin  chewable 81 milliGRAM(s) Oral daily  atorvastatin 40 milliGRAM(s) Oral at bedtime  bisacodyl 5 milliGRAM(s) Oral daily  chlorhexidine 2% Cloths 1 Application(s) Topical daily  dextrose 5%. 1000 milliLiter(s) (50 mL/Hr) IV Continuous <Continuous>  dextrose 5%. 1000 milliLiter(s) (50 mL/Hr) IV Continuous <Continuous>  dextrose 5%. 1000 milliLiter(s) (100 mL/Hr) IV Continuous <Continuous>  dextrose 5%. 1000 milliLiter(s) (100 mL/Hr) IV Continuous <Continuous>  dextrose 50% Injectable 12.5 Gram(s) IV Push once  dextrose 50% Injectable 25 Gram(s) IV Push once  dextrose 50% Injectable 25 Gram(s) IV Push once  glucagon  Injectable 1 milliGRAM(s) IntraMuscular once  glucagon  Injectable 1 milliGRAM(s) IntraMuscular once  heparin   Injectable 5000 Unit(s) SubCutaneous every 8 hours  insulin glargine Injectable (LANTUS) 12 Unit(s) SubCutaneous at bedtime  insulin lispro (ADMELOG) corrective regimen sliding scale   SubCutaneous Before meals and at bedtime  insulin lispro Injectable (ADMELOG) 7 Unit(s) SubCutaneous three times a day before meals  metoprolol succinate ER 12.5 milliGRAM(s) Oral daily  pantoprazole   Suspension 40 milliGRAM(s) Oral before breakfast  polyethylene glycol 3350 17 Gram(s) Oral two times a day  senna 2 Tablet(s) Oral at bedtime  sodium chloride 0.9% lock flush 3 milliLiter(s) IV Push every 8 hours  sodium chloride 0.9% lock flush 3 milliLiter(s) IV Push every 8 hours  sodium chloride 0.9%. 1000 milliLiter(s) (10 mL/Hr) IV Continuous <Continuous>  testosterone 1% Gel 50 milliGRAM(s) Topical daily  valsartan 20 milliGRAM(s) Oral every 12 hours    MEDICATIONS  (PRN):  dextrose Oral Gel 15 Gram(s) Oral once PRN Blood Glucose LESS THAN 70 milliGRAM(s)/deciliter    ASSESSMENT / RECOMMENDATIONS  Mr. Johnson is a 78-year-old male with type 2 diabetes mellitus, hypertension and severe aortic stenosis who was transferred to [facility] for further work-up and intervention of severe aortic stenosis, now s/p aortic valve replacement (6/30/23). Endocrinology was consulted for recommendations regarding diabetes management.     A1C: 8.6 %  BUN: 84  Creatinine: 2.01  GFR: 33  Weight: 66.6  BMI: 21.5    # Type 2 diabetes mellitus with hyperglycemia  - His glucose level decreased significantly overnight. Therefore, would mildly decrease dose of lantus for tonight. In addition, there is some discrepancy between his response to 7 units of lispro before his meals. Suspect that his diet history is not accurate. Hence, would continue with same dose of premeal for now.   - Please DECREASE lantus to 11 units at bedtime.   - Continue lispro 7 units before each meal.  - Continue lispro moderate dose sliding scale before meals and at bedtime.  - Patient's fingerstick glucose goal is 100-180 mg/dL.    - Discharge recommendations to be discussed.   - Patient can follow up at discharge with [facility] by calling [phone] to make an appointment.      Case discussed with Dr. Caicedo. Primary team updated.       Milton Laura    Endocrinology Fellow    Service Pager: [phone]    SUBJECTIVE / INTERVAL HPI: Patient was seen and examined this morning. His diet was advanced to regular consistent carb yesterday for dinner. His glucose increased after dinner up to 232 mg/dL (07-11 @ 21:30). Overnight, his glucose decreased to 118 mg/dL (07-12 @ 07:44). Patient states that he only had 3 crackers for dinner. For breakfast, he reported eating eggs, ?oatmeal, jello, 2 crackers and coffee.  his glucose dropped to 85 mg/dL after having breakfast. The current dose of premeal insulin didn't appear to be enough to cover his dinner (despite having almost no carbs); however, it was too much for his breakfast (despite eating an adequate amount of carbs). Suspect that his diet history is not accurate.     CAPILLARY BLOOD GLUCOSE & INSULIN RECEIVED  140 mg/dL (07-11 @ 07:21) ? 7 units of lispro.   153 mg/dL (07-11 @ 11:48) ? 7 units of lispro + 1 unit of lispro sliding scale.  194 mg/dL (07-11 @ 16:49) ? 7 units of lispro + 1 unit of lispro sliding scale.  232 mg/dL (07-11 @ 21:30) ? 12 units of lantus + 2 units of lispro sliding scale.   118 mg/dL (07-12 @ 07:44) ? 7 units of lispro.  85 mg/dL (07-12 @ 11:28) ? 7 units of lispro.    REVIEW OF SYSTEMS  Constitutional:  Negative fever or chills.   Cardiovascular:  Negative for chest pain or palpitations.  Respiratory:  (+) Productive cough. Negative for wheezing, or shortness of breath.    Gastrointestinal:  Negative for nausea, vomiting, diarrhea, constipation, or abdominal pain.  Neurologic:  No headache, confusion, dizziness, lightheadedness.    PHYSICAL EXAM  Vital Signs Last 24 Hrs  T(C): 36.2 (12 Jul 2023 09:00), Max: 36.9 (11 Jul 2023 17:22)  T(F): 97.1 (12 Jul 2023 09:00), Max: 98.5 (11 Jul 2023 17:22)  HR: 66 (12 Jul 2023 10:40) (63 - 71)  BP: 112/56 (12 Jul 2023 09:00) (95/47 - 128/59)  BP(mean): 81 (12 Jul 2023 09:00) (68 - 90)  RR: 16 (12 Jul 2023 09:00) (16 - 20)  SpO2: 96% (12 Jul 2023 10:40) (90% - 100%)    Parameters below as of 12 Jul 2023 10:40  Patient On (Oxygen Delivery Method): room air    Constitutional: Awake, alert, elderly male, in no acute distress.   HEENT: Normocephalic, atraumatic, SCARLETT.  Respiratory: Lungs clear to ausculation bilaterally.   Cardiovascular: regular rhythm, normal S1 and S2, (+) midline sternotomy scar.   GI: soft, non-tender, non-distended, bowel sounds present.  Extremities: No lower extremity edema.   Psychiatric: AAO x 3.    LABS  CBC - WBC/HGB/HTC/PLT: 9.35/8.0/23.6/281 (07-12-23)  BMP - Na/K/Cl/Bicarb/BUN/Cr/Gluc/AG/eGFR: 127/4.8/90/31/84/2.01/139/6/33 (07-12-23)  Ca - 8.6 (07-12-23)  Phos - 3.0 (07-12-23)  Mg - 2.0 (07-12-23)  LFT - Alb/Tprot/Tbili/Dbili/AlkPhos/ALT/AST: 3.4/--/1.4/--/102/15/34 (07-11-23)  PT/aPTT/INR: 13.8/38.3/1.16 (07-12-23)   Thyroid Stimulating Hormone, Serum: 1.440 (06-28-23)    MEDICATIONS  MEDICATIONS  (STANDING):  aMIOdarone    Tablet 400  Oral   aMIOdarone    Tablet 200 milliGRAM(s) Oral daily  aspirin  chewable 81 milliGRAM(s) Oral daily  atorvastatin 40 milliGRAM(s) Oral at bedtime  bisacodyl 5 milliGRAM(s) Oral daily  chlorhexidine 2% Cloths 1 Application(s) Topical daily  dextrose 5%. 1000 milliLiter(s) (50 mL/Hr) IV Continuous <Continuous>  dextrose 5%. 1000 milliLiter(s) (50 mL/Hr) IV Continuous <Continuous>  dextrose 5%. 1000 milliLiter(s) (100 mL/Hr) IV Continuous <Continuous>  dextrose 5%. 1000 milliLiter(s) (100 mL/Hr) IV Continuous <Continuous>  dextrose 50% Injectable 12.5 Gram(s) IV Push once  dextrose 50% Injectable 25 Gram(s) IV Push once  dextrose 50% Injectable 25 Gram(s) IV Push once  glucagon  Injectable 1 milliGRAM(s) IntraMuscular once  glucagon  Injectable 1 milliGRAM(s) IntraMuscular once  heparin   Injectable 5000 Unit(s) SubCutaneous every 8 hours  insulin glargine Injectable (LANTUS) 12 Unit(s) SubCutaneous at bedtime  insulin lispro (ADMELOG) corrective regimen sliding scale   SubCutaneous Before meals and at bedtime  insulin lispro Injectable (ADMELOG) 7 Unit(s) SubCutaneous three times a day before meals  metoprolol succinate ER 12.5 milliGRAM(s) Oral daily  pantoprazole   Suspension 40 milliGRAM(s) Oral before breakfast  polyethylene glycol 3350 17 Gram(s) Oral two times a day  senna 2 Tablet(s) Oral at bedtime  sodium chloride 0.9% lock flush 3 milliLiter(s) IV Push every 8 hours  sodium chloride 0.9% lock flush 3 milliLiter(s) IV Push every 8 hours  sodium chloride 0.9%. 1000 milliLiter(s) (10 mL/Hr) IV Continuous <Continuous>  testosterone 1% Gel 50 milliGRAM(s) Topical daily  valsartan 20 milliGRAM(s) Oral every 12 hours    MEDICATIONS  (PRN):  dextrose Oral Gel 15 Gram(s) Oral once PRN Blood Glucose LESS THAN 70 milliGRAM(s)/deciliter    ASSESSMENT / RECOMMENDATIONS  Mr. Johnson is a 78-year-old male with type 2 diabetes mellitus, hypertension and severe aortic stenosis who was transferred to [facility] for further work-up and intervention of severe aortic stenosis, now s/p aortic valve replacement (6/30/23). Endocrinology was consulted for recommendations regarding diabetes management.     A1C: 8.6 %  BUN: 84  Creatinine: 2.01  GFR: 33  Weight: 66.6  BMI: 21.5    # Type 2 diabetes mellitus with hyperglycemia  - His glucose level decreased significantly overnight. Therefore, would mildly decrease dose of lantus for tonight. In addition, there is some discrepancy between his response to 7 units of lispro before his meals. Suspect that his diet history is not accurate. Hence, would continue with same dose of premeal for now.   - Please DECREASE lantus to 11 units at bedtime.   - Continue lispro 7 units before each meal.  - Continue lispro moderate dose sliding scale before meals and at bedtime.  - Patient's fingerstick glucose goal is 100-180 mg/dL.    - Discharge recommendations to be discussed.   - Patient can follow up at discharge with [facility] by calling [phone] to make an appointment.      Case discussed with Dr. Caicedo. Primary team updated.       Milton Laura    Endocrinology Fellow    Service Pager: [phone]

## 2023-07-12 NOTE — PROGRESS NOTE ADULT - ATTENDING COMMENTS
Pt seen on rounds this afternoon.  Appetite continues to slowly improve, but as noted by Dr. Osuna, his reported intake does not correlate with his post-prandial fingersticks.  Has a box of crackers by his bedside and is using these as a carb source at his meals-it is possible that he is under-reporting the number that he is eating.  He is breathing comfortably, and lungs are essentially clear, with fairly good air entry at the bases.  He remains slightly hoarse--says that this is baseline, but it is difficult to tell.  He denies any dysphagia  --With the blood sugar having dropped significantly overnight (even though not actually low this morning), to decrease the Lantus to 11 units  --The post-prandial values are fluctuating in a way that makes it difficult to adjust the premeal lispro--continue 7 units for now.   --Still plan to have the pt return to oral agents post discharge.

## 2023-07-12 NOTE — PROGRESS NOTE ADULT - ASSESSMENT
78 year old male HTN and DM who originally presented to [facility] on 6/26 c/o chest pain who is now POD#12 from an AVR. Patient arrived to the ICU intubated on levophed. Overnight he had a R PTX noted requiring a pigtail placement. POD#1 he was able to be extubated. His creatinine bumped and pressors were started for better renal perfusion. On POD#2 patient was found to be cool and clammy. Echo performed revealing a new EF of 10%. He was then started on Dobutamine and Milrinone. He was electively intubated for cardiogenic shock. HF consulted and Bumex gtt was started. POD#5 Extubated to HFNC. POD#6 Saloni added for increasing pulmonary artery pressures. On POD#7 patient was allowed permissive hypertension for renal perfusion. Dobutamine held and Hydral and isordil added for GDMT. POD#8 right Pigtail removed. POD#9 Bumex held. Jo removed and then replaced for retention. Echo performed with evidence of a small pericardial effusion. Today patient was transferred to [facility].      Neuro: pain management   - Oxy PRN     CVS: POD#12 from AVR, post op with cardiogenic shock with a new EF of 10%. Most recent echo with mildly reduced BiV fucntion; post op afib now in NSR  - Continue ASA 81 and atorvastatin 40 mg POD for CAD   - afib ppx wtih amio taper.   - continue Toprol 12,5 daily and Valsartan 20mg BID for GDMT   - continue to follow CHF recs.     Respiratory: Saturating well on RA    - Wean off O2 sat > 92%  - IS and ambulation  - Chest PT.     GI: Tolerating PO diet   - GI PPX   - Bowel regimen with Senna and Miralax     : BUN/ Creatinine. 84/2.02 - Jo   - creatinine plateued.  - diuresis per HF team   - monitor I/Os.     Endo: A1c 8.6 FS low before lunch. in the 200s after dinner yesterday.   - continue current regimen. Will reassess with dinner FS   - continue 7 Lispro with meals   - continue 12 Lantus at bedtime.   - ISS     ID: WBC 8. afebrile.   - completed periop ABX   - continue to monitor fever curve     Heme: DVT PPX   - SQH     Shala Christina PA-C

## 2023-07-12 NOTE — PROGRESS NOTE ADULT - SUBJECTIVE AND OBJECTIVE BOX
Patient discussed on morning rounds with Dr. Koehler     Operation / Date: 6/29 AVR     SUBJECTIVE ASSESSMENT:  78y Male offers no complaints at this time. States that he walked over from the unit without feeling SOB. Denies chest pain.         Vital Signs Last 24 Hrs  T(C): 36.2 (12 Jul 2023 09:00), Max: 36.9 (11 Jul 2023 17:22)  T(F): 97.1 (12 Jul 2023 09:00), Max: 98.5 (11 Jul 2023 17:22)  HR: 69 (12 Jul 2023 09:00) (63 - 71)  BP: 112/56 (12 Jul 2023 09:00) (95/47 - 128/59)  BP(mean): 81 (12 Jul 2023 09:00) (68 - 90)  RR: 16 (12 Jul 2023 09:00) (15 - 20)  SpO2: 95% (12 Jul 2023 09:00) (90% - 100%)    Parameters below as of 12 Jul 2023 09:00  Patient On (Oxygen Delivery Method): room air      I&O's Detail    11 Jul 2023 07:01  -  12 Jul 2023 07:00  --------------------------------------------------------  IN:    Oral Fluid: 445 mL  Total IN: 445 mL    OUT:    Indwelling Catheter - Urethral (mL): 175 mL    Voided (mL): 700 mL  Total OUT: 875 mL    Total NET: -430 mL          CHEST TUBE:  Yes/No. AIR LEAKS: Yes/No. Suction / H2O SEAL.   ROGELIO DRAIN:  Yes/No.  EPICARDIAL WIRES: Yes/No.  TIE DOWNS: Yes/No.  KESSLER: Yes/No.    PHYSICAL EXAM:    General:     Neurological:    Cardiovascular:    Respiratory:    Gastrointestinal:    Extremities:    Vascular:    Incision Sites:    LABS:                        8.0    9.35  )-----------( 281      ( 12 Jul 2023 02:37 )             23.6       COUMADIN:  Yes/No. REASON: .    PT/INR - ( 12 Jul 2023 02:37 )   PT: 13.8 sec;   INR: 1.16          PTT - ( 12 Jul 2023 02:37 )  PTT:38.3 sec    07-12    127<L>  |  90<L>  |  84<H>  ----------------------------<  139<H>  4.8   |  31  |  2.01<H>    Ca    8.6      12 Jul 2023 02:37  Phos  3.0     07-12  Mg     2.0     07-12    TPro  6.6  /  Alb  3.4  /  TBili  1.4<H>  /  DBili  x   /  AST  34  /  ALT  15  /  AlkPhos  102  07-11      Urinalysis Basic - ( 12 Jul 2023 02:37 )    Color: x / Appearance: x / SG: x / pH: x  Gluc: 139 mg/dL / Ketone: x  / Bili: x / Urobili: x   Blood: x / Protein: x / Nitrite: x   Leuk Esterase: x / RBC: x / WBC x   Sq Epi: x / Non Sq Epi: x / Bacteria: x        MEDICATIONS  (STANDING):  aMIOdarone    Tablet 200 milliGRAM(s) Oral daily  aMIOdarone    Tablet 400  Oral   aspirin  chewable 81 milliGRAM(s) Oral daily  atorvastatin 40 milliGRAM(s) Oral at bedtime  bisacodyl 5 milliGRAM(s) Oral daily  chlorhexidine 2% Cloths 1 Application(s) Topical daily  dextrose 5%. 1000 milliLiter(s) (50 mL/Hr) IV Continuous <Continuous>  dextrose 5%. 1000 milliLiter(s) (100 mL/Hr) IV Continuous <Continuous>  dextrose 5%. 1000 milliLiter(s) (50 mL/Hr) IV Continuous <Continuous>  dextrose 5%. 1000 milliLiter(s) (100 mL/Hr) IV Continuous <Continuous>  dextrose 50% Injectable 25 Gram(s) IV Push once  dextrose 50% Injectable 12.5 Gram(s) IV Push once  dextrose 50% Injectable 25 Gram(s) IV Push once  glucagon  Injectable 1 milliGRAM(s) IntraMuscular once  glucagon  Injectable 1 milliGRAM(s) IntraMuscular once  heparin   Injectable 5000 Unit(s) SubCutaneous every 8 hours  insulin glargine Injectable (LANTUS) 12 Unit(s) SubCutaneous at bedtime  insulin lispro (ADMELOG) corrective regimen sliding scale   SubCutaneous Before meals and at bedtime  insulin lispro Injectable (ADMELOG) 7 Unit(s) SubCutaneous three times a day before meals  metoprolol succinate ER 12.5 milliGRAM(s) Oral daily  pantoprazole   Suspension 40 milliGRAM(s) Oral before breakfast  polyethylene glycol 3350 17 Gram(s) Oral two times a day  senna 2 Tablet(s) Oral at bedtime  sodium chloride 0.9% lock flush 3 milliLiter(s) IV Push every 8 hours  sodium chloride 0.9% lock flush 3 milliLiter(s) IV Push every 8 hours  sodium chloride 0.9%. 1000 milliLiter(s) (10 mL/Hr) IV Continuous <Continuous>  testosterone 1% Gel 50 milliGRAM(s) Topical daily  valsartan 20 milliGRAM(s) Oral every 12 hours    MEDICATIONS  (PRN):  dextrose Oral Gel 15 Gram(s) Oral once PRN Blood Glucose LESS THAN 70 milliGRAM(s)/deciliter        RADIOLOGY & ADDITIONAL TESTS:

## 2023-07-13 LAB
ANION GAP SERPL CALC-SCNC: 10 MMOL/L — SIGNIFICANT CHANGE UP (ref 5–17)
BUN SERPL-MCNC: 84 MG/DL — HIGH (ref 7–23)
CALCIUM SERPL-MCNC: 8.6 MG/DL — SIGNIFICANT CHANGE UP (ref 8.4–10.5)
CHLORIDE SERPL-SCNC: 91 MMOL/L — LOW (ref 96–108)
CO2 SERPL-SCNC: 27 MMOL/L — SIGNIFICANT CHANGE UP (ref 22–31)
CREAT SERPL-MCNC: 2.01 MG/DL — HIGH (ref 0.5–1.3)
EGFR: 33 ML/MIN/1.73M2 — LOW
GLUCOSE BLDC GLUCOMTR-MCNC: 108 MG/DL — HIGH (ref 70–99)
GLUCOSE BLDC GLUCOMTR-MCNC: 116 MG/DL — HIGH (ref 70–99)
GLUCOSE BLDC GLUCOMTR-MCNC: 190 MG/DL — HIGH (ref 70–99)
GLUCOSE BLDC GLUCOMTR-MCNC: 195 MG/DL — HIGH (ref 70–99)
GLUCOSE SERPL-MCNC: 80 MG/DL — SIGNIFICANT CHANGE UP (ref 70–99)
HCT VFR BLD CALC: 26.4 % — LOW (ref 39–50)
HGB BLD-MCNC: 8.7 G/DL — LOW (ref 13–17)
MAGNESIUM SERPL-MCNC: 2.2 MG/DL — SIGNIFICANT CHANGE UP (ref 1.6–2.6)
MCHC RBC-ENTMCNC: 31.5 PG — SIGNIFICANT CHANGE UP (ref 27–34)
MCHC RBC-ENTMCNC: 33 GM/DL — SIGNIFICANT CHANGE UP (ref 32–36)
MCV RBC AUTO: 95.7 FL — SIGNIFICANT CHANGE UP (ref 80–100)
NRBC # BLD: 0 /100 WBCS — SIGNIFICANT CHANGE UP (ref 0–0)
PHOSPHATE SERPL-MCNC: 3.4 MG/DL — SIGNIFICANT CHANGE UP (ref 2.5–4.5)
PLATELET # BLD AUTO: 360 K/UL — SIGNIFICANT CHANGE UP (ref 150–400)
POTASSIUM SERPL-MCNC: 4.8 MMOL/L — SIGNIFICANT CHANGE UP (ref 3.5–5.3)
POTASSIUM SERPL-SCNC: 4.8 MMOL/L — SIGNIFICANT CHANGE UP (ref 3.5–5.3)
RBC # BLD: 2.76 M/UL — LOW (ref 4.2–5.8)
RBC # FLD: 15.7 % — HIGH (ref 10.3–14.5)
SODIUM SERPL-SCNC: 128 MMOL/L — LOW (ref 135–145)
WBC # BLD: 9.31 K/UL — SIGNIFICANT CHANGE UP (ref 3.8–10.5)
WBC # FLD AUTO: 9.31 K/UL — SIGNIFICANT CHANGE UP (ref 3.8–10.5)

## 2023-07-13 PROCEDURE — 99233 SBSQ HOSP IP/OBS HIGH 50: CPT

## 2023-07-13 PROCEDURE — 99232 SBSQ HOSP IP/OBS MODERATE 35: CPT | Mod: GC

## 2023-07-13 PROCEDURE — 71045 X-RAY EXAM CHEST 1 VIEW: CPT | Mod: 26

## 2023-07-13 RX ORDER — FUROSEMIDE 40 MG
40 TABLET ORAL DAILY
Refills: 0 | Status: DISCONTINUED | OUTPATIENT
Start: 2023-07-13 | End: 2023-07-14

## 2023-07-13 RX ORDER — VALSARTAN 80 MG/1
40 TABLET ORAL DAILY
Refills: 0 | Status: DISCONTINUED | OUTPATIENT
Start: 2023-07-13 | End: 2023-07-14

## 2023-07-13 RX ADMIN — HEPARIN SODIUM 5000 UNIT(S): 5000 INJECTION INTRAVENOUS; SUBCUTANEOUS at 21:45

## 2023-07-13 RX ADMIN — Medication 5 MILLIGRAM(S): at 11:17

## 2023-07-13 RX ADMIN — ATORVASTATIN CALCIUM 40 MILLIGRAM(S): 80 TABLET, FILM COATED ORAL at 21:46

## 2023-07-13 RX ADMIN — Medication 1: at 11:17

## 2023-07-13 RX ADMIN — Medication 7 UNIT(S): at 11:18

## 2023-07-13 RX ADMIN — VALSARTAN 20 MILLIGRAM(S): 80 TABLET ORAL at 05:44

## 2023-07-13 RX ADMIN — SODIUM CHLORIDE 3 MILLILITER(S): 9 INJECTION INTRAMUSCULAR; INTRAVENOUS; SUBCUTANEOUS at 21:06

## 2023-07-13 RX ADMIN — Medication 81 MILLIGRAM(S): at 11:17

## 2023-07-13 RX ADMIN — SODIUM CHLORIDE 3 MILLILITER(S): 9 INJECTION INTRAMUSCULAR; INTRAVENOUS; SUBCUTANEOUS at 05:16

## 2023-07-13 RX ADMIN — AMIODARONE HYDROCHLORIDE 200 MILLIGRAM(S): 400 TABLET ORAL at 05:43

## 2023-07-13 RX ADMIN — Medication 1: at 16:51

## 2023-07-13 RX ADMIN — INSULIN GLARGINE 12 UNIT(S): 100 INJECTION, SOLUTION SUBCUTANEOUS at 21:46

## 2023-07-13 RX ADMIN — HEPARIN SODIUM 5000 UNIT(S): 5000 INJECTION INTRAVENOUS; SUBCUTANEOUS at 14:12

## 2023-07-13 RX ADMIN — Medication 7 UNIT(S): at 16:51

## 2023-07-13 RX ADMIN — SODIUM CHLORIDE 3 MILLILITER(S): 9 INJECTION INTRAMUSCULAR; INTRAVENOUS; SUBCUTANEOUS at 13:57

## 2023-07-13 RX ADMIN — HEPARIN SODIUM 5000 UNIT(S): 5000 INJECTION INTRAVENOUS; SUBCUTANEOUS at 05:43

## 2023-07-13 RX ADMIN — Medication 12.5 MILLIGRAM(S): at 05:44

## 2023-07-13 RX ADMIN — POLYETHYLENE GLYCOL 3350 17 GRAM(S): 17 POWDER, FOR SOLUTION ORAL at 05:44

## 2023-07-13 RX ADMIN — SENNA PLUS 2 TABLET(S): 8.6 TABLET ORAL at 21:46

## 2023-07-13 RX ADMIN — POLYETHYLENE GLYCOL 3350 17 GRAM(S): 17 POWDER, FOR SOLUTION ORAL at 17:22

## 2023-07-13 RX ADMIN — Medication 40 MILLIGRAM(S): at 15:13

## 2023-07-13 RX ADMIN — CHLORHEXIDINE GLUCONATE 1 APPLICATION(S): 213 SOLUTION TOPICAL at 11:21

## 2023-07-13 NOTE — PROGRESS NOTE ADULT - ASSESSMENT
77M PMH HTN, DM, CAD (nonobstructive on [facility] 23), AS/AI s/p AVR 23 with course c/b hypotension and concern for cardiogenic shock.     TTE 7/3/23: LVIDd 3.9cm. LVEF 10-15% with global hypokinesis. ?Reverse takotsubo pattern. RV normal in size with reduced function. LA mildly dilated. BioAVR, MG 7, no AI. Trace MR. Mild to moderate TR. PASP 58. Small pericardial effusion.  [facility] 23 ([facility]): Nonobstructive CAD    Hemodynamics   AM: MvO2 72.8; Erika CO 4.4 CI 2.7   AM: MvO2 68.2; Erika CO 3.3 CI 1.8, PAP 39/14, PCWP 12  : AM: MvO2 81.1, Thermodilution CO 5.1, CI 2.8     #Cardiogenic shock  TTE with newly reduced EF compared to [facility] TTE, EF now severely reduced with EF 10-15% with global hypokinesis. ECHO  with EF of 40% while on . Off  and vaso since  AM.   -Resolved   -HF management as below      #HFrEF  -Etiology: NICM given nonobstructive CAD on recent [facility]. Etiology unclear.  -GDMT: Isordil 10mg TID. Toprol 12.5mg QD, Valsartan 20mg BID   -Volume: 40mg PO lasix x1 now as pt with minimal JVD and LE edema   -Devices: Not appropriate at this time    -Repeat limited ECHO to eval for LV function now that patient is off inotropes since --->EF 50%         Recommendations above are preliminary pending discussion with an attending cardiologist    We'll continue to follow, thank you for the consultation      Steven Hernandez ([id number])  Cardiovascular Disease Fellow                     77M PMH HTN, DM, CAD (nonobstructive on [facility] 23), AS/AI s/p AVR 23 with course c/b hypotension and concern for cardiogenic shock.     TTE 7/3/23: LVIDd 3.9cm. LVEF 10-15% with global hypokinesis. ?Reverse takotsubo pattern. RV normal in size with reduced function. LA mildly dilated. BioAVR, MG 7, no AI. Trace MR. Mild to moderate TR. PASP 58. Small pericardial effusion.  [facility] 23 ([facility]): Nonobstructive CAD    Hemodynamics   AM: MvO2 72.8; Erika CO 4.4 CI 2.7   AM: MvO2 68.2; Erika CO 3.3 CI 1.8, PAP 39/14, PCWP 12  : AM: MvO2 81.1, Thermodilution CO 5.1, CI 2.8     #Cardiogenic shock  TTE with newly reduced EF compared to [facility] TTE, EF now severely reduced with EF 10-15% with global hypokinesis. ECHO  with EF of 40% while on . Off  and vaso since  AM.   -Resolved   -HF management as below      #HFrEF  -Etiology: NICM given nonobstructive CAD on recent [facility]. Etiology unclear.  -GDMT: D/C Isordil 10mg TID. Toprol 12.5mg QD, Increase Valsartan to 40mg BID   -Volume: Start 40mg PO lasix QD  -Devices: Not appropriate at this time    -Repeat limited ECHO to eval for LV function now that patient is off inotropes since --->EF 50%         Recommendations above are preliminary pending discussion with an attending cardiologist  HF signing off, please reconsult with any questions.      Steven Hernandez ([id number])  Cardiovascular Disease Fellow

## 2023-07-13 NOTE — PROGRESS NOTE ADULT - NUTRITIONAL ASSESSMENT
This patient has been assessed with a concern for Malnutrition and has been determined to have a diagnosis/diagnoses of Severe protein-calorie malnutrition.    This patient is being managed with:   Diet NPO with Tube Feed-  Tube Feeding Modality: Nasogastric  Nepro with Carb Steady (NEPRORTH)  Total Volume for 24 Hours (mL): 960  Total Number of Cans: 4  Continuous  Starting Tube Feed Rate {mL per Hour}: 10  Increase Tube Feed Rate by (mL): 10     Every 4 hours  Until Goal Tube Feed Rate (mL per Hour): 40  Tube Feed Duration (in Hours): 24  Tube Feed Start Time: 18:00  Entered: Jul  3 2023  5:43PM  
This patient has been assessed with a concern for Malnutrition and has been determined to have a diagnosis/diagnoses of Severe protein-calorie malnutrition.    This patient is being managed with:   Diet DASH/TLC-  Sodium & Cholesterol Restricted  Consistent Carbohydrate {No Snacks} (CSTCHO)  Supplement Feeding Modality:  Oral  Ensure Max Cans or Servings Per Day:  1       Frequency:  Three Times a day  Entered: Jul 11 2023  6:29PM  
This patient has been assessed with a concern for Malnutrition and has been determined to have a diagnosis/diagnoses of Severe protein-calorie malnutrition.    This patient is being managed with:   Diet NPO with Tube Feed-  Tube Feeding Modality: Nasogastric  Nepro with Carb Steady (NEPRORTH)  Total Volume for 24 Hours (mL): 960  Total Number of Cans: 4  Continuous  Starting Tube Feed Rate {mL per Hour}: 10  Increase Tube Feed Rate by (mL): 10     Every 4 hours  Until Goal Tube Feed Rate (mL per Hour): 40  Tube Feed Duration (in Hours): 24  Tube Feed Start Time: 18:00  Entered: Jul  3 2023  5:43PM  
This patient has been assessed with a concern for Malnutrition and has been determined to have a diagnosis/diagnoses of Severe protein-calorie malnutrition.    This patient is being managed with:   Diet Pureed-  Consistent Carbohydrate {No Snacks} (CSTCHO)  Mildly Thick Liquids (MILDTHICKLIQS)  Supplement Feeding Modality:  Oral  Glucerna Shake Cans or Servings Per Day:  1       Frequency:  Two Times a day  Ensure Max Cans or Servings Per Day:  1       Frequency:  Two Times a day  Entered: Jul 7 2023  1:48PM  
This patient has been assessed with a concern for Malnutrition and has been determined to have a diagnosis/diagnoses of Severe protein-calorie malnutrition.    This patient is being managed with:   Diet Pureed-  Consistent Carbohydrate {No Snacks} (CSTCHO)  Mildly Thick Liquids (MILDTHICKLIQS)  Supplement Feeding Modality:  Oral  Glucerna Shake Cans or Servings Per Day:  1       Frequency:  Two Times a day  Ensure Max Cans or Servings Per Day:  1       Frequency:  Two Times a day  Entered: Jul 7 2023  1:48PM  
This patient has been assessed with a concern for Malnutrition and has been determined to have a diagnosis/diagnoses of Severe protein-calorie malnutrition.    This patient is being managed with:   Diet Consistent Carbohydrate Renal/No Snacks-  Entered: Jul 1 2023  7:15AM  
This patient has been assessed with a concern for Malnutrition and has been determined to have a diagnosis/diagnoses of Severe protein-calorie malnutrition.    This patient is being managed with:   Diet Consistent Carbohydrate Renal/No Snacks-  Entered: Jul 1 2023  7:15AM  
This patient has been assessed with a concern for Malnutrition and has been determined to have a diagnosis/diagnoses of Severe protein-calorie malnutrition.    This patient is being managed with:   Diet DASH/TLC-  Sodium & Cholesterol Restricted  Consistent Carbohydrate {No Snacks} (CSTCHO)  Supplement Feeding Modality:  Oral  Ensure Max Cans or Servings Per Day:  1       Frequency:  Three Times a day  Entered: Jul 11 2023  6:29PM  
This patient has been assessed with a concern for Malnutrition and has been determined to have a diagnosis/diagnoses of Severe protein-calorie malnutrition.    This patient is being managed with:   Diet NPO with Tube Feed-  Tube Feeding Modality: Nasogastric  Nepro with Carb Steady (NEPRORTH)  Total Volume for 24 Hours (mL): 960  Total Number of Cans: 4  Continuous  Starting Tube Feed Rate {mL per Hour}: 10  Increase Tube Feed Rate by (mL): 10     Every 4 hours  Until Goal Tube Feed Rate (mL per Hour): 40  Tube Feed Duration (in Hours): 24  Tube Feed Start Time: 18:00  Entered: Jul  3 2023  5:43PM  
This patient has been assessed with a concern for Malnutrition and has been determined to have a diagnosis/diagnoses of Severe protein-calorie malnutrition.    This patient is being managed with:   Diet Pureed-  Consistent Carbohydrate {No Snacks} (CSTCHO)  Mildly Thick Liquids (MILDTHICKLIQS)  Supplement Feeding Modality:  Oral  Glucerna Shake Cans or Servings Per Day:  1       Frequency:  Two Times a day  Ensure Max Cans or Servings Per Day:  1       Frequency:  Two Times a day  Entered: Jul 7 2023  1:48PM  
This patient has been assessed with a concern for Malnutrition and has been determined to have a diagnosis/diagnoses of Severe protein-calorie malnutrition.    This patient is being managed with:   Diet Soft and Bite Sized-  Consistent Carbohydrate {No Snacks} (CSTCHO)  DASH/TLC {Sodium & Cholesterol Restricted} (DASH)  Entered: Jul 10 2023  2:20PM  
This patient has been assessed with a concern for Malnutrition and has been determined to have a diagnosis/diagnoses of Severe protein-calorie malnutrition.    This patient is being managed with:   Diet Soft and Bite Sized-  Consistent Carbohydrate {No Snacks} (CSTCHO)  DASH/TLC {Sodium & Cholesterol Restricted} (DASH)  Entered: Jul 10 2023  2:20PM  
This patient has been assessed with a concern for Malnutrition and has been determined to have a diagnosis/diagnoses of Severe protein-calorie malnutrition.    This patient is being managed with:   Diet Consistent Carbohydrate Renal/No Snacks-  Entered: Jul 1 2023  7:15AM  
This patient has been assessed with a concern for Malnutrition and has been determined to have a diagnosis/diagnoses of Severe protein-calorie malnutrition.    This patient is being managed with:   Diet Consistent Carbohydrate Renal/No Snacks-  Entered: Jul 1 2023  7:15AM  
This patient has been assessed with a concern for Malnutrition and has been determined to have a diagnosis/diagnoses of Severe protein-calorie malnutrition.    This patient is being managed with:   Diet DASH/TLC-  Sodium & Cholesterol Restricted  Consistent Carbohydrate {No Snacks} (CSTCHO)  Supplement Feeding Modality:  Oral  Ensure Max Cans or Servings Per Day:  1       Frequency:  Three Times a day  Entered: Jul 11 2023  6:29PM  
This patient has been assessed with a concern for Malnutrition and has been determined to have a diagnosis/diagnoses of Severe protein-calorie malnutrition.    This patient is being managed with:   Diet NPO with Tube Feed-  Tube Feeding Modality: Nasogastric  Nepro with Carb Steady (NEPRORTH)  Total Volume for 24 Hours (mL): 960  Total Number of Cans: 4  Continuous  Starting Tube Feed Rate {mL per Hour}: 10  Increase Tube Feed Rate by (mL): 10     Every 4 hours  Until Goal Tube Feed Rate (mL per Hour): 40  Tube Feed Duration (in Hours): 24  Tube Feed Start Time: 18:00  Entered: Jul  3 2023  5:43PM  
This patient has been assessed with a concern for Malnutrition and has been determined to have a diagnosis/diagnoses of Severe protein-calorie malnutrition.    This patient is being managed with:   Diet Pureed-  Consistent Carbohydrate {No Snacks} (CSTCHO)  Mildly Thick Liquids (MILDTHICKLIQS)  Supplement Feeding Modality:  Oral  Glucerna Shake Cans or Servings Per Day:  1       Frequency:  Two Times a day  Ensure Max Cans or Servings Per Day:  1       Frequency:  Two Times a day  Entered: Jul 7 2023  1:48PM  
This patient has been assessed with a concern for Malnutrition and has been determined to have a diagnosis/diagnoses of Severe protein-calorie malnutrition.    This patient is being managed with:   Diet NPO with Tube Feed-  Tube Feeding Modality: Nasogastric  Nepro with Carb Steady (NEPRORTH)  Total Volume for 24 Hours (mL): 960  Total Number of Cans: 4  Continuous  Starting Tube Feed Rate {mL per Hour}: 10  Increase Tube Feed Rate by (mL): 10     Every 4 hours  Until Goal Tube Feed Rate (mL per Hour): 40  Tube Feed Duration (in Hours): 24  Tube Feed Start Time: 18:00  Entered: Jul  3 2023  5:43PM

## 2023-07-13 NOTE — PROGRESS NOTE ADULT - SUBJECTIVE AND OBJECTIVE BOX
SUBJECTIVE / INTERVAL HPI: Patient was seen and examined this morning.     CAPILLARY BLOOD GLUCOSE & INSULIN RECEIVED  85 mg/dL (07-12 @ 11:28) ? 7 units of lispro.   168 mg/dL (07-12 @ 17:07) ? 7 units of lispro + 1 unit of lispro sliding scale.   143 mg/dL (07-12 @ 21:36) ? 12 units of lantus.   116 mg/dL (07-13 @ 06:59) ? Ø (Comment: Dose held per parameter).  195 mg/dL (07-13 @ 11:01) ? 7 units of lispro + 1 unit of lispro sliding scale.     REVIEW OF SYSTEMS  Constitutional:  Negative fever or chills.   Cardiovascular:  Negative for chest pain or palpitations.  Respiratory:  (+) cough. Negative for wheezing, or shortness of breath.    Gastrointestinal:  Negative for nausea, vomiting, diarrhea, constipation, or abdominal pain.  Neurologic:  No headache, confusion, dizziness, lightheadedness.    PHYSICAL EXAM  Vital Signs Last 24 Hrs  T(C): 36.2 (13 Jul 2023 09:24), Max: 36.7 (13 Jul 2023 01:05)  T(F): 97.1 (13 Jul 2023 09:24), Max: 98.1 (13 Jul 2023 01:05)  HR: 56 (13 Jul 2023 08:30) (56 - 62)  BP: 126/62 (13 Jul 2023 08:30) (107/55 - 126/62)  BP(mean): 89 (13 Jul 2023 08:30) (77 - 89)  RR: 16 (13 Jul 2023 08:30) (16 - 18)  SpO2: 94% (13 Jul 2023 08:30) (90% - 95%)    Parameters below as of 13 Jul 2023 08:30  Patient On (Oxygen Delivery Method): room air    Constitutional: Awake, alert, elderly male, in no acute distress.   HEENT: Normocephalic, atraumatic, SCARLETT.  Respiratory: Lungs clear to ausculation bilaterally.   Cardiovascular: regular rhythm, normal S1 and S2, (+) midline sternotomy scar.   GI: soft, non-tender, non-distended, bowel sounds present.  Extremities: No lower extremity edema.   Psychiatric: AAO x 3.    LABS  CBC - WBC/HGB/HTC/PLT: 9.31/8.7/26.4/360 (07-13-23)  BMP - Na/K/Cl/Bicarb/BUN/Cr/Gluc/AG/eGFR: 128/4.8/91/27/84/2.01/80/10/33 (07-13-23)  Ca - 8.6 (07-13-23)  Phos - 3.4 (07-13-23)  Mg - 2.2 (07-13-23)  LFT - Alb/Tprot/Tbili/Dbili/AlkPhos/ALT/AST: 3.4/--/1.4/--/102/15/34 (07-11-23)  PT/aPTT/INR: 13.8/38.3/1.16 (07-12-23)   Thyroid Stimulating Hormone, Serum: 1.440 (06-28-23)    MEDICATIONS  MEDICATIONS  (STANDING):  aMIOdarone    Tablet 200 milliGRAM(s) Oral daily  aMIOdarone    Tablet 400  Oral   aspirin  chewable 81 milliGRAM(s) Oral daily  atorvastatin 40 milliGRAM(s) Oral at bedtime  bisacodyl 5 milliGRAM(s) Oral daily  chlorhexidine 2% Cloths 1 Application(s) Topical daily  dextrose 5%. 1000 milliLiter(s) (50 mL/Hr) IV Continuous <Continuous>  dextrose 5%. 1000 milliLiter(s) (100 mL/Hr) IV Continuous <Continuous>  dextrose 5%. 1000 milliLiter(s) (50 mL/Hr) IV Continuous <Continuous>  dextrose 5%. 1000 milliLiter(s) (100 mL/Hr) IV Continuous <Continuous>  dextrose 50% Injectable 25 Gram(s) IV Push once  dextrose 50% Injectable 25 Gram(s) IV Push once  dextrose 50% Injectable 12.5 Gram(s) IV Push once  glucagon  Injectable 1 milliGRAM(s) IntraMuscular once  glucagon  Injectable 1 milliGRAM(s) IntraMuscular once  heparin   Injectable 5000 Unit(s) SubCutaneous every 8 hours  insulin glargine Injectable (LANTUS) 12 Unit(s) SubCutaneous at bedtime  insulin lispro (ADMELOG) corrective regimen sliding scale   SubCutaneous Before meals and at bedtime  insulin lispro Injectable (ADMELOG) 7 Unit(s) SubCutaneous three times a day before meals  metoprolol succinate ER 12.5 milliGRAM(s) Oral daily  pantoprazole   Suspension 40 milliGRAM(s) Oral before breakfast  polyethylene glycol 3350 17 Gram(s) Oral two times a day  senna 2 Tablet(s) Oral at bedtime  sodium chloride 0.9% lock flush 3 milliLiter(s) IV Push every 8 hours  sodium chloride 0.9% lock flush 3 milliLiter(s) IV Push every 8 hours  sodium chloride 0.9%. 1000 milliLiter(s) (10 mL/Hr) IV Continuous <Continuous>  valsartan 20 milliGRAM(s) Oral every 12 hours    MEDICATIONS  (PRN):  dextrose Oral Gel 15 Gram(s) Oral once PRN Blood Glucose LESS THAN 70 milliGRAM(s)/deciliter    ASSESSMENT / RECOMMENDATIONS    A1C: 8.6 %  BUN: 84  Creatinine: 2.01  GFR: 33  Weight: 66.6  BMI: 21.5    # Type 2 diabetes mellitus with hyperglycemia  - Please continue lantus *** units at bedtime.   - Continue lispro *** units before each meal.  - Continue lispro moderate / low dose sliding scale before meals and at bedtime.  - Patient's fingerstick glucose goal is 100-180 mg/dL.    - Discharge recommendations to be discussed.   - Patient can follow up at discharge with [facility] by calling [phone] to make an appointment.      Case discussed with Dr. Caicedo. Primary team updated.       Milton Laura    Endocrinology Fellow    Service Pager: [phone]    SUBJECTIVE / INTERVAL HPI: Patient was seen and examined this morning. He complained of having difficulty sleeping overnight as people kept waking him up. Glucose levels have been mostly within target range. However, his glucose increased to 195 mg/dL after breakfast likely due to him drinking coconut water.     DIET:  - Dinner (7/12/23): Rice, beans, ensure max (1 container).   - Breakfast: (7/13/23): one slice of toast, 2 boiled eggs, ensure max (1 container), coffee and one glass of coconut water,     CAPILLARY BLOOD GLUCOSE & INSULIN RECEIVED  85 mg/dL (07-12 @ 11:28) ? 7 units of lispro.   168 mg/dL (07-12 @ 17:07) ? 7 units of lispro + 1 unit of lispro sliding scale.   143 mg/dL (07-12 @ 21:36) ? 12 units of lantus.   116 mg/dL (07-13 @ 06:59) ? Ø (Comment: Dose held per parameter).  195 mg/dL (07-13 @ 11:01) ? 7 units of lispro + 1 unit of lispro sliding scale.     REVIEW OF SYSTEMS  Constitutional:  Negative fever or chills.   Cardiovascular:  Negative for chest pain or palpitations.  Respiratory:  (+) cough. Negative for wheezing, or shortness of breath.    Gastrointestinal:  Negative for nausea, vomiting, diarrhea, constipation, or abdominal pain.  Neurologic:  No headache, confusion, dizziness, lightheadedness.    PHYSICAL EXAM  Vital Signs Last 24 Hrs  T(C): 36.2 (13 Jul 2023 09:24), Max: 36.7 (13 Jul 2023 01:05)  T(F): 97.1 (13 Jul 2023 09:24), Max: 98.1 (13 Jul 2023 01:05)  HR: 56 (13 Jul 2023 08:30) (56 - 62)  BP: 126/62 (13 Jul 2023 08:30) (107/55 - 126/62)  BP(mean): 89 (13 Jul 2023 08:30) (77 - 89)  RR: 16 (13 Jul 2023 08:30) (16 - 18)  SpO2: 94% (13 Jul 2023 08:30) (90% - 95%)    Parameters below as of 13 Jul 2023 08:30  Patient On (Oxygen Delivery Method): room air    Constitutional: Awake, alert, elderly male, in no acute distress.   HEENT: Normocephalic, atraumatic, SCARLETT.  Respiratory: Lungs clear to ausculation bilaterally.   Cardiovascular: regular rhythm, normal S1 and S2, (+) midline sternotomy scar.   GI: soft, non-tender, non-distended, bowel sounds present.  Extremities: No lower extremity edema.   Psychiatric: AAO x 3.    LABS  CBC - WBC/HGB/HTC/PLT: 9.31/8.7/26.4/360 (07-13-23)  BMP - Na/K/Cl/Bicarb/BUN/Cr/Gluc/AG/eGFR: 128/4.8/91/27/84/2.01/80/10/33 (07-13-23)  Ca - 8.6 (07-13-23)  Phos - 3.4 (07-13-23)  Mg - 2.2 (07-13-23)  LFT - Alb/Tprot/Tbili/Dbili/AlkPhos/ALT/AST: 3.4/--/1.4/--/102/15/34 (07-11-23)  PT/aPTT/INR: 13.8/38.3/1.16 (07-12-23)   Thyroid Stimulating Hormone, Serum: 1.440 (06-28-23)    MEDICATIONS  MEDICATIONS  (STANDING):  aMIOdarone    Tablet 200 milliGRAM(s) Oral daily  aMIOdarone    Tablet 400  Oral   aspirin  chewable 81 milliGRAM(s) Oral daily  atorvastatin 40 milliGRAM(s) Oral at bedtime  bisacodyl 5 milliGRAM(s) Oral daily  chlorhexidine 2% Cloths 1 Application(s) Topical daily  dextrose 5%. 1000 milliLiter(s) (50 mL/Hr) IV Continuous <Continuous>  dextrose 5%. 1000 milliLiter(s) (100 mL/Hr) IV Continuous <Continuous>  dextrose 5%. 1000 milliLiter(s) (50 mL/Hr) IV Continuous <Continuous>  dextrose 5%. 1000 milliLiter(s) (100 mL/Hr) IV Continuous <Continuous>  dextrose 50% Injectable 25 Gram(s) IV Push once  dextrose 50% Injectable 25 Gram(s) IV Push once  dextrose 50% Injectable 12.5 Gram(s) IV Push once  glucagon  Injectable 1 milliGRAM(s) IntraMuscular once  glucagon  Injectable 1 milliGRAM(s) IntraMuscular once  heparin   Injectable 5000 Unit(s) SubCutaneous every 8 hours  insulin glargine Injectable (LANTUS) 12 Unit(s) SubCutaneous at bedtime  insulin lispro (ADMELOG) corrective regimen sliding scale   SubCutaneous Before meals and at bedtime  insulin lispro Injectable (ADMELOG) 7 Unit(s) SubCutaneous three times a day before meals  metoprolol succinate ER 12.5 milliGRAM(s) Oral daily  pantoprazole   Suspension 40 milliGRAM(s) Oral before breakfast  polyethylene glycol 3350 17 Gram(s) Oral two times a day  senna 2 Tablet(s) Oral at bedtime  sodium chloride 0.9% lock flush 3 milliLiter(s) IV Push every 8 hours  sodium chloride 0.9% lock flush 3 milliLiter(s) IV Push every 8 hours  sodium chloride 0.9%. 1000 milliLiter(s) (10 mL/Hr) IV Continuous <Continuous>  valsartan 20 milliGRAM(s) Oral every 12 hours    MEDICATIONS  (PRN):  dextrose Oral Gel 15 Gram(s) Oral once PRN Blood Glucose LESS THAN 70 milliGRAM(s)/deciliter    ASSESSMENT / RECOMMENDATIONS    A1C: 8.6 %  BUN: 84  Creatinine: 2.01  GFR: 33  Weight: 66.6  BMI: 21.5    # Type 2 diabetes mellitus with hyperglycemia  - Please continue lantus *** units at bedtime.   - Continue lispro *** units before each meal.  - Continue lispro moderate / low dose sliding scale before meals and at bedtime.  - Patient's fingerstick glucose goal is 100-180 mg/dL.    - Discharge recommendations to be discussed.   - Patient can follow up at discharge with [facility] by calling [phone] to make an appointment.      Case discussed with Dr. Caicedo. Primary team updated.       Milton Laura    Endocrinology Fellow    Service Pager: [phone]    SUBJECTIVE / INTERVAL HPI: Patient was seen and examined this morning. He complained of having difficulty sleeping overnight as people kept waking him up. Glucose levels have been mostly within target range. However, his glucose increased to 195 mg/dL after breakfast likely due to him drinking coconut water.     DIET:  - Dinner (7/12/23): Rice, beans, ensure max (1 container).   - Breakfast: (7/13/23): one slice of toast, 2 boiled eggs, ensure max (1 container), coffee and one glass of coconut water,     CAPILLARY BLOOD GLUCOSE & INSULIN RECEIVED  85 mg/dL (07-12 @ 11:28) ? 7 units of lispro.   168 mg/dL (07-12 @ 17:07) ? 7 units of lispro + 1 unit of lispro sliding scale.   143 mg/dL (07-12 @ 21:36) ? 12 units of lantus.   116 mg/dL (07-13 @ 06:59) ? Ø (Comment: Dose held per parameter).  195 mg/dL (07-13 @ 11:01) ? 7 units of lispro + 1 unit of lispro sliding scale.     REVIEW OF SYSTEMS  Constitutional:  Negative fever or chills.   Cardiovascular:  Negative for chest pain or palpitations.  Respiratory:  (+) cough. Negative for wheezing, or shortness of breath.    Gastrointestinal:  Negative for nausea, vomiting, diarrhea, constipation, or abdominal pain.  Neurologic:  No headache, confusion, dizziness, lightheadedness.    PHYSICAL EXAM  Vital Signs Last 24 Hrs  T(C): 36.2 (13 Jul 2023 09:24), Max: 36.7 (13 Jul 2023 01:05)  T(F): 97.1 (13 Jul 2023 09:24), Max: 98.1 (13 Jul 2023 01:05)  HR: 56 (13 Jul 2023 08:30) (56 - 62)  BP: 126/62 (13 Jul 2023 08:30) (107/55 - 126/62)  BP(mean): 89 (13 Jul 2023 08:30) (77 - 89)  RR: 16 (13 Jul 2023 08:30) (16 - 18)  SpO2: 94% (13 Jul 2023 08:30) (90% - 95%)    Parameters below as of 13 Jul 2023 08:30  Patient On (Oxygen Delivery Method): room air    Constitutional: Awake, alert, elderly male, in no acute distress.   HEENT: Normocephalic, atraumatic, SCARLETT.  Respiratory: Lungs clear to ausculation bilaterally.   Cardiovascular: regular rhythm, normal S1 and S2, (+) midline sternotomy scar.   GI: soft, non-tender, non-distended, bowel sounds present.  Extremities: No lower extremity edema.   Psychiatric: AAO x 3.    LABS  CBC - WBC/HGB/HTC/PLT: 9.31/8.7/26.4/360 (07-13-23)  BMP - Na/K/Cl/Bicarb/BUN/Cr/Gluc/AG/eGFR: 128/4.8/91/27/84/2.01/80/10/33 (07-13-23)  Ca - 8.6 (07-13-23)  Phos - 3.4 (07-13-23)  Mg - 2.2 (07-13-23)  LFT - Alb/Tprot/Tbili/Dbili/AlkPhos/ALT/AST: 3.4/--/1.4/--/102/15/34 (07-11-23)  PT/aPTT/INR: 13.8/38.3/1.16 (07-12-23)   Thyroid Stimulating Hormone, Serum: 1.440 (06-28-23)    MEDICATIONS  MEDICATIONS  (STANDING):  aMIOdarone    Tablet 200 milliGRAM(s) Oral daily  aMIOdarone    Tablet 400  Oral   aspirin  chewable 81 milliGRAM(s) Oral daily  atorvastatin 40 milliGRAM(s) Oral at bedtime  bisacodyl 5 milliGRAM(s) Oral daily  chlorhexidine 2% Cloths 1 Application(s) Topical daily  dextrose 5%. 1000 milliLiter(s) (50 mL/Hr) IV Continuous <Continuous>  dextrose 5%. 1000 milliLiter(s) (100 mL/Hr) IV Continuous <Continuous>  dextrose 5%. 1000 milliLiter(s) (50 mL/Hr) IV Continuous <Continuous>  dextrose 5%. 1000 milliLiter(s) (100 mL/Hr) IV Continuous <Continuous>  dextrose 50% Injectable 25 Gram(s) IV Push once  dextrose 50% Injectable 25 Gram(s) IV Push once  dextrose 50% Injectable 12.5 Gram(s) IV Push once  glucagon  Injectable 1 milliGRAM(s) IntraMuscular once  glucagon  Injectable 1 milliGRAM(s) IntraMuscular once  heparin   Injectable 5000 Unit(s) SubCutaneous every 8 hours  insulin glargine Injectable (LANTUS) 12 Unit(s) SubCutaneous at bedtime  insulin lispro (ADMELOG) corrective regimen sliding scale   SubCutaneous Before meals and at bedtime  insulin lispro Injectable (ADMELOG) 7 Unit(s) SubCutaneous three times a day before meals  metoprolol succinate ER 12.5 milliGRAM(s) Oral daily  pantoprazole   Suspension 40 milliGRAM(s) Oral before breakfast  polyethylene glycol 3350 17 Gram(s) Oral two times a day  senna 2 Tablet(s) Oral at bedtime  sodium chloride 0.9% lock flush 3 milliLiter(s) IV Push every 8 hours  sodium chloride 0.9% lock flush 3 milliLiter(s) IV Push every 8 hours  sodium chloride 0.9%. 1000 milliLiter(s) (10 mL/Hr) IV Continuous <Continuous>  valsartan 20 milliGRAM(s) Oral every 12 hours    MEDICATIONS  (PRN):  dextrose Oral Gel 15 Gram(s) Oral once PRN Blood Glucose LESS THAN 70 milliGRAM(s)/deciliter    ASSESSMENT / RECOMMENDATIONS  Mr. Johnson is a 78-year-old male with type 2 diabetes mellitus, hypertension and severe aortic stenosis who was transferred to [facility] for further work-up and intervention of severe aortic stenosis, now s/p aortic valve replacement (6/30/23). Endocrinology was consulted for recommendations regarding diabetes management.     A1C: 8.6 %  BUN: 84  Creatinine: 2.01  GFR: 33  Weight: 66.6  BMI: 21.5    # Type 2 diabetes mellitus with hyperglycemia  - Please continue lantus *** units at bedtime.   - Continue lispro *** units before each meal.  - Continue lispro moderate / low dose sliding scale before meals and at bedtime.  - Patient's fingerstick glucose goal is 100-180 mg/dL.    - Discharge recommendations to be discussed.   - Patient can follow up at discharge with [facility] by calling [phone] to make an appointment.      Case discussed with Dr. Caicedo. Primary team updated.       Milton Laura    Endocrinology Fellow    Service Pager: [phone]    SUBJECTIVE / INTERVAL HPI: Patient was seen and examined this morning. He complained of having difficulty sleeping overnight as people kept waking him up. Glucose levels have been mostly within target range. However, his glucose increased to 195 mg/dL after breakfast likely due to him drinking coconut water.     DIET:  - Dinner (7/12/23): Rice, beans, ensure max (1 container).   - Breakfast: (7/13/23): one slice of toast, 2 boiled eggs, ensure max (1 container), coffee and one glass of coconut water,     CAPILLARY BLOOD GLUCOSE & INSULIN RECEIVED  85 mg/dL (07-12 @ 11:28) ? 7 units of lispro.   168 mg/dL (07-12 @ 17:07) ? 7 units of lispro + 1 unit of lispro sliding scale.   143 mg/dL (07-12 @ 21:36) ? 12 units of lantus.   116 mg/dL (07-13 @ 06:59) ? Ø (Comment: Dose held per parameter).  195 mg/dL (07-13 @ 11:01) ? 7 units of lispro + 1 unit of lispro sliding scale.     REVIEW OF SYSTEMS  Constitutional:  Negative fever or chills.   Cardiovascular:  Negative for chest pain or palpitations.  Respiratory:  (+) cough. Negative for wheezing, or shortness of breath.    Gastrointestinal:  Negative for nausea, vomiting, diarrhea, constipation, or abdominal pain.  Neurologic:  No headache, confusion, dizziness, lightheadedness.    PHYSICAL EXAM  Vital Signs Last 24 Hrs  T(C): 36.2 (13 Jul 2023 09:24), Max: 36.7 (13 Jul 2023 01:05)  T(F): 97.1 (13 Jul 2023 09:24), Max: 98.1 (13 Jul 2023 01:05)  HR: 56 (13 Jul 2023 08:30) (56 - 62)  BP: 126/62 (13 Jul 2023 08:30) (107/55 - 126/62)  BP(mean): 89 (13 Jul 2023 08:30) (77 - 89)  RR: 16 (13 Jul 2023 08:30) (16 - 18)  SpO2: 94% (13 Jul 2023 08:30) (90% - 95%)    Parameters below as of 13 Jul 2023 08:30  Patient On (Oxygen Delivery Method): room air    Constitutional: Awake, alert, elderly male, in no acute distress.   HEENT: Normocephalic, atraumatic, SCARLETT.  Respiratory: Lungs clear to ausculation bilaterally.   Cardiovascular: regular rhythm, normal S1 and S2, (+) midline sternotomy scar.   GI: soft, non-tender, non-distended, bowel sounds present.  Extremities: No lower extremity edema.   Psychiatric: AAO x 3.    LABS  CBC - WBC/HGB/HTC/PLT: 9.31/8.7/26.4/360 (07-13-23)  BMP - Na/K/Cl/Bicarb/BUN/Cr/Gluc/AG/eGFR: 128/4.8/91/27/84/2.01/80/10/33 (07-13-23)  Ca - 8.6 (07-13-23)  Phos - 3.4 (07-13-23)  Mg - 2.2 (07-13-23)  LFT - Alb/Tprot/Tbili/Dbili/AlkPhos/ALT/AST: 3.4/--/1.4/--/102/15/34 (07-11-23)  PT/aPTT/INR: 13.8/38.3/1.16 (07-12-23)   Thyroid Stimulating Hormone, Serum: 1.440 (06-28-23)    MEDICATIONS  MEDICATIONS  (STANDING):  aMIOdarone    Tablet 200 milliGRAM(s) Oral daily  aMIOdarone    Tablet 400  Oral   aspirin  chewable 81 milliGRAM(s) Oral daily  atorvastatin 40 milliGRAM(s) Oral at bedtime  bisacodyl 5 milliGRAM(s) Oral daily  chlorhexidine 2% Cloths 1 Application(s) Topical daily  dextrose 5%. 1000 milliLiter(s) (50 mL/Hr) IV Continuous <Continuous>  dextrose 5%. 1000 milliLiter(s) (100 mL/Hr) IV Continuous <Continuous>  dextrose 5%. 1000 milliLiter(s) (50 mL/Hr) IV Continuous <Continuous>  dextrose 5%. 1000 milliLiter(s) (100 mL/Hr) IV Continuous <Continuous>  dextrose 50% Injectable 25 Gram(s) IV Push once  dextrose 50% Injectable 25 Gram(s) IV Push once  dextrose 50% Injectable 12.5 Gram(s) IV Push once  glucagon  Injectable 1 milliGRAM(s) IntraMuscular once  glucagon  Injectable 1 milliGRAM(s) IntraMuscular once  heparin   Injectable 5000 Unit(s) SubCutaneous every 8 hours  insulin glargine Injectable (LANTUS) 12 Unit(s) SubCutaneous at bedtime  insulin lispro (ADMELOG) corrective regimen sliding scale   SubCutaneous Before meals and at bedtime  insulin lispro Injectable (ADMELOG) 7 Unit(s) SubCutaneous three times a day before meals  metoprolol succinate ER 12.5 milliGRAM(s) Oral daily  pantoprazole   Suspension 40 milliGRAM(s) Oral before breakfast  polyethylene glycol 3350 17 Gram(s) Oral two times a day  senna 2 Tablet(s) Oral at bedtime  sodium chloride 0.9% lock flush 3 milliLiter(s) IV Push every 8 hours  sodium chloride 0.9% lock flush 3 milliLiter(s) IV Push every 8 hours  sodium chloride 0.9%. 1000 milliLiter(s) (10 mL/Hr) IV Continuous <Continuous>  valsartan 20 milliGRAM(s) Oral every 12 hours    MEDICATIONS  (PRN):  dextrose Oral Gel 15 Gram(s) Oral once PRN Blood Glucose LESS THAN 70 milliGRAM(s)/deciliter    ASSESSMENT / RECOMMENDATIONS  Mr. Johnson is a 78-year-old male with type 2 diabetes mellitus, hypertension and severe aortic stenosis who was transferred to [facility] for further work-up and intervention of severe aortic stenosis, now s/p aortic valve replacement (6/30/23). Endocrinology was consulted for recommendations regarding diabetes management.     A1C: 8.6 %  BUN: 84  Creatinine: 2.01  GFR: 33  Weight: 66.6  BMI: 21.5    # Type 2 diabetes mellitus with hyperglycemia  - Please continue lantus 12 units at bedtime.   - Continue lispro 7 units before each meal.  - Continue lispro moderate dose sliding scale before meals and at bedtime.  - Patient's fingerstick glucose goal is 100-180 mg/dL.    - Discharge recommendations to be discussed.   - Patient can follow up at discharge with [facility] by calling [phone] to make an appointment.      Case discussed with Dr. Caicedo. Primary team updated.       Milton Laura    Endocrinology Fellow    Service Pager: [phone]

## 2023-07-13 NOTE — PROGRESS NOTE ADULT - ATTENDING COMMENTS
Pt seen on rounds this afternoon.  Was again OOB in a chair, breathing comfortably.  Denied any dysphagia, and says that his voice was nearly back to normal, though he still seemed slightly hoarse  Glucoses have been mostly in target [id number] late yesterday, 116 pre-breakfast today.  The mild spike to 195 after breakfast was due to a combination lack of premeal lispro plus the glass of coconut water he drank--(11 gm sugar per 8 oz)  Appetite is still only fair at best.  He is drinking Ensure Max at every meal, and seems to prefer this to the food  Will continue the current regimen of 12 Lantus, 7 lispro premeal.    He should be able to return to oral agents post discharge.

## 2023-07-13 NOTE — PROGRESS NOTE ADULT - SUBJECTIVE AND OBJECTIVE BOX
Patient discussed on morning rounds with Dr. Koehler     Operation / Date: 6/29 AVR     SUBJECTIVE ASSESSMENT:  78y Male reports feeling much better today. States that the cough he had a few days ago has improved and no longer productive. No other complaints at this time.     Vital Signs Last 24 Hrs  T(C): 36.2 (13 Jul 2023 16:53), Max: 36.7 (13 Jul 2023 01:05)  T(F): 97.1 (13 Jul 2023 16:53), Max: 98.1 (13 Jul 2023 01:05)  HR: 54 (13 Jul 2023 17:00) (54 - 62)  BP: 115/55 (13 Jul 2023 17:00) (107/55 - 126/62)  BP(mean): 79 (13 Jul 2023 17:00) (75 - 89)  RR: 16 (13 Jul 2023 17:00) (16 - 18)  SpO2: 95% (13 Jul 2023 17:00) (90% - 96%)    Parameters below as of 13 Jul 2023 17:00  Patient On (Oxygen Delivery Method): room air      I&O's Detail    12 Jul 2023 07:01  -  13 Jul 2023 07:00  --------------------------------------------------------  IN:  Total IN: 0 mL    OUT:    Voided (mL): 900 mL  Total OUT: 900 mL    Total NET: -900 mL      13 Jul 2023 07:01  -  13 Jul 2023 17:23  --------------------------------------------------------  IN:    Oral Fluid: 200 mL  Total IN: 200 mL    OUT:    Voided (mL): 300 mL  Total OUT: 300 mL    Total NET: -100 mL      PHYSICAL EXAM:    GEN: NAD, looks comfortable  Psych: Mood appropriate  Neuro: A&Ox3.  No focal deficits.  Moving all extremities.   HEENT: No obvious abnormalities  CV: S1S2, regular, no murmurs appreciated.  No carotid bruits.  No JVD  Lungs: Clear B/L.  No wheezing, rales or rhonchi  ABD: Soft, non-tender, non-distended.  +Bowel sounds  EXT: Warm and well perfused.  No peripheral edema noted  Musculoskeletal: Moving all extremities with normal ROM, no joint swelling  PV: Pedal pulses palpable      LABS:                        8.7    9.31  )-----------( 360      ( 13 Jul 2023 05:30 )             26.4       PT/INR - ( 12 Jul 2023 02:37 )   PT: 13.8 sec;   INR: 1.16          PTT - ( 12 Jul 2023 02:37 )  PTT:38.3 sec    07-13    128<L>  |  91<L>  |  84<H>  ----------------------------<  80  4.8   |  27  |  2.01<H>    Ca    8.6      13 Jul 2023 05:30  Phos  3.4     07-13  Mg     2.2     07-13        Urinalysis Basic - ( 13 Jul 2023 05:30 )    Color: x / Appearance: x / SG: x / pH: x  Gluc: 80 mg/dL / Ketone: x  / Bili: x / Urobili: x   Blood: x / Protein: x / Nitrite: x   Leuk Esterase: x / RBC: x / WBC x   Sq Epi: x / Non Sq Epi: x / Bacteria: x        MEDICATIONS  (STANDING):  aMIOdarone    Tablet 200 milliGRAM(s) Oral daily  aMIOdarone    Tablet 400  Oral   aspirin  chewable 81 milliGRAM(s) Oral daily  atorvastatin 40 milliGRAM(s) Oral at bedtime  bisacodyl 5 milliGRAM(s) Oral daily  chlorhexidine 2% Cloths 1 Application(s) Topical daily  dextrose 5%. 1000 milliLiter(s) (50 mL/Hr) IV Continuous <Continuous>  dextrose 5%. 1000 milliLiter(s) (100 mL/Hr) IV Continuous <Continuous>  dextrose 5%. 1000 milliLiter(s) (50 mL/Hr) IV Continuous <Continuous>  dextrose 5%. 1000 milliLiter(s) (100 mL/Hr) IV Continuous <Continuous>  dextrose 50% Injectable 12.5 Gram(s) IV Push once  dextrose 50% Injectable 25 Gram(s) IV Push once  dextrose 50% Injectable 25 Gram(s) IV Push once  furosemide    Tablet 40 milliGRAM(s) Oral daily  glucagon  Injectable 1 milliGRAM(s) IntraMuscular once  glucagon  Injectable 1 milliGRAM(s) IntraMuscular once  heparin   Injectable 5000 Unit(s) SubCutaneous every 8 hours  insulin glargine Injectable (LANTUS) 12 Unit(s) SubCutaneous at bedtime  insulin lispro (ADMELOG) corrective regimen sliding scale   SubCutaneous Before meals and at bedtime  insulin lispro Injectable (ADMELOG) 7 Unit(s) SubCutaneous three times a day before meals  metoprolol succinate ER 12.5 milliGRAM(s) Oral daily  pantoprazole   Suspension 40 milliGRAM(s) Oral before breakfast  polyethylene glycol 3350 17 Gram(s) Oral two times a day  senna 2 Tablet(s) Oral at bedtime  sodium chloride 0.9% lock flush 3 milliLiter(s) IV Push every 8 hours  sodium chloride 0.9% lock flush 3 milliLiter(s) IV Push every 8 hours  sodium chloride 0.9%. 1000 milliLiter(s) (10 mL/Hr) IV Continuous <Continuous>  valsartan 40 milliGRAM(s) Oral daily    MEDICATIONS  (PRN):  dextrose Oral Gel 15 Gram(s) Oral once PRN Blood Glucose LESS THAN 70 milliGRAM(s)/deciliter        RADIOLOGY & ADDITIONAL TESTS:

## 2023-07-13 NOTE — PROGRESS NOTE ADULT - ASSESSMENT
78 year old male HTN and DM who originally presented to [facility] on 6/26 c/o chest pain who is now POD#12 from an AVR. Patient arrived to the ICU intubated on levophed. Overnight he had a R PTX noted requiring a pigtail placement. POD#1 he was able to be extubated. His creatinine bumped and pressors were started for better renal perfusion. On POD#2 patient was found to be cool and clammy. Echo performed revealing a new EF of 10%. He was then started on Dobutamine and Milrinone. He was electively intubated for cardiogenic shock. HF consulted and Bumex gtt was started. POD#5 Extubated to [facility]. POD#6 Saloni added for increasing pulmonary artery pressures. On POD#7 patient was allowed permissive hypertension for renal perfusion. Dobutamine held and Hydral and isordil added for GDMT. POD#8 right Pigtail removed. POD#9 Bumex held. Jo removed and then replaced for retention. Echo performed with evidence of a small pericardial effusion. Today patient was transferred to [facility].      Neuro: pain management   - Oxy PRN     CVS: POD#12 from AVR, post op with cardiogenic shock with a new EF of 10%. Most recent echo with mildly reduced BiV fucntion; post op afib now in NSR  - Continue ASA 81 and atorvastatin 40 mg POD for CAD   - afib ppx wtih amio taper.   - continue Toprol 12.5 daily and Increased Valsartan 40mg BID for GDMT   - continue to follow CHF recs.     Respiratory: Saturating well on RA    - Wean off O2 sat > 92%  - IS and ambulation  - Chest PT.     GI: Tolerating PO diet   - GI PPX   - Bowel regimen with Senna and Miralax     : BUN/ Creatinine. 84/2.01 - Jo;   - Hyponatremic 128 continuing to trend.   - creatinine plateued.  - Lasix 40 mg Daily per heart failure.   - monitor I/Os.     Endo: A1c 8.6 FS low before lunch. in the 200s after dinner yesterday.   - continue current regimen. Will reassess with dinner FS   - continue 7 Lispro with meals   - continue 12 Lantus at bedtime.   - ISS     ID: WBC 9.31. afebrile.   - completed periop ABX   - continue to monitor fever curve     Heme: DVT PPX   - [facility]     Shala Christina PA-C

## 2023-07-13 NOTE — PROGRESS NOTE ADULT - SUBJECTIVE AND OBJECTIVE BOX
VIPIN FRANCE  78y  Male    Patient is a 78y old  Male who presents with a chief complaint of severe AS and AI (13 Jul 2023 11:16)      INTERVAL HPI/OVERNIGHT EVENTS: Pt with no complaints this am.       T(C): 36.2 (07-13-23 @ 09:24), Max: 36.7 (07-13-23 @ 01:05)  HR: 56 (07-13-23 @ 08:30) (56 - 62)  BP: 126/62 (07-13-23 @ 08:30) (107/55 - 126/62)  RR: 16 (07-13-23 @ 08:30) (16 - 18)  SpO2: 94% (07-13-23 @ 08:30) (90% - 95%)  Wt(kg): --Vital Signs Last 24 Hrs  T(C): 36.2 (13 Jul 2023 09:24), Max: 36.7 (13 Jul 2023 01:05)  T(F): 97.1 (13 Jul 2023 09:24), Max: 98.1 (13 Jul 2023 01:05)  HR: 56 (13 Jul 2023 08:30) (56 - 62)  BP: 126/62 (13 Jul 2023 08:30) (107/55 - 126/62)  BP(mean): 89 (13 Jul 2023 08:30) (77 - 89)  RR: 16 (13 Jul 2023 08:30) (16 - 18)  SpO2: 94% (13 Jul 2023 08:30) (90% - 95%)    Parameters below as of 13 Jul 2023 08:30  Patient On (Oxygen Delivery Method): room air        PHYSICAL EXAM:  GENERAL: NADHEAD:  Atraumatic, Normocephalic  NECK: Slight JVD present even when sitting at 90 degrees   CHEST/LUNG: Clear to auscultation bilaterally; No rales, rhonchi, wheezing, or rubs  HEART: Regular rate and rhythm; No murmurs, rubs, or gallops  ABDOMEN: Soft,  EXTREMITIES: 1+ LE edema       Consultant(s) Notes Reviewed:  [x ] YES  [ ] NO  Care Discussed with Consultants/Other Providers [ x] YES  [ ] NO    LABS:                        8.7    9.31  )-----------( 360      ( 13 Jul 2023 05:30 )             26.4     07-13    128<L>  |  91<L>  |  84<H>  ----------------------------<  80  4.8   |  27  |  2.01<H>    Ca    8.6      13 Jul 2023 05:30  Phos  3.4     07-13  Mg     2.2     07-13        PT/INR - ( 12 Jul 2023 02:37 )   PT: 13.8 sec;   INR: 1.16          PTT - ( 12 Jul 2023 02:37 )  PTT:38.3 sec  Urinalysis Basic - ( 13 Jul 2023 05:30 )    Color: x / Appearance: x / SG: x / pH: x  Gluc: 80 mg/dL / Ketone: x  / Bili: x / Urobili: x   Blood: x / Protein: x / Nitrite: x   Leuk Esterase: x / RBC: x / WBC x   Sq Epi: x / Non Sq Epi: x / Bacteria: x      CAPILLARY BLOOD GLUCOSE      POCT Blood Glucose.: 195 mg/dL (13 Jul 2023 11:01)  POCT Blood Glucose.: 116 mg/dL (13 Jul 2023 06:59)  POCT Blood Glucose.: 143 mg/dL (12 Jul 2023 21:36)  POCT Blood Glucose.: 168 mg/dL (12 Jul 2023 17:07)        Urinalysis Basic - ( 13 Jul 2023 05:30 )    Color: x / Appearance: x / SG: x / pH: x  Gluc: 80 mg/dL / Ketone: x  / Bili: x / Urobili: x   Blood: x / Protein: x / Nitrite: x   Leuk Esterase: x / RBC: x / WBC x   Sq Epi: x / Non Sq Epi: x / Bacteria: x        RADIOLOGY & ADDITIONAL TESTS:    Imaging Personally Reviewed:  [ ] YES  [ ] NO    HEALTH ISSUES - PROBLEM Dx:  Acute on chronic renal failure

## 2023-07-13 NOTE — PROGRESS NOTE ADULT - ATTENDING COMMENTS
JVP elevated  b/l pedal edema    will start lasix 40mg po daily  cont low dose toprol xl. HR 50-60's  cont valsartan--> increase dose   can add SGLT2i next  EF improved  Pt can follow with Dr. Vogel for cardiology  HF will sign off. pls call with questions

## 2023-07-14 ENCOUNTER — TRANSCRIPTION ENCOUNTER (OUTPATIENT)
Age: 78
End: 2023-07-14

## 2023-07-14 VITALS — TEMPERATURE: 97 F

## 2023-07-14 PROBLEM — E11.9 TYPE 2 DIABETES MELLITUS WITHOUT COMPLICATIONS: Chronic | Status: ACTIVE | Noted: 2023-06-28

## 2023-07-14 PROBLEM — I10 ESSENTIAL (PRIMARY) HYPERTENSION: Chronic | Status: ACTIVE | Noted: 2023-06-28

## 2023-07-14 PROBLEM — I35.1 NONRHEUMATIC AORTIC (VALVE) INSUFFICIENCY: Chronic | Status: ACTIVE | Noted: 2023-06-28

## 2023-07-14 LAB
ALBUMIN SERPL ELPH-MCNC: 3.3 G/DL — SIGNIFICANT CHANGE UP (ref 3.3–5)
ALP SERPL-CCNC: 98 U/L — SIGNIFICANT CHANGE UP (ref 40–120)
ALT FLD-CCNC: 21 U/L — SIGNIFICANT CHANGE UP (ref 10–45)
ANION GAP SERPL CALC-SCNC: 10 MMOL/L — SIGNIFICANT CHANGE UP (ref 5–17)
APTT BLD: 33.8 SEC — SIGNIFICANT CHANGE UP (ref 27.5–35.5)
AST SERPL-CCNC: 41 U/L — HIGH (ref 10–40)
BILIRUB SERPL-MCNC: 1 MG/DL — SIGNIFICANT CHANGE UP (ref 0.2–1.2)
BUN SERPL-MCNC: 78 MG/DL — HIGH (ref 7–23)
CALCIUM SERPL-MCNC: 8.4 MG/DL — SIGNIFICANT CHANGE UP (ref 8.4–10.5)
CHLORIDE SERPL-SCNC: 92 MMOL/L — LOW (ref 96–108)
CO2 SERPL-SCNC: 27 MMOL/L — SIGNIFICANT CHANGE UP (ref 22–31)
CREAT SERPL-MCNC: 1.85 MG/DL — HIGH (ref 0.5–1.3)
EGFR: 36 ML/MIN/1.73M2 — LOW
GLUCOSE BLDC GLUCOMTR-MCNC: 187 MG/DL — HIGH (ref 70–99)
GLUCOSE BLDC GLUCOMTR-MCNC: 191 MG/DL — HIGH (ref 70–99)
GLUCOSE BLDC GLUCOMTR-MCNC: 74 MG/DL — SIGNIFICANT CHANGE UP (ref 70–99)
GLUCOSE SERPL-MCNC: 73 MG/DL — SIGNIFICANT CHANGE UP (ref 70–99)
HCT VFR BLD CALC: 27 % — LOW (ref 39–50)
HGB BLD-MCNC: 9 G/DL — LOW (ref 13–17)
INR BLD: 1.14 — SIGNIFICANT CHANGE UP (ref 0.88–1.16)
MAGNESIUM SERPL-MCNC: 2.2 MG/DL — SIGNIFICANT CHANGE UP (ref 1.6–2.6)
MCHC RBC-ENTMCNC: 31.9 PG — SIGNIFICANT CHANGE UP (ref 27–34)
MCHC RBC-ENTMCNC: 33.3 GM/DL — SIGNIFICANT CHANGE UP (ref 32–36)
MCV RBC AUTO: 95.7 FL — SIGNIFICANT CHANGE UP (ref 80–100)
NRBC # BLD: 0 /100 WBCS — SIGNIFICANT CHANGE UP (ref 0–0)
PHOSPHATE SERPL-MCNC: 3.9 MG/DL — SIGNIFICANT CHANGE UP (ref 2.5–4.5)
PLATELET # BLD AUTO: 424 K/UL — HIGH (ref 150–400)
POTASSIUM SERPL-MCNC: 4.9 MMOL/L — SIGNIFICANT CHANGE UP (ref 3.5–5.3)
POTASSIUM SERPL-SCNC: 4.9 MMOL/L — SIGNIFICANT CHANGE UP (ref 3.5–5.3)
PROT SERPL-MCNC: 6.7 G/DL — SIGNIFICANT CHANGE UP (ref 6–8.3)
PROTHROM AB SERPL-ACNC: 13.6 SEC — HIGH (ref 10.5–13.4)
RBC # BLD: 2.82 M/UL — LOW (ref 4.2–5.8)
RBC # FLD: 15.2 % — HIGH (ref 10.3–14.5)
SODIUM SERPL-SCNC: 129 MMOL/L — LOW (ref 135–145)
WBC # BLD: 9.31 K/UL — SIGNIFICANT CHANGE UP (ref 3.8–10.5)
WBC # FLD AUTO: 9.31 K/UL — SIGNIFICANT CHANGE UP (ref 3.8–10.5)

## 2023-07-14 PROCEDURE — 94660 CPAP INITIATION&MGMT: CPT

## 2023-07-14 PROCEDURE — C1751: CPT

## 2023-07-14 PROCEDURE — 97530 THERAPEUTIC ACTIVITIES: CPT

## 2023-07-14 PROCEDURE — 92526 ORAL FUNCTION THERAPY: CPT

## 2023-07-14 PROCEDURE — 84156 ASSAY OF PROTEIN URINE: CPT

## 2023-07-14 PROCEDURE — 81001 URINALYSIS AUTO W/SCOPE: CPT

## 2023-07-14 PROCEDURE — 83036 HEMOGLOBIN GLYCOSYLATED A1C: CPT

## 2023-07-14 PROCEDURE — P9047: CPT

## 2023-07-14 PROCEDURE — C1768: CPT

## 2023-07-14 PROCEDURE — 94002 VENT MGMT INPAT INIT DAY: CPT

## 2023-07-14 PROCEDURE — 84443 ASSAY THYROID STIM HORMONE: CPT

## 2023-07-14 PROCEDURE — 86022 PLATELET ANTIBODIES: CPT

## 2023-07-14 PROCEDURE — 94150 VITAL CAPACITY TEST: CPT

## 2023-07-14 PROCEDURE — 99232 SBSQ HOSP IP/OBS MODERATE 35: CPT | Mod: GC

## 2023-07-14 PROCEDURE — 84295 ASSAY OF SERUM SODIUM: CPT

## 2023-07-14 PROCEDURE — 84100 ASSAY OF PHOSPHORUS: CPT

## 2023-07-14 PROCEDURE — 86900 BLOOD TYPING SEROLOGIC ABO: CPT

## 2023-07-14 PROCEDURE — 85610 PROTHROMBIN TIME: CPT

## 2023-07-14 PROCEDURE — 86923 COMPATIBILITY TEST ELECTRIC: CPT

## 2023-07-14 PROCEDURE — 86901 BLOOD TYPING SEROLOGIC RH(D): CPT

## 2023-07-14 PROCEDURE — 80053 COMPREHEN METABOLIC PANEL: CPT

## 2023-07-14 PROCEDURE — 87070 CULTURE OTHR SPECIMN AEROBIC: CPT

## 2023-07-14 PROCEDURE — 83605 ASSAY OF LACTIC ACID: CPT

## 2023-07-14 PROCEDURE — 94640 AIRWAY INHALATION TREATMENT: CPT

## 2023-07-14 PROCEDURE — C1889: CPT

## 2023-07-14 PROCEDURE — 83935 ASSAY OF URINE OSMOLALITY: CPT

## 2023-07-14 PROCEDURE — 93321 DOPPLER ECHO F-UP/LMTD STD: CPT

## 2023-07-14 PROCEDURE — 85730 THROMBOPLASTIN TIME PARTIAL: CPT

## 2023-07-14 PROCEDURE — 97161 PT EVAL LOW COMPLEX 20 MIN: CPT

## 2023-07-14 PROCEDURE — 97165 OT EVAL LOW COMPLEX 30 MIN: CPT

## 2023-07-14 PROCEDURE — P9045: CPT

## 2023-07-14 PROCEDURE — 82330 ASSAY OF CALCIUM: CPT

## 2023-07-14 PROCEDURE — 36415 COLL VENOUS BLD VENIPUNCTURE: CPT

## 2023-07-14 PROCEDURE — 82803 BLOOD GASES ANY COMBINATION: CPT

## 2023-07-14 PROCEDURE — C1894: CPT

## 2023-07-14 PROCEDURE — 82550 ASSAY OF CK (CPK): CPT

## 2023-07-14 PROCEDURE — 36430 TRANSFUSION BLD/BLD COMPNT: CPT

## 2023-07-14 PROCEDURE — 93005 ELECTROCARDIOGRAM TRACING: CPT

## 2023-07-14 PROCEDURE — 85025 COMPLETE CBC W/AUTO DIFF WBC: CPT

## 2023-07-14 PROCEDURE — 82947 ASSAY GLUCOSE BLOOD QUANT: CPT

## 2023-07-14 PROCEDURE — 83735 ASSAY OF MAGNESIUM: CPT

## 2023-07-14 PROCEDURE — 80048 BASIC METABOLIC PNL TOTAL CA: CPT

## 2023-07-14 PROCEDURE — 82533 TOTAL CORTISOL: CPT

## 2023-07-14 PROCEDURE — 93306 TTE W/DOPPLER COMPLETE: CPT

## 2023-07-14 PROCEDURE — 85014 HEMATOCRIT: CPT

## 2023-07-14 PROCEDURE — 97164 PT RE-EVAL EST PLAN CARE: CPT

## 2023-07-14 PROCEDURE — 88311 DECALCIFY TISSUE: CPT

## 2023-07-14 PROCEDURE — 84133 ASSAY OF URINE POTASSIUM: CPT

## 2023-07-14 PROCEDURE — 86850 RBC ANTIBODY SCREEN: CPT

## 2023-07-14 PROCEDURE — 71045 X-RAY EXAM CHEST 1 VIEW: CPT

## 2023-07-14 PROCEDURE — 97168 OT RE-EVAL EST PLAN CARE: CPT

## 2023-07-14 PROCEDURE — 80061 LIPID PANEL: CPT

## 2023-07-14 PROCEDURE — 82962 GLUCOSE BLOOD TEST: CPT

## 2023-07-14 PROCEDURE — 71045 X-RAY EXAM CHEST 1 VIEW: CPT | Mod: 26

## 2023-07-14 PROCEDURE — C1769: CPT

## 2023-07-14 PROCEDURE — 71046 X-RAY EXAM CHEST 2 VIEWS: CPT

## 2023-07-14 PROCEDURE — 85027 COMPLETE CBC AUTOMATED: CPT

## 2023-07-14 PROCEDURE — 97116 GAIT TRAINING THERAPY: CPT

## 2023-07-14 PROCEDURE — 84540 ASSAY OF URINE/UREA-N: CPT

## 2023-07-14 PROCEDURE — 84300 ASSAY OF URINE SODIUM: CPT

## 2023-07-14 PROCEDURE — 82010 KETONE BODYS QUAN: CPT

## 2023-07-14 PROCEDURE — 84132 ASSAY OF SERUM POTASSIUM: CPT

## 2023-07-14 PROCEDURE — 82570 ASSAY OF URINE CREATININE: CPT

## 2023-07-14 PROCEDURE — 83880 ASSAY OF NATRIURETIC PEPTIDE: CPT

## 2023-07-14 PROCEDURE — 93308 TTE F-UP OR LMTD: CPT

## 2023-07-14 PROCEDURE — 88305 TISSUE EXAM BY PATHOLOGIST: CPT

## 2023-07-14 PROCEDURE — 86891 AUTOLOGOUS BLOOD OP SALVAGE: CPT

## 2023-07-14 PROCEDURE — 93880 EXTRACRANIAL BILAT STUDY: CPT

## 2023-07-14 PROCEDURE — 92610 EVALUATE SWALLOWING FUNCTION: CPT

## 2023-07-14 PROCEDURE — P9016: CPT

## 2023-07-14 RX ORDER — SPIRONOLACTONE 25 MG/1
1 TABLET, FILM COATED ORAL
Refills: 0 | DISCHARGE

## 2023-07-14 RX ORDER — ATORVASTATIN CALCIUM 80 MG/1
1 TABLET, FILM COATED ORAL
Qty: 30 | Refills: 0
Start: 2023-07-14 | End: 2023-08-12

## 2023-07-14 RX ORDER — VALSARTAN 80 MG/1
1 TABLET ORAL
Qty: 30 | Refills: 0
Start: 2023-07-14 | End: 2023-08-12

## 2023-07-14 RX ORDER — EMPAGLIFLOZIN 10 MG/1
1 TABLET, FILM COATED ORAL
Refills: 0 | DISCHARGE

## 2023-07-14 RX ORDER — REPAGLINIDE 1 MG/1
1 TABLET ORAL
Qty: 90 | Refills: 0
Start: 2023-07-14 | End: 2023-08-12

## 2023-07-14 RX ORDER — OXYCODONE HYDROCHLORIDE 5 MG/1
1 TABLET ORAL
Qty: 12 | Refills: 0
Start: 2023-07-14 | End: 2023-07-16

## 2023-07-14 RX ORDER — LOSARTAN POTASSIUM 100 MG/1
1 TABLET, FILM COATED ORAL
Refills: 0 | DISCHARGE

## 2023-07-14 RX ORDER — FUROSEMIDE 40 MG
1 TABLET ORAL
Qty: 30 | Refills: 0
Start: 2023-07-14 | End: 2023-08-12

## 2023-07-14 RX ORDER — BISOPROLOL FUMARATE 10 MG/1
0.5 TABLET, FILM COATED ORAL
Refills: 0 | DISCHARGE

## 2023-07-14 RX ORDER — ASPIRIN/CALCIUM CARB/MAGNESIUM 324 MG
1 TABLET ORAL
Qty: 30 | Refills: 0
Start: 2023-07-14 | End: 2023-08-12

## 2023-07-14 RX ORDER — METOPROLOL TARTRATE 50 MG
0.5 TABLET ORAL
Qty: 15 | Refills: 0
Start: 2023-07-14 | End: 2023-08-12

## 2023-07-14 RX ORDER — FUROSEMIDE 40 MG
1 TABLET ORAL
Refills: 0 | DISCHARGE

## 2023-07-14 RX ORDER — AMIODARONE HYDROCHLORIDE 400 MG/1
1 TABLET ORAL
Qty: 30 | Refills: 0
Start: 2023-07-14 | End: 2023-08-12

## 2023-07-14 RX ORDER — METFORMIN HYDROCHLORIDE 850 MG/1
1 TABLET ORAL
Qty: 60 | Refills: 0
Start: 2023-07-14 | End: 2023-08-12

## 2023-07-14 RX ADMIN — Medication 5 MILLIGRAM(S): at 11:39

## 2023-07-14 RX ADMIN — Medication 7 UNIT(S): at 11:38

## 2023-07-14 RX ADMIN — Medication 81 MILLIGRAM(S): at 11:39

## 2023-07-14 RX ADMIN — VALSARTAN 40 MILLIGRAM(S): 80 TABLET ORAL at 05:17

## 2023-07-14 RX ADMIN — SODIUM CHLORIDE 3 MILLILITER(S): 9 INJECTION INTRAMUSCULAR; INTRAVENOUS; SUBCUTANEOUS at 14:18

## 2023-07-14 RX ADMIN — Medication 12.5 MILLIGRAM(S): at 05:17

## 2023-07-14 RX ADMIN — Medication 40 MILLIGRAM(S): at 05:17

## 2023-07-14 RX ADMIN — SODIUM CHLORIDE 3 MILLILITER(S): 9 INJECTION INTRAMUSCULAR; INTRAVENOUS; SUBCUTANEOUS at 06:40

## 2023-07-14 RX ADMIN — AMIODARONE HYDROCHLORIDE 200 MILLIGRAM(S): 400 TABLET ORAL at 05:17

## 2023-07-14 RX ADMIN — POLYETHYLENE GLYCOL 3350 17 GRAM(S): 17 POWDER, FOR SOLUTION ORAL at 17:27

## 2023-07-14 RX ADMIN — HEPARIN SODIUM 5000 UNIT(S): 5000 INJECTION INTRAVENOUS; SUBCUTANEOUS at 15:00

## 2023-07-14 RX ADMIN — HEPARIN SODIUM 5000 UNIT(S): 5000 INJECTION INTRAVENOUS; SUBCUTANEOUS at 05:17

## 2023-07-14 RX ADMIN — Medication 1: at 11:38

## 2023-07-14 NOTE — PROGRESS NOTE ADULT - TIME BILLING
Insulin adjustment
Insulin adjustments
Insulin adjustments
Insulin management
Long discussion with pt and son regarding options for suitable oral agents given lack of insurance
Insulin adjustment
Insulin management
transition back to basal/bolus insulin
Insulin adjustments

## 2023-07-14 NOTE — DISCHARGE NOTE PROVIDER - DETAILS OF MALNUTRITION DIAGNOSIS/DIAGNOSES
This patient has been assessed with a concern for Malnutrition and was treated during this hospitalization for the following Nutrition diagnosis/diagnoses:     -  07/01/2023: Severe protein-calorie malnutrition

## 2023-07-14 NOTE — DISCHARGE NOTE PROVIDER - HOSPITAL COURSE
78 year old male HTN and DM who originally presented to [facility] on 6/26 c/o chest pain who is now POD#12 from an AVR. Patient arrived to the ICU intubated on levophed. Overnight he had a R PTX noted requiring a pigtail placement. POD#1 he was able to be extubated. His creatinine bumped and pressors were started for better renal perfusion. On POD#2 patient was found to be cool and clammy. Echo performed revealing a new EF of 10%. He was then started on Dobutamine and Milrinone. He was electively intubated for cardiogenic shock. HF consulted and Bumex gtt was started. POD#5 Extubated to [facility]. POD#6 Saloni added for increasing pulmonary artery pressures. On POD#7 patient was allowed permissive hypertension for renal perfusion. Repeat Echo performed demonstrating improved LV function and mildly reduced RV function. Dobutamine held and Hydral and isordil added for GDMT. POD#8 right Pigtail removed. POD#9 Bumex held. Jo removed and then replaced for retention. Echo performed with evidence of a small pericardial effusion. POD#10 patient was transferred to [facility] and passed TO. Overnight patient has moments of desaturation while sleeping, improves when he is awake. Will need outpatient LITA study. POD#11-12 patient's GDMT was titrated per HF recommendations. Today patient is ambulating on RA, having BMs and his pain is well controlled. As per Dr. Koehler patient is medically ready for discharge home. Because the patient does not have insurance, his medications were sent to the pharmacy and family is willing to pay for the medications out of pocket.     GEN: NAD, looks comfortable  Psych: Mood appropriate  Neuro: A&Ox3.  No focal deficits. Moving all extremities.   HEENT: No obvious abnormalities  CV: S1S2, regular, no murmurs appreciated.  No carotid bruits.  No JVD  Lungs: Clear B/L.  No wheezing, rales or rhonchi  ABD: Soft, non-tender, non-distended.  +Bowel sounds  EXT: Warm and well perfused.  trace lower extremity edema.   Musculoskeletal: Moving all extremities with normal ROM, no joint swelling  PV: Pedal pulses palpable 78 year old male HTN and DM who originally presented to [facility] on 6/26 c/o chest pain who is now POD#12 from an AVR. Patient arrived to the ICU intubated on levophed. Overnight he had a R PTX noted requiring a pigtail placement. POD#1 he was able to be extubated. His creatinine bumped and pressors were started for better renal perfusion. On POD#2 patient was found to be cool and clammy. Echo performed revealing a new EF of 10%. He was then started on Dobutamine and Milrinone. He was electively intubated for cardiogenic shock. HF consulted and Bumex gtt was started. POD#5 Extubated to [facility]. POD#6 Saloni added for increasing pulmonary artery pressures. On POD#7 patient was allowed permissive hypertension for renal perfusion. Repeat Echo performed demonstrating improved LV function and mildly reduced RV function. Dobutamine held and Hydral and isordil added for GDMT. POD#8 right Pigtail removed. POD#9 Bumex held. Jo removed and then replaced for retention. Echo performed with evidence of a small pericardial effusion. POD#10 patient was transferred to [facility] and passed TO. Overnight patient has moments of desaturation while sleeping, improves when he is awake. Will need outpatient LITA study. POD#11-12 patient's GDMT was titrated per HF recommendations. Today patient is ambulating on RA, having BMs and his pain is well controlled. As per Dr. Koehler patient is medically ready for discharge home. Because the patient does not have insurance, his medications were sent to the pharmacy and family is willing to pay for the medications out of pocket.       GEN: NAD, looks comfortable  Psych: Mood appropriate  Neuro: A&Ox3.  No focal deficits. Moving all extremities.   HEENT: No obvious abnormalities  CV: S1S2, regular, no murmurs appreciated.  No carotid bruits.  No JVD  Lungs: Clear B/L.  No wheezing, rales or rhonchi  ABD: Soft, non-tender, non-distended.  +Bowel sounds  EXT: Warm and well perfused.  trace lower extremity edema.   Musculoskeletal: Moving all extremities with normal ROM, no joint swelling  PV: Pedal pulses palpable

## 2023-07-14 NOTE — DISCHARGE NOTE NURSING/CASE MANAGEMENT/SOCIAL WORK - PATIENT PORTAL LINK FT
You can access the FollowMyHealth Patient Portal offered by [facility] by registering at the following website: [email or web address]. By joining [facility]’s FollowMyHealth portal, you will also be able to view your health information using other applications (apps) compatible with our system.

## 2023-07-14 NOTE — PROGRESS NOTE ADULT - ATTENDING COMMENTS
Pt seen on rounds this afternoon in anticipation of pending discharge.  Son was at the bedside.  Glucoses have been intermittently above target range, mostly post-prandially--possibly due to the Ensure Max that he has been having at most of his meals.  AM glucose was down to 74 today, but Lantus was not decreased to 11 units  Plan was to discharge pt back on oral agents, with the problem being that he does not have insurance while in the .  HIs EF and renal function allow him to return to therapy with metformin, which should easily replace basal insulin  The problem of finding a secretagogue for post-prandial control relates to the cost, and partially to his overnight insulin sensitivity--i.e. a sulfonylurea could provoke overnight hypoglycemia (which it did in the past when he took glyburide while back in [facility]).  Will use repaglinide, which is not unduly expensive.  We asked the pt and son to check on availability of Januvia when he returns to [facility]--a combination of this with metformin would probably be the best option

## 2023-07-14 NOTE — DISCHARGE NOTE PROVIDER - NSDCCPTREATMENT_GEN_ALL_CORE_FT
PRINCIPAL PROCEDURE  Procedure: Replacement of aortic valve with tissue valve  Findings and Treatment:   25mm bio  Bypass time: 81 mins  Aortic clamp time: 62 mins

## 2023-07-14 NOTE — PROGRESS NOTE ADULT - SUBJECTIVE AND OBJECTIVE BOX
SUBJECTIVE / INTERVAL HPI: Patient was seen and examined this morning.     CAPILLARY BLOOD GLUCOSE & INSULIN RECEIVED  195 mg/dL (07-13 @ 11:01) ? 7 units of lispro + 1 unit of lispro sliding scale.   190 mg/dL (07-13 @ 16:43) ? 7 units of lispro + 1 unit of lispro sliding scale.   108 mg/dL (07-13 @ 21:42) ? 12 units of lantus.   74 mg/dL (07-14 @ 06:35) ? Ø (Comment: Hold dose for parameter).   187 mg/dL (07-14 @ 11:11) ? 7 units of lispro + 1 unit of lispro sliding scale.     REVIEW OF SYSTEMS  Constitutional:  Negative fever or chills.   Cardiovascular:  Negative for chest pain or palpitations.  Respiratory:  (+) cough. Negative for wheezing, or shortness of breath.    Gastrointestinal:  Negative for nausea, vomiting, diarrhea, constipation, or abdominal pain.  Neurologic:  No headache, confusion, dizziness, lightheadedness.    PHYSICAL EXAM  Vital Signs Last 24 Hrs  T(C): 36.4 (14 Jul 2023 09:26), Max: 36.6 (14 Jul 2023 01:08)  T(F): 97.5 (14 Jul 2023 09:26), Max: 97.9 (14 Jul 2023 01:08)  HR: 62 (14 Jul 2023 12:12) (54 - 62)  BP: 144/67 (14 Jul 2023 12:12) (114/54 - 144/67)  BP(mean): 96 (14 Jul 2023 12:12) (75 - 96)  RR: 17 (14 Jul 2023 12:12) (16 - 18)  SpO2: 95% (14 Jul 2023 12:12) (90% - 96%)    Parameters below as of 14 Jul 2023 12:12  Patient On (Oxygen Delivery Method): nasal cannula w/ humidification  O2 Flow (L/min): 4    Constitutional: Awake, alert, elderly male, in no acute distress.   HEENT: Normocephalic, atraumatic, SCARLETT.  Respiratory: Lungs clear to ausculation bilaterally.   Cardiovascular: regular rhythm, normal S1 and S2, (+) midline sternotomy scar.   GI: soft, non-tender, non-distended, bowel sounds present.  Extremities: No lower extremity edema.   Psychiatric: AAO x 3.    LABS  CBC - WBC/HGB/HTC/PLT: 9.31/9.0/27.0/424 (07-14-23)  BMP - Na/K/Cl/Bicarb/BUN/Cr/Gluc/AG/eGFR: 129/4.9/92/27/78/1.85/73/10/36 (07-14-23)  Ca - 8.4 (07-14-23)  Phos - 3.9 (07-14-23)  Mg - 2.2 (07-14-23)  LFT - Alb/Tprot/Tbili/Dbili/AlkPhos/ALT/AST: 3.3/--/1.0/--/98/21/41 (07-14-23)  PT/aPTT/INR: 13.6/33.8/1.14 (07-14-23)   Thyroid Stimulating Hormone, Serum: 1.440 (06-28-23)    MEDICATIONS  MEDICATIONS  (STANDING):  aMIOdarone    Tablet 400  Oral   aMIOdarone    Tablet 200 milliGRAM(s) Oral daily  aspirin  chewable 81 milliGRAM(s) Oral daily  atorvastatin 40 milliGRAM(s) Oral at bedtime  bisacodyl 5 milliGRAM(s) Oral daily  chlorhexidine 2% Cloths 1 Application(s) Topical daily  dextrose 5%. 1000 milliLiter(s) (50 mL/Hr) IV Continuous <Continuous>  dextrose 5%. 1000 milliLiter(s) (50 mL/Hr) IV Continuous <Continuous>  dextrose 5%. 1000 milliLiter(s) (100 mL/Hr) IV Continuous <Continuous>  dextrose 5%. 1000 milliLiter(s) (100 mL/Hr) IV Continuous <Continuous>  dextrose 50% Injectable 25 Gram(s) IV Push once  dextrose 50% Injectable 12.5 Gram(s) IV Push once  dextrose 50% Injectable 25 Gram(s) IV Push once  furosemide    Tablet 40 milliGRAM(s) Oral daily  glucagon  Injectable 1 milliGRAM(s) IntraMuscular once  glucagon  Injectable 1 milliGRAM(s) IntraMuscular once  heparin   Injectable 5000 Unit(s) SubCutaneous every 8 hours  insulin glargine Injectable (LANTUS) 12 Unit(s) SubCutaneous at bedtime  insulin lispro (ADMELOG) corrective regimen sliding scale   SubCutaneous Before meals and at bedtime  insulin lispro Injectable (ADMELOG) 7 Unit(s) SubCutaneous three times a day before meals  metoprolol succinate ER 12.5 milliGRAM(s) Oral daily  pantoprazole   Suspension 40 milliGRAM(s) Oral before breakfast  polyethylene glycol 3350 17 Gram(s) Oral two times a day  senna 2 Tablet(s) Oral at bedtime  sodium chloride 0.9% lock flush 3 milliLiter(s) IV Push every 8 hours  sodium chloride 0.9% lock flush 3 milliLiter(s) IV Push every 8 hours  sodium chloride 0.9%. 1000 milliLiter(s) (10 mL/Hr) IV Continuous <Continuous>  valsartan 40 milliGRAM(s) Oral daily    MEDICATIONS  (PRN):  dextrose Oral Gel 15 Gram(s) Oral once PRN Blood Glucose LESS THAN 70 milliGRAM(s)/deciliter    ASSESSMENT / RECOMMENDATIONS  Mr. Johnson is a 78-year-old male with type 2 diabetes mellitus, hypertension and severe aortic stenosis who was transferred to [facility] for further work-up and intervention of severe aortic stenosis, now s/p aortic valve replacement (6/30/23). Endocrinology was consulted for recommendations regarding diabetes management.     A1C: 8.6 %  BUN: 78  Creatinine: 1.85  GFR: 36  Weight: 66.6  BMI: 21.5    # Type 2 diabetes mellitus with hyperglycemia  - Please continue lantus *** units at bedtime.   - Continue lispro *** units before each meal.  - Continue lispro moderate / low dose sliding scale before meals and at bedtime.  - Patient's fingerstick glucose goal is 100-180 mg/dL.    - Discharge recommendations to be discussed.   - Patient can follow up at discharge with [facility] by calling [phone] to make an appointment.      Case discussed with Dr. Caicedo. Primary team updated.       Milton Laura    Endocrinology Fellow    Service Pager: [phone]    SUBJECTIVE / INTERVAL HPI: Patient was seen and examined this morning. He has been tolerating his meals and reports improvement in appetite. He is planned for discharge later today.     CAPILLARY BLOOD GLUCOSE & INSULIN RECEIVED  195 mg/dL (07-13 @ 11:01) ? 7 units of lispro + 1 unit of lispro sliding scale.   190 mg/dL (07-13 @ 16:43) ? 7 units of lispro + 1 unit of lispro sliding scale.   108 mg/dL (07-13 @ 21:42) ? 12 units of lantus.   74 mg/dL (07-14 @ 06:35) ? Ø (Comment: Hold dose for parameter).   187 mg/dL (07-14 @ 11:11) ? 7 units of lispro + 1 unit of lispro sliding scale.     REVIEW OF SYSTEMS  Constitutional:  Negative fever or chills.   Cardiovascular:  Negative for chest pain or palpitations.  Respiratory:  (+) cough. Negative for wheezing, or shortness of breath.    Gastrointestinal:  Negative for nausea, vomiting, diarrhea, constipation, or abdominal pain.  Neurologic:  No headache, confusion, dizziness, lightheadedness.    PHYSICAL EXAM  Vital Signs Last 24 Hrs  T(C): 36.4 (14 Jul 2023 09:26), Max: 36.6 (14 Jul 2023 01:08)  T(F): 97.5 (14 Jul 2023 09:26), Max: 97.9 (14 Jul 2023 01:08)  HR: 62 (14 Jul 2023 12:12) (54 - 62)  BP: 144/67 (14 Jul 2023 12:12) (114/54 - 144/67)  BP(mean): 96 (14 Jul 2023 12:12) (75 - 96)  RR: 17 (14 Jul 2023 12:12) (16 - 18)  SpO2: 95% (14 Jul 2023 12:12) (90% - 96%)    Parameters below as of 14 Jul 2023 12:12  Patient On (Oxygen Delivery Method): nasal cannula w/ humidification  O2 Flow (L/min): 4    Constitutional: Awake, alert, elderly male, in no acute distress.   HEENT: Normocephalic, atraumatic, SCARLETT.  Respiratory: Lungs clear to ausculation bilaterally.   Cardiovascular: regular rhythm, normal S1 and S2, (+) midline sternotomy scar.   GI: soft, non-tender, non-distended, bowel sounds present.  Extremities: No lower extremity edema.   Psychiatric: AAO x 3.    LABS  CBC - WBC/HGB/HTC/PLT: 9.31/9.0/27.0/424 (07-14-23)  BMP - Na/K/Cl/Bicarb/BUN/Cr/Gluc/AG/eGFR: 129/4.9/92/27/78/1.85/73/10/36 (07-14-23)  Ca - 8.4 (07-14-23)  Phos - 3.9 (07-14-23)  Mg - 2.2 (07-14-23)  LFT - Alb/Tprot/Tbili/Dbili/AlkPhos/ALT/AST: 3.3/--/1.0/--/98/21/41 (07-14-23)  PT/aPTT/INR: 13.6/33.8/1.14 (07-14-23)   Thyroid Stimulating Hormone, Serum: 1.440 (06-28-23)    MEDICATIONS  MEDICATIONS  (STANDING):  aMIOdarone    Tablet 400  Oral   aMIOdarone    Tablet 200 milliGRAM(s) Oral daily  aspirin  chewable 81 milliGRAM(s) Oral daily  atorvastatin 40 milliGRAM(s) Oral at bedtime  bisacodyl 5 milliGRAM(s) Oral daily  chlorhexidine 2% Cloths 1 Application(s) Topical daily  dextrose 5%. 1000 milliLiter(s) (50 mL/Hr) IV Continuous <Continuous>  dextrose 5%. 1000 milliLiter(s) (50 mL/Hr) IV Continuous <Continuous>  dextrose 5%. 1000 milliLiter(s) (100 mL/Hr) IV Continuous <Continuous>  dextrose 5%. 1000 milliLiter(s) (100 mL/Hr) IV Continuous <Continuous>  dextrose 50% Injectable 25 Gram(s) IV Push once  dextrose 50% Injectable 12.5 Gram(s) IV Push once  dextrose 50% Injectable 25 Gram(s) IV Push once  furosemide    Tablet 40 milliGRAM(s) Oral daily  glucagon  Injectable 1 milliGRAM(s) IntraMuscular once  glucagon  Injectable 1 milliGRAM(s) IntraMuscular once  heparin   Injectable 5000 Unit(s) SubCutaneous every 8 hours  insulin glargine Injectable (LANTUS) 12 Unit(s) SubCutaneous at bedtime  insulin lispro (ADMELOG) corrective regimen sliding scale   SubCutaneous Before meals and at bedtime  insulin lispro Injectable (ADMELOG) 7 Unit(s) SubCutaneous three times a day before meals  metoprolol succinate ER 12.5 milliGRAM(s) Oral daily  pantoprazole   Suspension 40 milliGRAM(s) Oral before breakfast  polyethylene glycol 3350 17 Gram(s) Oral two times a day  senna 2 Tablet(s) Oral at bedtime  sodium chloride 0.9% lock flush 3 milliLiter(s) IV Push every 8 hours  sodium chloride 0.9% lock flush 3 milliLiter(s) IV Push every 8 hours  sodium chloride 0.9%. 1000 milliLiter(s) (10 mL/Hr) IV Continuous <Continuous>  valsartan 40 milliGRAM(s) Oral daily    MEDICATIONS  (PRN):  dextrose Oral Gel 15 Gram(s) Oral once PRN Blood Glucose LESS THAN 70 milliGRAM(s)/deciliter    ASSESSMENT / RECOMMENDATIONS  Mr. Johnson is a 78-year-old male with type 2 diabetes mellitus, hypertension and severe aortic stenosis who was transferred to [facility] for further work-up and intervention of severe aortic stenosis, now s/p aortic valve replacement (6/30/23). Endocrinology was consulted for recommendations regarding diabetes management.     A1C: 8.6 %  BUN: 78  Creatinine: 1.85  GFR: 36  Weight: 66.6  BMI: 21.5    # Type 2 diabetes mellitus with hyperglycemia  - He is planned for discharge later today. His EF improved to 50% on last echocardiogram. Ideally, would have considered prescribing a combination of metformin and a DPP4 upon discharge given current insulin requirements and history of hypoglycemia while on sulfonylurea in the past. However, options are limited given patient doesn't have insurance and a DPP4 would be too expensive at this time.   - Upon discharge, he can continue with metformin 500 mg twice daily and repaglinide 0.5 mg three times daily before meals.   - Patient can follow up at discharge with [facility] by calling [phone] to make an appointment.      Case discussed with Dr. Caicedo. Primary team updated.       Milton Laura    Endocrinology Fellow    Service Pager: [phone]

## 2023-07-14 NOTE — DISCHARGE NOTE PROVIDER - NSDCFUADDAPPT_GEN_ALL_CORE_FT
Because you have no insurance, you will need to follow up with [facility] at [phone]. The clinic does not have appointments until the end of August.

## 2023-07-14 NOTE — PROGRESS NOTE ADULT - NS ATTEST RISK PROBLEM GEN_ALL_CORE FT
Need to transition off insulin drip
Need for transition from insulin drip to basal/bolus regimen
Need for transition to oral agent regimen for discharge.
Still with inadequate glycemic control
Uncontrolled post-op hyperglycemia
Fluctuating glucoses, poor PO intake
Inadequate glycemic control with intermittent hypoglycemia.  Poor PO intake
Still sub-optimal glycemic control
Need to continued improvement in glycemic control

## 2023-07-14 NOTE — PROGRESS NOTE ADULT - ATTENDING SUPERVISION STATEMENT
Fellow

## 2023-07-14 NOTE — DISCHARGE NOTE PROVIDER - NSDCMRMEDTOKEN_GEN_ALL_CORE_FT
amiodarone 200 mg oral tablet: 1 tab(s) orally once a day  aspirin 81 mg oral tablet, chewable: 1 tab(s) orally once a day  atorvastatin 40 mg oral tablet: 1 tab(s) orally once a day (at bedtime)  furosemide 40 mg oral tablet: 1 tab(s) orally once a day  Metoprolol Succinate ER 25 mg oral tablet, extended release: 0.5 tab(s) orally once a day  oxyCODONE 5 mg oral tablet: 1 tab(s) orally every 6 hours as needed for  severe pain MDD: 4  valsartan 40 mg oral tablet: 1 tab(s) orally once a day   amiodarone 200 mg oral tablet: 1 tab(s) orally once a day  aspirin 81 mg oral tablet, chewable: 1 tab(s) orally once a day  atorvastatin 40 mg oral tablet: 1 tab(s) orally once a day (at bedtime)  furosemide 40 mg oral tablet: 1 tab(s) orally once a day  metFORMIN 500 mg oral tablet: 1 tab(s) orally 2 times a day  Metoprolol Succinate ER 25 mg oral tablet, extended release: 0.5 tab(s) orally once a day  oxyCODONE 5 mg oral tablet: 1 tab(s) orally every 6 hours as needed for  severe pain MDD: 4  repaglinide 0.5 mg oral tablet: 1 tab(s) orally every 8 hours  valsartan 40 mg oral tablet: 1 tab(s) orally once a day

## 2023-07-14 NOTE — CHART NOTE - NSCHARTNOTESELECT_GEN_ALL_CORE
D/C CT/Event Note
CT removal/Event Note
Nutrition Services
PW removed/Event Note
hayley removal/Event Note

## 2023-07-14 NOTE — PROGRESS NOTE ADULT - REASON FOR ADMISSION
severe AS and AI

## 2023-07-14 NOTE — DISCHARGE NOTE NURSING/CASE MANAGEMENT/SOCIAL WORK - NSDCFUADDAPPT_GEN_ALL_CORE_FT
Because you have no insurance, you will need to [place] [facility] [place] [phone]. The clinic does not have appointments until the end of August.

## 2023-07-14 NOTE — DISCHARGE NOTE PROVIDER - CARE PROVIDER_API CALL
Kyle Koehler  Thoracic and Cardiac Surgery  [address]  [place] [address]  Phone: [phone]  Fax: [phone]  Scheduled Appointment: 07/26/2023 11:00 AM

## 2023-07-14 NOTE — CHART NOTE - NSCHARTNOTEFT_GEN_A_CORE
Admitting Diagnosis:   Patient is a 78y old  Male who presents with a chief complaint of severe AS and AI (14 Jul 2023 12:53)    PAST MEDICAL & SURGICAL HISTORY:  HTN (hypertension)  DM (diabetes mellitus)  Severe aortic regurgitation  History of cholecystectomy  H/O prostatectomy    Current Nutrition Order:  DASH/TLC  Consistent Carbohydrate (No Snacks)  Ensure Max x3/day     PO Intake: Good (%) [   ]  Fair (50-75%) [ x ] Poor (<25%) [   ]    GI Issues:       Pain:    Skin Integrity:    Labs:   07-14    129<L>  |  92<L>  |  78<H>  ----------------------------<  73  4.9   |  27  |  1.85<H>    Ca    8.4      14 Jul 2023 05:25  Phos  3.9     07-14  Mg     2.2     07-14    TPro  6.7  /  Alb  3.3  /  TBili  1.0  /  DBili  x   /  AST  41<H>  /  ALT  21  /  AlkPhos  98  07-14    CAPILLARY BLOOD GLUCOSE    POCT Blood Glucose.: 187 mg/dL (14 Jul 2023 11:11)  POCT Blood Glucose.: 74 mg/dL (14 Jul 2023 06:35)  POCT Blood Glucose.: 108 mg/dL (13 Jul 2023 21:42)  POCT Blood Glucose.: 190 mg/dL (13 Jul 2023 16:43)    Medications:  MEDICATIONS  (STANDING):  aMIOdarone    Tablet 400  Oral   aMIOdarone    Tablet 200 milliGRAM(s) Oral daily  aspirin  chewable 81 milliGRAM(s) Oral daily  atorvastatin 40 milliGRAM(s) Oral at bedtime  bisacodyl 5 milliGRAM(s) Oral daily  chlorhexidine 2% Cloths 1 Application(s) Topical daily  dextrose 5%. 1000 milliLiter(s) (50 mL/Hr) IV Continuous <Continuous>  dextrose 5%. 1000 milliLiter(s) (100 mL/Hr) IV Continuous <Continuous>  dextrose 5%. 1000 milliLiter(s) (50 mL/Hr) IV Continuous <Continuous>  dextrose 5%. 1000 milliLiter(s) (100 mL/Hr) IV Continuous <Continuous>  dextrose 50% Injectable 25 Gram(s) IV Push once  dextrose 50% Injectable 25 Gram(s) IV Push once  dextrose 50% Injectable 12.5 Gram(s) IV Push once  furosemide    Tablet 40 milliGRAM(s) Oral daily  glucagon  Injectable 1 milliGRAM(s) IntraMuscular once  glucagon  Injectable 1 milliGRAM(s) IntraMuscular once  heparin   Injectable 5000 Unit(s) SubCutaneous every 8 hours  insulin glargine Injectable (LANTUS) 12 Unit(s) SubCutaneous at bedtime  insulin lispro (ADMELOG) corrective regimen sliding scale   SubCutaneous Before meals and at bedtime  insulin lispro Injectable (ADMELOG) 7 Unit(s) SubCutaneous three times a day before meals  metoprolol succinate ER 12.5 milliGRAM(s) Oral daily  pantoprazole   Suspension 40 milliGRAM(s) Oral before breakfast  polyethylene glycol 3350 17 Gram(s) Oral two times a day  senna 2 Tablet(s) Oral at bedtime  sodium chloride 0.9% lock flush 3 milliLiter(s) IV Push every 8 hours  sodium chloride 0.9% lock flush 3 milliLiter(s) IV Push every 8 hours  sodium chloride 0.9%. 1000 milliLiter(s) (10 mL/Hr) IV Continuous <Continuous>  valsartan 40 milliGRAM(s) Oral daily    MEDICATIONS  (PRN):  dextrose Oral Gel 15 Gram(s) Oral once PRN Blood Glucose LESS THAN 70 milliGRAM(s)/deciliter    Anthropometrics:  Daily     Daily     IBW:     % IBW    Weight Change:     Nutrition Focused Physical Exam: Completed [   ]  Not Pertinent [   ]  Muscle Wasting- Temporal [   ]  Clavicle/Pectoral [   ]  Shoulder/Deltoid [   ]  Scapula [   ]  Interosseous [   ]  Quadriceps [   ]  Gastrocnemius [   ]  Fat Wasting- Orbital [   ]  Buccal [   ]  Triceps [   ]  Rib [   ]  Suspect [PCM] 2/2 to physical assessment, [poor intake], and [wt loss]; please see malnutrition chart note.    Estimated energy needs:   Calories:   Protein:   Fluid:    Subjective:     Previous Nutrition Diagnosis:    Active [   ]  Resolved [   ]    If resolved, new PES:     Goal:    Recommendations:    Education:     Risk Level: High [   ] Moderate [   ] Low [   ] Admitting Diagnosis:   Patient is a 78y old  Male who presents with a chief complaint of severe AS and AI (14 Jul 2023 12:53)    PAST MEDICAL & SURGICAL HISTORY:  HTN (hypertension)  DM (diabetes mellitus)  Severe aortic regurgitation  History of cholecystectomy  H/O prostatectomy    Current Nutrition Order:  DASH/TLC  Consistent Carbohydrate (No Snacks)  Ensure Max x3/day     PO Intake: Good (%) [   ]  Fair (50-75%) [ x ] Poor (<25%) [   ]    GI Issues:   Pt denies n/v/d/c, last recorded BM 7/13    Pain:  No pain reported    Skin Integrity:  3+ generalized edema noted  No pressure injuries documented    Labs:   07-14    129<L>  |  92<L>  |  78<H>  ----------------------------<  73  4.9   |  27  |  1.85<H>    Ca    8.4      14 Jul 2023 05:25  Phos  3.9     07-14  Mg     2.2     07-14    TPro  6.7  /  Alb  3.3  /  TBili  1.0  /  DBili  x   /  AST  41<H>  /  ALT  21  /  AlkPhos  98  07-14    CAPILLARY BLOOD GLUCOSE    POCT Blood Glucose.: 187 mg/dL (14 Jul 2023 11:11)  POCT Blood Glucose.: 74 mg/dL (14 Jul 2023 06:35)  POCT Blood Glucose.: 108 mg/dL (13 Jul 2023 21:42)  POCT Blood Glucose.: 190 mg/dL (13 Jul 2023 16:43)    Medications:  MEDICATIONS  (STANDING):  aMIOdarone    Tablet 400  Oral   aMIOdarone    Tablet 200 milliGRAM(s) Oral daily  aspirin  chewable 81 milliGRAM(s) Oral daily  atorvastatin 40 milliGRAM(s) Oral at bedtime  bisacodyl 5 milliGRAM(s) Oral daily  chlorhexidine 2% Cloths 1 Application(s) Topical daily  dextrose 5%. 1000 milliLiter(s) (50 mL/Hr) IV Continuous <Continuous>  dextrose 5%. 1000 milliLiter(s) (100 mL/Hr) IV Continuous <Continuous>  dextrose 5%. 1000 milliLiter(s) (50 mL/Hr) IV Continuous <Continuous>  dextrose 5%. 1000 milliLiter(s) (100 mL/Hr) IV Continuous <Continuous>  dextrose 50% Injectable 25 Gram(s) IV Push once  dextrose 50% Injectable 25 Gram(s) IV Push once  dextrose 50% Injectable 12.5 Gram(s) IV Push once  furosemide    Tablet 40 milliGRAM(s) Oral daily  glucagon  Injectable 1 milliGRAM(s) IntraMuscular once  glucagon  Injectable 1 milliGRAM(s) IntraMuscular once  heparin   Injectable 5000 Unit(s) SubCutaneous every 8 hours  insulin glargine Injectable (LANTUS) 12 Unit(s) SubCutaneous at bedtime  insulin lispro (ADMELOG) corrective regimen sliding scale   SubCutaneous Before meals and at bedtime  insulin lispro Injectable (ADMELOG) 7 Unit(s) SubCutaneous three times a day before meals  metoprolol succinate ER 12.5 milliGRAM(s) Oral daily  pantoprazole   Suspension 40 milliGRAM(s) Oral before breakfast  polyethylene glycol 3350 17 Gram(s) Oral two times a day  senna 2 Tablet(s) Oral at bedtime  sodium chloride 0.9% lock flush 3 milliLiter(s) IV Push every 8 hours  sodium chloride 0.9% lock flush 3 milliLiter(s) IV Push every 8 hours  sodium chloride 0.9%. 1000 milliLiter(s) (10 mL/Hr) IV Continuous <Continuous>  valsartan 40 milliGRAM(s) Oral daily    MEDICATIONS  (PRN):  dextrose Oral Gel 15 Gram(s) Oral once PRN Blood Glucose LESS THAN 70 milliGRAM(s)/deciliter    Anthropometrics:  Height: 5'9"   Weight: 147lb/66.6kg   BMI: 21.5    IBW: 160lb/72.7kg   % IBW: 92%    Weight Change:   No new weights obtained since admission. Recommend nursing to trend weights weekly. RD to continue monitoring weights as able.     Estimated energy needs:   Calories: 1665-1998kcal/d (25-30kcal/kg)  Protein: 80-93g/d (1.2-1.4g/kg)  Defer fluids to team  Estimated needs based on ABW as within % IBW. Needs adjusted for age, S/p OR, Malnutrition, EF, Renal.    Subjective:   77M PMHx of HTN and DM who originally presented to [facility] 6/26 c/o chest pain x 3 months. At [facility] he underwent a stress test which revealed small sized, reversible, myocardial perfusion defect of the anteroapical wall c/w ischemia. TTE revealed moderate AS and severe AR, nml LVEF. Pt referred for cardiac cath which revealed non-obstructive CAD. He was then transferred to [facility] under the care of Dr. Koehler for further work-up and intervention of severe AI and AS. S/p Replacement of aortic valve with tissue valve 6/30. Extuabted 6/30 However course complicated by cardiogenic shock, CAMMIE (likely iATN i/s/o cardiogenic shock) and reintubation 7/3. S/p Echo 7/3 with severely reduced LVEF 15%/ reduced RV fx / no tamponade. S/p Echo 7/5 with LV Ef 40%/ mod-severe RV dysfunction/ mild to mod pericardial effusion. Pt Extubated 7/5. Now with plan to repeat TTE when off inotropes- have bene off since 7/7. Pigtail removed after clamp trial, no pneumo 7/8. Jo removed then replaced for retention. Echo performed with evidence of a small pericardial effusion. Today patient was transferred to [facility].      On follow-up pt resting in bed. Labs reviewed: Na 129 <L>, BUN/Cr 78/1.85 <H>, AST 41 <H>. On DASH/TLC Consistent Carbohydrate (no snacks) diet with Ensure Max x3/day. Pt reports good intake of meals, notes appetite still below baseline. Endorses completing x2 Ensure oral nutrition supplements per day. Reports no difficulties chewing/swallowing regular consistency foods/liquids. Reports GI tolerance of diet. Reviewed importance of adequate intake and strategies overcome decreased appetite. See nutrition recommendations. RD to follow-up per protocol.     Previous Nutrition Diagnosis:  Malnutrition... Severe PCM RT inadequate energy intake to meet nutrition needs AEB significant wt loss, likely meeting <75% EER PTA, muscle/fat loss    Active [ x ]  Resolved [   ]    Goal:  Consistently meet >75% nutrient needs to reduce s/s of malnutrition.     Recommendations:  1. Recommend Low Sodium Consistent Carbohydrate (snacks) diet to promote intake.   >>Encourage & monitor PO intake. [place] dietary preferences as able.  >>If pt intake declines, recommend liberalize diet to promote variety/intake.   2. Recommend Ensure Max (150kcal, 30g pro) x2/day per pt preference.  >>If PO intake declines and pt accepts, can increase to x3/day.   3. Monitor GI tolerance, weight trends, labs, & skin integrity. Monitor chew/swallow tolerance, if pt noted with difficulty recommend NPO/SLP.   4. Defer bowel and pain regimens to team.   5. RD to remain available for diet education/intervention prn.     Education:   Encouraged pt continue with intake of meals and supplements. Recommend pt take shelf stable items off tray to snack in between meals. Emphasized importance of protein foods for recovery. Pt aware RD remains available for additional questions/concerns.     Risk Level: High [ x ] Moderate [   ] Low [   ] Admitting Diagnosis:   Patient is a 78y old  Male who presents with a chief complaint of severe AS and AI (14 Jul 2023 12:53)    PAST MEDICAL & SURGICAL HISTORY:  HTN (hypertension)  DM (diabetes mellitus)  Severe aortic regurgitation  History of cholecystectomy  H/O prostatectomy    Current Nutrition Order:  DASH/TLC  Consistent Carbohydrate (No Snacks)  Ensure Max x3/day     PO Intake: Good (%) [   ]  Fair (50-75%) [ x ] Poor (<25%) [   ]    GI Issues:   Pt denies n/v/d/c, last recorded BM 7/13    Pain:  No pain reported    Skin Integrity:  3+ generalized edema noted  No pressure injuries documented    Labs:   07-14    129<L>  |  92<L>  |  78<H>  ----------------------------<  73  4.9   |  27  |  1.85<H>    Ca    8.4      14 Jul 2023 05:25  Phos  3.9     07-14  Mg     2.2     07-14    TPro  6.7  /  Alb  3.3  /  TBili  1.0  /  DBili  x   /  AST  41<H>  /  ALT  21  /  AlkPhos  98  07-14    CAPILLARY BLOOD GLUCOSE    POCT Blood Glucose.: 187 mg/dL (14 Jul 2023 11:11)  POCT Blood Glucose.: 74 mg/dL (14 Jul 2023 06:35)  POCT Blood Glucose.: 108 mg/dL (13 Jul 2023 21:42)  POCT Blood Glucose.: 190 mg/dL (13 Jul 2023 16:43)    Medications:  MEDICATIONS  (STANDING):  aMIOdarone    Tablet 400  Oral   aMIOdarone    Tablet 200 milliGRAM(s) Oral daily  aspirin  chewable 81 milliGRAM(s) Oral daily  atorvastatin 40 milliGRAM(s) Oral at bedtime  bisacodyl 5 milliGRAM(s) Oral daily  chlorhexidine 2% Cloths 1 Application(s) Topical daily  dextrose 5%. 1000 milliLiter(s) (50 mL/Hr) IV Continuous <Continuous>  dextrose 5%. 1000 milliLiter(s) (100 mL/Hr) IV Continuous <Continuous>  dextrose 5%. 1000 milliLiter(s) (50 mL/Hr) IV Continuous <Continuous>  dextrose 5%. 1000 milliLiter(s) (100 mL/Hr) IV Continuous <Continuous>  dextrose 50% Injectable 25 Gram(s) IV Push once  dextrose 50% Injectable 25 Gram(s) IV Push once  dextrose 50% Injectable 12.5 Gram(s) IV Push once  furosemide    Tablet 40 milliGRAM(s) Oral daily  glucagon  Injectable 1 milliGRAM(s) IntraMuscular once  glucagon  Injectable 1 milliGRAM(s) IntraMuscular once  heparin   Injectable 5000 Unit(s) SubCutaneous every 8 hours  insulin glargine Injectable (LANTUS) 12 Unit(s) SubCutaneous at bedtime  insulin lispro (ADMELOG) corrective regimen sliding scale   SubCutaneous Before meals and at bedtime  insulin lispro Injectable (ADMELOG) 7 Unit(s) SubCutaneous three times a day before meals  metoprolol succinate ER 12.5 milliGRAM(s) Oral daily  pantoprazole   Suspension 40 milliGRAM(s) Oral before breakfast  polyethylene glycol 3350 17 Gram(s) Oral two times a day  senna 2 Tablet(s) Oral at bedtime  sodium chloride 0.9% lock flush 3 milliLiter(s) IV Push every 8 hours  sodium chloride 0.9% lock flush 3 milliLiter(s) IV Push every 8 hours  sodium chloride 0.9%. 1000 milliLiter(s) (10 mL/Hr) IV Continuous <Continuous>  valsartan 40 milliGRAM(s) Oral daily    MEDICATIONS  (PRN):  dextrose Oral Gel 15 Gram(s) Oral once PRN Blood Glucose LESS THAN 70 milliGRAM(s)/deciliter    Anthropometrics:  Height: 5'9"   Weight: 147lb/66.6kg   BMI: 21.5    IBW: 160lb/72.7kg   % IBW: 92%    Weight Change:   No new weights obtained since admission. Recommend nursing to trend weights weekly. RD to continue monitoring weights as able.     Estimated energy needs:   Calories: 1665-1998kcal/d (25-30kcal/kg)  Protein: 80-93g/d (1.2-1.4g/kg)  Defer fluids to team  Estimated needs based on ABW as within % IBW. Needs adjusted for age, S/p OR, Malnutrition, EF, Renal.    Subjective:   77M PMHx of HTN and DM who originally presented to [facility] 6/26 c/o chest pain x 3 months. At [facility] he underwent a stress test which revealed small sized, reversible, myocardial perfusion defect of the anteroapical wall c/w ischemia. TTE revealed moderate AS and severe AR, nml LVEF. Pt referred for cardiac cath which revealed non-obstructive CAD. He was then transferred to [facility] under the care of Dr. Koehler for further work-up and intervention of severe AI and AS. S/p Replacement of aortic valve with tissue valve 6/30. Extuabted 6/30 However course complicated by cardiogenic shock, CAMMIE (likely iATN i/s/o cardiogenic shock) and reintubation 7/3. S/p Echo 7/3 with severely reduced LVEF 15%/ reduced RV fx / no tamponade. S/p Echo 7/5 with LV Ef 40%/ mod-severe RV dysfunction/ mild to mod pericardial effusion. Pt Extubated 7/5. Now with plan to repeat TTE when off inotropes- have bene off since 7/7. Pigtail removed after clamp trial, no pneumo 7/8. Jo removed then replaced for retention. Echo performed with evidence of a small pericardial effusion. Today patient was transferred to [facility].      On follow-up pt resting in bed. Labs reviewed: Na 129 <L>, BUN/Cr 78/1.85 <H>, AST 41 <H>. On DASH/TLC Consistent Carbohydrate (no snacks) diet with Ensure Max x3/day. Pt prefers [employer],  used - #[id number]. Pt reports fair intake of meals, notes appetite still below baseline. Endorses completing x2 Ensure oral nutrition supplements per day. Reports no difficulties chewing/swallowing regular consistency foods/liquids. Reports GI tolerance of diet. Reviewed importance of adequate intake and strategies overcome decreased appetite. See nutrition recommendations. RD to follow-up per protocol.     Previous Nutrition Diagnosis:  Malnutrition... Severe PCM RT inadequate energy intake to meet nutrition needs AEB significant wt loss, likely meeting <75% EER PTA, muscle/fat loss    Active [ x ]  Resolved [   ]    Goal:  Consistently meet >75% nutrient needs to reduce s/s of malnutrition.     Recommendations:  1. Recommend Low Sodium Consistent Carbohydrate (snacks) diet to promote intake.   >>Encourage & monitor PO intake. [place] dietary preferences as able.  >>If pt intake declines, recommend liberalize diet to promote variety/intake.   2. Recommend Ensure Max (150kcal, 30g pro) x2/day per pt preference.  >>If PO intake declines and pt accepts, can increase to x3/day.   3. Monitor GI tolerance, weight trends, labs, & skin integrity. Monitor chew/swallow tolerance, if pt noted with difficulty recommend NPO/SLP.   4. Defer bowel and pain regimens to team.   5. RD to remain available for diet education/intervention prn.     Education:   Encouraged pt continue with intake of meals and supplements. Recommend pt take shelf stable items off tray to snack in between meals. Emphasized importance of protein foods for recovery. Pt aware RD remains available for additional questions/concerns.     Risk Level: High [ x ] Moderate [   ] Low [   ]

## 2023-07-14 NOTE — PROGRESS NOTE ADULT - PROVIDER SPECIALTY LIST ADULT
CT Surgery
Critical Care
Endocrinology
Heart Failure
Nephrology
Critical Care
Endocrinology
Heart Failure
Heart Failure
Nephrology
Critical Care
Endocrinology
Heart Failure
Nephrology
Nephrology
CT Surgery
Critical Care
Heart Failure
Heart Failure
Nephrology
CT Surgery

## 2023-07-14 NOTE — DISCHARGE NOTE NURSING/CASE MANAGEMENT/SOCIAL WORK - NSDCPEFALRISK_GEN_ALL_CORE
For information on Fall & Injury Prevention, visit: [email or web address] OR  [email or web address] OR  https://www.cdc.gov/steadi/patient.html

## 2023-07-15 ENCOUNTER — TRANSCRIPTION ENCOUNTER (OUTPATIENT)
Age: 78
End: 2023-07-15

## 2023-07-17 ENCOUNTER — NON-APPOINTMENT (OUTPATIENT)
Age: 78
End: 2023-07-17

## 2023-07-17 ENCOUNTER — APPOINTMENT (OUTPATIENT)
Dept: CARE COORDINATION | Facility: HOME HEALTH | Age: 78
End: 2023-07-17
Payer: SELF-PAY

## 2023-07-17 PROCEDURE — 99024 POSTOP FOLLOW-UP VISIT: CPT

## 2023-07-17 RX ORDER — METFORMIN HYDROCHLORIDE 500 MG/1
500 TABLET, COATED ORAL TWICE DAILY
Qty: 60 | Refills: 2 | Status: ACTIVE | COMMUNITY

## 2023-07-17 RX ORDER — AMIODARONE HYDROCHLORIDE 200 MG/1
200 TABLET ORAL DAILY
Qty: 30 | Refills: 0 | Status: ACTIVE | COMMUNITY

## 2023-07-17 RX ORDER — FUROSEMIDE 40 MG/1
40 TABLET ORAL DAILY
Qty: 30 | Refills: 0 | Status: ACTIVE | COMMUNITY

## 2023-07-17 RX ORDER — ASPIRIN 81 MG/1
81 TABLET ORAL DAILY
Refills: 3 | Status: ACTIVE | COMMUNITY

## 2023-07-18 VITALS
SYSTOLIC BLOOD PRESSURE: 118 MMHG | DIASTOLIC BLOOD PRESSURE: 78 MMHG | OXYGEN SATURATION: 95 % | HEART RATE: 62 BPM | RESPIRATION RATE: 18 BRPM

## 2023-07-18 NOTE — ASSESSMENT
[FreeTextEntry1] : ASSESSMENT \par \par Patient recovering at home s/p AVR, accompanied by son Ghulam Johnson. Reviewed all medications and dosages with patient understanding. Patient has all medications in home and is taking as prescribed. Pain controlled with current medication regimen. No new symptoms, issues or concerns. Reports ambulating around home independently, without the use if assistive device. Denies chest pain, SOB/RODGERS, nausea/vomiting, constipation/diarrhea. \par \par \par PLAN OF CARE\par \par -IS use and return demonstration done patient pulling TV of 2000; encouraged IS use 10x q1h to improve circulation and breathing. \par \par -Per HF recomm Valsartan 40mg BID, discharged on daily dose, currently on diuretics and AV reinaldo agents, [facility] scheduled for 7/21/2023 based on assessment will titrate ARB appropriately. \par \par -Shower let water run over incision use antibacterial soap and pat dry; avoid all creams, ointments or lotions leave open to air. \par \par -Encourage increased ambulation to include outdoors; avoiding extreme temperatures.\par \par -Start daily weights today; call immediately for weight gain >3lbs in a day/5lbs in a week.\par \par Follow Up: \par [facility]: Scheduled Appointment: 07/26/2023 11:00 AM\par \par Follow Your Heart team will continue to follow up with patient's status. NP/CCC roles explained with patient understanding, contact information provided. Patient agrees to call with any questions, issues or concerns. Worsening symptoms reviewed with patient with reiteration and understanding.\par \par [facility] RN: SOC 7/16 per patient. \par Sternal Precautions\par \par Sternal precautions are used to help protect your sternum (breastbone) after open chest surgery. Wires are placed during surgery to hold the sternum together as it heals. Sternal precautions help prevent the wires from cutting through the sternum. The precautions also help prevent the sternum from coming apart from an injury, and prevent pain and bleeding. You may need to use the precautions for up to 12 weeks after surgery. It is important to follow the instructions carefully. An injury to the healing sternum can be life-threatening.\par \par General sternal precautions: Start slowly and do more as you get stronger. Pain medicine might make it harder for you to know when to slow down or be careful. Stop immediately if you hear a crunch or pop in your sternum.\par \par Protect your sternum. Hug a pillow to your chest or cross your arms over your chest when you laugh, sneeze, or cough.\par \par Be careful when you get into or out of a chair or bed. Hug a pillow or cross your arms when you stand or sit. Do not twist as you move. Use only your legs to sit and stand. You may need to use a raised toilet seat if you have trouble standing up without using your arms. \par \par Ask when you may take a bath or shower. You may need to use a bath chair if you have trouble getting into or out of the tub. Do not use a grab bar.\par \par Do not lift or carry anything heavier than 5 pounds. For example, a gallon of milk weighs 8 pounds.\par \par Keep your arms down as much as possible. Do not put your arms out to the side, behind you, or over your head. Do not let anyone pull your arms to help you move or dress. Do not reach for items.\par \par Do not push or pull anything. Examples include a car door or a vacuum .\par \par Do not drive while you are healing. Your surgeon will tell you when it is safe for you to start driving again.\par \par \par

## 2023-07-18 NOTE — HISTORY OF PRESENT ILLNESS
[FreeTextEntry1] : FY: [facility] [employer] [facility] \par 24 hr post-discharge follow-up and assessment \par \par Akash Johnson is a 78 year old male HTN and DM who originally presented to [facility] on 6/26 c/o chest pain who is now POD#12 from an AVR. Patient arrived to the ICU intubated on levophed. Overnight he had a R PTX noted requiring a pigtail placement. POD#1 he was able to be extubated. His creatinine bumped and pressors were started for better renal perfusion. On POD#2 patient was found to be cool and clammy. Echo performed revealing a new EF of 10%. He was then started on Dobutamine and Milrinone. He was electively intubated for \par cardiogenic shock. HF consulted and Bumex gtt was started. POD#5 Extubated to [facility]. POD#6 Saloni added for increasing pulmonary artery pressures. On POD#7 patient was allowed permissive hypertension for renal perfusion. Repeat Echo performed demonstrating improved LV function and mildly reduced RV function. Dobutamine held and Hydral and isordil added for GDMT. POD#8 right Pigtail  removed. POD#9 Bumex held. Jo removed and then replaced for retention. Echo performed with evidence of a small pericardial effusion. POD#10 patient was transferred to [facility] and passed TO. Overnight patient has moments of desaturation while sleeping, improves when he is awake. Will need outpatient LITA study. POD#11-12 patient's GDMT was titrated per HF recommendations. Discharged to home on 7/14/2023. \par \par

## 2023-07-21 ENCOUNTER — APPOINTMENT (OUTPATIENT)
Dept: CARE COORDINATION | Facility: HOME HEALTH | Age: 78
End: 2023-07-21
Payer: SELF-PAY

## 2023-07-21 VITALS
OXYGEN SATURATION: 97 % | WEIGHT: 151 LBS | RESPIRATION RATE: 18 BRPM | SYSTOLIC BLOOD PRESSURE: 129 MMHG | DIASTOLIC BLOOD PRESSURE: 68 MMHG | HEART RATE: 52 BPM

## 2023-07-21 DIAGNOSIS — I25.10 ATHEROSCLEROTIC HEART DISEASE OF NATIVE CORONARY ARTERY WITHOUT ANGINA PECTORIS: ICD-10-CM

## 2023-07-21 DIAGNOSIS — D62 ACUTE POSTHEMORRHAGIC ANEMIA: ICD-10-CM

## 2023-07-21 DIAGNOSIS — E43 UNSPECIFIED SEVERE PROTEIN-CALORIE MALNUTRITION: ICD-10-CM

## 2023-07-21 DIAGNOSIS — I44.0 ATRIOVENTRICULAR BLOCK, FIRST DEGREE: ICD-10-CM

## 2023-07-21 DIAGNOSIS — I31.39 OTHER PERICARDIAL EFFUSION (NONINFLAMMATORY): ICD-10-CM

## 2023-07-21 DIAGNOSIS — N18.30 CHRONIC KIDNEY DISEASE, STAGE 3 UNSPECIFIED: ICD-10-CM

## 2023-07-21 DIAGNOSIS — Z90.49 ACQUIRED ABSENCE OF OTHER SPECIFIED PARTS OF DIGESTIVE TRACT: ICD-10-CM

## 2023-07-21 DIAGNOSIS — I35.2 NONRHEUMATIC AORTIC (VALVE) STENOSIS WITH INSUFFICIENCY: ICD-10-CM

## 2023-07-21 DIAGNOSIS — R31.9 HEMATURIA, UNSPECIFIED: ICD-10-CM

## 2023-07-21 DIAGNOSIS — Z83.49 FAMILY HISTORY OF OTHER ENDOCRINE, NUTRITIONAL AND METABOLIC DISEASES: ICD-10-CM

## 2023-07-21 DIAGNOSIS — I13.0 HYPERTENSIVE HEART AND CHRONIC KIDNEY DISEASE WITH HEART FAILURE AND STAGE 1 THROUGH STAGE 4 CHRONIC KIDNEY DISEASE, OR UNSPECIFIED CHRONIC KIDNEY DISEASE: ICD-10-CM

## 2023-07-21 DIAGNOSIS — E11.649 TYPE 2 DIABETES MELLITUS WITH HYPOGLYCEMIA WITHOUT COMA: ICD-10-CM

## 2023-07-21 DIAGNOSIS — I42.8 OTHER CARDIOMYOPATHIES: ICD-10-CM

## 2023-07-21 DIAGNOSIS — Z83.3 FAMILY HISTORY OF DIABETES MELLITUS: ICD-10-CM

## 2023-07-21 DIAGNOSIS — E87.6 HYPOKALEMIA: ICD-10-CM

## 2023-07-21 DIAGNOSIS — E87.20 ACIDOSIS, UNSPECIFIED: ICD-10-CM
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
## 2023-07-21 DIAGNOSIS — I27.20 PULMONARY HYPERTENSION, UNSPECIFIED: ICD-10-CM

## 2023-07-21 DIAGNOSIS — I95.9 HYPOTENSION, UNSPECIFIED: ICD-10-CM

## 2023-07-21 DIAGNOSIS — E87.1 HYPO-OSMOLALITY AND HYPONATREMIA: ICD-10-CM

## 2023-07-21 DIAGNOSIS — I97.89 OTHER POSTPROCEDURAL COMPLICATIONS AND DISORDERS OF THE CIRCULATORY SYSTEM, NOT ELSEWHERE CLASSIFIED: ICD-10-CM

## 2023-07-21 DIAGNOSIS — Z90.79 ACQUIRED ABSENCE OF OTHER GENITAL ORGAN(S): ICD-10-CM

## 2023-07-21 DIAGNOSIS — I50.21 ACUTE SYSTOLIC (CONGESTIVE) HEART FAILURE: ICD-10-CM

## 2023-07-21 DIAGNOSIS — E87.8 OTHER DISORDERS OF ELECTROLYTE AND FLUID BALANCE, NOT ELSEWHERE CLASSIFIED: ICD-10-CM

## 2023-07-21 DIAGNOSIS — J93.9 PNEUMOTHORAX, UNSPECIFIED: ICD-10-CM

## 2023-07-21 DIAGNOSIS — T81.11XA POSTPROCEDURAL CARDIOGENIC SHOCK, INITIAL ENCOUNTER: ICD-10-CM

## 2023-07-21 DIAGNOSIS — I48.91 UNSPECIFIED ATRIAL FIBRILLATION: ICD-10-CM

## 2023-07-21 DIAGNOSIS — R00.1 BRADYCARDIA, UNSPECIFIED: ICD-10-CM

## 2023-07-21 DIAGNOSIS — R57.8 OTHER SHOCK: ICD-10-CM

## 2023-07-21 DIAGNOSIS — E86.1 HYPOVOLEMIA: ICD-10-CM

## 2023-07-21 DIAGNOSIS — R33.9 RETENTION OF URINE, UNSPECIFIED: ICD-10-CM

## 2023-07-21 DIAGNOSIS — J95.821 ACUTE POSTPROCEDURAL RESPIRATORY FAILURE: ICD-10-CM

## 2023-07-21 DIAGNOSIS — Z79.84 LONG TERM (CURRENT) USE OF ORAL HYPOGLYCEMIC DRUGS: ICD-10-CM

## 2023-07-21 DIAGNOSIS — E11.22 TYPE 2 DIABETES MELLITUS WITH DIABETIC CHRONIC KIDNEY DISEASE: ICD-10-CM

## 2023-07-21 DIAGNOSIS — N17.0 ACUTE KIDNEY FAILURE WITH TUBULAR NECROSIS: ICD-10-CM

## 2023-07-21 DIAGNOSIS — E11.65 TYPE 2 DIABETES MELLITUS WITH HYPERGLYCEMIA: ICD-10-CM

## 2023-07-21 PROCEDURE — 99024 POSTOP FOLLOW-UP VISIT: CPT

## 2023-07-21 RX ORDER — REPAGLINIDE 0.5 MG/1
0.5 TABLET ORAL TWICE DAILY
Refills: 0 | Status: ACTIVE | COMMUNITY

## 2023-07-21 RX ORDER — DEXTROSE 3.75 G
4 TABLET,CHEWABLE ORAL
Qty: 30 | Refills: 5 | Status: ACTIVE | COMMUNITY
Start: 2023-07-21 | End: 1900-01-01

## 2023-07-21 RX ORDER — OXYCODONE 5 MG/1
5 TABLET ORAL
Refills: 0 | Status: DISCONTINUED | COMMUNITY
End: 2023-07-21

## 2023-07-24 DIAGNOSIS — I10 ESSENTIAL (PRIMARY) HYPERTENSION: ICD-10-CM

## 2023-07-24 DIAGNOSIS — E11.9 TYPE 2 DIABETES MELLITUS W/OUT COMPLICATIONS: ICD-10-CM

## 2023-07-25 NOTE — REASON FOR VISIT
[Follow-Up: _____] : a [unfilled] follow-up visit [Family Member] : family member [Ad Hoc ] : provided by an ad hoc  [Interpreters_FullName] : Ghulam Johnson [Interpreters_Relationshiptopatient] : Son [TWNoteComboBox1] : [place]

## 2023-07-25 NOTE — ASSESSMENT
[FreeTextEntry1] : ASSESSMENT \par \par Patient recovering well at home s/p AVR, accompanied by son Ghulam Johnson and DIL. Reviewed all medications and dosages with patient understanding. Patient has all medications in home and is taking as prescribed. Pain controlled with current medication regimen. No new symptoms, issues or concerns. Reports ambulating around home independently, without the use if assistive device. Denies chest pain, SOB/RODGERS, nausea/vomiting, constipation/diarrhea. \par \par PLAN OF CARE\par \par -SB on exam today at 52 bpm, discharged on Toprol 1/2 tab of 25mg; per DIL unaware pill should be cut, took 25mg  in AM. Start Toprol 12.5mg 7/22 and onwards, denies SOB/RODGERS. \par \par -Per patient FBG qam since discharge ranges are 48-50, reports largest meals are breakfast and lunch, small meal for dinner, hypoglycemia likely due to repgalinide 0.5mg TID; REDUCE to with BID with breakfast and lunch. CONTINUE Metformin 500mg BID, unlikely to be the cause of hypoglycemia. Glucose tabs rx admin FBG <70. Discussed BB can mask hypoglycemia continue FBG. \par \par -Per son poor intake since discharge, discussed increasing protein intake to encourage wound healing and prevent fatigue and fluid retention. \par \par -HF recommendations for Valsartan 40mg BID, discharged on QD will discuss at POA after diuretics and AV reinaldo agents reviewed, patient appears euvolemic on exam, consistent weight loss since d/c on DW.\par \par -Lungs CTA; IS use and return demonstration done patient pulling TV of 2000; encouraged IS use 10x q1h to improve circulation and breathing. \par \par -Shower let water run over incision use antibacterial soap and pat dry; avoid all creams, ointments or lotions leave open to air. \par \par -Encourage increased ambulation to include outdoors; avoiding extreme temperatures.\par \par -Start daily weights today; call immediately for weight gain >3lbs in a day/5lbs in a week.\par \par \par Follow Up: \par [facility]: Scheduled Appointment: 07/26/2023 11:00 AM\par \par [facility] team will continue to follow up with patient's status. NP/CCC roles explained with patient understanding, contact information provided. Patient agrees to call with any questions, issues or concerns. Worsening symptoms reviewed with patient with reiteration and understanding.\par \par [facility] RN: SOC 7/16 per patient. \par \par Sternal Precautions\par \par Sternal precautions are used to help protect your sternum (breastbone) after open chest surgery. Wires are placed during surgery to hold the sternum together as it heals. Sternal precautions help prevent the wires from cutting through the sternum. The precautions also help prevent the sternum from coming apart from an injury, and prevent pain and bleeding. You may need to use the precautions for up to 12 weeks after surgery. It is important to follow the instructions carefully. An injury to the healing sternum can be life-threatening.\par \par General sternal precautions: Start slowly and do more as you get stronger. Pain medicine might make it harder for you to know when to slow down or be careful. Stop immediately if you hear a crunch or pop in your sternum.\par \par Protect your sternum. Hug a pillow to your chest or cross your arms over your chest when you laugh, sneeze, or cough.\par \par Be careful when you get into or out of a chair or bed. Hug a pillow or cross your arms when you stand or sit. Do not twist as you move. Use only your legs to sit and stand. You may need to use a raised toilet seat if you have trouble standing up without using your arms. \par \par Ask when you may take a bath or shower. You may need to use a bath chair if you have trouble getting into or out of the tub. Do not use a grab bar.\par \par Do not lift or carry anything heavier than 5 pounds. For example, a gallon of milk weighs 8 pounds.\par \par Keep your arms down as much as possible. Do not put your arms out to the side, behind you, or over your head. Do not let anyone pull your arms to help you move or dress. Do not reach for items.\par \par Do not push or pull anything. Examples include a car door or a vacuum .\par \par Do not drive while you are healing. Your surgeon will tell you when it is safe for you to start driving again.\par \par \par \par

## 2023-07-25 NOTE — HISTORY OF PRESENT ILLNESS
[FreeTextEntry1] : FY: [facility] \par 24 hr post-discharge follow-up and in home assessment \par \par Akash Johnson is a 78 year old male HTN and DM who originally presented to [facility] on 6/26 c/o chest pain who is now POD#12 from an AVR. Patient arrived to the ICU intubated on levophed. Overnight he had a R PTX noted requiring a pigtail placement. POD#1 he was able to be extubated. His creatinine bumped and pressors were started for better renal perfusion. On POD#2 patient was found to be cool and clammy. Echo performed revealing a new EF of 10%. He was then started on Dobutamine and Milrinone. He was electively intubated for \par cardiogenic shock. HF consulted and Bumex gtt was started. POD#5 Extubated to [facility]. POD#6 Saloni added for increasing pulmonary artery pressures. On POD#7 patient was allowed permissive hypertension for renal perfusion. Repeat Echo performed demonstrating improved LV function and mildly reduced RV function. Dobutamine held and Hydral and isordil added for GDMT. POD#8 right Pigtail removed. POD#9 Bumex held. Jo removed and then replaced for retention. Echo performed with evidence of a small pericardial effusion. POD#10 patient was transferred to [facility] and passed TO. Overnight patient has moments of desaturation while sleeping, improves when he is awake. Will need outpatient LITA study. POD#11-12 patient's GDMT was titrated per HF recommendations. Discharged to home on 7/14/2023.\par \par Post-op incision assessment: MSI and CT sites c/d/i; margins are well approximated sternum is stable, no drainage or signs of infection.

## 2023-07-25 NOTE — PHYSICAL EXAM
[Sclera] : the sclera and conjunctiva were normal [PERRL With Normal Accommodation] : pupils were equal in size, round, and reactive to light [Extraocular Movements] : extraocular movements were intact [Neck Appearance] : the appearance of the neck was normal [Jugular Venous Distention Increased] : there was no jugular-venous distention [Respiration, Rhythm And Depth] : normal respiratory rhythm and effort [Exaggerated Use Of Accessory Muscles For Inspiration] : no accessory muscle use [Auscultation Breath Sounds / Voice Sounds] : lungs were clear to auscultation bilaterally [Heart Sounds] : normal S1 and S2 [Heart Sounds Gallop] : no gallops [Bradycardic ___] : the heart rate was bradycardic at [unfilled] bpm [2+] : left 2+ [No Abnormalities] : the abdominal aorta was not enlarged and no bruit was heard [Breast Appearance] : normal in appearance [Bowel Sounds] : normal bowel sounds [Abdomen Soft] : soft [Abdomen Tenderness] : non-tender [Cervical Lymph Nodes Enlarged Posterior Bilaterally] : posterior cervical [Cervical Lymph Nodes Enlarged Anterior Bilaterally] : anterior cervical [No CVA Tenderness] : no ~M costovertebral angle tenderness [No Spinal Tenderness] : no spinal tenderness [Abnormal Walk] : normal gait [Nail Clubbing] : no clubbing  or cyanosis of the fingernails [Musculoskeletal - Swelling] : no joint swelling seen [Motor Tone] : muscle strength and tone were normal [Skin Color & Pigmentation] : normal skin color and pigmentation [Skin Turgor] : normal skin turgor [] : no rash [Deep Tendon Reflexes (DTR)] : deep tendon reflexes were 2+ and symmetric [No Focal Deficits] : no focal deficits [Sensation] : the sensory exam was normal to light touch and pinprick [Oriented To Time, Place, And Person] : oriented to person, place, and time [Impaired Insight] : insight and judgment were intact [Affect] : the affect was normal [Right Carotid Bruit] : no bruit heard over the right carotid [Left Carotid Bruit] : no bruit heard over the left carotid [Right Femoral Bruit] : no bruit heard over the right femoral artery [Left Femoral Bruit] : no bruit heard over the left femoral artery [FreeTextEntry1] : ambulating independently in home

## 2023-07-26 ENCOUNTER — OUTPATIENT (OUTPATIENT)
Dept: OUTPATIENT SERVICES | Facility: HOSPITAL | Age: 78
LOS: 1 days | End: 2023-07-26
Payer: MEDICAID

## 2023-07-26 ENCOUNTER — NON-APPOINTMENT (OUTPATIENT)
Age: 78
End: 2023-07-26

## 2023-07-26 ENCOUNTER — APPOINTMENT (OUTPATIENT)
Dept: CARDIOTHORACIC SURGERY | Facility: CLINIC | Age: 78
End: 2023-07-26
Payer: SELF-PAY

## 2023-07-26 VITALS
RESPIRATION RATE: 17 BRPM | DIASTOLIC BLOOD PRESSURE: 61 MMHG | WEIGHT: 140 LBS | BODY MASS INDEX: 22.5 KG/M2 | HEART RATE: 50 BPM | SYSTOLIC BLOOD PRESSURE: 143 MMHG | TEMPERATURE: 97.7 F | OXYGEN SATURATION: 98 % | HEIGHT: 66 IN

## 2023-07-26 DIAGNOSIS — Z90.49 ACQUIRED ABSENCE OF OTHER SPECIFIED PARTS OF DIGESTIVE TRACT: Chronic | ICD-10-CM

## 2023-07-26 DIAGNOSIS — Z90.79 ACQUIRED ABSENCE OF OTHER GENITAL ORGAN(S): Chronic | ICD-10-CM

## 2023-07-26 DIAGNOSIS — Z95.2 PRESENCE OF PROSTHETIC HEART VALVE: ICD-10-CM

## 2023-07-26 DIAGNOSIS — Z98.890 OTHER SPECIFIED POSTPROCEDURAL STATES: ICD-10-CM

## 2023-07-26 PROCEDURE — 71046 X-RAY EXAM CHEST 2 VIEWS: CPT | Mod: 26

## 2023-07-26 PROCEDURE — 71046 X-RAY EXAM CHEST 2 VIEWS: CPT

## 2023-07-26 PROCEDURE — 99024 POSTOP FOLLOW-UP VISIT: CPT

## 2023-07-30 NOTE — ASSESSMENT
[FreeTextEntry1] : 78 year old male HTN and DM, status post aortic valve replacement on 6/30/23, presents for a postop visit. \par \par - Follow up with PCP/Cardiologist Dr. Vogel. \par - Continue current medication regimen.\par - Continue to increase activity and walk daily as tolerated. Continue to use incentive spirometer. \par - No driving or strenuous activity for six weeks after surgery. Avoid lifting >10 to 15lbs for first two months after surgery. \par - Continue to use compression stockings. Keep legs elevated above heart when resting/sitting/sleeping.\par - Call MD if you experience fever, fatigue, dizziness, confusion, syncope, shortness of breath, chest pain not relieved with analgesics, increased redness/drainage from incision.\par - Patient discharged from CTS service. \par \par

## 2023-07-30 NOTE — REASON FOR VISIT
[de-identified] : bio aortic valve replacement  [de-identified] : 6/30/23  [de-identified] : Patient arrived to the ICU intubated on levophed. Overnight he had a R PTX noted requiring a pigtail placement. POD#1 he was able to be extubated. His creatinine bumped and pressors were started for better renal perfusion. On POD#2 patient was found to be cool and clammy. Echo performed revealing a new EF of 10%. He was then started on Dobutamine and Milrinone. He was electively intubated for cardiogenic shock. HF consulted and Bumex gtt was started. POD#5 Extubated to [place]. POD#6 Saloni added for increasing pulmonary artery pressures. On POD#7 patient was allowed permissive hypertension for renal perfusion. Repeat Echo performed demonstrating improved LV function and mildly reduced RV function. Dobutamine held and Hydral and isordil added for GDMT. POD#8 right Pigtail removed. POD#9 Bumex held. Jo removed and then replaced for retention. Echo performed with evidence of a small pericardial effusion. POD#10 patient was transferred to [facility] and passed TO. Overnight patient has moments of desaturation while sleeping, improves when he is awake. Will need outpatient LITA study. POD#11-12 patient's GDMT was titrated per HF recommendations. Patient is medically ready for discharge home on 7/14/23. Because the patient does not have insurance, his medications were sent to the pharmacy and family is willing to pay for the medications out of pocket. \par \par Patient is recuperating well from surgery. He is ambulating and increasing his activities daily. Patient denies fever, chills, dizziness, syncope, shortness of breath, chest pain, palpitations or peripheral edema.\par \par Chest X ray reviewed today and demonstrates clear lung fields. There is no effusion, infiltrate, or pneumothorax.\par

## 2023-07-30 NOTE — END OF VISIT
[FreeTextEntry3] : I, LYNETTE BISHOP , am scribing for and in the presence of CANDACE CLIFTON the following sections: History of present illness, past Medical/family/surgical/family/social history, review of systems, vital signs, physical exam and disposition. I personally performed the services described in the documentation, reviewed the documentation recorded by the scribe in my presence and it accurately and completely records my words and actions.

## 2023-07-30 NOTE — CONSULT LETTER
[Dear  ___] : Dear  [unfilled], [Please see my note below.] : Please see my note below. [FreeTextEntry2] : Harinder Vogel MD [FreeTextEntry1] : Mr. VIPIN FRANCE   was seen today for a post operative visit. He is doing well status post bio aortic valve replacement on 6/30/23. We have discharged the patient from CTS service and have asked that the patient followup in your office. Enclosed is a copy of the operative report. Please contact our office for any questions or concerns at [phone].\par \par Thank you for allowing us to participate in this patient's care.\par \par  [FreeTextEntry3] : Sincerely, \par \par \par \par \par \par [facility] M.D.\par Professor of Cardiovascular and Thoracic Surgery\par Minimally Invasive Valve Surgeon\par Director of Aortic Surgery, [facility]\par Cell: [phone]\par Email: [email or web address] \par \par [facility]:\par [address], [address], [place] [address]\par Office: [phone]\par Fax: [phone]\par \par [facility]:\par Department of Cardiovascular and Thoracic Surgery\par [address] [place] [address], [address]\par Office: [phone]\par Fax: [phone]\par \par Practice Manager: Ms. Lydia Almonte\par Email: [email or web address]\par Phone: [phone]\par \par \par \par

## 2023-07-30 NOTE — PROCEDURE
[FreeTextEntry1] : TTE 7/10/23\par  1. Limited study obtained for evaluation of pericardial effusion.\par  2. Borderline reduced left ventricular systolic function.\par  3. Basal to mid inferior and basal inferoseptum is hypokinetic.\par  4. Normal right ventricular size.\par  5. Reduced right ventricular systolic function.\par  6. Bioprosthetic valve is seen in the aortic position.\par  7. Pulmonary hypertension present, pulmonary artery systolic pressure is 79 mmHg.\par  8. Small-to-moderate pericardial effusion without echocardiographic evidence of cardiac tamponade physiology.\par  9. Right pleural effusion.\par 10. Compared to the previous TTE performed on 7/5/2023, pericardial effusion is unchanged.

## 2023-08-01 ENCOUNTER — NON-APPOINTMENT (OUTPATIENT)
Age: 78
End: 2023-08-01

## 2023-08-01 ENCOUNTER — TRANSCRIPTION ENCOUNTER (OUTPATIENT)
Age: 78
End: 2023-08-01

## 2023-08-02 ENCOUNTER — TRANSCRIPTION ENCOUNTER (OUTPATIENT)
Age: 78
End: 2023-08-02

## 2023-08-12 ENCOUNTER — TRANSCRIPTION ENCOUNTER (OUTPATIENT)
Age: 78
End: 2023-08-12

## 2023-08-12 RX ORDER — AMIODARONE HYDROCHLORIDE 400 MG/1
1 TABLET ORAL
Qty: 30 | Refills: 0
Start: 2023-08-12 | End: 2023-09-10

## 2023-08-15 ENCOUNTER — TRANSCRIPTION ENCOUNTER (OUTPATIENT)
Age: 78
End: 2023-08-15

## 2023-08-16 RX ORDER — VALSARTAN 40 MG/1
40 TABLET, COATED ORAL DAILY
Qty: 60 | Refills: 0 | Status: ACTIVE | COMMUNITY
Start: 1900-01-01 | End: 1900-01-01

## 2023-08-16 RX ORDER — METOPROLOL SUCCINATE 25 MG/1
25 TABLET, EXTENDED RELEASE ORAL DAILY
Qty: 30 | Refills: 0 | Status: ACTIVE | COMMUNITY
Start: 1900-01-01 | End: 1900-01-01

## 2023-09-18 NOTE — PROGRESS NOTE ADULT - NS ATTEST RISK GEN_ALL_CORE
Risk Statement (NON-critical care)
[id number]

## 2024-02-03 NOTE — PROCEDURE NOTE - NSLINELOCINTERCO_GEN_A_CORE
4th intercostal space
Patient requests all Lab, Cardiology, and Radiology Results on their Discharge Instructions

## 2024-10-04 NOTE — PROGRESS NOTE ADULT - ASSESSMENT
77M PMH HTN, DM, CAD (nonobstructive on [facility] 6/27/23), AS/AI s/p AVR 6/29/23 with course c/b hypotension and concern for cardiogenic shock.     TTE 7/3/23: LVIDd 3.9cm. LVEF 10-15% with global hypokinesis. ?Reverse takotsubo pattern. RV normal in size with reduced function. LA mildly dilated. BioAVR, MG 7, no AI. Trace MR. Mild to moderate TR. PASP 58. Small pericardial effusion.  [facility] 6/27/23 ([facility]): Nonobstructive CAD    Hemodynamics  7/4 AM: MvO2 72.8; Erika CO 4.4 CI 2.7  7/5 AM: MvO2 68.2; Erika CO 3.3 CI 1.8, PAP 39/14, PCWP 12  7/6: AM: MvO2 81.1, Thermodilution CO 5.1, CI 2.8     #Cardiogenic shock  TTE with newly reduced EF compared to [facility] TTE, EF now severely reduced with EF 10-15% with global hypokinesis.   -Small decrease in MvO2 today compared to yesterday, D/C Milrinone   -Wean vasopressin as tolerated  -Continue to wean dobutamine as tolerated   -HF management as below    #HFrEF  -Etiology: NICM given nonobstructive CAD on recent [facility]. Etiology unclear. Anticipate repeat TTE for LV function assessment once stable.   -GDMT: On hold while on inotropes  -Volume: CVPs currently 2. Goal net even   -Devices: Not appropriate at this time    Pending discussion with Dr. Cuellar  [Use of Plain Language] : use of plain language [Adequate] : adequate [None] : none 77M PMH HTN, DM, CAD (nonobstructive on [facility] 6/27/23), AS/AI s/p AVR 6/29/23 with course c/b hypotension and concern for cardiogenic shock.     TTE 7/3/23: LVIDd 3.9cm. LVEF 10-15% with global hypokinesis. ?Reverse takotsubo pattern. RV normal in size with reduced function. LA mildly dilated. BioAVR, MG 7, no AI. Trace MR. Mild to moderate TR. PASP 58. Small pericardial effusion.  [facility] 6/27/23 ([facility]): Nonobstructive CAD    Hemodynamics  7/4 AM: MvO2 72.8; Erika CO 4.4 CI 2.7  7/5 AM: MvO2 68.2; Erika CO 3.3 CI 1.8, PAP 39/14, PCWP 12  7/6: AM: MvO2 81.1, Thermodilution CO 5.1, CI 2.8     #Cardiogenic shock  TTE with newly reduced EF compared to [facility] TTE, EF now severely reduced with EF 10-15% with global hypokinesis.   -D/C Milrinone   -Wean vasopressin as tolerated  -Add Saloni given elevated PA pressures with RV dysfunction, should likely help with inotrope wean and vasopressin requirements   -Would plan to wean dobutamine tomorrow pending hemodynamics   -HF management as below  -D/c Toro     #HFrEF  -Etiology: NICM given nonobstructive CAD on recent [facility]. Etiology unclear. Anticipate repeat TTE for LV function assessment once stable.   -GDMT: Will intorduce as tolerates. ECHO once off intropes  -Volume: CVPs currently 2. Goal net even   -Devices: Not appropriate at this time    Discussed with Dr. Cuellar

## 2025-04-08 NOTE — H&P ADULT - NSHPSOCIALHISTORY_GEN_ALL_CORE
"Kary"
Denies hx of smoking or ETOH use  Lives in [facility], staying with son presently   Ambulates independently

## (undated) DEVICE — SUT PROLENE 6-0 30" RB-2

## (undated) DEVICE — DRAPE PROBE COVER 5" X 96"

## (undated) DEVICE — SAW BLADE STRYKER MICRO SAGITTAL OFFSET 13X0.51X50MM

## (undated) DEVICE — DRAPE IOBAN 33" X 23"

## (undated) DEVICE — KIT APPLICATOR SPRAY FOR HARVEST SYSTEM

## (undated) DEVICE — CATH TRIOX OXIMETRY 8F 3 LUMENS

## (undated) DEVICE — SUT VICRYL 2-0 27" CT-1

## (undated) DEVICE — DRSG TAPE UMBILICAL COTTON 2" X 30 X 1/8"

## (undated) DEVICE — TUBING BRAT 2 SUCTION ASSEMBLY TWIST LOCK

## (undated) DEVICE — INS ST DBL SAFEJAW FGRTY

## (undated) DEVICE — SUT SILK 2-0 18" SH (POP-OFF)

## (undated) DEVICE — SUMP INTRACARDIAC/PERICARDIAL 20FR 1/4" ADULT

## (undated) DEVICE — APPLICATOR PACK

## (undated) DEVICE — SUT PROLENE 5-0 36" RB-1

## (undated) DEVICE — SUT PROLENE 4-0 36" SH

## (undated) DEVICE — SUT PROLENE 5-0 30" RB-2

## (undated) DEVICE — MARKING PEN W RULER

## (undated) DEVICE — SUT ETHIBOND 3-0 36" RB-1

## (undated) DEVICE — DRAPE SLUSH / WARMER 44 X 66"

## (undated) DEVICE — CONNECTOR STRAIGHT 3/8 X 1/2"

## (undated) DEVICE — DRSG PREVENA PLUS SYSTEM

## (undated) DEVICE — PACK PROCEDURE HARVEST SMARTPREP APC-60

## (undated) DEVICE — SUT PLEDGET 9MM X 4MM X 1.5MM

## (undated) DEVICE — SUT MONOCRYL 4-0 27" PS-2 UNDYED

## (undated) DEVICE — RIGID ADULT SUCKER

## (undated) DEVICE — SUT PROLENE 3-0 36" RB-1

## (undated) DEVICE — PACK PROC CV DRAPE

## (undated) DEVICE — DRSG STERISTRIPS 0.5 X 4"

## (undated) DEVICE — CARDIOPLEGIA MANAGEMENT SET COLOR CODED CLAMPS

## (undated) DEVICE — ELCTR STRYKER NEPTUNE SMOKE EVACUATION PENCIL (GREEN)

## (undated) DEVICE — TOURNIQUET SET 12FR (1 RED, 2 BLUE, 3 CLEAR, 1 SNARE) 5.5"

## (undated) DEVICE — SUT NUROLON 1 18" OS-8 (POP-OFF)

## (undated) DEVICE — APPLICATION TIP

## (undated) DEVICE — VENTING ADAPTER "Y" (RED/BLUE) 7.5"

## (undated) DEVICE — SUT PROLENE 2-0 36" SH

## (undated) DEVICE — SUT PROLENE 4-0 36" RB-1

## (undated) DEVICE — SUT STAINLESS STEEL 7 4-18" CCS

## (undated) DEVICE — ELCTR REM POLYHESIVE ADULT PT RETURN 15FT

## (undated) DEVICE — PACK OPEN HEART LNX

## (undated) DEVICE — Device

## (undated) DEVICE — SUT PROLENE 3-0 36" SH-1

## (undated) DEVICE — PACING CABLE (BROWN) A/V TEMP SCREW DOWN 12FT

## (undated) DEVICE — SUT PROLENE 3-0 36" SH

## (undated) DEVICE — TUBING SMOKE EVAC 3/8" X 10FT FOR NEPTUNE

## (undated) DEVICE — SUT SILK 5-0 60" TIES